# Patient Record
Sex: FEMALE | Race: WHITE | NOT HISPANIC OR LATINO | Employment: OTHER | URBAN - METROPOLITAN AREA
[De-identification: names, ages, dates, MRNs, and addresses within clinical notes are randomized per-mention and may not be internally consistent; named-entity substitution may affect disease eponyms.]

---

## 2017-01-01 DIAGNOSIS — E78.5 DYSLIPIDEMIA: ICD-10-CM

## 2017-01-02 RX ORDER — SIMVASTATIN 40 MG/1
TABLET, FILM COATED ORAL
Qty: 30 TABLET | Refills: 5 | Status: SHIPPED | OUTPATIENT
Start: 2017-01-02 | End: 2017-07-13 | Stop reason: SDUPTHER

## 2017-01-11 ENCOUNTER — OFFICE VISIT (OUTPATIENT)
Dept: FAMILY MEDICINE | Facility: CLINIC | Age: 77
End: 2017-01-11
Payer: MEDICARE

## 2017-01-11 VITALS
HEIGHT: 69 IN | WEIGHT: 145 LBS | DIASTOLIC BLOOD PRESSURE: 80 MMHG | BODY MASS INDEX: 21.48 KG/M2 | SYSTOLIC BLOOD PRESSURE: 148 MMHG | HEART RATE: 84 BPM

## 2017-01-11 DIAGNOSIS — Z12.39 BREAST CANCER SCREENING: ICD-10-CM

## 2017-01-11 DIAGNOSIS — E04.1 THYROID NODULE: ICD-10-CM

## 2017-01-11 DIAGNOSIS — G81.91 RIGHT HEMIPLEGIA: ICD-10-CM

## 2017-01-11 DIAGNOSIS — Z12.31 ENCOUNTER FOR SCREENING MAMMOGRAM FOR MALIGNANT NEOPLASM OF BREAST: ICD-10-CM

## 2017-01-11 DIAGNOSIS — K21.9 GASTROESOPHAGEAL REFLUX DISEASE, ESOPHAGITIS PRESENCE NOT SPECIFIED: ICD-10-CM

## 2017-01-11 DIAGNOSIS — R47.01 APHASIA: ICD-10-CM

## 2017-01-11 DIAGNOSIS — E11.49 DIABETES MELLITUS TYPE 2 WITH NEUROLOGICAL MANIFESTATIONS: ICD-10-CM

## 2017-01-11 DIAGNOSIS — I10 ESSENTIAL HYPERTENSION: Primary | ICD-10-CM

## 2017-01-11 PROCEDURE — 99999 PR PBB SHADOW E&M-EST. PATIENT-LVL II: CPT | Mod: PBBFAC,,, | Performed by: FAMILY MEDICINE

## 2017-01-11 PROCEDURE — 3077F SYST BP >= 140 MM HG: CPT | Mod: S$GLB,,, | Performed by: FAMILY MEDICINE

## 2017-01-11 PROCEDURE — 1159F MED LIST DOCD IN RCRD: CPT | Mod: S$GLB,,, | Performed by: FAMILY MEDICINE

## 2017-01-11 PROCEDURE — 3079F DIAST BP 80-89 MM HG: CPT | Mod: S$GLB,,, | Performed by: FAMILY MEDICINE

## 2017-01-11 PROCEDURE — 99214 OFFICE O/P EST MOD 30 MIN: CPT | Mod: S$GLB,,, | Performed by: FAMILY MEDICINE

## 2017-01-11 PROCEDURE — 4010F ACE/ARB THERAPY RXD/TAKEN: CPT | Mod: S$GLB,,, | Performed by: FAMILY MEDICINE

## 2017-01-11 PROCEDURE — 1157F ADVNC CARE PLAN IN RCRD: CPT | Mod: S$GLB,,, | Performed by: FAMILY MEDICINE

## 2017-01-11 PROCEDURE — 1160F RVW MEDS BY RX/DR IN RCRD: CPT | Mod: S$GLB,,, | Performed by: FAMILY MEDICINE

## 2017-01-11 PROCEDURE — 3044F HG A1C LEVEL LT 7.0%: CPT | Mod: S$GLB,,, | Performed by: FAMILY MEDICINE

## 2017-01-11 PROCEDURE — 99499 UNLISTED E&M SERVICE: CPT | Mod: S$GLB,,, | Performed by: FAMILY MEDICINE

## 2017-01-11 PROCEDURE — 2022F DILAT RTA XM EVC RTNOPTHY: CPT | Mod: S$GLB,,, | Performed by: FAMILY MEDICINE

## 2017-01-11 RX ORDER — LISINOPRIL AND HYDROCHLOROTHIAZIDE 12.5; 2 MG/1; MG/1
1 TABLET ORAL DAILY
Qty: 30 TABLET | Refills: 5 | Status: SHIPPED | OUTPATIENT
Start: 2017-01-11 | End: 2017-01-25 | Stop reason: SDUPTHER

## 2017-01-11 RX ORDER — LISINOPRIL 20 MG/1
20 TABLET ORAL DAILY
Status: CANCELLED | OUTPATIENT
Start: 2017-01-11

## 2017-01-11 RX ORDER — METFORMIN HYDROCHLORIDE 1000 MG/1
1000 TABLET ORAL 2 TIMES DAILY WITH MEALS
Qty: 60 TABLET | Refills: 5 | Status: SHIPPED | OUTPATIENT
Start: 2017-01-11 | End: 2017-07-08 | Stop reason: SDUPTHER

## 2017-01-11 RX ORDER — CARVEDILOL 12.5 MG/1
12.5 TABLET ORAL 2 TIMES DAILY
Qty: 60 TABLET | Refills: 11 | Status: SHIPPED | OUTPATIENT
Start: 2017-01-11 | End: 2018-01-13 | Stop reason: SDUPTHER

## 2017-01-11 RX ORDER — PANTOPRAZOLE SODIUM 40 MG/1
40 TABLET, DELAYED RELEASE ORAL DAILY
Qty: 30 TABLET | Refills: 11 | Status: SHIPPED | OUTPATIENT
Start: 2017-01-11 | End: 2018-01-12 | Stop reason: SDUPTHER

## 2017-01-11 NOTE — MR AVS SNAPSHOT
[facility]  [address]  [facility] [address]  Phone: [phone]  Fax: [phone]                  Michelle Rowell   2017 3:30 PM   Office Visit    Description:  Female : 1940   Provider:  Reno Hilario MD   Department:  [facility]           Reason for Visit     Follow-up           Diagnoses this Visit        Comments    Essential hypertension    -  Primary     Diabetes mellitus type 2 with neurological manifestations         Gastroesophageal reflux disease, esophagitis presence not specified         Right hemiplegia         Aphasia         Breast cancer screening         Encounter for screening mammogram for malignant neoplasm of breast         Thyroid nodule                To Do List           Future Appointments        Provider Department Dept Phone    2017 11:30 AM STAH MAMMO1 [facility] [phone]    2017 4:15 PM Reno Hilario MD [facility] [phone]    2017 11:30 AM Rodrick Faria MD [facility] - Neurology [phone]      Goals (5 Years of Data)     None      Follow-Up and Disposition     Return in about 2 weeks (around 2017).    Follow-up and Disposition History       These Medications        Disp Refills Start End    metformin (GLUCOPHAGE) 1000 MG tablet 60 tablet 5 2017     Take 1 tablet (1,000 mg total) by mouth 2 (two) times daily with meals. - Oral    Pharmacy: [facility] [address] [place] [address] Ph #: [phone]       carvedilol (COREG) 12.5 MG tablet 60 tablet 11 2017    Take 1 tablet (12.5 mg total) by mouth 2 (two) times daily. - Oral    Pharmacy: [facility] [address] [place] [address] Ph #: [phone]       pantoprazole (PROTONIX) 40 MG tablet 30 tablet 11 2017     Take 1 tablet (40 mg total) by mouth once daily. - Oral    Pharmacy: [facility] [address] [place] [address]  Ph #: [phone]       lisinopril-hydrochlorothiazide (PRINZIDE,ZESTORETIC) 20-12.5 mg per tablet 30 tablet 5 1/11/2017 1/11/2018    Take 1 tablet by mouth once daily. - Oral    Pharmacy: [facility] [place] [address] Ph #: [phone]         [facility]     [facility] On Call Nurse Care Line - 24/7 Assistance  Registered nurses in the [facility] provide clinical advisement, health education, appointment booking, and other advisory services.  Call for this free service at [phone].             Medications           Message regarding Medications     Verify the changes and/or additions to your medication regime listed below are the same as discussed with your clinician today.  If any of these changes or additions are incorrect, please notify your healthcare provider.        START taking these NEW medications        Refills    lisinopril-hydrochlorothiazide (PRINZIDE,ZESTORETIC) 20-12.5 mg per tablet 5    Sig: Take 1 tablet by mouth once daily.    Class: Normal    Route: Oral      CHANGE how you are taking these medications     Start Taking Instead of    metformin (GLUCOPHAGE) 1000 MG tablet metformin (GLUCOPHAGE) 1000 MG tablet    Dosage:  Take 1 tablet (1,000 mg total) by mouth 2 (two) times daily with meals. Dosage:  TAKE ONE TABLET BY MOUTH TWICE DAILY WITH  MEALS    Reason for Change:  Reorder     pantoprazole (PROTONIX) 40 MG tablet pantoprazole (PROTONIX) 40 MG tablet    Dosage:  Take 1 tablet (40 mg total) by mouth once daily. Dosage:  TAKE ONE TABLET BY MOUTH ONCE DAILY    Reason for Change:  Reorder            Verify that the below list of medications is an accurate representation of the medications you are currently taking.  If none reported, the list may be blank. If incorrect, please contact your healthcare provider. Carry this list with you in case of emergency.           Current Medications     carvedilol (COREG) 12.5 MG tablet Take 1 tablet (12.5 mg total)  "by mouth 2 (two) times daily.    latanoprost 0.005 % ophthalmic solution Place 1 drop into both eyes every evening.    lisinopril (PRINIVIL,ZESTRIL) 20 MG tablet Take 1 tablet by mouth once daily.    metformin (GLUCOPHAGE) 1000 MG tablet Take 1 tablet (1,000 mg total) by mouth 2 (two) times daily with meals.    pantoprazole (PROTONIX) 40 MG tablet Take 1 tablet (40 mg total) by mouth once daily.    simvastatin (ZOCOR) 40 MG tablet TAKE ONE TABLET BY MOUTH ONCE DAILY IN THE EVENING    lisinopril-hydrochlorothiazide (PRINZIDE,ZESTORETIC) 20-12.5 mg per tablet Take 1 tablet by mouth once daily.           Clinical Reference Information           Vital Signs - Last Recorded  Most recent update: 1/11/2017  3:46 PM by Nasim River MA    BP Pulse Ht Wt BMI    (!) 148/80 (BP Location: Left arm, Patient Position: Sitting, BP Method: Manual) 84 5' 9" (1.753 m) 65.8 kg (145 lb) 21.41 kg/m2      Blood Pressure          Most Recent Value    BP  (!)  148/80      Allergies as of 1/11/2017     No Known Allergies      Immunizations Administered on Date of Encounter - 1/11/2017     None      Orders Placed During Today's Visit     Future Labs/Procedures Expected by Expires    Mammo Digital Screening Bilat with CAD  1/11/2017 3/14/2018      "

## 2017-01-11 NOTE — PROGRESS NOTES
Subjective:       Patient ID: Michelle Rowell is a 76 y.o. female.    Chief Complaint: Follow-up    Pt is a 76 y.o. female who presents for evaluation and management of   Encounter Diagnoses   Name Primary?    Essential hypertension Yes    Diabetes mellitus type 2 with neurological manifestations     Gastroesophageal reflux disease, esophagitis presence not specified     Right hemiplegia     Aphasia     Breast cancer screening     Encounter for screening mammogram for malignant neoplasm of breast     .  Doing well on current meds. Denies any side effects. Prevention is up to date.    Review of Systems   Constitutional: Negative for chills and fever.   Respiratory: Negative for shortness of breath.    Cardiovascular: Negative for chest pain and palpitations.   Gastrointestinal: Negative for abdominal pain, blood in stool, constipation and nausea.   Genitourinary: Negative for difficulty urinating.   Neurological: Positive for speech difficulty, weakness and numbness.   Psychiatric/Behavioral: Negative for dysphoric mood, sleep disturbance and suicidal ideas. The patient is not nervous/anxious.        Objective:      Physical Exam   Constitutional: She is oriented to person, place, and time. She appears well-developed and well-nourished.   HENT:   Head: Normocephalic and atraumatic.   Right Ear: External ear normal.   Left Ear: External ear normal.   Nose: Nose normal.   Mouth/Throat: Oropharynx is clear and moist.   Eyes: EOM are normal. Pupils are equal, round, and reactive to light.   Neck: Normal range of motion. Neck supple. No JVD present. No tracheal deviation present. No thyromegaly present.   Cardiovascular: Normal rate, normal heart sounds and intact distal pulses.    No murmur heard.  Pulmonary/Chest: Effort normal and breath sounds normal. No respiratory distress. She has no wheezes. She has no rales. She exhibits no tenderness.   Abdominal: Soft. Bowel sounds are normal. She exhibits no  distension and no mass. There is no tenderness. There is no rebound and no guarding.   Musculoskeletal: Normal range of motion. She exhibits no edema or tenderness.   Lymphadenopathy:     She has no cervical adenopathy.   Neurological: She is alert and oriented to person, place, and time. She has normal reflexes. She displays normal reflexes. No cranial nerve deficit. She exhibits normal muscle tone. Coordination normal.   Mild dysarthria   Skin: Skin is warm and dry. No rash noted. No erythema. No pallor.   Psychiatric: She has a normal mood and affect. Her behavior is normal. Judgment and thought content normal.       Assessment:       1. Essential hypertension    2. Diabetes mellitus type 2 with neurological manifestations    3. Gastroesophageal reflux disease, esophagitis presence not specified    4. Right hemiplegia    5. Aphasia    6. Breast cancer screening    7. Encounter for screening mammogram for malignant neoplasm of breast         Plan:   Michelle was seen today for follow-up.    Diagnoses and all orders for this visit:    Essential hypertension  -     carvedilol (COREG) 12.5 MG tablet; Take 1 tablet (12.5 mg total) by mouth 2 (two) times daily.  -     lisinopril-hydrochlorothiazide (PRINZIDE,ZESTORETIC) 20-12.5 mg per tablet; Take 1 tablet by mouth once daily.    Diabetes mellitus type 2 with neurological manifestations  -     metformin (GLUCOPHAGE) 1000 MG tablet; Take 1 tablet (1,000 mg total) by mouth 2 (two) times daily with meals.    Gastroesophageal reflux disease, esophagitis presence not specified  -     pantoprazole (PROTONIX) 40 MG tablet; Take 1 tablet (40 mg total) by mouth once daily.    Right hemiplegia    Aphasia    Breast cancer screening  -     Mammo Digital Screening Bilat with CAD; Future    Encounter for screening mammogram for malignant neoplasm of breast   -     Mammo Digital Screening Bilat with CAD; Future    Thyroid nodule    Other orders  -     Cancel: lisinopril  (PRINIVIL,ZESTRIL) 20 MG tablet; Take 1 tablet (20 mg total) by mouth once daily.    RTC 2 weeks fo BP---added 12.5 HCT to ACE   See [facility] for FNA     No Follow-up on file.

## 2017-01-17 ENCOUNTER — HOSPITAL ENCOUNTER (OUTPATIENT)
Dept: RADIOLOGY | Facility: HOSPITAL | Age: 77
Discharge: HOME | End: 2017-01-17
Attending: FAMILY MEDICINE
Payer: MEDICARE

## 2017-01-17 DIAGNOSIS — Z12.39 BREAST CANCER SCREENING: ICD-10-CM

## 2017-01-17 DIAGNOSIS — Z12.31 ENCOUNTER FOR SCREENING MAMMOGRAM FOR MALIGNANT NEOPLASM OF BREAST: ICD-10-CM

## 2017-01-17 PROCEDURE — 77067 SCR MAMMO BI INCL CAD: CPT | Mod: 26,,, | Performed by: RADIOLOGY

## 2017-01-17 PROCEDURE — 77063 BREAST TOMOSYNTHESIS BI: CPT | Mod: 26,,, | Performed by: RADIOLOGY

## 2017-01-17 PROCEDURE — 77067 SCR MAMMO BI INCL CAD: CPT | Mod: TC

## 2017-01-25 ENCOUNTER — OFFICE VISIT (OUTPATIENT)
Dept: FAMILY MEDICINE | Facility: CLINIC | Age: 77
End: 2017-01-25
Payer: MEDICARE

## 2017-01-25 VITALS
HEART RATE: 88 BPM | WEIGHT: 141.38 LBS | HEIGHT: 69 IN | BODY MASS INDEX: 20.94 KG/M2 | SYSTOLIC BLOOD PRESSURE: 126 MMHG | DIASTOLIC BLOOD PRESSURE: 76 MMHG

## 2017-01-25 DIAGNOSIS — I10 ESSENTIAL HYPERTENSION: Primary | ICD-10-CM

## 2017-01-25 DIAGNOSIS — E11.40 TYPE 2 DIABETES MELLITUS WITH DIABETIC NEUROPATHY, WITHOUT LONG-TERM CURRENT USE OF INSULIN: ICD-10-CM

## 2017-01-25 DIAGNOSIS — E78.5 DYSLIPIDEMIA: ICD-10-CM

## 2017-01-25 PROCEDURE — 1157F ADVNC CARE PLAN IN RCRD: CPT | Mod: S$GLB,,, | Performed by: FAMILY MEDICINE

## 2017-01-25 PROCEDURE — 99499 UNLISTED E&M SERVICE: CPT | Mod: S$GLB,,, | Performed by: FAMILY MEDICINE

## 2017-01-25 PROCEDURE — 1159F MED LIST DOCD IN RCRD: CPT | Mod: S$GLB,,, | Performed by: FAMILY MEDICINE

## 2017-01-25 PROCEDURE — 3074F SYST BP LT 130 MM HG: CPT | Mod: S$GLB,,, | Performed by: FAMILY MEDICINE

## 2017-01-25 PROCEDURE — 4010F ACE/ARB THERAPY RXD/TAKEN: CPT | Mod: S$GLB,,, | Performed by: FAMILY MEDICINE

## 2017-01-25 PROCEDURE — 3044F HG A1C LEVEL LT 7.0%: CPT | Mod: S$GLB,,, | Performed by: FAMILY MEDICINE

## 2017-01-25 PROCEDURE — 99999 PR PBB SHADOW E&M-EST. PATIENT-LVL II: CPT | Mod: PBBFAC,,, | Performed by: FAMILY MEDICINE

## 2017-01-25 PROCEDURE — 1160F RVW MEDS BY RX/DR IN RCRD: CPT | Mod: S$GLB,,, | Performed by: FAMILY MEDICINE

## 2017-01-25 PROCEDURE — 2022F DILAT RTA XM EVC RTNOPTHY: CPT | Mod: S$GLB,,, | Performed by: FAMILY MEDICINE

## 2017-01-25 PROCEDURE — 3078F DIAST BP <80 MM HG: CPT | Mod: S$GLB,,, | Performed by: FAMILY MEDICINE

## 2017-01-25 PROCEDURE — 99213 OFFICE O/P EST LOW 20 MIN: CPT | Mod: S$GLB,,, | Performed by: FAMILY MEDICINE

## 2017-01-25 RX ORDER — LISINOPRIL AND HYDROCHLOROTHIAZIDE 12.5; 2 MG/1; MG/1
1 TABLET ORAL DAILY
Qty: 30 TABLET | Refills: 5 | Status: SHIPPED | OUTPATIENT
Start: 2017-01-25 | End: 2018-01-25

## 2017-01-25 NOTE — PROGRESS NOTES
Subjective:       Patient ID: Michelle Rowell is a 76 y.o. female.    Chief Complaint: Follow-up (1 week)    Pt is a 76 y.o. female who presents for evaluation and management of   Encounter Diagnoses   Name Primary?    Essential hypertension Yes    Dyslipidemia     Type 2 diabetes mellitus with diabetic neuropathy, without long-term current use of insulin    .  Doing well on current meds. Denies any side effects. Prevention is up to date.    Review of Systems   Respiratory: Negative for shortness of breath.    Cardiovascular: Negative for chest pain.       Objective:      Physical Exam   Constitutional: She is oriented to person, place, and time. She appears well-developed and well-nourished.   HENT:   Head: Normocephalic and atraumatic.   Right Ear: External ear normal.   Left Ear: External ear normal.   Nose: Nose normal.   Mouth/Throat: Oropharynx is clear and moist.   Eyes: EOM are normal. Pupils are equal, round, and reactive to light.   Neck: Normal range of motion. Neck supple. No JVD present. No tracheal deviation present. No thyromegaly present.   Cardiovascular: Normal rate, normal heart sounds and intact distal pulses.    No murmur heard.  Pulmonary/Chest: Effort normal and breath sounds normal. No respiratory distress. She has no wheezes. She has no rales. She exhibits no tenderness.   Abdominal: Soft. Bowel sounds are normal. She exhibits no distension and no mass. There is no tenderness. There is no rebound and no guarding.   Musculoskeletal: Normal range of motion. She exhibits no edema or tenderness.   Lymphadenopathy:     She has no cervical adenopathy.   Neurological: She is alert and oriented to person, place, and time. She has normal reflexes. She displays normal reflexes. No cranial nerve deficit. She exhibits normal muscle tone. Coordination normal.   Skin: Skin is warm and dry. No rash noted. No erythema. No pallor.   Psychiatric: She has a normal mood and affect. Her behavior is  normal. Judgment and thought content normal.       Assessment:       1. Essential hypertension    2. Dyslipidemia    3. Type 2 diabetes mellitus with diabetic neuropathy, without long-term current use of insulin        Plan:   Michelle was seen today for follow-up.    Diagnoses and all orders for this visit:    Essential hypertension  -     lisinopril-hydrochlorothiazide (PRINZIDE,ZESTORETIC) 20-12.5 mg per tablet; Take 1 tablet by mouth once daily.  -     Comprehensive metabolic panel; Future    Dyslipidemia  -     Lipid panel; Future  -     Comprehensive metabolic panel; Future    Type 2 diabetes mellitus with diabetic neuropathy, without long-term current use of insulin  -     Hemoglobin A1c; Future    RTC 3 months with labs   No Follow-up on file.

## 2017-04-21 ENCOUNTER — CLINICAL SUPPORT (OUTPATIENT)
Dept: FAMILY MEDICINE | Facility: CLINIC | Age: 77
End: 2017-04-21
Payer: MEDICARE

## 2017-04-21 DIAGNOSIS — I10 ESSENTIAL HYPERTENSION: ICD-10-CM

## 2017-04-21 DIAGNOSIS — E11.40 TYPE 2 DIABETES MELLITUS WITH DIABETIC NEUROPATHY, WITHOUT LONG-TERM CURRENT USE OF INSULIN: ICD-10-CM

## 2017-04-21 DIAGNOSIS — E78.5 DYSLIPIDEMIA: ICD-10-CM

## 2017-04-21 LAB
ALBUMIN SERPL BCP-MCNC: 3.5 G/DL
ALP SERPL-CCNC: 52 U/L
ALT SERPL W/O P-5'-P-CCNC: 25 U/L
ANION GAP SERPL CALC-SCNC: 12 MMOL/L
AST SERPL-CCNC: 24 U/L
BILIRUB SERPL-MCNC: 0.4 MG/DL
BUN SERPL-MCNC: 22 MG/DL
CALCIUM SERPL-MCNC: 9.3 MG/DL
CHLORIDE SERPL-SCNC: 102 MMOL/L
CHOLEST/HDLC SERPL: 2.4 {RATIO}
CO2 SERPL-SCNC: 25 MMOL/L
CREAT SERPL-MCNC: 0.8 MG/DL
EST. GFR  (AFRICAN AMERICAN): >60 ML/MIN/1.73 M^2
EST. GFR  (NON AFRICAN AMERICAN): >60 ML/MIN/1.73 M^2
GLUCOSE SERPL-MCNC: 130 MG/DL
HDL/CHOLESTEROL RATIO: 41.6 %
HDLC SERPL-MCNC: 137 MG/DL
HDLC SERPL-MCNC: 57 MG/DL
LDLC SERPL CALC-MCNC: 46.4 MG/DL
NONHDLC SERPL-MCNC: 80 MG/DL
POTASSIUM SERPL-SCNC: 4 MMOL/L
PROT SERPL-MCNC: 6.6 G/DL
SODIUM SERPL-SCNC: 139 MMOL/L
TRIGL SERPL-MCNC: 168 MG/DL

## 2017-04-21 PROCEDURE — 83036 HEMOGLOBIN GLYCOSYLATED A1C: CPT

## 2017-04-21 PROCEDURE — 36415 COLL VENOUS BLD VENIPUNCTURE: CPT | Mod: S$GLB,,, | Performed by: FAMILY MEDICINE

## 2017-04-21 PROCEDURE — 80053 COMPREHEN METABOLIC PANEL: CPT

## 2017-04-21 PROCEDURE — 80061 LIPID PANEL: CPT

## 2017-04-22 LAB
ESTIMATED AVG GLUCOSE: 134 MG/DL
HBA1C MFR BLD HPLC: 6.3 %

## 2017-04-27 ENCOUNTER — OFFICE VISIT (OUTPATIENT)
Dept: FAMILY MEDICINE | Facility: CLINIC | Age: 77
End: 2017-04-27
Payer: MEDICARE

## 2017-04-27 VITALS
DIASTOLIC BLOOD PRESSURE: 80 MMHG | SYSTOLIC BLOOD PRESSURE: 116 MMHG | HEART RATE: 80 BPM | HEIGHT: 69 IN | BODY MASS INDEX: 20.01 KG/M2 | WEIGHT: 135.13 LBS

## 2017-04-27 DIAGNOSIS — E11.59 TYPE 2 DIABETES MELLITUS WITH OTHER CIRCULATORY COMPLICATION, WITHOUT LONG-TERM CURRENT USE OF INSULIN: ICD-10-CM

## 2017-04-27 DIAGNOSIS — G47.00 INSOMNIA, UNSPECIFIED TYPE: ICD-10-CM

## 2017-04-27 DIAGNOSIS — R47.01 APHASIA: ICD-10-CM

## 2017-04-27 DIAGNOSIS — I10 ESSENTIAL HYPERTENSION: ICD-10-CM

## 2017-04-27 DIAGNOSIS — F32.A DEPRESSION, UNSPECIFIED DEPRESSION TYPE: ICD-10-CM

## 2017-04-27 DIAGNOSIS — Z86.73 HISTORY OF EMBOLIC STROKE: Primary | ICD-10-CM

## 2017-04-27 DIAGNOSIS — R63.0 POOR APPETITE: ICD-10-CM

## 2017-04-27 PROCEDURE — 1160F RVW MEDS BY RX/DR IN RCRD: CPT | Mod: S$GLB,,, | Performed by: FAMILY MEDICINE

## 2017-04-27 PROCEDURE — 3074F SYST BP LT 130 MM HG: CPT | Mod: S$GLB,,, | Performed by: FAMILY MEDICINE

## 2017-04-27 PROCEDURE — 99214 OFFICE O/P EST MOD 30 MIN: CPT | Mod: S$GLB,,, | Performed by: FAMILY MEDICINE

## 2017-04-27 PROCEDURE — 4010F ACE/ARB THERAPY RXD/TAKEN: CPT | Mod: S$GLB,,, | Performed by: FAMILY MEDICINE

## 2017-04-27 PROCEDURE — 99499 UNLISTED E&M SERVICE: CPT | Mod: S$GLB,,, | Performed by: FAMILY MEDICINE

## 2017-04-27 PROCEDURE — 1159F MED LIST DOCD IN RCRD: CPT | Mod: S$GLB,,, | Performed by: FAMILY MEDICINE

## 2017-04-27 PROCEDURE — 3044F HG A1C LEVEL LT 7.0%: CPT | Mod: S$GLB,,, | Performed by: FAMILY MEDICINE

## 2017-04-27 PROCEDURE — 3079F DIAST BP 80-89 MM HG: CPT | Mod: S$GLB,,, | Performed by: FAMILY MEDICINE

## 2017-04-27 PROCEDURE — 99999 PR PBB SHADOW E&M-EST. PATIENT-LVL II: CPT | Mod: PBBFAC,,, | Performed by: FAMILY MEDICINE

## 2017-04-27 RX ORDER — MIRTAZAPINE 15 MG/1
15 TABLET, FILM COATED ORAL NIGHTLY
Qty: 30 TABLET | Refills: 1 | Status: SHIPPED | OUTPATIENT
Start: 2017-04-27 | End: 2017-06-06 | Stop reason: SDUPTHER

## 2017-04-27 NOTE — PROGRESS NOTES
Subjective:       Patient ID: Michelle Rowell is a 76 y.o. female.    Chief Complaint: Follow-up    Pt is a 76 y.o. female who presents for evaluation and management of   Encounter Diagnoses   Name Primary?    History of embolic stroke Yes    Essential hypertension     Aphasia     Type 2 diabetes mellitus with other circulatory complication, without long-term current use of insulin     Insomnia, unspecified type     Depression, unspecified depression type     Poor appetite    .More withdrawn and weak per family. Seems like she is getting weaker. Stays home. Doesn't want to go out. Losing weight. She insists that her appetite is good. Eats mostly sandwiches     Doing well on current meds. Denies any side effects. Prevention is up to date.  Review of Systems   Constitutional: Positive for fatigue. Negative for chills and fever.   Respiratory: Negative for shortness of breath.    Cardiovascular: Negative for chest pain and palpitations.   Gastrointestinal: Negative for abdominal pain, blood in stool, constipation and nausea.   Genitourinary: Negative for difficulty urinating.   Neurological: Positive for speech difficulty, weakness and numbness.   Psychiatric/Behavioral: Positive for sleep disturbance. Negative for dysphoric mood and suicidal ideas. The patient is not nervous/anxious.         Sleeps 5 hours a night on a good night        Objective:      Physical Exam   Constitutional: She is oriented to person, place, and time. She appears well-developed and well-nourished.   HENT:   Head: Normocephalic and atraumatic.   Right Ear: External ear normal.   Left Ear: External ear normal.   Nose: Nose normal.   Mouth/Throat: Oropharynx is clear and moist.   Eyes: EOM are normal. Pupils are equal, round, and reactive to light.   Neck: Normal range of motion. Neck supple. No JVD present. No tracheal deviation present. No thyromegaly present.   Cardiovascular: Normal rate, normal heart sounds and intact distal  pulses.    No murmur heard.  Pulmonary/Chest: Effort normal and breath sounds normal. No respiratory distress. She has no wheezes. She has no rales. She exhibits no tenderness.   Abdominal: Soft. Bowel sounds are normal. She exhibits no distension and no mass. There is no tenderness. There is no rebound and no guarding.   Musculoskeletal: Normal range of motion. She exhibits no edema or tenderness.   Lymphadenopathy:     She has no cervical adenopathy.   Neurological: She is alert and oriented to person, place, and time. She has normal reflexes. She displays normal reflexes. No cranial nerve deficit. She exhibits normal muscle tone. Coordination normal.   Mild dysarthria   Skin: Skin is warm and dry. No rash noted. No erythema. No pallor.   Psychiatric: She has a normal mood and affect. Her behavior is normal. Judgment and thought content normal.       Assessment:       1. History of embolic stroke    2. Essential hypertension    3. Aphasia    4. Type 2 diabetes mellitus with other circulatory complication, without long-term current use of insulin    5. Insomnia, unspecified type    6. Depression, unspecified depression type    7. Poor appetite        Plan:   Michelle was seen today for follow-up.    Diagnoses and all orders for this visit:    History of embolic stroke    Essential hypertension    Aphasia    Type 2 diabetes mellitus with other circulatory complication, without long-term current use of insulin    Insomnia, unspecified type  -     mirtazapine (REMERON) 15 MG tablet; Take 1 tablet (15 mg total) by mouth every evening.    Depression, unspecified depression type  -     mirtazapine (REMERON) 15 MG tablet; Take 1 tablet (15 mg total) by mouth every evening.    Poor appetite  -     mirtazapine (REMERON) 15 MG tablet; Take 1 tablet (15 mg total) by mouth every evening.    refer to PT for debility   Add glucerna once a day   Adding above and RTC 1 month     No Follow-up on file.

## 2017-04-27 NOTE — MR AVS SNAPSHOT
[facility]  [address]  [facility] [address]  Phone: [phone]  Fax: [phone]                  Michelle Rowell   2017 4:15 PM   Office Visit    Description:  Female : 1940   Provider:  Reno Hilario MD   Department:  [facility]           Reason for Visit     Follow-up           Diagnoses this Visit        Comments    History of embolic stroke    -  Primary     Essential hypertension         Aphasia         Type 2 diabetes mellitus with other circulatory complication, without long-term current use of insulin         Insomnia, unspecified type         Depression, unspecified depression type         Poor appetite                To Do List           Future Appointments        Provider Department Dept Phone    2017 4:15 PM Reno Hilario MD [facility] [phone]    2017 11:30 AM Rodrick Faria MD [facility] - Neurology [phone]      Goals (5 Years of Data)     None      Follow-Up and Disposition     Return in about 1 month (around 2017).       These Medications        Disp Refills Start End    mirtazapine (REMERON) 15 MG tablet 30 tablet 1 2017    Take 1 tablet (15 mg total) by mouth every evening. - Oral    Pharmacy: [facility] [address] [place] [address] #: [phone]         [facility]     [facility] Care Line - 24/ Assistance  Unless otherwise directed by your provider, please contact [facility], our nurse care line that is available for / assistance.     Registered nurses in the [facility] provide: appointment scheduling, clinical advisement, health education, and other advisory services.  Call: [phone] (toll free)               Medications           Message regarding Medications     Verify the changes and/or additions to your medication regime listed below are the same as discussed with your clinician  "today.  If any of these changes or additions are incorrect, please notify your healthcare provider.        START taking these NEW medications        Refills    mirtazapine (REMERON) 15 MG tablet 1    Sig: Take 1 tablet (15 mg total) by mouth every evening.    Class: Normal    Route: Oral           Verify that the below list of medications is an accurate representation of the medications you are currently taking.  If none reported, the list may be blank. If incorrect, please contact your healthcare provider. Carry this list with you in case of emergency.           Current Medications     carvedilol (COREG) 12.5 MG tablet Take 1 tablet (12.5 mg total) by mouth 2 (two) times daily.    latanoprost 0.005 % ophthalmic solution Place 1 drop into both eyes every evening.    lisinopril-hydrochlorothiazide (PRINZIDE,ZESTORETIC) 20-12.5 mg per tablet Take 1 tablet by mouth once daily.    metformin (GLUCOPHAGE) 1000 MG tablet Take 1 tablet (1,000 mg total) by mouth 2 (two) times daily with meals.    pantoprazole (PROTONIX) 40 MG tablet Take 1 tablet (40 mg total) by mouth once daily.    simvastatin (ZOCOR) 40 MG tablet TAKE ONE TABLET BY MOUTH ONCE DAILY IN THE EVENING    mirtazapine (REMERON) 15 MG tablet Take 1 tablet (15 mg total) by mouth every evening.           Clinical Reference Information           Your Vitals Were     BP Pulse Height Weight BMI    116/80 (BP Location: Left arm, Patient Position: Sitting, BP Method: Manual) 80 5' 9" (1.753 m) 61.3 kg (135 lb 2.3 oz) 19.96 kg/m2      Blood Pressure          Most Recent Value    BP  116/80      Allergies as of 4/27/2017     No Known Allergies      Immunizations Administered on Date of Encounter - 4/27/2017     None      Language Assistance Services     ATTENTION: Language assistance services are available, free of charge. Please call [phone].      ATENCIÓN: Si sidrala arvind, tiene a kumar disposición servicios gratuitos de asistencia lingüística. Llame al " [phone].     APOLINAR Ý: N?u b?n nói Ti?ng Vi?t, có các d?ch v? h? tr? ngôn ng? mi?n phí dành cho b?n. G?i s? [phone].         [facility] complies with applicable Federal civil rights laws and does not discriminate on the basis of race, color, national origin, age, disability, or sex.

## 2017-06-06 ENCOUNTER — OFFICE VISIT (OUTPATIENT)
Dept: FAMILY MEDICINE | Facility: CLINIC | Age: 77
End: 2017-06-06
Payer: MEDICARE

## 2017-06-06 VITALS
DIASTOLIC BLOOD PRESSURE: 62 MMHG | RESPIRATION RATE: 18 BRPM | HEIGHT: 69 IN | SYSTOLIC BLOOD PRESSURE: 130 MMHG | BODY MASS INDEX: 20.27 KG/M2 | WEIGHT: 136.88 LBS | HEART RATE: 88 BPM

## 2017-06-06 DIAGNOSIS — S31.000A SACRAL WOUND, INITIAL ENCOUNTER: ICD-10-CM

## 2017-06-06 DIAGNOSIS — R63.0 POOR APPETITE: ICD-10-CM

## 2017-06-06 DIAGNOSIS — E11.9 TYPE 2 DIABETES MELLITUS WITHOUT COMPLICATION, WITHOUT LONG-TERM CURRENT USE OF INSULIN: Primary | ICD-10-CM

## 2017-06-06 DIAGNOSIS — F32.A DEPRESSION, UNSPECIFIED DEPRESSION TYPE: ICD-10-CM

## 2017-06-06 DIAGNOSIS — G47.00 INSOMNIA, UNSPECIFIED TYPE: ICD-10-CM

## 2017-06-06 PROCEDURE — 99499 UNLISTED E&M SERVICE: CPT | Mod: S$GLB,,, | Performed by: FAMILY MEDICINE

## 2017-06-06 PROCEDURE — 3044F HG A1C LEVEL LT 7.0%: CPT | Mod: S$GLB,,, | Performed by: FAMILY MEDICINE

## 2017-06-06 PROCEDURE — 1125F AMNT PAIN NOTED PAIN PRSNT: CPT | Mod: S$GLB,,, | Performed by: FAMILY MEDICINE

## 2017-06-06 PROCEDURE — 1159F MED LIST DOCD IN RCRD: CPT | Mod: S$GLB,,, | Performed by: FAMILY MEDICINE

## 2017-06-06 PROCEDURE — 4010F ACE/ARB THERAPY RXD/TAKEN: CPT | Mod: S$GLB,,, | Performed by: FAMILY MEDICINE

## 2017-06-06 PROCEDURE — 99999 PR PBB SHADOW E&M-EST. PATIENT-LVL II: CPT | Mod: PBBFAC,,, | Performed by: FAMILY MEDICINE

## 2017-06-06 PROCEDURE — 99214 OFFICE O/P EST MOD 30 MIN: CPT | Mod: S$GLB,,, | Performed by: FAMILY MEDICINE

## 2017-06-06 RX ORDER — MIRTAZAPINE 15 MG/1
15 TABLET, FILM COATED ORAL NIGHTLY
Qty: 30 TABLET | Refills: 5 | Status: SHIPPED | OUTPATIENT
Start: 2017-06-06 | End: 2017-06-22 | Stop reason: SDUPTHER

## 2017-06-06 NOTE — PROGRESS NOTES
Subjective:       Patient ID: Michelle Rowell is a 76 y.o. female.    Chief Complaint: Follow-up and Pain (tailbone )    Pt is a 76 y.o. female who presents for evaluation and management of   Encounter Diagnoses   Name Primary?    Insomnia, unspecified type     Depression, unspecified depression type     Poor appetite     Type 2 diabetes mellitus without complication, without long-term current use of insulin Yes   .sleep mood and appetite all improved     Doing well on current meds. Denies any side effects. Prevention is up to date.    Review of Systems   Constitutional: Positive for activity change and appetite change.        Appetite seems better    Psychiatric/Behavioral: Negative for dysphoric mood, sleep disturbance and suicidal ideas. The patient is not nervous/anxious.        Objective:      Physical Exam   Constitutional: She is oriented to person, place, and time. She appears well-developed and well-nourished.   HENT:   Head: Normocephalic and atraumatic.   Right Ear: External ear normal.   Left Ear: External ear normal.   Nose: Nose normal.   Mouth/Throat: Oropharynx is clear and moist.   Eyes: EOM are normal. Pupils are equal, round, and reactive to light.   Neck: Normal range of motion. Neck supple. No JVD present. No tracheal deviation present. No thyromegaly present.   Cardiovascular: Normal rate, normal heart sounds and intact distal pulses.    No murmur heard.  Pulmonary/Chest: Effort normal and breath sounds normal. No respiratory distress. She has no wheezes. She has no rales. She exhibits no tenderness.   Abdominal: Soft. Bowel sounds are normal. She exhibits no distension and no mass. There is no tenderness. There is no rebound and no guarding.   Musculoskeletal: Normal range of motion. She exhibits no edema or tenderness.   Lymphadenopathy:     She has no cervical adenopathy.   Neurological: She is alert and oriented to person, place, and time. She has normal reflexes. She displays  "normal reflexes. No cranial nerve deficit. She exhibits normal muscle tone. Coordination normal.   Mild dysarthria   Skin: Skin is warm and dry. No rash noted. No erythema. No pallor.   5mm stage 2 wound of superior glut cleft   Psychiatric: She has a normal mood and affect. Her behavior is normal. Judgment and thought content normal.       Protective Sensation (w/ 10 gram monofilament):  Right: Intact  Left: Intact    Visual Inspection:  Normal -  Bilateral    Pedal Pulses:   Right: Present  Left: Present    Posterior tibialis:   Right:Present  Left: Present      Assessment:       1. Type 2 diabetes mellitus without complication, without long-term current use of insulin    2. Insomnia, unspecified type    3. Depression, unspecified depression type    4. Poor appetite    5. Sacral wound, initial encounter        Plan:   Michelle was seen today for follow-up and pain.    Diagnoses and all orders for this visit:    Type 2 diabetes mellitus without complication, without long-term current use of insulin    Insomnia, unspecified type  -     mirtazapine (REMERON) 15 MG tablet; Take 1 tablet (15 mg total) by mouth every evening.    Depression, unspecified depression type  -     mirtazapine (REMERON) 15 MG tablet; Take 1 tablet (15 mg total) by mouth every evening.    Poor appetite  -     mirtazapine (REMERON) 15 MG tablet; Take 1 tablet (15 mg total) by mouth every evening.    Sacral wound, initial encounter  -     foam bandage (MEPILEX BORDER) 3 X 3 " Bndg; Apply 1 Device topically once daily at 6am.    RTC 2 weeks to recheck wound     No Follow-up on file.      "

## 2017-06-09 ENCOUNTER — PATIENT OUTREACH (OUTPATIENT)
Dept: ADMINISTRATIVE | Facility: HOSPITAL | Age: 77
End: 2017-06-09

## 2017-06-09 NOTE — LETTER
June 20, 2017    Michelle NORMA Sorin  [address] [facility] [address]             [facility]  [address]  [facility] [address]  Phone: [phone] Dear Ms. Rowell:    We have tried to reach you by mychart unsuccessfully.    [facility] is committed to your overall health.  To help you get the most out of each of your visits, we will review your information to make sure you are up to date on all of your recommended tests and/or procedures.       Dr. Hilario has found that you may be due for a tetanus vaccine, a pneumonia vaccine, a shingles vaccine, and a colonoscopy.     If you have had any of the above done at another facility, please bring the records or information with you so that your record at [facility] will be complete.  If you would like to schedule any of these, please contact me.     If you are currently taking medication, please bring it with you to your appointment for review.     Also, if you have any type of Advanced Directives, please bring them with you to your office visit so we may scan them into your chart.     Sincerely,       Rosanne Daniels LPN Clinical Care Coordinator   [facility]   [phone]

## 2017-06-22 ENCOUNTER — OFFICE VISIT (OUTPATIENT)
Dept: FAMILY MEDICINE | Facility: CLINIC | Age: 77
End: 2017-06-22
Payer: MEDICARE

## 2017-06-22 VITALS
BODY MASS INDEX: 21.25 KG/M2 | HEIGHT: 67 IN | SYSTOLIC BLOOD PRESSURE: 110 MMHG | RESPIRATION RATE: 18 BRPM | WEIGHT: 135.38 LBS | DIASTOLIC BLOOD PRESSURE: 68 MMHG | HEART RATE: 82 BPM

## 2017-06-22 DIAGNOSIS — L89.90 PRESSURE ULCER, UNSPECIFIED LOCATION, UNSPECIFIED ULCER STAGE: Primary | ICD-10-CM

## 2017-06-22 DIAGNOSIS — F32.A DEPRESSION, UNSPECIFIED DEPRESSION TYPE: ICD-10-CM

## 2017-06-22 DIAGNOSIS — R63.0 POOR APPETITE: ICD-10-CM

## 2017-06-22 DIAGNOSIS — G47.00 INSOMNIA, UNSPECIFIED TYPE: ICD-10-CM

## 2017-06-22 PROCEDURE — 1159F MED LIST DOCD IN RCRD: CPT | Mod: S$GLB,,, | Performed by: FAMILY MEDICINE

## 2017-06-22 PROCEDURE — 99214 OFFICE O/P EST MOD 30 MIN: CPT | Mod: S$GLB,,, | Performed by: FAMILY MEDICINE

## 2017-06-22 PROCEDURE — 99499 UNLISTED E&M SERVICE: CPT | Mod: S$GLB,,, | Performed by: FAMILY MEDICINE

## 2017-06-22 PROCEDURE — 1126F AMNT PAIN NOTED NONE PRSNT: CPT | Mod: S$GLB,,, | Performed by: FAMILY MEDICINE

## 2017-06-22 PROCEDURE — 99999 PR PBB SHADOW E&M-EST. PATIENT-LVL III: CPT | Mod: PBBFAC,,, | Performed by: FAMILY MEDICINE

## 2017-06-22 RX ORDER — LISINOPRIL 20 MG/1
TABLET ORAL
COMMUNITY
Start: 2017-06-18 | End: 2017-06-22

## 2017-06-22 RX ORDER — MIRTAZAPINE 30 MG/1
30 TABLET, FILM COATED ORAL NIGHTLY
Qty: 30 TABLET | Refills: 5 | Status: SHIPPED | OUTPATIENT
Start: 2017-06-22 | End: 2018-01-13 | Stop reason: SDUPTHER

## 2017-06-22 NOTE — PROGRESS NOTES
Subjective:       Patient ID: Michelle Rowell is a 76 y.o. female.    Chief Complaint: Follow-up (pt had ulcer on her rectum )    Pt is a 76 y.o. female who presents for evaluation and management of   Encounter Diagnoses   Name Primary?    Insomnia, unspecified type     Depression, unspecified depression type     Poor appetite     Pressure ulcer, unspecified location, unspecified ulcer stage Yes   .  Doing well on current meds. Denies any side effects. Prevention is up to date.  Review of Systems   Respiratory: Negative for shortness of breath.    Cardiovascular: Negative for chest pain.   Skin: Negative for wound.   Psychiatric/Behavioral: Positive for agitation and dysphoric mood. Negative for sleep disturbance and suicidal ideas.       Objective:      Physical Exam   Constitutional: She is oriented to person, place, and time. She appears well-developed and well-nourished.   HENT:   Head: Normocephalic and atraumatic.   Right Ear: External ear normal.   Left Ear: External ear normal.   Nose: Nose normal.   Mouth/Throat: Oropharynx is clear and moist.   Eyes: EOM are normal. Pupils are equal, round, and reactive to light.   Neck: Normal range of motion. Neck supple. No JVD present. No tracheal deviation present. No thyromegaly present.   Cardiovascular: Normal rate, normal heart sounds and intact distal pulses.    No murmur heard.  Pulmonary/Chest: Effort normal and breath sounds normal. No respiratory distress. She has no wheezes. She has no rales. She exhibits no tenderness.   Abdominal: Soft. Bowel sounds are normal. She exhibits no distension and no mass. There is no tenderness. There is no rebound and no guarding.   Musculoskeletal: Normal range of motion. She exhibits no edema or tenderness.   Lymphadenopathy:     She has no cervical adenopathy.   Neurological: She is alert and oriented to person, place, and time. She has normal reflexes. She displays normal reflexes. No cranial nerve deficit. She  exhibits normal muscle tone. Coordination normal.   Mild dysarthria   Skin: Skin is warm and dry. No rash noted. No erythema. No pallor.   5mm stage 2 wound of superior glut cleft has resolved    Psychiatric: She has a normal mood and affect. Her behavior is normal. Judgment and thought content normal.       Assessment:       1. Pressure ulcer, unspecified location, unspecified ulcer stage    2. Insomnia, unspecified type    3. Depression, unspecified depression type    4. Poor appetite        Plan:   Michelle was seen today for follow-up.    Diagnoses and all orders for this visit:    Pressure ulcer, unspecified location, unspecified ulcer stage    Insomnia, unspecified type  -     mirtazapine (REMERON) 30 MG tablet; Take 1 tablet (30 mg total) by mouth every evening.    Depression, unspecified depression type  -     mirtazapine (REMERON) 30 MG tablet; Take 1 tablet (30 mg total) by mouth every evening.    Poor appetite  -     mirtazapine (REMERON) 30 MG tablet; Take 1 tablet (30 mg total) by mouth every evening.      No Follow-up on file.

## 2017-06-27 ENCOUNTER — LAB VISIT (OUTPATIENT)
Dept: LAB | Facility: HOSPITAL | Age: 77
End: 2017-06-27
Attending: PSYCHIATRY & NEUROLOGY
Payer: MEDICARE

## 2017-06-27 ENCOUNTER — OFFICE VISIT (OUTPATIENT)
Dept: NEUROLOGY | Facility: CLINIC | Age: 77
End: 2017-06-27
Payer: MEDICARE

## 2017-06-27 VITALS
SYSTOLIC BLOOD PRESSURE: 126 MMHG | HEART RATE: 88 BPM | DIASTOLIC BLOOD PRESSURE: 72 MMHG | WEIGHT: 135.81 LBS | BODY MASS INDEX: 21.31 KG/M2 | HEIGHT: 67 IN | RESPIRATION RATE: 16 BRPM

## 2017-06-27 DIAGNOSIS — E11.9 TYPE 2 DIABETES MELLITUS WITHOUT COMPLICATION, WITHOUT LONG-TERM CURRENT USE OF INSULIN: ICD-10-CM

## 2017-06-27 DIAGNOSIS — I10 ESSENTIAL HYPERTENSION: ICD-10-CM

## 2017-06-27 DIAGNOSIS — E78.5 DYSLIPIDEMIA: ICD-10-CM

## 2017-06-27 DIAGNOSIS — R63.4 WEIGHT LOSS: ICD-10-CM

## 2017-06-27 DIAGNOSIS — E04.1 THYROID NODULE: ICD-10-CM

## 2017-06-27 DIAGNOSIS — M48.02 CERVICAL SPINAL STENOSIS: ICD-10-CM

## 2017-06-27 DIAGNOSIS — I69.90 LATE EFFECTS OF CVA (CEREBROVASCULAR ACCIDENT): Primary | ICD-10-CM

## 2017-06-27 LAB — TSH SERPL DL<=0.005 MIU/L-ACNC: 1.63 UIU/ML

## 2017-06-27 PROCEDURE — 1126F AMNT PAIN NOTED NONE PRSNT: CPT | Mod: S$GLB,,, | Performed by: PSYCHIATRY & NEUROLOGY

## 2017-06-27 PROCEDURE — 84443 ASSAY THYROID STIM HORMONE: CPT

## 2017-06-27 PROCEDURE — 99499 UNLISTED E&M SERVICE: CPT | Mod: S$GLB,,, | Performed by: PSYCHIATRY & NEUROLOGY

## 2017-06-27 PROCEDURE — 1159F MED LIST DOCD IN RCRD: CPT | Mod: S$GLB,,, | Performed by: PSYCHIATRY & NEUROLOGY

## 2017-06-27 PROCEDURE — 99214 OFFICE O/P EST MOD 30 MIN: CPT | Mod: S$GLB,,, | Performed by: PSYCHIATRY & NEUROLOGY

## 2017-06-27 PROCEDURE — 4010F ACE/ARB THERAPY RXD/TAKEN: CPT | Mod: S$GLB,,, | Performed by: PSYCHIATRY & NEUROLOGY

## 2017-06-27 PROCEDURE — 36415 COLL VENOUS BLD VENIPUNCTURE: CPT

## 2017-06-27 PROCEDURE — 99999 PR PBB SHADOW E&M-EST. PATIENT-LVL III: CPT | Mod: PBBFAC,,, | Performed by: PSYCHIATRY & NEUROLOGY

## 2017-06-27 PROCEDURE — 3044F HG A1C LEVEL LT 7.0%: CPT | Mod: S$GLB,,, | Performed by: PSYCHIATRY & NEUROLOGY

## 2017-06-27 RX ORDER — ASPIRIN 81 MG/1
162 TABLET ORAL DAILY
Status: ON HOLD | COMMUNITY
End: 2019-06-12 | Stop reason: HOSPADM

## 2017-06-27 NOTE — PROGRESS NOTES
"HPI: Michelle Rowell is a 76 y.o. female with years of neck pain and tingling in the right neck   CT spine, showed "subluxation" at C3/4 and likely at least moderate spinal stenosis". Follow up MRI 3/2015 shows moderate spinal stenosis and "very minimal anterolisthesis of C3 in respect to C4 by " which is not concerning for subluxation.   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 Daughters seems to think this is better    The patient has lost 10 pounds since the last visit (has been loosing weight since CVA) but eats well. Weight is stable since the past 2 months.   She has been more careful with her diet. PCP placed her on Remeron which helped her appetite (used for mood which is now improved).  She is back PT to help with walking which was declined with mood  Still no neck pain    Review of Systems   Constitutional: Positive for weight loss. Negative for fever.   HENT: Negative for nosebleeds.    Eyes: Negative for double vision.   Respiratory: Negative for hemoptysis.    Cardiovascular: Negative for leg swelling.   Gastrointestinal: Negative for blood in stool.   Genitourinary: Negative for hematuria.   Musculoskeletal: Negative for falls.   Skin: Negative for rash.   Neurological: Negative for seizures.   Endo/Heme/Allergies: Does not bruise/bleed easily.   Psychiatric/Behavioral: Negative for memory loss.             Exam:  Gen Appearance, well developed/nourished in no apparent distress  CV: 2+ distal pulses with no edema or swelling  Neuro:  MS: Awake, alert,  Sustains attention. Recent/remote memory intact, Language is reduced to mildly to spontaneous speech with some slurred speech mildly (improved from prior). She has some hesitancy at times but comprehension is intact.Fund of Knowledge is full  CN: Optic discs are flat with normal vasculature, PERRL, " "Extraoccular movements and visual fields are full. Normal facial sensation and strength,   Tongue and Palate are midline and strong. Shoulder Shrug symmetric and strong.  Motor: Normal bulk, tone increased on the right, no abnormal movements. 5/5 strength bilateral upper/lower extremities with 1+ reflexes    Sensory: symmetric to temp and vibration  Cerebellar: Finger-nose, Rapid alternating movements intact  Gait: Normal stance, no ataxia but circumducts right leg- encouraged walker use or cane use.     Imagin MRI C spine: Degenerative changes of the cervical spine contributing to multilevel central canal stenosis and neural foraminal narrowing as detailed in the above report.  NOTE: "very minimal anterolisthesis of C3 in respect to C4 by "  Left thyroid nodule measuring 1.3 x 1.6 cm. Consider further evaluation with a thyroid ultrasound.  ______________________________________     RA factor negative      MRI brain 2016:   MRI following thrombectomy for left MCA occlusion demonstrates small zones of infarction in the left frontal cortex which are mostly perisylvian, left insula, left putamen and caudate. There is no hemorrhage.      Electronically signed by: NITHYA DELANEY MD  Date: 16  Time: 07:33        Encounter     View Encounter           CTA: CT demonstrates no evidence of acute intracranial hemorrhage. There are minimal early CT changes in the left subinsular region although the majority of the gray-white differentiation is preserved.    CTA demonstrates a left M1 segment occlusion.    CT perfusion demonstrates a large area of ischemic penumbra with only a small area of core infarction in the left basal ganglia region.    The patient was taken emergently to the neuro- interventional radiology catheter lab for emergent mechanical thrombectomy.    Angio:   No carotid stenosis  1. Successful resolution thrombectomy of the left middle cerebral artery  2. TICI 3 of the left middle cerebral artery " "artery    Echo:1 - Technically difficult portable study.   2 - Concentric remodeling.   3 - Normal left ventricular systolic function (EF 60-65%).   4 - Normal right ventricular systolic function .   5 - Grade I left ventricular diastolic dysfunction.     4/2017: A1C less than 7 and LDL less than 70  Reviewed hypercoagulable panel from PCP- normal  3/2016 Thyroid US: There is a 1.6 cm solid nodule seen within the left inferior pole of the thyroid.  If not already perform FNA for further evaluation is recommended      Cardiac event monitor since CVA:  There were a total of 26 patient-triggered events.  The majority of these revealed normal sinus rhythm.  Two transmissions revealed normal sinus rhythm with rare PVCs.  One transmission revealed normal sinus rhythm with a PAC.  There were no significant   arrhythmias noted.    Assessment/Plan: Michelle Rowell is a 76 y.o. female with years of neck pain and tingling in the right neck   CT spine, showed "subluxation" at C3/4 and likely at least moderate spinal stenosis". Follow up MRI 3/2015 shows moderate spinal stenosis and "very minimal anterolisthesis of C3 in respect to C4 by " which is not concerning for subluxation.   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 Event monitor was negative. She had no other reason for anticoagulation once PE/DVT treatment was completed. Continue ASA instead for CVA prevention  2. Stroke risk factors: DM, HTN, HLD. Continue treatment for stroke prevention. Goal A1C is less than 7 (at goal) and goal LDL is less than 70 for stroke prevention (at goal).   3. Remain off driving for now again reviewed today/ she agrees as she declines driving safety evaluation .  4. Weight loss noted- now stabilizing on Remeron via PCP who is monitoring  5. Her neck pain is resolved/ no further treatment needed for this at this time for her C " spine stenosis/ no subluxation.   6. The patient saw Dr Christine re: Thyroid nodule-- who recommended US guided biopsy now that coumadin therapy is complete but patient refused. Check TSH today and requested she PCP for further management.   RTC in 12 months.

## 2017-07-05 ENCOUNTER — TELEPHONE (OUTPATIENT)
Dept: FAMILY MEDICINE | Facility: CLINIC | Age: 77
End: 2017-07-05

## 2017-07-05 NOTE — TELEPHONE ENCOUNTER
----- Message from Dejan Cagle sent at 2017  9:20 AM CDT -----  Contact: MIKHAIL / FLEX  Michelle Rowell  MRN: [id number]  : 1940  PCP: Reno Hilario  Home Phone      [phone]  Work Phone      Not on file.  Mobile          [phone]      MESSAGE: NEEDS PLAN OF CARE SIGNED AND SENT BACK.     PHONE: [phone]    FAX: [phone]

## 2017-07-05 NOTE — TELEPHONE ENCOUNTER
Spoke to Luna with [employer], states she will refax plan of care. Will get Dr. Hilario to sign and fax back.

## 2017-07-08 DIAGNOSIS — E11.49 DIABETES MELLITUS TYPE 2 WITH NEUROLOGICAL MANIFESTATIONS: ICD-10-CM

## 2017-07-09 RX ORDER — METFORMIN HYDROCHLORIDE 1000 MG/1
TABLET ORAL
Qty: 60 TABLET | Refills: 0 | Status: SHIPPED | OUTPATIENT
Start: 2017-07-09 | End: 2018-05-15 | Stop reason: SDUPTHER

## 2017-07-13 DIAGNOSIS — E78.5 DYSLIPIDEMIA: ICD-10-CM

## 2017-07-13 RX ORDER — SIMVASTATIN 40 MG/1
TABLET, FILM COATED ORAL
Qty: 30 TABLET | Refills: 0 | Status: SHIPPED | OUTPATIENT
Start: 2017-07-13 | End: 2017-08-20 | Stop reason: SDUPTHER

## 2017-07-31 ENCOUNTER — OFFICE VISIT (OUTPATIENT)
Dept: FAMILY MEDICINE | Facility: CLINIC | Age: 77
End: 2017-07-31
Payer: MEDICARE

## 2017-07-31 VITALS
SYSTOLIC BLOOD PRESSURE: 110 MMHG | WEIGHT: 136.25 LBS | HEIGHT: 67 IN | RESPIRATION RATE: 18 BRPM | BODY MASS INDEX: 21.38 KG/M2 | HEART RATE: 82 BPM | DIASTOLIC BLOOD PRESSURE: 70 MMHG

## 2017-07-31 DIAGNOSIS — F32.A DEPRESSION, UNSPECIFIED DEPRESSION TYPE: ICD-10-CM

## 2017-07-31 DIAGNOSIS — E11.49 TYPE 2 DIABETES MELLITUS WITH OTHER NEUROLOGIC COMPLICATION: Primary | ICD-10-CM

## 2017-07-31 DIAGNOSIS — R63.0 POOR APPETITE: ICD-10-CM

## 2017-07-31 DIAGNOSIS — G47.00 INSOMNIA, UNSPECIFIED TYPE: ICD-10-CM

## 2017-07-31 DIAGNOSIS — E04.1 THYROID NODULE: ICD-10-CM

## 2017-07-31 PROCEDURE — 99999 PR PBB SHADOW E&M-EST. PATIENT-LVL III: CPT | Mod: PBBFAC,,, | Performed by: FAMILY MEDICINE

## 2017-07-31 PROCEDURE — 3044F HG A1C LEVEL LT 7.0%: CPT | Mod: S$GLB,,, | Performed by: FAMILY MEDICINE

## 2017-07-31 PROCEDURE — 1159F MED LIST DOCD IN RCRD: CPT | Mod: S$GLB,,, | Performed by: FAMILY MEDICINE

## 2017-07-31 PROCEDURE — 4010F ACE/ARB THERAPY RXD/TAKEN: CPT | Mod: S$GLB,,, | Performed by: FAMILY MEDICINE

## 2017-07-31 PROCEDURE — 1126F AMNT PAIN NOTED NONE PRSNT: CPT | Mod: S$GLB,,, | Performed by: FAMILY MEDICINE

## 2017-07-31 PROCEDURE — 99499 UNLISTED E&M SERVICE: CPT | Mod: S$GLB,,, | Performed by: FAMILY MEDICINE

## 2017-07-31 PROCEDURE — 99213 OFFICE O/P EST LOW 20 MIN: CPT | Mod: S$GLB,,, | Performed by: FAMILY MEDICINE

## 2017-07-31 RX ORDER — BLOOD SUGAR DIAGNOSTIC
STRIP MISCELLANEOUS
COMMUNITY
Start: 2017-07-24 | End: 2018-12-11

## 2017-07-31 RX ORDER — BLOOD-GLUCOSE CONTROL, NORMAL
EACH MISCELLANEOUS
COMMUNITY
Start: 2017-07-24 | End: 2018-12-11

## 2017-07-31 NOTE — PROGRESS NOTES
Subjective:       Patient ID: Michelle Rowell is a 76 y.o. female.    Chief Complaint: Follow-up    Pt is a 76 y.o. female who presents for evaluation and management of   Encounter Diagnoses   Name Primary?    Insomnia, unspecified type     Depression, unspecified depression type     Poor appetite     Type 2 diabetes mellitus with other neurologic complication Yes    Thyroid nodule    .  Doing well on current meds. Denies any side effects. Prevention is up to date.  Review of Systems   Psychiatric/Behavioral: Negative for dysphoric mood and sleep disturbance.       Objective:      Physical Exam   Constitutional: She is oriented to person, place, and time. She appears well-developed and well-nourished.   HENT:   Head: Normocephalic and atraumatic.   Right Ear: External ear normal.   Left Ear: External ear normal.   Nose: Nose normal.   Mouth/Throat: Oropharynx is clear and moist.   Eyes: EOM are normal. Pupils are equal, round, and reactive to light.   Neck: Normal range of motion. Neck supple. No JVD present. No tracheal deviation present. No thyromegaly present.   Cardiovascular: Normal rate, normal heart sounds and intact distal pulses.    No murmur heard.  Pulmonary/Chest: Effort normal and breath sounds normal. No respiratory distress. She has no wheezes. She has no rales. She exhibits no tenderness.   Abdominal: Soft. Bowel sounds are normal. She exhibits no distension and no mass. There is no tenderness. There is no rebound and no guarding.   Musculoskeletal: Normal range of motion. She exhibits no edema or tenderness.   Lymphadenopathy:     She has no cervical adenopathy.   Neurological: She is alert and oriented to person, place, and time. She has normal reflexes. She displays normal reflexes. No cranial nerve deficit. She exhibits normal muscle tone. Coordination normal.   Mild dysarthria   Skin: Skin is warm and dry. No rash noted. No erythema. No pallor.   Psychiatric: She has a normal mood and  affect. Her behavior is normal. Judgment and thought content normal.       Assessment:       1. Type 2 diabetes mellitus with other neurologic complication    2. Insomnia, unspecified type    3. Depression, unspecified depression type    4. Poor appetite    5. Thyroid nodule        Plan:   Michelle was seen today for follow-up.    Diagnoses and all orders for this visit:    Type 2 diabetes mellitus with other neurologic complication  -     Comprehensive metabolic panel; Future  -     Hemoglobin A1c; Future    Insomnia, unspecified type    Depression, unspecified depression type    Poor appetite    Thyroid nodule  -     US Soft Tissue Head Neck Thyroid; Future    continue remeron  30mg nightly which is working well     Pt has refused FNA with Dr. Christine. But if her nodule has grown she may need to be convinced to do this         No Follow-up on file.

## 2017-08-14 ENCOUNTER — HOSPITAL ENCOUNTER (OUTPATIENT)
Dept: RADIOLOGY | Facility: HOSPITAL | Age: 77
Discharge: HOME | End: 2017-08-14
Attending: FAMILY MEDICINE
Payer: MEDICARE

## 2017-08-14 DIAGNOSIS — E04.1 THYROID NODULE: ICD-10-CM

## 2017-08-14 PROCEDURE — 76536 US EXAM OF HEAD AND NECK: CPT | Mod: TC

## 2017-08-14 PROCEDURE — 76536 US EXAM OF HEAD AND NECK: CPT | Mod: 26,,, | Performed by: RADIOLOGY

## 2017-08-16 NOTE — PROGRESS NOTES
Test results normal in that her thryoid nodule is stable, no need to bx. I rec repeating in a couple of years thanks

## 2017-08-20 DIAGNOSIS — E78.5 DYSLIPIDEMIA: ICD-10-CM

## 2017-08-21 RX ORDER — SIMVASTATIN 40 MG/1
TABLET, FILM COATED ORAL
Qty: 30 TABLET | Refills: 0 | Status: SHIPPED | OUTPATIENT
Start: 2017-08-21 | End: 2017-09-17 | Stop reason: SDUPTHER

## 2017-09-15 DIAGNOSIS — E11.9 TYPE 2 DIABETES MELLITUS WITHOUT COMPLICATION: ICD-10-CM

## 2017-09-15 RX ORDER — METFORMIN HYDROCHLORIDE 1000 MG/1
TABLET ORAL
Qty: 60 TABLET | Refills: 5 | Status: SHIPPED | OUTPATIENT
Start: 2017-09-15 | End: 2018-02-27 | Stop reason: SDUPTHER

## 2017-09-17 DIAGNOSIS — E78.5 DYSLIPIDEMIA: ICD-10-CM

## 2017-09-17 RX ORDER — SIMVASTATIN 40 MG/1
TABLET, FILM COATED ORAL
Qty: 30 TABLET | Refills: 5 | Status: SHIPPED | OUTPATIENT
Start: 2017-09-17 | End: 2018-02-27 | Stop reason: SDUPTHER

## 2017-10-27 ENCOUNTER — CLINICAL SUPPORT (OUTPATIENT)
Dept: FAMILY MEDICINE | Facility: CLINIC | Age: 77
End: 2017-10-27
Payer: MEDICARE

## 2017-10-27 DIAGNOSIS — E11.9 TYPE 2 DIABETES MELLITUS WITHOUT COMPLICATION, WITH LONG-TERM CURRENT USE OF INSULIN: ICD-10-CM

## 2017-10-27 DIAGNOSIS — Z79.4 TYPE 2 DIABETES MELLITUS WITHOUT COMPLICATION, WITH LONG-TERM CURRENT USE OF INSULIN: ICD-10-CM

## 2017-10-27 LAB
ALBUMIN SERPL BCP-MCNC: 3.5 G/DL
ALP SERPL-CCNC: 60 U/L
ALT SERPL W/O P-5'-P-CCNC: 24 U/L
ANION GAP SERPL CALC-SCNC: 10 MMOL/L
AST SERPL-CCNC: 26 U/L
BILIRUB SERPL-MCNC: 0.4 MG/DL
BUN SERPL-MCNC: 16 MG/DL
CALCIUM SERPL-MCNC: 9.3 MG/DL
CHLORIDE SERPL-SCNC: 102 MMOL/L
CO2 SERPL-SCNC: 28 MMOL/L
CREAT SERPL-MCNC: 0.7 MG/DL
EST. GFR  (AFRICAN AMERICAN): >60 ML/MIN/1.73 M^2
EST. GFR  (NON AFRICAN AMERICAN): >60 ML/MIN/1.73 M^2
ESTIMATED AVG GLUCOSE: 120 MG/DL
GLUCOSE SERPL-MCNC: 113 MG/DL
HBA1C MFR BLD HPLC: 5.8 %
POTASSIUM SERPL-SCNC: 4 MMOL/L
PROT SERPL-MCNC: 6.8 G/DL
SODIUM SERPL-SCNC: 140 MMOL/L

## 2017-10-27 PROCEDURE — 36415 COLL VENOUS BLD VENIPUNCTURE: CPT | Mod: S$GLB,,, | Performed by: FAMILY MEDICINE

## 2017-10-27 PROCEDURE — 83036 HEMOGLOBIN GLYCOSYLATED A1C: CPT

## 2017-10-27 PROCEDURE — 80053 COMPREHEN METABOLIC PANEL: CPT

## 2017-11-14 ENCOUNTER — OFFICE VISIT (OUTPATIENT)
Dept: FAMILY MEDICINE | Facility: CLINIC | Age: 77
End: 2017-11-14
Payer: MEDICARE

## 2017-11-14 VITALS
HEIGHT: 67 IN | RESPIRATION RATE: 20 BRPM | HEART RATE: 72 BPM | DIASTOLIC BLOOD PRESSURE: 76 MMHG | BODY MASS INDEX: 21.25 KG/M2 | SYSTOLIC BLOOD PRESSURE: 102 MMHG | WEIGHT: 135.38 LBS

## 2017-11-14 DIAGNOSIS — I10 ESSENTIAL HYPERTENSION: Primary | ICD-10-CM

## 2017-11-14 DIAGNOSIS — Z95.1 S/P CABG X 4: ICD-10-CM

## 2017-11-14 DIAGNOSIS — E04.1 THYROID NODULE: ICD-10-CM

## 2017-11-14 DIAGNOSIS — F32.A DEPRESSION, UNSPECIFIED DEPRESSION TYPE: ICD-10-CM

## 2017-11-14 DIAGNOSIS — E11.40 TYPE 2 DIABETES MELLITUS WITH DIABETIC NEUROPATHY, WITHOUT LONG-TERM CURRENT USE OF INSULIN: ICD-10-CM

## 2017-11-14 DIAGNOSIS — Z86.73 HISTORY OF EMBOLIC STROKE: ICD-10-CM

## 2017-11-14 DIAGNOSIS — G47.00 INSOMNIA, UNSPECIFIED TYPE: ICD-10-CM

## 2017-11-14 DIAGNOSIS — E55.9 VITAMIN D DEFICIENCY DISEASE: ICD-10-CM

## 2017-11-14 PROCEDURE — 99214 OFFICE O/P EST MOD 30 MIN: CPT | Mod: S$GLB,,, | Performed by: FAMILY MEDICINE

## 2017-11-14 PROCEDURE — 99499 UNLISTED E&M SERVICE: CPT | Mod: S$GLB,,, | Performed by: FAMILY MEDICINE

## 2017-11-14 PROCEDURE — G0008 ADMIN INFLUENZA VIRUS VAC: HCPCS | Mod: S$GLB,,, | Performed by: FAMILY MEDICINE

## 2017-11-14 PROCEDURE — 90662 IIV NO PRSV INCREASED AG IM: CPT | Mod: S$GLB,,, | Performed by: FAMILY MEDICINE

## 2017-11-14 PROCEDURE — 99999 PR PBB SHADOW E&M-EST. PATIENT-LVL III: CPT | Mod: PBBFAC,,, | Performed by: FAMILY MEDICINE

## 2017-11-14 RX ORDER — BLOOD GLUCOSE CONTROL HIGH,LOW
EACH MISCELLANEOUS
COMMUNITY
Start: 2017-10-10 | End: 2018-12-11

## 2017-11-14 RX ORDER — LANCING DEVICE
EACH MISCELLANEOUS
COMMUNITY
Start: 2017-10-10 | End: 2018-12-11

## 2017-11-14 NOTE — PROGRESS NOTES
Subjective:       Patient ID: Michelle Rowell is a 76 y.o. female.    Chief Complaint: Follow-up    Pt is a 76 y.o. female who presents for evaluation and management of   Encounter Diagnoses   Name Primary?    Essential hypertension Yes    History of embolic stroke     Depression, unspecified depression type     S/P CABG x 4     Type 2 diabetes mellitus with diabetic neuropathy, without long-term current use of insulin     Thyroid nodule     Insomnia, unspecified type     Vitamin D deficiency disease    .  Doing well on current meds. Denies any side effects. Prevention is up to date.  Review of Systems   Constitutional: Negative for chills, fatigue and fever.        Eating better      Respiratory: Negative for shortness of breath.    Cardiovascular: Negative for chest pain and palpitations.   Gastrointestinal: Negative for abdominal pain, blood in stool, constipation and nausea.   Genitourinary: Negative for difficulty urinating.   Neurological: Positive for speech difficulty, weakness and numbness.   Psychiatric/Behavioral: Negative for dysphoric mood, sleep disturbance and suicidal ideas. The patient is not nervous/anxious.         Sleeps 5 hours a night on a good night        Objective:      Physical Exam   Constitutional: She is oriented to person, place, and time. She appears well-developed and well-nourished.   HENT:   Head: Normocephalic and atraumatic.   Right Ear: External ear normal.   Left Ear: External ear normal.   Nose: Nose normal.   Mouth/Throat: Oropharynx is clear and moist.   Eyes: EOM are normal. Pupils are equal, round, and reactive to light.   Neck: Normal range of motion. Neck supple. No JVD present. No tracheal deviation present. No thyromegaly present.   Cardiovascular: Normal rate, normal heart sounds and intact distal pulses.    No murmur heard.  Pulmonary/Chest: Effort normal and breath sounds normal. No respiratory distress. She has no wheezes. She has no rales. She  exhibits no tenderness.   Abdominal: Soft. Bowel sounds are normal. She exhibits no distension and no mass. There is no tenderness. There is no rebound and no guarding.   Musculoskeletal: Normal range of motion. She exhibits no edema or tenderness.   Lymphadenopathy:     She has no cervical adenopathy.   Neurological: She is alert and oriented to person, place, and time. She has normal reflexes. She displays normal reflexes. No cranial nerve deficit. She exhibits normal muscle tone. Coordination normal.   Mild dysarthria   Skin: Skin is warm and dry. No rash noted. No erythema. No pallor.   Psychiatric: She has a normal mood and affect. Her behavior is normal. Judgment and thought content normal.       Assessment:       1. Essential hypertension    2. History of embolic stroke    3. Depression, unspecified depression type    4. S/P CABG x 4    5. Type 2 diabetes mellitus with diabetic neuropathy, without long-term current use of insulin    6. Thyroid nodule    7. Insomnia, unspecified type    8. Vitamin D deficiency disease        Plan:   Michelle was seen today for follow-up.    Diagnoses and all orders for this visit:    Essential hypertension  -     Comprehensive metabolic panel; Future  -     TSH; Future    History of embolic stroke    Depression, unspecified depression type    S/P CABG x 4    Type 2 diabetes mellitus with diabetic neuropathy, without long-term current use of insulin  -     Lipid panel; Future  -     Hemoglobin A1c; Future    Thyroid nodule    Insomnia, unspecified type    Vitamin D deficiency disease  -     Vitamin D; Future    Other orders  -     Influenza - High Dose (65+) (PF) (IM)    continue same     See orders     No Follow-up on file.

## 2018-01-12 DIAGNOSIS — K21.9 GASTROESOPHAGEAL REFLUX DISEASE, ESOPHAGITIS PRESENCE NOT SPECIFIED: ICD-10-CM

## 2018-01-13 DIAGNOSIS — I10 ESSENTIAL HYPERTENSION: ICD-10-CM

## 2018-01-13 DIAGNOSIS — G47.00 INSOMNIA, UNSPECIFIED TYPE: ICD-10-CM

## 2018-01-13 DIAGNOSIS — R63.0 POOR APPETITE: ICD-10-CM

## 2018-01-13 DIAGNOSIS — F32.A DEPRESSION, UNSPECIFIED DEPRESSION TYPE: ICD-10-CM

## 2018-01-14 RX ORDER — MIRTAZAPINE 30 MG/1
TABLET, FILM COATED ORAL
Qty: 30 TABLET | Refills: 5 | Status: SHIPPED | OUTPATIENT
Start: 2018-01-14 | End: 2018-05-15 | Stop reason: SDUPTHER

## 2018-01-14 RX ORDER — LISINOPRIL AND HYDROCHLOROTHIAZIDE 12.5; 2 MG/1; MG/1
TABLET ORAL
Qty: 30 TABLET | Refills: 5 | Status: SHIPPED | OUTPATIENT
Start: 2018-01-14 | End: 2018-05-15 | Stop reason: SDUPTHER

## 2018-01-14 RX ORDER — CARVEDILOL 12.5 MG/1
TABLET ORAL
Qty: 60 TABLET | Refills: 11 | Status: SHIPPED | OUTPATIENT
Start: 2018-01-14 | End: 2018-05-15 | Stop reason: SDUPTHER

## 2018-01-14 RX ORDER — PANTOPRAZOLE SODIUM 40 MG/1
TABLET, DELAYED RELEASE ORAL
Qty: 30 TABLET | Refills: 11 | Status: SHIPPED | OUTPATIENT
Start: 2018-01-14 | End: 2018-05-15 | Stop reason: SDUPTHER

## 2018-02-09 DIAGNOSIS — E11.9 TYPE 2 DIABETES MELLITUS WITHOUT COMPLICATION: ICD-10-CM

## 2018-02-27 DIAGNOSIS — E78.5 DYSLIPIDEMIA: ICD-10-CM

## 2018-02-27 DIAGNOSIS — E11.9 TYPE 2 DIABETES MELLITUS WITHOUT COMPLICATION: ICD-10-CM

## 2018-02-27 RX ORDER — SIMVASTATIN 40 MG/1
TABLET, FILM COATED ORAL
Qty: 30 TABLET | Refills: 5 | Status: SHIPPED | OUTPATIENT
Start: 2018-02-27 | End: 2018-05-15 | Stop reason: SDUPTHER

## 2018-02-27 RX ORDER — METFORMIN HYDROCHLORIDE 1000 MG/1
TABLET ORAL
Qty: 60 TABLET | Refills: 5 | Status: SHIPPED | OUTPATIENT
Start: 2018-02-27 | End: 2018-05-15 | Stop reason: SDUPTHER

## 2018-05-08 ENCOUNTER — PES CALL (OUTPATIENT)
Dept: ADMINISTRATIVE | Facility: CLINIC | Age: 78
End: 2018-05-08

## 2018-05-08 ENCOUNTER — CLINICAL SUPPORT (OUTPATIENT)
Dept: FAMILY MEDICINE | Facility: CLINIC | Age: 78
End: 2018-05-08
Payer: MEDICARE

## 2018-05-08 DIAGNOSIS — E04.1 THYROID NODULE: ICD-10-CM

## 2018-05-08 DIAGNOSIS — E11.40 TYPE 2 DIABETES MELLITUS WITH DIABETIC NEUROPATHY, WITHOUT LONG-TERM CURRENT USE OF INSULIN: ICD-10-CM

## 2018-05-08 DIAGNOSIS — E55.9 VITAMIN D DEFICIENCY DISEASE: ICD-10-CM

## 2018-05-08 DIAGNOSIS — I10 ESSENTIAL HYPERTENSION: ICD-10-CM

## 2018-05-08 LAB
25(OH)D3+25(OH)D2 SERPL-MCNC: 9 NG/ML
ALBUMIN SERPL BCP-MCNC: 3.4 G/DL
ALP SERPL-CCNC: 66 U/L
ALT SERPL W/O P-5'-P-CCNC: 23 U/L
ANION GAP SERPL CALC-SCNC: 11 MMOL/L
AST SERPL-CCNC: 24 U/L
BILIRUB SERPL-MCNC: 0.5 MG/DL
BUN SERPL-MCNC: 13 MG/DL
CALCIUM SERPL-MCNC: 9.5 MG/DL
CHLORIDE SERPL-SCNC: 99 MMOL/L
CHOLEST SERPL-MCNC: 144 MG/DL
CHOLEST/HDLC SERPL: 2.5 {RATIO}
CO2 SERPL-SCNC: 28 MMOL/L
CREAT SERPL-MCNC: 0.7 MG/DL
EST. GFR  (AFRICAN AMERICAN): >60 ML/MIN/1.73 M^2
EST. GFR  (NON AFRICAN AMERICAN): >60 ML/MIN/1.73 M^2
ESTIMATED AVG GLUCOSE: 126 MG/DL
GLUCOSE SERPL-MCNC: 113 MG/DL
HBA1C MFR BLD HPLC: 6 %
HDLC SERPL-MCNC: 57 MG/DL
HDLC SERPL: 39.6 %
LDLC SERPL CALC-MCNC: 68.6 MG/DL
NONHDLC SERPL-MCNC: 87 MG/DL
POTASSIUM SERPL-SCNC: 4 MMOL/L
PROT SERPL-MCNC: 6.8 G/DL
SODIUM SERPL-SCNC: 138 MMOL/L
TRIGL SERPL-MCNC: 92 MG/DL
TSH SERPL DL<=0.005 MIU/L-ACNC: 2.18 UIU/ML

## 2018-05-08 PROCEDURE — 83036 HEMOGLOBIN GLYCOSYLATED A1C: CPT

## 2018-05-08 PROCEDURE — 82306 VITAMIN D 25 HYDROXY: CPT

## 2018-05-08 PROCEDURE — 36415 COLL VENOUS BLD VENIPUNCTURE: CPT | Mod: S$GLB,,, | Performed by: FAMILY MEDICINE

## 2018-05-08 PROCEDURE — 80053 COMPREHEN METABOLIC PANEL: CPT

## 2018-05-08 PROCEDURE — 84443 ASSAY THYROID STIM HORMONE: CPT

## 2018-05-08 PROCEDURE — 80061 LIPID PANEL: CPT

## 2018-05-08 PROCEDURE — 99999 PR PBB SHADOW E&M-EST. PATIENT-LVL I: CPT | Mod: PBBFAC,,,

## 2018-05-11 DIAGNOSIS — Z13.5 DIABETIC RETINOPATHY SCREENING: ICD-10-CM

## 2018-05-15 ENCOUNTER — OFFICE VISIT (OUTPATIENT)
Dept: FAMILY MEDICINE | Facility: CLINIC | Age: 78
End: 2018-05-15
Payer: MEDICARE

## 2018-05-15 VITALS
BODY MASS INDEX: 21.21 KG/M2 | RESPIRATION RATE: 18 BRPM | SYSTOLIC BLOOD PRESSURE: 122 MMHG | DIASTOLIC BLOOD PRESSURE: 82 MMHG | HEIGHT: 67 IN | WEIGHT: 135.13 LBS | HEART RATE: 82 BPM

## 2018-05-15 DIAGNOSIS — E11.49 DIABETES MELLITUS TYPE 2 WITH NEUROLOGICAL MANIFESTATIONS: ICD-10-CM

## 2018-05-15 DIAGNOSIS — M89.9 BONE DISORDER: ICD-10-CM

## 2018-05-15 DIAGNOSIS — F32.A DEPRESSION, UNSPECIFIED DEPRESSION TYPE: ICD-10-CM

## 2018-05-15 DIAGNOSIS — R63.0 POOR APPETITE: ICD-10-CM

## 2018-05-15 DIAGNOSIS — I10 ESSENTIAL HYPERTENSION: ICD-10-CM

## 2018-05-15 DIAGNOSIS — G47.00 INSOMNIA, UNSPECIFIED TYPE: ICD-10-CM

## 2018-05-15 DIAGNOSIS — E78.5 DYSLIPIDEMIA: ICD-10-CM

## 2018-05-15 DIAGNOSIS — E55.9 VITAMIN D DEFICIENCY DISEASE: Primary | ICD-10-CM

## 2018-05-15 DIAGNOSIS — K21.9 GASTROESOPHAGEAL REFLUX DISEASE, ESOPHAGITIS PRESENCE NOT SPECIFIED: ICD-10-CM

## 2018-05-15 PROCEDURE — 3074F SYST BP LT 130 MM HG: CPT | Mod: CPTII,S$GLB,, | Performed by: FAMILY MEDICINE

## 2018-05-15 PROCEDURE — 3044F HG A1C LEVEL LT 7.0%: CPT | Mod: CPTII,S$GLB,, | Performed by: FAMILY MEDICINE

## 2018-05-15 PROCEDURE — 99214 OFFICE O/P EST MOD 30 MIN: CPT | Mod: S$GLB,,, | Performed by: FAMILY MEDICINE

## 2018-05-15 PROCEDURE — 99499 UNLISTED E&M SERVICE: CPT | Mod: S$GLB,,, | Performed by: FAMILY MEDICINE

## 2018-05-15 PROCEDURE — 99999 PR PBB SHADOW E&M-EST. PATIENT-LVL III: CPT | Mod: PBBFAC,,, | Performed by: FAMILY MEDICINE

## 2018-05-15 PROCEDURE — 3079F DIAST BP 80-89 MM HG: CPT | Mod: CPTII,S$GLB,, | Performed by: FAMILY MEDICINE

## 2018-05-15 RX ORDER — LISINOPRIL AND HYDROCHLOROTHIAZIDE 12.5; 2 MG/1; MG/1
1 TABLET ORAL DAILY
Qty: 30 TABLET | Refills: 5 | Status: SHIPPED | OUTPATIENT
Start: 2018-05-15 | End: 2019-02-05 | Stop reason: SDUPTHER

## 2018-05-15 RX ORDER — METFORMIN HYDROCHLORIDE 1000 MG/1
1000 TABLET ORAL 2 TIMES DAILY WITH MEALS
Qty: 60 TABLET | Refills: 0 | Status: SHIPPED | OUTPATIENT
Start: 2018-05-15 | End: 2019-05-08 | Stop reason: SDUPTHER

## 2018-05-15 RX ORDER — SIMVASTATIN 40 MG/1
TABLET, FILM COATED ORAL
Qty: 30 TABLET | Refills: 5 | Status: ON HOLD | OUTPATIENT
Start: 2018-05-15 | End: 2019-06-12 | Stop reason: HOSPADM

## 2018-05-15 RX ORDER — ERGOCALCIFEROL 1.25 MG/1
50000 CAPSULE ORAL
Qty: 4 CAPSULE | Refills: 5 | Status: SHIPPED | OUTPATIENT
Start: 2018-05-15 | End: 2018-06-29

## 2018-05-15 RX ORDER — PANTOPRAZOLE SODIUM 40 MG/1
40 TABLET, DELAYED RELEASE ORAL DAILY
Qty: 30 TABLET | Refills: 11 | Status: SHIPPED | OUTPATIENT
Start: 2018-05-15 | End: 2018-12-11

## 2018-05-15 RX ORDER — CARVEDILOL 12.5 MG/1
12.5 TABLET ORAL 2 TIMES DAILY
Qty: 60 TABLET | Refills: 11 | Status: ON HOLD | OUTPATIENT
Start: 2018-05-15 | End: 2023-01-01 | Stop reason: HOSPADM

## 2018-05-15 RX ORDER — MIRTAZAPINE 30 MG/1
TABLET, FILM COATED ORAL
Qty: 30 TABLET | Refills: 5 | Status: ON HOLD | OUTPATIENT
Start: 2018-05-15 | End: 2019-06-12

## 2018-05-15 NOTE — PROGRESS NOTES
Subjective:       Patient ID: Michelle Rowell is a 77 y.o. female.    Chief Complaint: Follow-up (6 month follow up)    Pt is a 77 y.o. female who presents for evaluation and management of   Encounter Diagnoses   Name Primary?    Vitamin D deficiency disease Yes    Essential hypertension     Diabetes mellitus type 2 with neurological manifestations     Insomnia, unspecified type     Depression, unspecified depression type     Poor appetite     Gastroesophageal reflux disease, esophagitis presence not specified     Dyslipidemia    .  Doing well on current meds. Denies any side effects. Prevention is up to date.    Review of Systems   Constitutional: Negative for chills, fatigue and fever.        Eating better      Respiratory: Negative for shortness of breath.    Cardiovascular: Negative for chest pain and palpitations.   Gastrointestinal: Negative for abdominal pain, blood in stool, constipation and nausea.   Genitourinary: Negative for difficulty urinating.   Neurological: Positive for speech difficulty, weakness and numbness.   Psychiatric/Behavioral: Negative for dysphoric mood, sleep disturbance and suicidal ideas. The patient is not nervous/anxious.         Sleeps 5 hours a night on a good night        Objective:      Physical Exam   Constitutional: She is oriented to person, place, and time. She appears well-developed and well-nourished.   HENT:   Head: Normocephalic and atraumatic.   Right Ear: External ear normal.   Left Ear: External ear normal.   Nose: Nose normal.   Mouth/Throat: Oropharynx is clear and moist.   Eyes: EOM are normal. Pupils are equal, round, and reactive to light.   Neck: Normal range of motion. Neck supple. No JVD present. No tracheal deviation present. No thyromegaly present.   Cardiovascular: Normal rate, normal heart sounds and intact distal pulses.    No murmur heard.  Pulmonary/Chest: Effort normal and breath sounds normal. No respiratory distress. She has no wheezes.  She has no rales. She exhibits no tenderness.   Abdominal: Soft. Bowel sounds are normal. She exhibits no distension and no mass. There is no tenderness. There is no rebound and no guarding.   Musculoskeletal: Normal range of motion. She exhibits no edema or tenderness.   Lymphadenopathy:     She has no cervical adenopathy.   Neurological: She is alert and oriented to person, place, and time. She has normal reflexes. She displays normal reflexes. No cranial nerve deficit. She exhibits normal muscle tone. Coordination normal.   Skin: Skin is warm and dry. No rash noted. No erythema. No pallor.   Psychiatric: She has a normal mood and affect. Her behavior is normal. Judgment and thought content normal.       Assessment:       1. Vitamin D deficiency disease    2. Essential hypertension    3. Diabetes mellitus type 2 with neurological manifestations    4. Insomnia, unspecified type    5. Depression, unspecified depression type    6. Poor appetite    7. Gastroesophageal reflux disease, esophagitis presence not specified    8. Dyslipidemia    9. Bone disorder        Plan:   Michelle was seen today for follow-up.    Diagnoses and all orders for this visit:    Vitamin D deficiency disease  -     ergocalciferol (ERGOCALCIFEROL) 50,000 unit Cap; Take 1 capsule (50,000 Units total) by mouth every 7 days.  -     Vitamin D; Future    Essential hypertension  -     carvedilol (COREG) 12.5 MG tablet; Take 1 tablet (12.5 mg total) by mouth 2 (two) times daily.  -     lisinopril-hydrochlorothiazide (PRINZIDE,ZESTORETIC) 20-12.5 mg per tablet; Take 1 tablet by mouth once daily.  -     Comprehensive metabolic panel; Future    Diabetes mellitus type 2 with neurological manifestations  -     metFORMIN (GLUCOPHAGE) 1000 MG tablet; Take 1 tablet (1,000 mg total) by mouth 2 (two) times daily with meals.  -     Hemoglobin A1c; Future    Insomnia, unspecified type  -     mirtazapine (REMERON) 30 MG tablet; TAKE ONE TABLET BY MOUTH ONCE  DAILY IN THE EVENING    Depression, unspecified depression type  -     mirtazapine (REMERON) 30 MG tablet; TAKE ONE TABLET BY MOUTH ONCE DAILY IN THE EVENING    Poor appetite  -     mirtazapine (REMERON) 30 MG tablet; TAKE ONE TABLET BY MOUTH ONCE DAILY IN THE EVENING    Gastroesophageal reflux disease, esophagitis presence not specified  -     pantoprazole (PROTONIX) 40 MG tablet; Take 1 tablet (40 mg total) by mouth once daily.    Dyslipidemia  -     simvastatin (ZOCOR) 40 MG tablet; TAKE ONE TABLET BY MOUTH ONCE DAILY IN THE EVENING    Bone disorder  -     DXA Bone Density Spine And Hip; Future    RTC 6 months       No Follow-up on file.

## 2018-06-07 ENCOUNTER — HOSPITAL ENCOUNTER (OUTPATIENT)
Dept: RADIOLOGY | Facility: HOSPITAL | Age: 78
Discharge: HOME | End: 2018-06-07
Attending: FAMILY MEDICINE
Payer: MEDICARE

## 2018-06-07 DIAGNOSIS — M89.9 BONE DISORDER: ICD-10-CM

## 2018-06-07 PROCEDURE — 77080 DXA BONE DENSITY AXIAL: CPT | Mod: TC

## 2018-06-07 PROCEDURE — 77080 DXA BONE DENSITY AXIAL: CPT | Mod: 26,,, | Performed by: RADIOLOGY

## 2018-06-09 NOTE — PROGRESS NOTES
Osteopenia of right hip but no Osteoporosis and her lumbar spine is normal On DXA. Continue vit D to prevent osteoporosis

## 2018-06-10 ENCOUNTER — PATIENT MESSAGE (OUTPATIENT)
Dept: FAMILY MEDICINE | Facility: CLINIC | Age: 78
End: 2018-06-10

## 2018-06-27 ENCOUNTER — PATIENT MESSAGE (OUTPATIENT)
Dept: FAMILY MEDICINE | Facility: CLINIC | Age: 78
End: 2018-06-27

## 2018-06-27 DIAGNOSIS — Z86.73 HISTORY OF CVA IN ADULTHOOD: Primary | ICD-10-CM

## 2018-06-27 DIAGNOSIS — M21.372 BILATERAL FOOT-DROP: ICD-10-CM

## 2018-06-27 DIAGNOSIS — M21.371 BILATERAL FOOT-DROP: ICD-10-CM

## 2018-06-27 NOTE — TELEPHONE ENCOUNTER
Spoke to patients daughter, states patient is refusing PT at this time. Instructed to call if patient decides to go.

## 2018-06-29 ENCOUNTER — OFFICE VISIT (OUTPATIENT)
Dept: NEUROLOGY | Facility: CLINIC | Age: 78
End: 2018-06-29
Payer: MEDICARE

## 2018-06-29 VITALS
HEIGHT: 67 IN | RESPIRATION RATE: 14 BRPM | WEIGHT: 134.94 LBS | BODY MASS INDEX: 21.18 KG/M2 | SYSTOLIC BLOOD PRESSURE: 108 MMHG | HEART RATE: 102 BPM | DIASTOLIC BLOOD PRESSURE: 68 MMHG

## 2018-06-29 DIAGNOSIS — I69.90 LATE EFFECTS OF CVA (CEREBROVASCULAR ACCIDENT): Primary | ICD-10-CM

## 2018-06-29 DIAGNOSIS — E11.9 TYPE 2 DIABETES MELLITUS WITHOUT COMPLICATION, WITHOUT LONG-TERM CURRENT USE OF INSULIN: ICD-10-CM

## 2018-06-29 DIAGNOSIS — E78.5 DYSLIPIDEMIA: ICD-10-CM

## 2018-06-29 DIAGNOSIS — I10 ESSENTIAL HYPERTENSION: ICD-10-CM

## 2018-06-29 PROCEDURE — 99214 OFFICE O/P EST MOD 30 MIN: CPT | Mod: S$GLB,,, | Performed by: PSYCHIATRY & NEUROLOGY

## 2018-06-29 PROCEDURE — 3078F DIAST BP <80 MM HG: CPT | Mod: CPTII,S$GLB,, | Performed by: PSYCHIATRY & NEUROLOGY

## 2018-06-29 PROCEDURE — 99499 UNLISTED E&M SERVICE: CPT | Mod: S$GLB,,, | Performed by: PSYCHIATRY & NEUROLOGY

## 2018-06-29 PROCEDURE — 3044F HG A1C LEVEL LT 7.0%: CPT | Mod: CPTII,S$GLB,, | Performed by: PSYCHIATRY & NEUROLOGY

## 2018-06-29 PROCEDURE — 3074F SYST BP LT 130 MM HG: CPT | Mod: CPTII,S$GLB,, | Performed by: PSYCHIATRY & NEUROLOGY

## 2018-06-29 PROCEDURE — 99999 PR PBB SHADOW E&M-EST. PATIENT-LVL III: CPT | Mod: PBBFAC,,, | Performed by: PSYCHIATRY & NEUROLOGY

## 2018-06-29 RX ORDER — VIT C/E/ZN/COPPR/LUTEIN/ZEAXAN 250MG-90MG
1000 CAPSULE ORAL DAILY
COMMUNITY

## 2018-06-29 NOTE — PROGRESS NOTES
"HPI: Michelle Rowell is a 77 y.o. female with years of neck pain and tingling in the right neck   CT spine, showed "subluxation" at C3/4 and likely at least moderate spinal stenosis". Follow up MRI 3/2015 shows moderate spinal stenosis and "very minimal anterolisthesis of C3 in respect to C4 by " which is not concerning for subluxation.   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  Patient did see PCP since the last visit regarding her thyroid nodule. Repeat US stable and PCP recommended another study in a few years  No weight loss  Speech si the same. Right sided weakness is the same and annoying.   She continues to have right HP with gait.  She is not falling.  No neck pain    Review of Systems   Constitutional: Negative for fever and weight loss.   HENT: Negative for nosebleeds.    Eyes: Negative for double vision.   Respiratory: Negative for hemoptysis.    Cardiovascular: Negative for leg swelling.   Gastrointestinal: Negative for blood in stool.   Genitourinary: Negative for hematuria.   Musculoskeletal: Negative for falls.   Skin: Negative for rash.   Neurological: Negative for tremors.   Endo/Heme/Allergies: Does not bruise/bleed easily.   Psychiatric/Behavioral: Negative for memory loss.       Exam:  Gen Appearance, well developed/nourished in no apparent distress  CV: 2+ distal pulses with no edema or swelling  Neuro:  MS: Awake, alert,  Sustains attention. Recent/remote memory intact, Language is reduced to mildly to spontaneous speech with some slurred speech mildly as prior. She has some hesitancy at times but comprehension is intact. Fund of Knowledge is full  CN: Optic discs are flat with normal vasculature, PERRL, Extraoccular movements and visual fields are full. Normal facial sensation and strength,   Tongue and Palate are midline and " "strong. Shoulder Shrug symmetric and strong.  Motor: Normal bulk, tone increased on the right, no abnormal movements. 5/5 strength bilateral upper with some difficulty with right sided movements from late effect CVA/lower extremities with 1+ reflexes    Sensory: symmetric to temp and vibration  Cerebellar: Finger-nose, Rapid alternating movements intact  Gait: Normal stance, no ataxia but circumducts right leg-using walker today and clearly has some right foot drop from HP    Imagin MRI C spine: Degenerative changes of the cervical spine contributing to multilevel central canal stenosis and neural foraminal narrowing as detailed in the above report.  NOTE: "very minimal anterolisthesis of C3 in respect to C4 by "  Left thyroid nodule measuring 1.3 x 1.6 cm. Consider further evaluation with a thyroid ultrasound.  ______________________________________     RA factor negative      MRI brain 2016:   MRI following thrombectomy for left MCA occlusion demonstrates small zones of infarction in the left frontal cortex which are mostly perisylvian, left insula, left putamen and caudate. There is no hemorrhage.      Electronically signed by: NITHYA DELANEY MD  Date: 16  Time: 07:33        Encounter     View Encounter           CTA: CT demonstrates no evidence of acute intracranial hemorrhage. There are minimal early CT changes in the left subinsular region although the majority of the gray-white differentiation is preserved.    CTA demonstrates a left M1 segment occlusion.    CT perfusion demonstrates a large area of ischemic penumbra with only a small area of core infarction in the left basal ganglia region.    The patient was taken emergently to the neuro- interventional radiology catheter lab for emergent mechanical thrombectomy.    Angio:   No carotid stenosis  1. Successful resolution thrombectomy of the left middle cerebral artery  2. TICI 3 of the left middle cerebral artery artery    Echo:1 - Technically " "difficult portable study.   2 - Concentric remodeling.   3 - Normal left ventricular systolic function (EF 60-65%).   4 - Normal right ventricular systolic function .   5 - Grade I left ventricular diastolic dysfunction.     2018 A1C is 6 and LDL is 86  TSH normal 2017/2018    Cardiac event monitor since CVA:  There were a total of 26 patient-triggered events.  The majority of these revealed normal sinus rhythm.  Two transmissions revealed normal sinus rhythm with rare PVCs.  One transmission revealed normal sinus rhythm with a PAC.  There were no significant   arrhythmias noted.    Assessment/Plan: Michelle Rowell is a 77 y.o. female with years of neck pain and tingling in the right neck   CT spine, showed "subluxation" at C3/4 and likely at least moderate spinal stenosis". Follow up MRI 3/2015 shows moderate spinal stenosis and "very minimal anterolisthesis of C3 in respect to C4 by " which is not concerning for subluxation.   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 Event monitor was negative. She had no other reason for anticoagulation once PE/DVT treatment was completed. Continue ASA instead for CVA prevention  2. Stroke risk factors: DM, HTN, HLD. Continue treatment for stroke prevention. Goal A1C is less than 7 (at goal) and goal LDL is less than 70 for stroke prevention (at goal). BP at goal  3. Remain off driving for now again reviewed today/ she agrees as she declines driving safety evaluation .  4. Weight loss noted- now  Resolved   5. Her neck pain is resolved/ no further treatment needed for this at this time for her C spine stenosis/ no subluxation.   6. Can refer back to PT PRN/if desired for her chronic right hemiparesis- she declines at this time but Rx written for PT in case she decides on this.   RTC in 2 years                  "

## 2018-08-06 ENCOUNTER — PES CALL (OUTPATIENT)
Dept: ADMINISTRATIVE | Facility: CLINIC | Age: 78
End: 2018-08-06

## 2018-09-16 DIAGNOSIS — E11.9 TYPE 2 DIABETES MELLITUS WITHOUT COMPLICATION: ICD-10-CM

## 2018-09-16 RX ORDER — METFORMIN HYDROCHLORIDE 1000 MG/1
TABLET ORAL
Qty: 180 TABLET | Refills: 1 | Status: SHIPPED | OUTPATIENT
Start: 2018-09-16 | End: 2018-11-20 | Stop reason: SDUPTHER

## 2018-11-09 ENCOUNTER — CLINICAL SUPPORT (OUTPATIENT)
Dept: FAMILY MEDICINE | Facility: CLINIC | Age: 78
End: 2018-11-09
Payer: MEDICARE

## 2018-11-09 DIAGNOSIS — E55.9 VITAMIN D DEFICIENCY DISEASE: ICD-10-CM

## 2018-11-09 DIAGNOSIS — E11.49 DIABETES MELLITUS TYPE 2 WITH NEUROLOGICAL MANIFESTATIONS: ICD-10-CM

## 2018-11-09 DIAGNOSIS — I10 ESSENTIAL HYPERTENSION: ICD-10-CM

## 2018-11-09 LAB
25(OH)D3+25(OH)D2 SERPL-MCNC: 44 NG/ML
ALBUMIN SERPL BCP-MCNC: 3.6 G/DL
ALP SERPL-CCNC: 66 U/L
ALT SERPL W/O P-5'-P-CCNC: 21 U/L
ANION GAP SERPL CALC-SCNC: 11 MMOL/L
AST SERPL-CCNC: 25 U/L
BILIRUB SERPL-MCNC: 0.5 MG/DL
BUN SERPL-MCNC: 18 MG/DL
CALCIUM SERPL-MCNC: 9.6 MG/DL
CHLORIDE SERPL-SCNC: 96 MMOL/L
CO2 SERPL-SCNC: 29 MMOL/L
CREAT SERPL-MCNC: 0.8 MG/DL
EST. GFR  (AFRICAN AMERICAN): >60 ML/MIN/1.73 M^2
EST. GFR  (NON AFRICAN AMERICAN): >60 ML/MIN/1.73 M^2
ESTIMATED AVG GLUCOSE: 126 MG/DL
GLUCOSE SERPL-MCNC: 112 MG/DL
HBA1C MFR BLD HPLC: 6 %
POTASSIUM SERPL-SCNC: 4.4 MMOL/L
PROT SERPL-MCNC: 7 G/DL
SODIUM SERPL-SCNC: 136 MMOL/L

## 2018-11-09 PROCEDURE — 36415 COLL VENOUS BLD VENIPUNCTURE: CPT | Mod: HCWC,S$GLB,, | Performed by: FAMILY MEDICINE

## 2018-11-09 PROCEDURE — 83036 HEMOGLOBIN GLYCOSYLATED A1C: CPT | Mod: HCWC

## 2018-11-09 PROCEDURE — 80053 COMPREHEN METABOLIC PANEL: CPT | Mod: HCWC

## 2018-11-09 PROCEDURE — 99999 PR PBB SHADOW E&M-EST. PATIENT-LVL I: CPT | Mod: PBBFAC,HCWC,,

## 2018-11-09 PROCEDURE — 82306 VITAMIN D 25 HYDROXY: CPT | Mod: HCWC

## 2018-11-12 ENCOUNTER — TELEPHONE (OUTPATIENT)
Dept: FAMILY MEDICINE | Facility: CLINIC | Age: 78
End: 2018-11-12

## 2018-11-20 ENCOUNTER — OFFICE VISIT (OUTPATIENT)
Dept: FAMILY MEDICINE | Facility: CLINIC | Age: 78
End: 2018-11-20
Payer: MEDICARE

## 2018-11-20 VITALS
HEART RATE: 86 BPM | RESPIRATION RATE: 18 BRPM | HEIGHT: 67 IN | SYSTOLIC BLOOD PRESSURE: 108 MMHG | BODY MASS INDEX: 21.21 KG/M2 | DIASTOLIC BLOOD PRESSURE: 64 MMHG | WEIGHT: 135.13 LBS

## 2018-11-20 DIAGNOSIS — F32.A DEPRESSION, UNSPECIFIED DEPRESSION TYPE: ICD-10-CM

## 2018-11-20 DIAGNOSIS — E11.40 TYPE 2 DIABETES MELLITUS WITH DIABETIC NEUROPATHY, WITHOUT LONG-TERM CURRENT USE OF INSULIN: ICD-10-CM

## 2018-11-20 DIAGNOSIS — Z86.73 HISTORY OF EMBOLIC STROKE: ICD-10-CM

## 2018-11-20 DIAGNOSIS — E55.9 VITAMIN D DEFICIENCY DISEASE: ICD-10-CM

## 2018-11-20 DIAGNOSIS — G47.00 INSOMNIA, UNSPECIFIED TYPE: ICD-10-CM

## 2018-11-20 DIAGNOSIS — I10 ESSENTIAL HYPERTENSION: Primary | ICD-10-CM

## 2018-11-20 DIAGNOSIS — G81.91 RIGHT HEMIPLEGIA: ICD-10-CM

## 2018-11-20 PROCEDURE — 90670 PCV13 VACCINE IM: CPT | Mod: HCWC,S$GLB,, | Performed by: FAMILY MEDICINE

## 2018-11-20 PROCEDURE — 99999 PR PBB SHADOW E&M-EST. PATIENT-LVL III: CPT | Mod: PBBFAC,HCWC,, | Performed by: FAMILY MEDICINE

## 2018-11-20 PROCEDURE — 3074F SYST BP LT 130 MM HG: CPT | Mod: CPTII,HCWC,S$GLB, | Performed by: FAMILY MEDICINE

## 2018-11-20 PROCEDURE — 3078F DIAST BP <80 MM HG: CPT | Mod: CPTII,HCWC,S$GLB, | Performed by: FAMILY MEDICINE

## 2018-11-20 PROCEDURE — 3044F HG A1C LEVEL LT 7.0%: CPT | Mod: CPTII,HCWC,S$GLB, | Performed by: FAMILY MEDICINE

## 2018-11-20 PROCEDURE — 99214 OFFICE O/P EST MOD 30 MIN: CPT | Mod: HCWC,25,S$GLB, | Performed by: FAMILY MEDICINE

## 2018-11-20 PROCEDURE — G0009 ADMIN PNEUMOCOCCAL VACCINE: HCPCS | Mod: HCWC,S$GLB,, | Performed by: FAMILY MEDICINE

## 2018-11-20 PROCEDURE — 90662 IIV NO PRSV INCREASED AG IM: CPT | Mod: HCWC,S$GLB,, | Performed by: FAMILY MEDICINE

## 2018-11-20 PROCEDURE — 1101F PT FALLS ASSESS-DOCD LE1/YR: CPT | Mod: CPTII,HCWC,S$GLB, | Performed by: FAMILY MEDICINE

## 2018-11-20 PROCEDURE — G0008 ADMIN INFLUENZA VIRUS VAC: HCPCS | Mod: HCWC,S$GLB,, | Performed by: FAMILY MEDICINE

## 2018-11-20 NOTE — PROGRESS NOTES
Subjective:       Patient ID: Michelle Rowell is a 77 y.o. female.    Chief Complaint: Annual Exam    Pt is a 77 y.o. female who presents for evaluation and management of   Encounter Diagnoses   Name Primary?    Essential hypertension Yes    Depression, unspecified depression type     History of embolic stroke     Insomnia, unspecified type     Type 2 diabetes mellitus with diabetic neuropathy, without long-term current use of insulin     Right hemiplegia    .  Doing well on current meds. Denies any side effects. Prevention is up to date.    Review of Systems   Constitutional: Negative for chills and fever.   Respiratory: Negative for shortness of breath.    Cardiovascular: Negative for chest pain and palpitations.   Gastrointestinal: Negative for abdominal pain, blood in stool, constipation and nausea.   Endocrine: Negative for polydipsia and polyuria.   Genitourinary: Negative for difficulty urinating, frequency and hematuria.   Musculoskeletal: Positive for back pain.   Neurological: Positive for speech difficulty, weakness and numbness.   Psychiatric/Behavioral: Negative for dysphoric mood, sleep disturbance and suicidal ideas. The patient is not nervous/anxious.        Objective:      Physical Exam   Constitutional: She is oriented to person, place, and time. She appears well-developed and well-nourished.   HENT:   Head: Normocephalic and atraumatic.   Right Ear: External ear normal.   Left Ear: External ear normal.   Nose: Nose normal.   Mouth/Throat: Oropharynx is clear and moist.   Eyes: EOM are normal. Pupils are equal, round, and reactive to light.   Neck: Normal range of motion. Neck supple. No JVD present. No tracheal deviation present. No thyromegaly present.   Cardiovascular: Normal rate, normal heart sounds and intact distal pulses.   No murmur heard.  Pulmonary/Chest: Effort normal and breath sounds normal. No respiratory distress. She has no wheezes. She has no rales. She exhibits no  tenderness.   Abdominal: Soft. Bowel sounds are normal. She exhibits no distension and no mass. There is no tenderness. There is no rebound and no guarding.   Musculoskeletal: Normal range of motion. She exhibits no edema or tenderness.   Protective Sensation (w/ 10 gram monofilament):  Right: Intact  Left: Intact    Visual Inspection:  Normal -  Bilateral    Pedal Pulses:   Right: Present  Left: Present    Posterior tibialis:   Right:Present  Left: Present     Lymphadenopathy:     She has no cervical adenopathy.   Neurological: She is alert and oriented to person, place, and time. She has normal reflexes. She displays normal reflexes. No cranial nerve deficit. She exhibits normal muscle tone. Coordination normal.   Mild dysarthria   Skin: Skin is warm and dry. No rash noted. No erythema. No pallor.   Psychiatric: She has a normal mood and affect. Her behavior is normal. Judgment and thought content normal.       Assessment:       1. Essential hypertension    2. Depression, unspecified depression type    3. History of embolic stroke    4. Insomnia, unspecified type    5. Type 2 diabetes mellitus with diabetic neuropathy, without long-term current use of insulin    6. Right hemiplegia    7. Vitamin D deficiency disease        Plan:   Michelle was seen today for annual exam.    Diagnoses and all orders for this visit:    Essential hypertension  -     Lipid panel; Future  -     Comprehensive metabolic panel; Future    Depression, unspecified depression type    History of embolic stroke    Insomnia, unspecified type    Type 2 diabetes mellitus with diabetic neuropathy, without long-term current use of insulin  -     Hemoglobin A1c; Future  -     (In Office Administered) Pneumococcal Conjugate Vaccine (13 Valent) (IM)    Right hemiplegia    Vitamin D deficiency disease  -     Vitamin D; Future    Other orders  -     Influenza - High Dose (65+) (PF) (IM)          Problem List Items Addressed This Visit     Depression     Essential hypertension - Primary    History of embolic stroke    Insomnia    Right hemiplegia    Type 2 diabetes mellitus with neurologic complication          Continue same see orders   Flu vaccine today     RTC 6 months     No Follow-up on file.

## 2018-11-21 LAB
LEFT EYE DM RETINOPATHY: NEGATIVE
RIGHT EYE DM RETINOPATHY: POSITIVE

## 2018-12-10 ENCOUNTER — PATIENT MESSAGE (OUTPATIENT)
Dept: FAMILY MEDICINE | Facility: CLINIC | Age: 78
End: 2018-12-10

## 2018-12-11 ENCOUNTER — OFFICE VISIT (OUTPATIENT)
Dept: FAMILY MEDICINE | Facility: CLINIC | Age: 78
End: 2018-12-11
Payer: MEDICARE

## 2018-12-11 VITALS
BODY MASS INDEX: 20.88 KG/M2 | HEIGHT: 67 IN | SYSTOLIC BLOOD PRESSURE: 110 MMHG | DIASTOLIC BLOOD PRESSURE: 68 MMHG | HEART RATE: 56 BPM | WEIGHT: 133 LBS | RESPIRATION RATE: 18 BRPM

## 2018-12-11 DIAGNOSIS — M54.50 ACUTE LEFT-SIDED LOW BACK PAIN WITHOUT SCIATICA: Primary | ICD-10-CM

## 2018-12-11 PROCEDURE — 99213 OFFICE O/P EST LOW 20 MIN: CPT | Mod: 25,HCWC,S$GLB, | Performed by: FAMILY MEDICINE

## 2018-12-11 PROCEDURE — 96372 THER/PROPH/DIAG INJ SC/IM: CPT | Mod: HCWC,S$GLB,, | Performed by: FAMILY MEDICINE

## 2018-12-11 PROCEDURE — 99999 PR PBB SHADOW E&M-EST. PATIENT-LVL III: CPT | Mod: PBBFAC,HCWC,, | Performed by: FAMILY MEDICINE

## 2018-12-11 PROCEDURE — 3074F SYST BP LT 130 MM HG: CPT | Mod: CPTII,HCWC,S$GLB, | Performed by: FAMILY MEDICINE

## 2018-12-11 PROCEDURE — 1101F PT FALLS ASSESS-DOCD LE1/YR: CPT | Mod: CPTII,HCWC,S$GLB, | Performed by: FAMILY MEDICINE

## 2018-12-11 PROCEDURE — 3078F DIAST BP <80 MM HG: CPT | Mod: CPTII,HCWC,S$GLB, | Performed by: FAMILY MEDICINE

## 2018-12-11 RX ORDER — KETOROLAC TROMETHAMINE 30 MG/ML
30 INJECTION, SOLUTION INTRAMUSCULAR; INTRAVENOUS
Status: COMPLETED | OUTPATIENT
Start: 2018-12-11 | End: 2018-12-11

## 2018-12-11 RX ORDER — METHYLPREDNISOLONE ACETATE 40 MG/ML
60 INJECTION, SUSPENSION INTRA-ARTICULAR; INTRALESIONAL; INTRAMUSCULAR; SOFT TISSUE
Status: COMPLETED | OUTPATIENT
Start: 2018-12-11 | End: 2018-12-11

## 2018-12-11 RX ADMIN — KETOROLAC TROMETHAMINE 30 MG: 30 INJECTION, SOLUTION INTRAMUSCULAR; INTRAVENOUS at 05:12

## 2018-12-11 RX ADMIN — METHYLPREDNISOLONE ACETATE 60 MG: 40 INJECTION, SUSPENSION INTRA-ARTICULAR; INTRALESIONAL; INTRAMUSCULAR; SOFT TISSUE at 05:12

## 2018-12-11 NOTE — PROGRESS NOTES
Subjective:       Patient ID: Michelle Rowell is a 78 y.o. female.    Chief Complaint: Back Pain    Pt is a 78 y.o. female who presents for evaluation and management of   Encounter Diagnosis   Name Primary?    Acute left-sided low back pain without sciatica Yes   .  Aleve is helping some   Radiates to her left thigh     Doing well on current meds. Denies any side effects. Prevention is up to date.  Review of Systems   Musculoskeletal: Positive for back pain.   Neurological: Positive for weakness and numbness.       Objective:      Physical Exam   Constitutional: She is oriented to person, place, and time. She appears well-developed and well-nourished.   HENT:   Head: Normocephalic and atraumatic.   Right Ear: External ear normal.   Left Ear: External ear normal.   Nose: Nose normal.   Mouth/Throat: Oropharynx is clear and moist.   Eyes: EOM are normal. Pupils are equal, round, and reactive to light.   Neck: Normal range of motion. Neck supple. No JVD present. No tracheal deviation present. No thyromegaly present.   Cardiovascular: Normal rate, normal heart sounds and intact distal pulses.   No murmur heard.  Pulmonary/Chest: Effort normal and breath sounds normal. No respiratory distress. She has no wheezes. She has no rales. She exhibits no tenderness.   Abdominal: Soft. Bowel sounds are normal. She exhibits no distension and no mass. There is no tenderness. There is no rebound and no guarding.   Musculoskeletal: Normal range of motion. She exhibits no edema or tenderness.   Neg SLR  TTP left inferior facets and buttock    Lymphadenopathy:     She has no cervical adenopathy.   Neurological: She is alert and oriented to person, place, and time. She has normal reflexes. She displays normal reflexes. No cranial nerve deficit. She exhibits normal muscle tone. Coordination normal.   Dysarthria      Skin: Skin is warm and dry. No rash noted. No erythema. No pallor.   Psychiatric: She has a normal mood and affect.  Her behavior is normal. Judgment and thought content normal.       Assessment:       1. Acute left-sided low back pain without sciatica        Plan:   Michelle was seen today for back pain.    Diagnoses and all orders for this visit:    Acute left-sided low back pain without sciatica  -     ketorolac injection 30 mg  -     methylPREDNISolone acetate injection 60 mg      Problem List Items Addressed This Visit     None      Visit Diagnoses     Acute left-sided low back pain without sciatica    -  Primary    Relevant Medications    ketorolac injection 30 mg (Start on 12/11/2018  5:00 PM)    methylPREDNISolone acetate injection 60 mg (Start on 12/11/2018  5:00 PM)        Continue aleve   If she is not getting better, we will do HH with PT   No Follow-up on file.

## 2018-12-12 ENCOUNTER — TELEPHONE (OUTPATIENT)
Dept: FAMILY MEDICINE | Facility: CLINIC | Age: 78
End: 2018-12-12

## 2018-12-12 DIAGNOSIS — M54.50 ACUTE LEFT-SIDED LOW BACK PAIN WITHOUT SCIATICA: ICD-10-CM

## 2018-12-12 DIAGNOSIS — M25.552 PAIN OF LEFT HIP JOINT: Primary | ICD-10-CM

## 2018-12-12 NOTE — TELEPHONE ENCOUNTER
Patient was seen in office yesterday for back pain. Patients daughter calling, states patient was barely able to get up this morning due to pain. States injections helped yesterday but concerned about the severe pain. States once she takes alleve, it helps. Advise.      Kimi: [phone]

## 2018-12-13 ENCOUNTER — HOSPITAL ENCOUNTER (OUTPATIENT)
Dept: RADIOLOGY | Facility: HOSPITAL | Age: 78
Discharge: HOME | End: 2018-12-13
Attending: FAMILY MEDICINE
Payer: MEDICARE

## 2018-12-13 DIAGNOSIS — M54.50 ACUTE LEFT-SIDED LOW BACK PAIN WITHOUT SCIATICA: ICD-10-CM

## 2018-12-13 DIAGNOSIS — M25.552 PAIN OF LEFT HIP JOINT: ICD-10-CM

## 2018-12-13 PROCEDURE — 72100 X-RAY EXAM L-S SPINE 2/3 VWS: CPT | Mod: TC,HCWC

## 2018-12-13 PROCEDURE — 73502 X-RAY EXAM HIP UNI 2-3 VIEWS: CPT | Mod: 26,HCWC,LT, | Performed by: RADIOLOGY

## 2018-12-13 PROCEDURE — 72100 X-RAY EXAM L-S SPINE 2/3 VWS: CPT | Mod: 26,HCWC,, | Performed by: RADIOLOGY

## 2018-12-13 PROCEDURE — 73502 X-RAY EXAM HIP UNI 2-3 VIEWS: CPT | Mod: TC,HCWC,LT

## 2018-12-14 NOTE — TELEPHONE ENCOUNTER
Patient seen in clinic on 12/11/2018, please review Hip and L-spine results. Advise      (Dr. Hilario out of the office today)

## 2018-12-15 NOTE — TELEPHONE ENCOUNTER
I spoke with the family about osteopenia & DJD and may need Rx for this; so they will contact Dr Bojorquez about Prolia, fosamax, etc

## 2018-12-17 ENCOUNTER — TELEPHONE (OUTPATIENT)
Dept: FAMILY MEDICINE | Facility: CLINIC | Age: 78
End: 2018-12-17

## 2018-12-17 ENCOUNTER — PATIENT MESSAGE (OUTPATIENT)
Dept: FAMILY MEDICINE | Facility: CLINIC | Age: 78
End: 2018-12-17

## 2018-12-17 NOTE — PROGRESS NOTES
Please inform Michelle that her xrays show some arthritis and also curvature of her lumbar spine that could certainly give her some pain radiating into the left thigh. No broken bones. If she is still hurting, I would rec she see Dr. Tim iniguez [facility]. Let me know if interested thanks

## 2018-12-17 NOTE — TELEPHONE ENCOUNTER
Spoke to pts daughter and she stated that pt still cannot walk in the morning due to extremely bad hip pain. Pain is rated above a 10 on the pain scale. The pain is rapidly getting worse each day.  Daughter states that as the day goes on pt is able to walk by herself a little bit but the pain is still rated between a 8-10.    Xray was completed 12/12/2018.  SHe would like to know what is the next step for them to take to figure out why pt is in so much hip pain in the morning and through out the day.  Daughter is worried that she will not be able to walk at all pretty soon.

## 2018-12-17 NOTE — TELEPHONE ENCOUNTER
----- Message from Reno Hilario MD sent at 12/16/2018  8:06 PM CST -----  Please inform Michelle that her xrays show some arthritis and also curvature of her lumbar spine that could certainly give her some pain radiating into the left thigh. No broken bones. If she is still hurting, I would rec she see Dr. Tim iniguez [facility]. Let me know if interested thanks

## 2018-12-17 NOTE — TELEPHONE ENCOUNTER
----- Message from Dedrick Holley sent at 2018 10:28 AM CST -----  Contact: Daughter - Kimi Rowell  MRN: [id number]  : 1940  PCP: Reno Hilario  Home Phone      [phone]  Work Phone      Not on file.  Mobile          [phone]      MESSAGE: still having severe hip pain X 2 wks -- requesting to speak with nurse    Call [phone]    PCP: Yanelis

## 2018-12-17 NOTE — TELEPHONE ENCOUNTER
Pts daughter was notitifed pts dr espinoza on Friday.  [facility] she verbally understood and I also informed her that Elisabet will contact them with an appt date and time with dr Dumont

## 2018-12-18 RX ORDER — HYDROCODONE BITARTRATE AND ACETAMINOPHEN 7.5; 325 MG/1; MG/1
1 TABLET ORAL EVERY 12 HOURS PRN
Qty: 30 TABLET | Refills: 0 | Status: SHIPPED | OUTPATIENT
Start: 2018-12-18 | End: 2019-06-06 | Stop reason: ALTCHOICE

## 2018-12-21 ENCOUNTER — OFFICE VISIT (OUTPATIENT)
Dept: PAIN MEDICINE | Facility: CLINIC | Age: 78
End: 2018-12-21
Attending: PAIN MEDICINE
Payer: MEDICARE

## 2018-12-21 VITALS
WEIGHT: 132.06 LBS | HEART RATE: 81 BPM | HEIGHT: 67 IN | DIASTOLIC BLOOD PRESSURE: 74 MMHG | SYSTOLIC BLOOD PRESSURE: 123 MMHG | BODY MASS INDEX: 20.73 KG/M2

## 2018-12-21 DIAGNOSIS — G81.91 RIGHT HEMIPLEGIA: Primary | ICD-10-CM

## 2018-12-21 DIAGNOSIS — M53.3 SACROILIAC JOINT PAIN: ICD-10-CM

## 2018-12-21 DIAGNOSIS — M47.897 OTHER OSTEOARTHRITIS OF SPINE, LUMBOSACRAL REGION: ICD-10-CM

## 2018-12-21 DIAGNOSIS — M47.816 LUMBAR SPONDYLOSIS: ICD-10-CM

## 2018-12-21 DIAGNOSIS — M51.36 DDD (DEGENERATIVE DISC DISEASE), LUMBAR: ICD-10-CM

## 2018-12-21 PROCEDURE — 3078F DIAST BP <80 MM HG: CPT | Mod: CPTII,S$GLB,, | Performed by: PAIN MEDICINE

## 2018-12-21 PROCEDURE — 3078F PR MOST RECENT DIASTOLIC BLOOD PRESSURE < 80 MM HG: ICD-10-PCS | Mod: CPTII,S$GLB,, | Performed by: PAIN MEDICINE

## 2018-12-21 PROCEDURE — 99204 OFFICE O/P NEW MOD 45 MIN: CPT | Mod: S$GLB,,, | Performed by: PAIN MEDICINE

## 2018-12-21 PROCEDURE — 99999 PR PBB SHADOW E&M-EST. PATIENT-LVL III: CPT | Mod: PBBFAC,,, | Performed by: PAIN MEDICINE

## 2018-12-21 PROCEDURE — 99999 PR PBB SHADOW E&M-EST. PATIENT-LVL III: ICD-10-PCS | Mod: PBBFAC,,, | Performed by: PAIN MEDICINE

## 2018-12-21 PROCEDURE — 99499 UNLISTED E&M SERVICE: CPT | Mod: HCNC,S$GLB,, | Performed by: PAIN MEDICINE

## 2018-12-21 PROCEDURE — 1101F PR PT FALLS ASSESS DOC 0-1 FALLS W/OUT INJ PAST YR: ICD-10-PCS | Mod: CPTII,S$GLB,, | Performed by: PAIN MEDICINE

## 2018-12-21 PROCEDURE — 3074F SYST BP LT 130 MM HG: CPT | Mod: CPTII,S$GLB,, | Performed by: PAIN MEDICINE

## 2018-12-21 PROCEDURE — 99204 PR OFFICE/OUTPT VISIT, NEW, LEVL IV, 45-59 MIN: ICD-10-PCS | Mod: S$GLB,,, | Performed by: PAIN MEDICINE

## 2018-12-21 PROCEDURE — 99499 RISK ADDL DX/OHS AUDIT: ICD-10-PCS | Mod: HCNC,S$GLB,, | Performed by: PAIN MEDICINE

## 2018-12-21 PROCEDURE — 1101F PT FALLS ASSESS-DOCD LE1/YR: CPT | Mod: CPTII,S$GLB,, | Performed by: PAIN MEDICINE

## 2018-12-21 PROCEDURE — 3074F PR MOST RECENT SYSTOLIC BLOOD PRESSURE < 130 MM HG: ICD-10-PCS | Mod: CPTII,S$GLB,, | Performed by: PAIN MEDICINE

## 2018-12-21 NOTE — LETTER
January 1, 2019      Reno Hilario MD  [address]  [facility] [id number] [place] [address] - Pain Management  [address] [facility] [address]  Phone: [phone]  Fax: [phone]          Patient: Michelle Rowell   MR Number: [id number]   YOB: 1940   Date of Visit: 12/21/2018       Dear Dr. Reno Hilario:    Thank you for referring Michelle Rowell to me for evaluation. Attached you will find relevant portions of my assessment and plan of care.    If you have questions, please do not hesitate to call me. I look forward to following Michelle Rowlel along with you.    Sincerely,    Tim Dumont Jr., MD    Enclosure  CC:  No Recipients    If you would like to receive this communication electronically, please contact [email or web address] or [phone] to request more information on [employer] access.    For providers and/or their staff who would like to refer a patient to [facility], please contact us through our one-stop-shop provider referral line, [facility], at [phone].    If you feel you have received this communication in error or would no longer like to receive these types of communications, please e-mail [email or web address]

## 2018-12-21 NOTE — PROGRESS NOTES
Subjective:     Patient ID: Michelle Rowell is a 78 y.o. female    Chief Complaint: Hip Pain (Left)      Referred by: Reno Hilario MD      HPI:    Initial Encounter (12/21/18):  Michelle Rowell is a 78 y.o. female with hx of CVA with residual right hemiparesis who presents today with left hip pain. This pain started about 2 weeks ago. No injury noted. At first the pain was not severe, but after one week the pain started to limit her ability to ambulate. The pain has significantly improved over the past few days. She is able to ambulate with a walker, but the pain is still limiting her mobility and ADLs. The pain is located in the left lower lumbar region and does not radiate. She denies any associated numbness tingling, weakness or b/b dysfunction.      This pain is described in detail below.    Physical Therapy: No    Non-pharmacologic Treatment: Rest helps         · TENS? No    Pain Medications:         · Currently taking: Norco, Aleve    · Has tried in the past:      · Has not tried: Muscle relaxants, TCAs, SNRIs, anticonvulsants, topical creams    Blood thinners: ASA 81    Interventional Therapies: None    Relevant Surgeries: None    Affecting sleep? Yes    Affecting daily activities? yes    Depressive symptoms? no          · SI/HI? No    Work status: Retired    Pain Scores:    Best:       4/10  Worst:     10/10  Usually:   8/10  Today:    4/10    Review of Systems   Constitutional: Negative for activity change, appetite change, chills, fatigue, fever and unexpected weight change.   HENT: Negative for hearing loss.    Eyes: Negative for visual disturbance.   Respiratory: Negative for chest tightness and shortness of breath.    Cardiovascular: Negative for chest pain.   Gastrointestinal: Negative for abdominal pain, constipation, diarrhea, nausea and vomiting.   Genitourinary: Negative for difficulty urinating.   Musculoskeletal: Positive for arthralgias, back pain, gait problem and myalgias.  Negative for neck pain.   Skin: Negative for rash.   Neurological: Negative for dizziness, weakness, light-headedness, numbness and headaches.   Psychiatric/Behavioral: Positive for sleep disturbance. Negative for hallucinations and suicidal ideas. The patient is not nervous/anxious.        Past Medical History:   Diagnosis Date    Diabetes mellitus type II     Heart disease     Hyperlipidemia     Hypertension     Limb ischemia     Pseudoaneurysm following procedure 1/11/2016    Stroke        Past Surgical History:   Procedure Laterality Date    CORONARY ARTERY BYPASS GRAFT      JARED FILTER PLACEMENT      REMOVAL-IVC FILTER N/A 6/6/2016    Performed by [facility] at [facility] OR [address] [place] [address]    THROMBECTOMY      TONSILLECTOMY      TUBAL LIGATION      US PSEUDOANEURYSM REPAIR INC IMAGING  1/11/2016            Social History     Socioeconomic History    Marital status:      Spouse name: Not on file    Number of children: Not on file    Years of education: Not on file    Highest education level: Not on file   Social Needs    Financial resource strain: Not on file    Food insecurity - worry: Not on file    Food insecurity - inability: Not on file    Transportation needs - medical: Not on file    Transportation needs - non-medical: Not on file   Occupational History    Not on file   Tobacco Use    Smoking status: Former Smoker    Smokeless tobacco: Never Used    Tobacco comment: quit in 1990s   Substance and Sexual Activity    Alcohol use: No    Drug use: No    Sexual activity: Not on file   Other Topics Concern    Not on file   Social History Narrative    Not on file       Review of patient's allergies indicates:  No Known Allergies    Current Outpatient Medications on File Prior to Visit   Medication Sig Dispense Refill    aspirin (ECOTRIN) 81 MG EC tablet Take 162 mg by mouth once daily.       carvedilol (COREG) 12.5 MG tablet Take 1 tablet (12.5 mg total) by mouth 2  "(two) times daily. 60 tablet 11    cholecalciferol, vitamin D3, (VITAMIN D3) 1,000 unit capsule Take 1,000 Units by mouth once daily.      HYDROcodone-acetaminophen (NORCO) 7.5-325 mg per tablet Take 1 tablet by mouth every 12 (twelve) hours as needed for Pain. 30 tablet 0    latanoprost 0.005 % ophthalmic solution Place 1 drop into both eyes every evening.      lisinopril-hydrochlorothiazide (PRINZIDE,ZESTORETIC) 20-12.5 mg per tablet Take 1 tablet by mouth once daily. 30 tablet 5    metFORMIN (GLUCOPHAGE) 1000 MG tablet Take 1 tablet (1,000 mg total) by mouth 2 (two) times daily with meals. 60 tablet 0    mirtazapine (REMERON) 30 MG tablet TAKE ONE TABLET BY MOUTH ONCE DAILY IN THE EVENING 30 tablet 5    simvastatin (ZOCOR) 40 MG tablet TAKE ONE TABLET BY MOUTH ONCE DAILY IN THE EVENING 30 tablet 5     No current facility-administered medications on file prior to visit.        Objective:      /74   Pulse 81   Ht 5' 7" (1.702 m)   Wt 59.9 kg (132 lb 0.9 oz)   BMI 20.68 kg/m²     Exam:  GEN:  Well developed, well nourished.  No acute distress. Normal pain behavior.  HEENT:  No trauma.  Mucous membranes moist.  Nares patent bilaterally.  PSYCH: Normal affect. Thought content appropriate.  CHEST:  Breathing symmetric.  No audible wheezing.  ABD: Soft, non-distended.  SKIN:  Warm, pink, dry.  No rash on exposed areas.    EXT:  No cyanosis, clubbing, or edema.  No color change or changes in nail or hair growth.  NEURO/MUSCULOSKELETAL:  Fully alert, oriented, and appropriate. Speech normal jareth. No cranial nerve deficits.   Gait: Antalgic. Uses RW for ambulation.  No trendelenburg sign bilaterally.   Motor Strength: 5/5 motor strength throughout lower extremities.   Sensory: No sensory deficit in the lower extremities.   Reflexes:  2+ and symmetric throughout.  Downgoing Babinski's bilaterally.  No clonus or spasticity.  L-Spine:  Very limited ROM with pain on all movements. Unable to perform facet " loading bilaterally.  Negative SLR bilaterally.    SI Joint/Hip: Unabe to perform HARDEEP bilaterally.  Unable to perform FADIR bilaterally.  TTP over left lumbar paraspinals, Left SIJ  No TTP over hips, piriformis muscles, or GTB.      No pain with IR/ER bilateral hips    Imaging:  Narrative     EXAMINATION:  XR LUMBAR SPINE AP AND LATERAL    CLINICAL HISTORY:  Low back pain, <6wks, no red flags, no prior management;Low back pain    TECHNIQUE:  AP, lateral and spot images were performed of the lumbar spine.    COMPARISON:  None    FINDINGS:  There is a dextroscoliotic curvature of the lumbar spine.  Vertebral body heights are maintained.  There are multilevel degenerative changes consisting of disc space narrowing, endplate sclerosis, marginal osteophytosis and facet arthropathy.  No fracture or subluxation is seen.   Impression       As above.      Electronically signed by: Xochitl Chawla MD  Date: 12/13/2018  Time: 12:18     Narrative     EXAMINATION:  XR HIP 2 VIEW LEFT    CLINICAL HISTORY:  Pain in left hip    TECHNIQUE:  AP view of the pelvis and frog leg lateral view of the left hip were performed.    COMPARISON:  None    FINDINGS:  The bones are osteopenic.  The bilateral sacroiliac joints, hip joints and pubic symphysis are intact.  Mild-to-moderate degenerative changes are noted.  There also moderate degenerative changes of the lumbar spine.  No fracture or dislocation is identified.  Vascular calcifications.   Impression       As above.      Electronically signed by: Xochitl Chawla MD  Date: 12/13/2018  Time: 11:56    Encounter     View Encounter                Assessment:       Encounter Diagnoses   Name Primary?    Right hemiplegia Yes    DDD (degenerative disc disease), lumbar     Lumbar spondylosis     Other osteoarthritis of spine, lumbosacral region     Sacroiliac joint pain          Plan:       Michelle was seen today for hip pain.    Diagnoses and all orders for this visit:    Right  hemiplegia  -     Ambulatory Referral to Physical/Occupational Therapy    DDD (degenerative disc disease), lumbar  -     Ambulatory Referral to Physical/Occupational Therapy    Lumbar spondylosis  -     Ambulatory Referral to Physical/Occupational Therapy    Other osteoarthritis of spine, lumbosacral region  -     Ambulatory Referral to Physical/Occupational Therapy    Sacroiliac joint pain  -     Ambulatory Referral to Physical/Occupational Therapy        Michelle Rowell is a 78 y.o. female with acute left sided low back pain. Exact etiology unclear, though pain has significantly improved since onset. No signs of neurologic involvement. Good relief with NSAIDs    1. Pertinent imaging studies reviewed by me. Imaging results were discussed with patient.  2. Refer to PT for ROM, strengthening, stretching and HEP.  3. Ok to continue Aleve as this is helpful and no contraindications. Advised her to take with food and as little as needed.  4. RTC PRN. May consider lumbar MBBs vs. Left SIJ injection if needed.

## 2019-01-06 DIAGNOSIS — F32.A DEPRESSION, UNSPECIFIED DEPRESSION TYPE: ICD-10-CM

## 2019-01-06 DIAGNOSIS — G47.00 INSOMNIA, UNSPECIFIED TYPE: ICD-10-CM

## 2019-01-06 DIAGNOSIS — I10 ESSENTIAL HYPERTENSION: ICD-10-CM

## 2019-01-06 DIAGNOSIS — R63.0 POOR APPETITE: ICD-10-CM

## 2019-01-06 RX ORDER — MIRTAZAPINE 30 MG/1
TABLET, FILM COATED ORAL
Qty: 30 TABLET | Refills: 5 | Status: SHIPPED | OUTPATIENT
Start: 2019-01-06 | End: 2019-02-05 | Stop reason: SDUPTHER

## 2019-01-06 RX ORDER — LISINOPRIL AND HYDROCHLOROTHIAZIDE 12.5; 2 MG/1; MG/1
TABLET ORAL
Qty: 30 TABLET | Refills: 5 | Status: ON HOLD | OUTPATIENT
Start: 2019-01-06 | End: 2019-06-12 | Stop reason: HOSPADM

## 2019-02-05 ENCOUNTER — OFFICE VISIT (OUTPATIENT)
Dept: FAMILY MEDICINE | Facility: CLINIC | Age: 79
End: 2019-02-05
Payer: MEDICARE

## 2019-02-05 VITALS
WEIGHT: 129.81 LBS | SYSTOLIC BLOOD PRESSURE: 146 MMHG | BODY MASS INDEX: 20.33 KG/M2 | RESPIRATION RATE: 16 BRPM | TEMPERATURE: 97 F | DIASTOLIC BLOOD PRESSURE: 80 MMHG | HEART RATE: 76 BPM

## 2019-02-05 DIAGNOSIS — L98.429 STAGE 2 SKIN ULCER OF SACRAL REGION: Primary | ICD-10-CM

## 2019-02-05 PROCEDURE — 3079F PR MOST RECENT DIASTOLIC BLOOD PRESSURE 80-89 MM HG: ICD-10-PCS | Mod: HCNC,CPTII,S$GLB, | Performed by: FAMILY MEDICINE

## 2019-02-05 PROCEDURE — 99499 UNLISTED E&M SERVICE: CPT | Mod: HCNC,S$GLB,, | Performed by: FAMILY MEDICINE

## 2019-02-05 PROCEDURE — 1101F PT FALLS ASSESS-DOCD LE1/YR: CPT | Mod: HCNC,CPTII,S$GLB, | Performed by: FAMILY MEDICINE

## 2019-02-05 PROCEDURE — 3077F SYST BP >= 140 MM HG: CPT | Mod: HCNC,CPTII,S$GLB, | Performed by: FAMILY MEDICINE

## 2019-02-05 PROCEDURE — 99999 PR PBB SHADOW E&M-EST. PATIENT-LVL III: CPT | Mod: PBBFAC,HCNC,, | Performed by: FAMILY MEDICINE

## 2019-02-05 PROCEDURE — 99213 OFFICE O/P EST LOW 20 MIN: CPT | Mod: HCNC,S$GLB,, | Performed by: FAMILY MEDICINE

## 2019-02-05 PROCEDURE — 99999 PR PBB SHADOW E&M-EST. PATIENT-LVL III: ICD-10-PCS | Mod: PBBFAC,HCNC,, | Performed by: FAMILY MEDICINE

## 2019-02-05 PROCEDURE — 99499 RISK ADDL DX/OHS AUDIT: ICD-10-PCS | Mod: HCNC,S$GLB,, | Performed by: FAMILY MEDICINE

## 2019-02-05 PROCEDURE — 99213 PR OFFICE/OUTPT VISIT, EST, LEVL III, 20-29 MIN: ICD-10-PCS | Mod: HCNC,S$GLB,, | Performed by: FAMILY MEDICINE

## 2019-02-05 PROCEDURE — 1101F PR PT FALLS ASSESS DOC 0-1 FALLS W/OUT INJ PAST YR: ICD-10-PCS | Mod: HCNC,CPTII,S$GLB, | Performed by: FAMILY MEDICINE

## 2019-02-05 PROCEDURE — 3077F PR MOST RECENT SYSTOLIC BLOOD PRESSURE >= 140 MM HG: ICD-10-PCS | Mod: HCNC,CPTII,S$GLB, | Performed by: FAMILY MEDICINE

## 2019-02-05 PROCEDURE — 3079F DIAST BP 80-89 MM HG: CPT | Mod: HCNC,CPTII,S$GLB, | Performed by: FAMILY MEDICINE

## 2019-02-05 RX ORDER — PANTOPRAZOLE SODIUM 40 MG/1
TABLET, DELAYED RELEASE ORAL
Status: ON HOLD | COMMUNITY
Start: 2019-01-08 | End: 2019-06-12 | Stop reason: HOSPADM

## 2019-02-05 NOTE — PROGRESS NOTES
Subjective:       Patient ID: Michelle Rowell is a 78 y.o. female.    Chief Complaint: Sore (Coccyx)    Pt is a 78 y.o. female who presents for evaluation and management of   Encounter Diagnosis   Name Primary?    Stage 2 skin ulcer of sacral region Yes     Noticed 2 days ago  She had ulcer here about a year ago that got better with mepilex     Doing well on current meds. Denies any side effects. Prevention is up to date.    Review of Systems   Constitutional: Negative for fever.   Skin: Positive for wound.       Objective:      Physical Exam   Constitutional: She is oriented to person, place, and time. She appears well-developed and well-nourished.   HENT:   Head: Normocephalic and atraumatic.   Right Ear: External ear normal.   Left Ear: External ear normal.   Nose: Nose normal.   Mouth/Throat: Oropharynx is clear and moist.   Eyes: EOM are normal. Pupils are equal, round, and reactive to light.   Neck: Normal range of motion. Neck supple. No JVD present. No tracheal deviation present. No thyromegaly present.   Cardiovascular: Normal rate, normal heart sounds and intact distal pulses.   No murmur heard.  Pulmonary/Chest: Effort normal and breath sounds normal. No respiratory distress. She has no wheezes. She has no rales. She exhibits no tenderness.   Abdominal: Soft. Bowel sounds are normal. She exhibits no distension and no mass. There is no tenderness. There is no rebound and no guarding.   Musculoskeletal: Normal range of motion. She exhibits no edema or tenderness.   Lymphadenopathy:     She has no cervical adenopathy.   Neurological: She is alert and oriented to person, place, and time. She has normal reflexes. She displays normal reflexes. No cranial nerve deficit. She exhibits normal muscle tone. Coordination normal.   Skin: Skin is warm and dry. No rash noted. No erythema. No pallor.   Superior gluteal cleft with 2-3mm ulcer, stage 2    Psychiatric: She has a normal mood and affect. Her behavior is  "normal. Judgment and thought content normal.       Assessment:       1. Stage 2 skin ulcer of sacral region        Plan:   Michelle was seen today for sore.    Diagnoses and all orders for this visit:    Stage 2 skin ulcer of sacral region  -     foam bandage (MEPILEX BORDER) 3 X 3 " Bndg; Apply 1 Units topically once daily.      Problem List Items Addressed This Visit     None      Visit Diagnoses     Stage 2 skin ulcer of sacral region    -  Primary    Relevant Medications    foam bandage (MEPILEX BORDER) 3 X 3 " Bndg        Has foam pillow with center cut out.   Will resume mepilex daily. Clean with betadine daily     RTC 2 weeks     No Follow-up on file.      "

## 2019-02-25 DIAGNOSIS — E78.5 DYSLIPIDEMIA: ICD-10-CM

## 2019-02-26 RX ORDER — SIMVASTATIN 40 MG/1
TABLET, FILM COATED ORAL
Qty: 30 TABLET | Refills: 5 | Status: SHIPPED | OUTPATIENT
Start: 2019-02-26 | End: 2019-05-08 | Stop reason: SDUPTHER

## 2019-04-19 DIAGNOSIS — E11.9 TYPE 2 DIABETES MELLITUS WITHOUT COMPLICATION: ICD-10-CM

## 2019-04-21 RX ORDER — METFORMIN HYDROCHLORIDE 1000 MG/1
TABLET ORAL
Qty: 180 TABLET | Refills: 1 | Status: ON HOLD | OUTPATIENT
Start: 2019-04-21 | End: 2019-06-12 | Stop reason: HOSPADM

## 2019-05-07 ENCOUNTER — PATIENT MESSAGE (OUTPATIENT)
Dept: FAMILY MEDICINE | Facility: CLINIC | Age: 79
End: 2019-05-07

## 2019-05-08 ENCOUNTER — CLINICAL SUPPORT (OUTPATIENT)
Dept: FAMILY MEDICINE | Facility: CLINIC | Age: 79
End: 2019-05-08
Payer: MEDICARE

## 2019-05-08 ENCOUNTER — OFFICE VISIT (OUTPATIENT)
Dept: FAMILY MEDICINE | Facility: CLINIC | Age: 79
End: 2019-05-08
Payer: MEDICARE

## 2019-05-08 VITALS
HEIGHT: 67 IN | SYSTOLIC BLOOD PRESSURE: 110 MMHG | RESPIRATION RATE: 18 BRPM | DIASTOLIC BLOOD PRESSURE: 68 MMHG | WEIGHT: 129.19 LBS | BODY MASS INDEX: 20.28 KG/M2 | HEART RATE: 84 BPM

## 2019-05-08 DIAGNOSIS — G81.91 RIGHT HEMIPLEGIA: ICD-10-CM

## 2019-05-08 DIAGNOSIS — Z95.1 S/P CABG X 4: ICD-10-CM

## 2019-05-08 DIAGNOSIS — E04.1 THYROID NODULE: ICD-10-CM

## 2019-05-08 DIAGNOSIS — M25.473 ANKLE SWELLING, UNSPECIFIED LATERALITY: ICD-10-CM

## 2019-05-08 DIAGNOSIS — E11.40 TYPE 2 DIABETES MELLITUS WITH DIABETIC NEUROPATHY, WITHOUT LONG-TERM CURRENT USE OF INSULIN: ICD-10-CM

## 2019-05-08 DIAGNOSIS — E55.9 VITAMIN D DEFICIENCY DISEASE: ICD-10-CM

## 2019-05-08 DIAGNOSIS — I10 ESSENTIAL HYPERTENSION: ICD-10-CM

## 2019-05-08 DIAGNOSIS — F32.A DEPRESSION, UNSPECIFIED DEPRESSION TYPE: ICD-10-CM

## 2019-05-08 DIAGNOSIS — E88.09 HYPOALBUMINEMIA DUE TO PROTEIN-CALORIE MALNUTRITION: ICD-10-CM

## 2019-05-08 DIAGNOSIS — E46 HYPOALBUMINEMIA DUE TO PROTEIN-CALORIE MALNUTRITION: ICD-10-CM

## 2019-05-08 DIAGNOSIS — E11.40 TYPE 2 DIABETES MELLITUS WITH DIABETIC NEUROPATHY, WITHOUT LONG-TERM CURRENT USE OF INSULIN: Primary | ICD-10-CM

## 2019-05-08 LAB
25(OH)D3+25(OH)D2 SERPL-MCNC: 42 NG/ML (ref 30–96)
ALBUMIN SERPL BCP-MCNC: 2.9 G/DL (ref 3.5–5.2)
ALP SERPL-CCNC: 65 U/L (ref 55–135)
ALT SERPL W/O P-5'-P-CCNC: 31 U/L (ref 10–44)
ANION GAP SERPL CALC-SCNC: 10 MMOL/L (ref 8–16)
AST SERPL-CCNC: 29 U/L (ref 10–40)
BILIRUB SERPL-MCNC: 0.4 MG/DL (ref 0.1–1)
BUN SERPL-MCNC: 11 MG/DL (ref 8–23)
CALCIUM SERPL-MCNC: 9.1 MG/DL (ref 8.7–10.5)
CHLORIDE SERPL-SCNC: 96 MMOL/L (ref 95–110)
CHOLEST SERPL-MCNC: 156 MG/DL (ref 120–199)
CHOLEST/HDLC SERPL: 2.4 {RATIO} (ref 2–5)
CO2 SERPL-SCNC: 29 MMOL/L (ref 23–29)
CREAT SERPL-MCNC: 0.7 MG/DL (ref 0.5–1.4)
EST. GFR  (AFRICAN AMERICAN): >60 ML/MIN/1.73 M^2
EST. GFR  (NON AFRICAN AMERICAN): >60 ML/MIN/1.73 M^2
GLUCOSE SERPL-MCNC: 106 MG/DL (ref 70–110)
HDLC SERPL-MCNC: 66 MG/DL (ref 40–75)
HDLC SERPL: 42.3 % (ref 20–50)
LDLC SERPL CALC-MCNC: 69.6 MG/DL (ref 63–159)
NONHDLC SERPL-MCNC: 90 MG/DL
POTASSIUM SERPL-SCNC: 4.3 MMOL/L (ref 3.5–5.1)
PROT SERPL-MCNC: 6.3 G/DL (ref 6–8.4)
SODIUM SERPL-SCNC: 135 MMOL/L (ref 136–145)
TRIGL SERPL-MCNC: 102 MG/DL (ref 30–150)

## 2019-05-08 PROCEDURE — 3078F DIAST BP <80 MM HG: CPT | Mod: HCNC,CPTII,S$GLB, | Performed by: FAMILY MEDICINE

## 2019-05-08 PROCEDURE — 80061 LIPID PANEL: CPT | Mod: HCNC

## 2019-05-08 PROCEDURE — 36415 COLL VENOUS BLD VENIPUNCTURE: CPT | Mod: HCNC,S$GLB,, | Performed by: FAMILY MEDICINE

## 2019-05-08 PROCEDURE — 99999 PR PBB SHADOW E&M-EST. PATIENT-LVL III: CPT | Mod: PBBFAC,HCNC,, | Performed by: FAMILY MEDICINE

## 2019-05-08 PROCEDURE — 99214 OFFICE O/P EST MOD 30 MIN: CPT | Mod: HCNC,S$GLB,, | Performed by: FAMILY MEDICINE

## 2019-05-08 PROCEDURE — 3074F SYST BP LT 130 MM HG: CPT | Mod: HCNC,CPTII,S$GLB, | Performed by: FAMILY MEDICINE

## 2019-05-08 PROCEDURE — 99499 UNLISTED E&M SERVICE: CPT | Mod: HCNC,S$GLB,, | Performed by: FAMILY MEDICINE

## 2019-05-08 PROCEDURE — 36415 PR COLLECTION VENOUS BLOOD,VENIPUNCTURE: ICD-10-PCS | Mod: HCNC,S$GLB,, | Performed by: FAMILY MEDICINE

## 2019-05-08 PROCEDURE — 3078F PR MOST RECENT DIASTOLIC BLOOD PRESSURE < 80 MM HG: ICD-10-PCS | Mod: HCNC,CPTII,S$GLB, | Performed by: FAMILY MEDICINE

## 2019-05-08 PROCEDURE — 99999 PR PBB SHADOW E&M-EST. PATIENT-LVL III: ICD-10-PCS | Mod: PBBFAC,HCNC,, | Performed by: FAMILY MEDICINE

## 2019-05-08 PROCEDURE — 99499 RISK ADDL DX/OHS AUDIT: ICD-10-PCS | Mod: HCNC,S$GLB,, | Performed by: FAMILY MEDICINE

## 2019-05-08 PROCEDURE — 99214 PR OFFICE/OUTPT VISIT, EST, LEVL IV, 30-39 MIN: ICD-10-PCS | Mod: HCNC,S$GLB,, | Performed by: FAMILY MEDICINE

## 2019-05-08 PROCEDURE — 83036 HEMOGLOBIN GLYCOSYLATED A1C: CPT | Mod: HCNC

## 2019-05-08 PROCEDURE — 1101F PT FALLS ASSESS-DOCD LE1/YR: CPT | Mod: HCNC,CPTII,S$GLB, | Performed by: FAMILY MEDICINE

## 2019-05-08 PROCEDURE — 82306 VITAMIN D 25 HYDROXY: CPT | Mod: HCNC

## 2019-05-08 PROCEDURE — 80053 COMPREHEN METABOLIC PANEL: CPT | Mod: HCNC

## 2019-05-08 PROCEDURE — 1101F PR PT FALLS ASSESS DOC 0-1 FALLS W/OUT INJ PAST YR: ICD-10-PCS | Mod: HCNC,CPTII,S$GLB, | Performed by: FAMILY MEDICINE

## 2019-05-08 PROCEDURE — 3074F PR MOST RECENT SYSTOLIC BLOOD PRESSURE < 130 MM HG: ICD-10-PCS | Mod: HCNC,CPTII,S$GLB, | Performed by: FAMILY MEDICINE

## 2019-05-08 NOTE — PROGRESS NOTES
Subjective:       Patient ID: Michelle Rowell is a 78 y.o. female.    Chief Complaint: Edema (Right Ankle swelling x 3 months)    Pt is a 78 y.o. female who presents for evaluation and management of   Encounter Diagnoses   Name Primary?    Type 2 diabetes mellitus with diabetic neuropathy, without long-term current use of insulin Yes    S/P CABG x 4     Right hemiplegia     Essential hypertension     Depression, unspecified depression type     Thyroid nodule     Ankle swelling, unspecified laterality     Hypoalbuminemia due to protein-calorie malnutrition    .  Doing well on current meds. Denies any side effects. Prevention is up to date.  Review of Systems   Constitutional: Negative for chills and fever.   Respiratory: Negative for shortness of breath.    Cardiovascular: Positive for leg swelling. Negative for chest pain and palpitations.   Gastrointestinal: Negative for abdominal pain, blood in stool, constipation and nausea.   Endocrine: Negative for polydipsia and polyuria.   Genitourinary: Negative for difficulty urinating, frequency and hematuria.   Musculoskeletal: Positive for back pain.   Neurological: Positive for speech difficulty, weakness and numbness.   Psychiatric/Behavioral: Negative for dysphoric mood, sleep disturbance and suicidal ideas. The patient is not nervous/anxious.        Objective:      Physical Exam   Constitutional: She is oriented to person, place, and time. She appears well-developed and well-nourished.   HENT:   Head: Normocephalic and atraumatic.   Right Ear: External ear normal.   Left Ear: External ear normal.   Nose: Nose normal.   Mouth/Throat: Oropharynx is clear and moist.   Eyes: Pupils are equal, round, and reactive to light. EOM are normal.   Neck: Normal range of motion. Neck supple. No JVD present. No tracheal deviation present. No thyromegaly present.   Cardiovascular: Normal rate, normal heart sounds and intact distal pulses.   No murmur  heard.  Pulmonary/Chest: Effort normal and breath sounds normal. No respiratory distress. She has no wheezes. She has no rales. She exhibits no tenderness.   Abdominal: Soft. Bowel sounds are normal. She exhibits no distension and no mass. There is no tenderness. There is no rebound and no guarding.   Musculoskeletal: Normal range of motion. She exhibits edema. She exhibits no tenderness.   2 plus edema   Neg yashira  No palp cord    Lymphadenopathy:     She has no cervical adenopathy.   Neurological: She is alert and oriented to person, place, and time. She has normal reflexes. She displays normal reflexes. No cranial nerve deficit. She exhibits normal muscle tone. Coordination normal.   Partial right hemiparesis    Skin: Skin is warm and dry. No rash noted. No erythema. No pallor.   Superior gluteal cleft with 2-3mm ulcer, stage 2    Psychiatric: She has a normal mood and affect. Her behavior is normal. Judgment and thought content normal.       Assessment:       1. Type 2 diabetes mellitus with diabetic neuropathy, without long-term current use of insulin    2. S/P CABG x 4    3. Right hemiplegia    4. Essential hypertension    5. Depression, unspecified depression type    6. Thyroid nodule    7. Ankle swelling, unspecified laterality    8. Hypoalbuminemia due to protein-calorie malnutrition        Plan:   Michelle was seen today for edema.    Diagnoses and all orders for this visit:    Type 2 diabetes mellitus with diabetic neuropathy, without long-term current use of insulin    S/P CABG x 4    Right hemiplegia    Essential hypertension    Depression, unspecified depression type    Thyroid nodule  -     US Soft Tissue Head Neck Thyroid; Future    Ankle swelling, unspecified laterality    Hypoalbuminemia due to protein-calorie malnutrition      Problem List Items Addressed This Visit     Depression    Essential hypertension    Right hemiplegia    S/P CABG x 4    Thyroid nodule    Relevant Orders    US Soft Tissue  Head Neck Thyroid    Type 2 diabetes mellitus with neurologic complication - Primary      Other Visit Diagnoses     Ankle swelling, unspecified laterality        Hypoalbuminemia due to protein-calorie malnutrition            RLE has always b een a little swollen, bu both legs are worse recently. BAsed on her labs, and no signs of CHF, this is from low albumin.  She needs to add glucerna 2 po BID to her meals and get LE compression stockings     RTC 6 months       No follow-ups on file.

## 2019-05-09 LAB
ESTIMATED AVG GLUCOSE: 123 MG/DL (ref 68–131)
HBA1C MFR BLD HPLC: 5.9 % (ref 4–5.6)

## 2019-05-19 DIAGNOSIS — K21.9 GASTROESOPHAGEAL REFLUX DISEASE, ESOPHAGITIS PRESENCE NOT SPECIFIED: ICD-10-CM

## 2019-05-19 RX ORDER — PANTOPRAZOLE SODIUM 40 MG/1
TABLET, DELAYED RELEASE ORAL
Qty: 90 TABLET | Refills: 3 | Status: ON HOLD | OUTPATIENT
Start: 2019-05-19 | End: 2019-06-12 | Stop reason: HOSPADM

## 2019-05-31 ENCOUNTER — PES CALL (OUTPATIENT)
Dept: ADMINISTRATIVE | Facility: CLINIC | Age: 79
End: 2019-05-31

## 2019-06-06 ENCOUNTER — ANESTHESIA EVENT (OUTPATIENT)
Dept: EMERGENCY MEDICINE | Facility: HOSPITAL | Age: 79
End: 2019-06-06
Payer: MEDICARE

## 2019-06-06 ENCOUNTER — ANESTHESIA (OUTPATIENT)
Dept: EMERGENCY MEDICINE | Facility: HOSPITAL | Age: 79
End: 2019-06-06
Payer: MEDICARE

## 2019-06-06 ENCOUNTER — HOSPITAL ENCOUNTER (EMERGENCY)
Facility: HOSPITAL | Age: 79
Discharge: ANOTHER HOSPITAL | End: 2019-06-06
Attending: SURGERY
Payer: MEDICARE

## 2019-06-06 ENCOUNTER — HOSPITAL ENCOUNTER (INPATIENT)
Facility: HOSPITAL | Age: 79
LOS: 6 days | Discharge: NURSING, REHAB OR LONG-TERM CARE | DRG: 064 | End: 2019-06-12
Attending: EMERGENCY MEDICINE | Admitting: PSYCHIATRY & NEUROLOGY
Payer: MEDICARE

## 2019-06-06 VITALS
DIASTOLIC BLOOD PRESSURE: 56 MMHG | TEMPERATURE: 96 F | RESPIRATION RATE: 16 BRPM | SYSTOLIC BLOOD PRESSURE: 119 MMHG | WEIGHT: 136.81 LBS | HEART RATE: 75 BPM | OXYGEN SATURATION: 100 % | BODY MASS INDEX: 21.43 KG/M2

## 2019-06-06 DIAGNOSIS — R53.1 WEAKNESS: ICD-10-CM

## 2019-06-06 DIAGNOSIS — I10 ESSENTIAL HYPERTENSION: ICD-10-CM

## 2019-06-06 DIAGNOSIS — Z95.1 S/P CABG X 4: ICD-10-CM

## 2019-06-06 DIAGNOSIS — I63.9 CVA (CEREBRAL VASCULAR ACCIDENT): ICD-10-CM

## 2019-06-06 DIAGNOSIS — N39.0 UTI (URINARY TRACT INFECTION), UNCOMPLICATED: ICD-10-CM

## 2019-06-06 DIAGNOSIS — I25.3 ANEURYSM OF HEART: ICD-10-CM

## 2019-06-06 DIAGNOSIS — Z92.82 S/P ADMN TPA IN DIFF FAC W/N LAST 24 HR BEF ADM TO CRNT FAC: ICD-10-CM

## 2019-06-06 DIAGNOSIS — I67.848 OTHER CEREBROVASCULAR VASOSPASM AND VASOCONSTRICTION: ICD-10-CM

## 2019-06-06 DIAGNOSIS — I63.9 STROKE: ICD-10-CM

## 2019-06-06 DIAGNOSIS — G81.91 RIGHT HEMIPLEGIA: ICD-10-CM

## 2019-06-06 DIAGNOSIS — I63.9 CEREBROVASCULAR ACCIDENT (CVA), UNSPECIFIED MECHANISM: Primary | ICD-10-CM

## 2019-06-06 DIAGNOSIS — Z96.89 CHEST TUBE IN PLACE: ICD-10-CM

## 2019-06-06 DIAGNOSIS — Z86.73 HISTORY OF ISCHEMIC LEFT MCA STROKE: ICD-10-CM

## 2019-06-06 DIAGNOSIS — R13.12 OROPHARYNGEAL DYSPHAGIA: ICD-10-CM

## 2019-06-06 DIAGNOSIS — I63.411 EMBOLIC STROKE INVOLVING RIGHT MIDDLE CEREBRAL ARTERY: Primary | ICD-10-CM

## 2019-06-06 PROBLEM — I63.511 STROKE DUE TO OCCLUSION OF RIGHT MIDDLE CEREBRAL ARTERY: Status: ACTIVE | Noted: 2019-06-06

## 2019-06-06 PROBLEM — Z86.72 HISTORY OF DEEP VEIN THROMBOPHLEBITIS OF LOWER EXTREMITY: Status: ACTIVE | Noted: 2019-06-06

## 2019-06-06 PROBLEM — J96.00 ACUTE RESPIRATORY FAILURE: Status: ACTIVE | Noted: 2019-06-06

## 2019-06-06 PROBLEM — Z86.718 HISTORY OF DEEP VEIN THROMBOSIS: Status: ACTIVE | Noted: 2019-06-06

## 2019-06-06 LAB
ABO + RH BLD: NORMAL
ALBUMIN SERPL BCP-MCNC: 3.1 G/DL (ref 3.5–5.2)
ALP SERPL-CCNC: 61 U/L (ref 55–135)
ALT SERPL W/O P-5'-P-CCNC: 23 U/L (ref 10–44)
ANION GAP SERPL CALC-SCNC: 15 MMOL/L (ref 8–16)
APTT BLDCRRT: 24.1 SEC (ref 21–32)
APTT BLDCRRT: 27.4 SEC (ref 21–32)
AST SERPL-CCNC: 24 U/L (ref 10–40)
BASOPHILS # BLD AUTO: 0.05 K/UL (ref 0–0.2)
BASOPHILS # BLD AUTO: 0.05 K/UL (ref 0–0.2)
BASOPHILS # BLD AUTO: 0.08 K/UL (ref 0–0.2)
BASOPHILS NFR BLD: 0.3 % (ref 0–1.9)
BASOPHILS NFR BLD: 0.6 % (ref 0–1.9)
BASOPHILS NFR BLD: 0.7 % (ref 0–1.9)
BILIRUB SERPL-MCNC: 0.5 MG/DL (ref 0.1–1)
BLD GP AB SCN CELLS X3 SERPL QL: NORMAL
BUN SERPL-MCNC: 13 MG/DL (ref 8–23)
CALCIUM SERPL-MCNC: 9.1 MG/DL (ref 8.7–10.5)
CHLORIDE SERPL-SCNC: 96 MMOL/L (ref 95–110)
CHOLEST SERPL-MCNC: 141 MG/DL (ref 120–199)
CHOLEST/HDLC SERPL: 2 {RATIO} (ref 2–5)
CK MB SERPL-MCNC: 0.8 NG/ML (ref 0.1–6.5)
CK MB SERPL-RTO: 1.3 % (ref 0–5)
CK SERPL-CCNC: 62 U/L (ref 20–180)
CO2 SERPL-SCNC: 23 MMOL/L (ref 23–29)
CREAT SERPL-MCNC: 0.7 MG/DL (ref 0.5–1.4)
DIFFERENTIAL METHOD: ABNORMAL
EOSINOPHIL # BLD AUTO: 0 K/UL (ref 0–0.5)
EOSINOPHIL # BLD AUTO: 0.1 K/UL (ref 0–0.5)
EOSINOPHIL # BLD AUTO: 0.2 K/UL (ref 0–0.5)
EOSINOPHIL NFR BLD: 0.1 % (ref 0–8)
EOSINOPHIL NFR BLD: 0.7 % (ref 0–8)
EOSINOPHIL NFR BLD: 1.3 % (ref 0–8)
ERYTHROCYTE [DISTWIDTH] IN BLOOD BY AUTOMATED COUNT: 15.1 % (ref 11.5–14.5)
ERYTHROCYTE [DISTWIDTH] IN BLOOD BY AUTOMATED COUNT: 15.6 % (ref 11.5–14.5)
ERYTHROCYTE [DISTWIDTH] IN BLOOD BY AUTOMATED COUNT: 15.9 % (ref 11.5–14.5)
EST. GFR  (AFRICAN AMERICAN): >60 ML/MIN/1.73 M^2
EST. GFR  (NON AFRICAN AMERICAN): >60 ML/MIN/1.73 M^2
ESTIMATED AVG GLUCOSE: 114 MG/DL (ref 68–131)
GLUCOSE SERPL-MCNC: 119 MG/DL (ref 70–110)
HBA1C MFR BLD HPLC: 5.6 % (ref 4–5.6)
HCT VFR BLD AUTO: 36.4 % (ref 37–48.5)
HCT VFR BLD AUTO: 36.8 % (ref 37–48.5)
HCT VFR BLD AUTO: 39.4 % (ref 37–48.5)
HDLC SERPL-MCNC: 69 MG/DL (ref 40–75)
HDLC SERPL: 48.9 % (ref 20–50)
HGB BLD-MCNC: 12 G/DL (ref 12–16)
HGB BLD-MCNC: 12.3 G/DL (ref 12–16)
HGB BLD-MCNC: 13.2 G/DL (ref 12–16)
IMM GRANULOCYTES # BLD AUTO: 0.05 K/UL (ref 0–0.04)
IMM GRANULOCYTES # BLD AUTO: 0.07 K/UL (ref 0–0.04)
IMM GRANULOCYTES NFR BLD AUTO: 0.4 % (ref 0–0.5)
IMM GRANULOCYTES NFR BLD AUTO: 0.6 % (ref 0–0.5)
INR PPP: 1 (ref 0.8–1.2)
INR PPP: 1.2 (ref 0.8–1.2)
INR PPP: 1.9 (ref 0.8–1.2)
LDLC SERPL CALC-MCNC: 57.6 MG/DL (ref 63–159)
LYMPHOCYTES # BLD AUTO: 2.2 K/UL (ref 1–4.8)
LYMPHOCYTES # BLD AUTO: 2.5 K/UL (ref 1–4.8)
LYMPHOCYTES # BLD AUTO: 4.8 K/UL (ref 1–4.8)
LYMPHOCYTES NFR BLD: 15.1 % (ref 18–48)
LYMPHOCYTES NFR BLD: 24.2 % (ref 18–48)
LYMPHOCYTES NFR BLD: 39.3 % (ref 18–48)
MCH RBC QN AUTO: 31.1 PG (ref 27–31)
MCH RBC QN AUTO: 31.4 PG (ref 27–31)
MCH RBC QN AUTO: 31.9 PG (ref 27–31)
MCHC RBC AUTO-ENTMCNC: 33 G/DL (ref 32–36)
MCHC RBC AUTO-ENTMCNC: 33.4 G/DL (ref 32–36)
MCHC RBC AUTO-ENTMCNC: 33.5 G/DL (ref 32–36)
MCV RBC AUTO: 93 FL (ref 82–98)
MCV RBC AUTO: 94 FL (ref 82–98)
MCV RBC AUTO: 97 FL (ref 82–98)
MONOCYTES # BLD AUTO: 0.6 K/UL (ref 0.3–1)
MONOCYTES # BLD AUTO: 1.2 K/UL (ref 0.3–1)
MONOCYTES # BLD AUTO: 1.5 K/UL (ref 0.3–1)
MONOCYTES NFR BLD: 10.2 % (ref 4–15)
MONOCYTES NFR BLD: 6.8 % (ref 4–15)
MONOCYTES NFR BLD: 9.1 % (ref 4–15)
NEUTROPHILS # BLD AUTO: 12.4 K/UL (ref 1.8–7.7)
NEUTROPHILS # BLD AUTO: 5.9 K/UL (ref 1.8–7.7)
NEUTROPHILS # BLD AUTO: 6.1 K/UL (ref 1.8–7.7)
NEUTROPHILS NFR BLD: 48.5 % (ref 38–73)
NEUTROPHILS NFR BLD: 67.1 % (ref 38–73)
NEUTROPHILS NFR BLD: 75 % (ref 38–73)
NONHDLC SERPL-MCNC: 72 MG/DL
NRBC BLD-RTO: 0 /100 WBC
NRBC BLD-RTO: 0 /100 WBC
PLATELET # BLD AUTO: 217 K/UL (ref 150–350)
PLATELET # BLD AUTO: 290 K/UL (ref 150–350)
PLATELET # BLD AUTO: 314 K/UL (ref 150–350)
PMV BLD AUTO: 11.2 FL (ref 9.2–12.9)
PMV BLD AUTO: 11.2 FL (ref 9.2–12.9)
PMV BLD AUTO: 12.5 FL (ref 9.2–12.9)
POCT GLUCOSE: 115 MG/DL (ref 70–110)
POCT GLUCOSE: 141 MG/DL (ref 70–110)
POCT GLUCOSE: 179 MG/DL (ref 70–110)
POCT GLUCOSE: 206 MG/DL (ref 70–110)
POTASSIUM SERPL-SCNC: 4.4 MMOL/L (ref 3.5–5.1)
PROT SERPL-MCNC: 6.5 G/DL (ref 6–8.4)
PROTHROMBIN TIME: 10.1 SEC (ref 9–12.5)
PROTHROMBIN TIME: 11.7 SEC (ref 9–12.5)
PROTHROMBIN TIME: 18.2 SEC (ref 9–12.5)
RBC # BLD AUTO: 3.76 M/UL (ref 4–5.4)
RBC # BLD AUTO: 3.95 M/UL (ref 4–5.4)
RBC # BLD AUTO: 4.21 M/UL (ref 4–5.4)
SODIUM SERPL-SCNC: 134 MMOL/L (ref 136–145)
TRIGL SERPL-MCNC: 72 MG/DL (ref 30–150)
TROPONIN I SERPL DL<=0.01 NG/ML-MCNC: <0.006 NG/ML (ref 0–0.03)
TSH SERPL DL<=0.005 MIU/L-ACNC: 3.27 UIU/ML (ref 0.4–4)
WBC # BLD AUTO: 12.15 K/UL (ref 3.9–12.7)
WBC # BLD AUTO: 16.51 K/UL (ref 3.9–12.7)
WBC # BLD AUTO: 9.09 K/UL (ref 3.9–12.7)

## 2019-06-06 PROCEDURE — 85610 PROTHROMBIN TIME: CPT | Mod: HCNC

## 2019-06-06 PROCEDURE — 96375 TX/PRO/DX INJ NEW DRUG ADDON: CPT | Mod: HCNC

## 2019-06-06 PROCEDURE — 85610 PROTHROMBIN TIME: CPT | Mod: 91,HCNC

## 2019-06-06 PROCEDURE — 84484 ASSAY OF TROPONIN QUANT: CPT | Mod: HCNC

## 2019-06-06 PROCEDURE — 85025 COMPLETE CBC W/AUTO DIFF WBC: CPT | Mod: 91,HCNC

## 2019-06-06 PROCEDURE — 27200966 HC CLOSED SUCTION SYSTEM: Mod: HCNC

## 2019-06-06 PROCEDURE — 82550 ASSAY OF CK (CPK): CPT | Mod: HCNC

## 2019-06-06 PROCEDURE — 85730 THROMBOPLASTIN TIME PARTIAL: CPT | Mod: HCNC

## 2019-06-06 PROCEDURE — 86901 BLOOD TYPING SEROLOGIC RH(D): CPT | Mod: HCNC

## 2019-06-06 PROCEDURE — 99291 CRITICAL CARE FIRST HOUR: CPT | Mod: HCNC,,, | Performed by: PHYSICIAN ASSISTANT

## 2019-06-06 PROCEDURE — 25000003 PHARM REV CODE 250: Mod: HCNC | Performed by: SURGERY

## 2019-06-06 PROCEDURE — 82962 GLUCOSE BLOOD TEST: CPT | Mod: HCNC,91

## 2019-06-06 PROCEDURE — 63600175 PHARM REV CODE 636 W HCPCS: Mod: HCNC

## 2019-06-06 PROCEDURE — G0427 INPT/ED TELECONSULT70: HCPCS | Mod: GT,G0,, | Performed by: PSYCHIATRY & NEUROLOGY

## 2019-06-06 PROCEDURE — 82962 GLUCOSE BLOOD TEST: CPT | Mod: HCNC

## 2019-06-06 PROCEDURE — 99223 1ST HOSP IP/OBS HIGH 75: CPT | Mod: HCNC,,, | Performed by: PSYCHIATRY & NEUROLOGY

## 2019-06-06 PROCEDURE — 20000000 HC ICU ROOM: Mod: HCNC

## 2019-06-06 PROCEDURE — 31500 INSERT EMERGENCY AIRWAY: CPT | Mod: HCNC | Performed by: NURSE ANESTHETIST, CERTIFIED REGISTERED

## 2019-06-06 PROCEDURE — 94002 VENT MGMT INPAT INIT DAY: CPT | Mod: HCNC

## 2019-06-06 PROCEDURE — 93010 ELECTROCARDIOGRAM REPORT: CPT | Mod: HCNC,,, | Performed by: INTERNAL MEDICINE

## 2019-06-06 PROCEDURE — 25000003 PHARM REV CODE 250: Mod: HCNC | Performed by: PHYSICIAN ASSISTANT

## 2019-06-06 PROCEDURE — 94761 N-INVAS EAR/PLS OXIMETRY MLT: CPT | Mod: HCNC

## 2019-06-06 PROCEDURE — 93010 EKG 12-LEAD: ICD-10-PCS | Mod: HCNC,,, | Performed by: INTERNAL MEDICINE

## 2019-06-06 PROCEDURE — 99291 PR CRITICAL CARE, E/M 30-74 MINUTES: ICD-10-PCS | Mod: HCNC,,, | Performed by: PHYSICIAN ASSISTANT

## 2019-06-06 PROCEDURE — 96365 THER/PROPH/DIAG IV INF INIT: CPT | Mod: HCNC

## 2019-06-06 PROCEDURE — 85730 THROMBOPLASTIN TIME PARTIAL: CPT | Mod: 91,HCNC

## 2019-06-06 PROCEDURE — 93005 ELECTROCARDIOGRAM TRACING: CPT | Mod: HCNC

## 2019-06-06 PROCEDURE — 27000221 HC OXYGEN, UP TO 24 HOURS: Mod: HCNC

## 2019-06-06 PROCEDURE — 85025 COMPLETE CBC W/AUTO DIFF WBC: CPT | Mod: HCNC

## 2019-06-06 PROCEDURE — 82553 CREATINE MB FRACTION: CPT | Mod: HCNC

## 2019-06-06 PROCEDURE — 80061 LIPID PANEL: CPT | Mod: HCNC

## 2019-06-06 PROCEDURE — 99900026 HC AIRWAY MAINTENANCE (STAT): Mod: HCNC

## 2019-06-06 PROCEDURE — 83036 HEMOGLOBIN GLYCOSYLATED A1C: CPT | Mod: HCNC

## 2019-06-06 PROCEDURE — 80053 COMPREHEN METABOLIC PANEL: CPT | Mod: HCNC

## 2019-06-06 PROCEDURE — 99291 CRITICAL CARE FIRST HOUR: CPT | Mod: 25,HCNC

## 2019-06-06 PROCEDURE — 99900035 HC TECH TIME PER 15 MIN (STAT): Mod: HCNC

## 2019-06-06 PROCEDURE — 99223 PR INITIAL HOSPITAL CARE,LEVL III: ICD-10-PCS | Mod: HCNC,,, | Performed by: PSYCHIATRY & NEUROLOGY

## 2019-06-06 PROCEDURE — 96360 HYDRATION IV INFUSION INIT: CPT | Mod: HCNC

## 2019-06-06 PROCEDURE — 84443 ASSAY THYROID STIM HORMONE: CPT | Mod: HCNC

## 2019-06-06 PROCEDURE — 63600175 PHARM REV CODE 636 W HCPCS: Mod: HCNC | Performed by: SURGERY

## 2019-06-06 PROCEDURE — 25500020 PHARM REV CODE 255: Mod: HCNC | Performed by: EMERGENCY MEDICINE

## 2019-06-06 PROCEDURE — G0427 PR INPT TELEHEALTH CON 70/>M: ICD-10-PCS | Mod: GT,G0,, | Performed by: PSYCHIATRY & NEUROLOGY

## 2019-06-06 RX ORDER — ETOMIDATE 2 MG/ML
INJECTION INTRAVENOUS
Status: COMPLETED
Start: 2019-06-06 | End: 2019-06-06

## 2019-06-06 RX ORDER — INSULIN ASPART 100 [IU]/ML
0-5 INJECTION, SOLUTION INTRAVENOUS; SUBCUTANEOUS EVERY 6 HOURS PRN
Status: DISCONTINUED | OUTPATIENT
Start: 2019-06-06 | End: 2019-06-12 | Stop reason: HOSPADM

## 2019-06-06 RX ORDER — ACETAMINOPHEN 325 MG/1
650 TABLET ORAL EVERY 6 HOURS PRN
Status: DISCONTINUED | OUTPATIENT
Start: 2019-06-06 | End: 2019-06-07

## 2019-06-06 RX ORDER — PROPOFOL 10 MG/ML
15 INJECTION, EMULSION INTRAVENOUS
Status: COMPLETED | OUTPATIENT
Start: 2019-06-06 | End: 2019-06-06

## 2019-06-06 RX ORDER — CHLORHEXIDINE GLUCONATE ORAL RINSE 1.2 MG/ML
15 SOLUTION DENTAL 2 TIMES DAILY
Status: DISCONTINUED | OUTPATIENT
Start: 2019-06-06 | End: 2019-06-08

## 2019-06-06 RX ORDER — PROPOFOL 10 MG/ML
INJECTION, EMULSION INTRAVENOUS
Status: COMPLETED
Start: 2019-06-06 | End: 2019-06-06

## 2019-06-06 RX ORDER — SODIUM CHLORIDE 9 MG/ML
INJECTION, SOLUTION INTRAVENOUS CONTINUOUS
Status: DISCONTINUED | OUTPATIENT
Start: 2019-06-06 | End: 2019-06-08

## 2019-06-06 RX ORDER — FAMOTIDINE 20 MG/1
20 TABLET, FILM COATED ORAL 2 TIMES DAILY
Status: DISCONTINUED | OUTPATIENT
Start: 2019-06-06 | End: 2019-06-06

## 2019-06-06 RX ORDER — PANTOPRAZOLE SODIUM 40 MG/1
40 FOR SUSPENSION ORAL 2 TIMES DAILY
Status: DISCONTINUED | OUTPATIENT
Start: 2019-06-06 | End: 2019-06-10

## 2019-06-06 RX ORDER — ATORVASTATIN CALCIUM 20 MG/1
40 TABLET, FILM COATED ORAL DAILY
Status: DISCONTINUED | OUTPATIENT
Start: 2019-06-07 | End: 2019-06-07

## 2019-06-06 RX ORDER — GLUCAGON 1 MG
1 KIT INJECTION
Status: DISCONTINUED | OUTPATIENT
Start: 2019-06-06 | End: 2019-06-12 | Stop reason: HOSPADM

## 2019-06-06 RX ORDER — ETOMIDATE 2 MG/ML
20 INJECTION INTRAVENOUS
Status: COMPLETED | OUTPATIENT
Start: 2019-06-06 | End: 2019-06-06

## 2019-06-06 RX ORDER — SODIUM CHLORIDE 0.9 % (FLUSH) 0.9 %
10 SYRINGE (ML) INJECTION
Status: DISCONTINUED | OUTPATIENT
Start: 2019-06-06 | End: 2019-06-12 | Stop reason: HOSPADM

## 2019-06-06 RX ORDER — FENTANYL CITRATE-0.9 % NACL/PF 10 MCG/ML
PLASTIC BAG, INJECTION (ML) INTRAVENOUS CONTINUOUS
Status: DISCONTINUED | OUTPATIENT
Start: 2019-06-06 | End: 2019-06-06

## 2019-06-06 RX ORDER — SUCCINYLCHOLINE CHLORIDE 20 MG/ML
INJECTION INTRAMUSCULAR; INTRAVENOUS
Status: DISCONTINUED
Start: 2019-06-06 | End: 2019-06-06 | Stop reason: HOSPADM

## 2019-06-06 RX ADMIN — ALTEPLASE 50 MG: KIT at 02:06

## 2019-06-06 RX ADMIN — PROPOFOL 15 MCG/KG/MIN: 10 INJECTION, EMULSION INTRAVENOUS at 02:06

## 2019-06-06 RX ADMIN — IOHEXOL 100 ML: 350 INJECTION, SOLUTION INTRAVENOUS at 04:06

## 2019-06-06 RX ADMIN — SODIUM CHLORIDE: 0.9 INJECTION, SOLUTION INTRAVENOUS at 07:06

## 2019-06-06 RX ADMIN — Medication 5 MCG/HR: at 04:06

## 2019-06-06 RX ADMIN — CHLORHEXIDINE GLUCONATE 0.12% ORAL RINSE 15 ML: 1.2 LIQUID ORAL at 09:06

## 2019-06-06 RX ADMIN — SODIUM CHLORIDE 1000 ML: 0.9 SOLUTION INTRAVENOUS at 03:06

## 2019-06-06 RX ADMIN — ALTEPLASE 6 MG: KIT at 02:06

## 2019-06-06 NOTE — HPI
Ms. Rowell is a 79 y/o female with PMH significant for previous LMCA stroke with residual RSW, DVTs s/p IVC filter placement, DM, HLD, HTN who [place] [facility] [place]/p TPA for RMCA syndrome. Patient was found on the ground with LSW and right gaze at home by her nieces at approximately 1325, who had last spoken with her over the phone an hour previously, seeming to be at her baseline. She was brought to [facility] ED where telestroke consult was done and patient was given TPA. Infusion completed at 1541. While in ED, she was intubated for airway protection prior to transport. Not a candidate for thrombectomy as no LVO on CTAMP on arrival to [facility].

## 2019-06-06 NOTE — ED PROVIDER NOTES
Encounter Date: 6/6/2019       History     Chief Complaint   Patient presents with    Extremity Weakness     Patient is 78-year-old white female who was basically found on the floor at home by family members.  Patient was not able to express words to the family, they noted she had left upper extremity weakness and mouth and lower lip drooping on the right side.  Patient has had a previous stroke but this is definitely a change in mental status.        Review of patient's allergies indicates:  No Known Allergies  Past Medical History:   Diagnosis Date    Diabetes mellitus type II     Heart disease     Hyperlipidemia     Hypertension     Limb ischemia     Pseudoaneurysm following procedure 1/11/2016    Stroke      Past Surgical History:   Procedure Laterality Date    CORONARY ARTERY BYPASS GRAFT      [place] FILTER PLACEMENT      REMOVAL-IVC FILTER N/A 6/6/2016    Performed by [facility] at [facility] OR [address] [place] [address]    THROMBECTOMY      TONSILLECTOMY      TUBAL LIGATION      US PSEUDOANEURYSM REPAIR INC IMAGING  1/11/2016          Family History   Problem Relation Age of Onset    Heart disease Father     Cancer Father     Thyroid disease Daughter      Social History     Tobacco Use    Smoking status: Former Smoker    Smokeless tobacco: Never Used    Tobacco comment: quit in 1990s   Substance Use Topics    Alcohol use: No    Drug use: No     Review of Systems   Unable to perform ROS: Mental status change       Physical Exam     Initial Vitals   BP Pulse Resp Temp SpO2   06/06/19 1359 06/06/19 1359 -- 06/06/19 1405 06/06/19 1359   (!) 142/89 83  96.1 °F (35.6 °C) 99 %      MAP       --                Physical Exam    Constitutional: No distress.   HENT:   Head: Normocephalic and atraumatic.   Nose: Nose normal.   Mouth/Throat: Oropharynx is clear and moist.   Eyes: Conjunctivae and EOM are normal. Pupils are equal, round, and reactive to light.   Neck: Normal range of motion. Neck supple.    Cardiovascular: Normal rate, normal heart sounds and intact distal pulses.   Pulmonary/Chest: Breath sounds normal. No respiratory distress. She has no wheezes. She has no rales.   Abdominal: Soft. Bowel sounds are normal. There is no tenderness. There is no rebound.   Neurological: She is alert.   Skin: Skin is warm and dry.         ED Course   Procedures  Labs Reviewed   CBC W/ AUTO DIFFERENTIAL - Abnormal; Notable for the following components:       Result Value    Mean Corpuscular Hemoglobin 31.4 (*)     RDW 15.6 (*)     Mono # 1.2 (*)     All other components within normal limits   POCT GLUCOSE - Abnormal; Notable for the following components:    POCT Glucose 115 (*)     All other components within normal limits   COMPREHENSIVE METABOLIC PANEL   PROTIME-INR   APTT   TROPONIN I   CK-MB   POCT GLUCOSE MONITORING CONTINUOUS          Imaging Results          CT Head Without Contrast (Final result)  Result time 06/06/19 13:59:08    Final result by Bharath Shi MD (06/06/19 13:59:08)                 Impression:      1.  No CT evidence of an acute intracranial abnormality.    2.  Mild atrophy and minimal small vessel ischemic changes of the periventricular white matter.  Old infarcts.      Electronically signed by: Bharath Shi MD  Date:    06/06/2019  Time:    13:59             Narrative:    EXAMINATION:  CT HEAD WITHOUT CONTRAST    CLINICAL HISTORY:  Neuro deficit(s), subacute;    TECHNIQUE:  Axial images were obtained through the head.  Coronal and sagittal reformations were also performed.  Contrast was not administered.    COMPARISON:  February 4, 2016.    FINDINGS:  CT head without contrast dated  June 6, 2019.    There is no CT evidence of an acute intracranial abnormality.  No acute focal infarct, hemorrhage, mass or midline shift.    There are findings related to an old left cerebral and basal ganglia infarcts with some compensatory enlargement of the left lateral ventricular system.  There is  mild atrophy and minimal small vessel ischemic changes of the periventricular white matter.    No displaced skull fracture.  No visualized significant paranasal sinus disease.                               Acute CVA, non-hemorrhagic.  TPA initiated .  Patient accepted in Transfer to [facility].                       Clinical Impression:       ICD-10-CM ICD-9-CM   1. Cerebrovascular accident (CVA), unspecified mechanism I63.9 434.91   2. Weakness R53.1 780.79         Disposition:   Disposition: Transferred  Condition: Critical                        John Swain Jr., MD  06/06/19 1431

## 2019-06-06 NOTE — ED PROVIDER NOTES
"Encounter Date: 6/6/2019       History     Chief Complaint   Patient presents with    Transfer     arrived via [facility] transfer from [facility], stroke work up, intubated, received TPA     3:57 PM  Patient is a 78-year-old female with a history of HTN, DM2, HLD, previous stroke s/p R hemiparesis presents to the ED after being transferred from [facility] ED. Patient at seen at outside, was "basically found on the floor at home by family members.  Patient was not able to express words to the family, they noted she had left upper extremity weakness and mouth and lower lip drooping on the right side." Vasculary neurology was consulted and tPA was given at 1441 and patient transferred to [facility] for further evaluation.  Patient intubated and was given propofol and then ketamine.  Exam is limited at this time given sedation.     Hx of R hemiparesis from PCP physical exams from previous strokes. At baseline, patient was able to walk with a walker. She was independent and could feed and dress herself.     Family states that they called patient around 1200, and patient wanted something to eat. A family emember came around 1330 PM to bring [employer] and saw the patient on the ground.           Review of patient's allergies indicates:  No Known Allergies  Past Medical History:   Diagnosis Date    Diabetes mellitus type II     Heart disease     Hyperlipidemia     Hypertension     Limb ischemia     Pseudoaneurysm following procedure 1/11/2016    Stroke      Past Surgical History:   Procedure Laterality Date    CORONARY ARTERY BYPASS GRAFT      JARED FILTER PLACEMENT      REMOVAL-IVC FILTER N/A 6/6/2016    Performed by [facility] Diagnostic Provider at [facility] OR [facility]    THROMBECTOMY      TONSILLECTOMY      TUBAL LIGATION      US PSEUDOANEURYSM REPAIR INC IMAGING  1/11/2016          Family History   Problem Relation Age of Onset    Heart disease Father     Cancer Father     Thyroid disease Daughter  "     Social History     Tobacco Use    Smoking status: Former Smoker    Smokeless tobacco: Never Used    Tobacco comment: quit in 1990s   Substance Use Topics    Alcohol use: No    Drug use: No     Review of Systems   Unable to perform ROS: Acuity of condition       Physical Exam     Initial Vitals [06/06/19 1550]   BP Pulse Resp Temp SpO2   115/83 73 20 96.5 °F (35.8 °C) 99 %      MAP       --         Physical Exam    Vitals reviewed.  Constitutional: She is not diaphoretic. She is sedated and intubated.   HENT:   Head: Atraumatic.   Nose: Nose normal.   Eyes: Lids are normal. Pupils are equal, round, and reactive to light. Right eye exhibits no chemosis. Left eye exhibits no chemosis.   Cardiovascular: Normal rate.   Pulmonary/Chest: No stridor. She is intubated. She has no wheezes.   Abdominal: She exhibits no mass. There is no rigidity and no guarding.   Neurological:   Previously documented R hemiparesis.  Patient withdraws from painful stimuli of LUE/LLE extremities.          ED Course   Procedures  Labs Reviewed   CBC W/ AUTO DIFFERENTIAL - Abnormal; Notable for the following components:       Result Value    RBC 3.76 (*)     Hematocrit 36.4 (*)     Mean Corpuscular Hemoglobin 31.9 (*)     RDW 15.9 (*)     Immature Granulocytes 0.6 (*)     Immature Grans (Abs) 0.05 (*)     All other components within normal limits   PROTIME-INR - Abnormal; Notable for the following components:    Prothrombin Time 18.2 (*)     INR 1.9 (*)     All other components within normal limits   LIPID PANEL - Abnormal; Notable for the following components:    LDL Cholesterol 57.6 (*)     All other components within normal limits   POCT GLUCOSE - Abnormal; Notable for the following components:    POCT Glucose 141 (*)     All other components within normal limits   TSH   HEMOGLOBIN A1C   HEMOGLOBIN A1C    Narrative:     ADD ON [facility] [phone] PER ZEE ALMONTE RN  06/06/2019  18:31    URINALYSIS, REFLEX TO URINE CULTURE   POCT  GLUCOSE, HAND-HELD DEVICE   TYPE & SCREEN   POCT GLUCOSE MONITORING CONTINUOUS   POCT GLUCOSE MONITORING CONTINUOUS          Imaging Results           CTA STROKE MULTI-PHASE (Final result)  Result time 06/06/19 16:38:04    Final result by Dhiraj Quiroga DO (06/06/19 16:38:04)                 Impression:      CTA head: No evidence for significant proximal stenosis or occlusion.    CTA neck: Atherosclerotic disease carotid bifurcations and proximal ICAs with less than 50% proximal ICA stenosis by NASCET criteria.    CT head: Compared to study from 02/04/2016 there is been encephalomalacia of the left cerebral hemisphere left MCA territory compatible with interval remote infarction.  Compensatory enlargement ventricles sulci and cisterns without hydrocephalus.    There is no evidence for acute intracranial hemorrhage or sulcal effacement to suggest large territory recent infarction allowing for motion.  Clinical correlation and further evaluation with MRI as warranted if patient compatible..    Innumerable micro nodularity within the lungs which may be related to infectious/granulomatous process though indeterminate.  Clinical correlation and further evaluation with CT thorax recommended.    Partially visualized aerated material within the right bronchus intermedius and bilateral lower lobe bronchi concerning for possible aspiration debris    Indeterminate left thyroid nodule clinical correlation and correlation with nonemergent thyroid ultrasound advised.      Electronically signed by: Dhiraj Quiroga DO  Date:    06/06/2019  Time:    16:38             Narrative:    EXAMINATION:  CTA STROKE MULTI-PHASE    CLINICAL HISTORY:  s/p tpa;    TECHNIQUE:  5 mm axial images of the head pre contrast with 0.625 mm axial CTA images of the head neck postcontrast.  Coronal and sagittal MPR and MIP imaging was performed 100 ml of Omnipaque 350 contrast was injected intravenously please note study was performed in multiphase  technique with 3 arterial passes through the head.    COMPARISON:  CT head 02/04/2016    FINDINGS:  CT head without contrast: There is large region of encephalomalacia left cerebral hemisphere with compensatory enlargement ventricles sulci and cisterns new from prior CT compatible with interval remote infarction.  There is no evidence for acute intracranial hemorrhage or sulcal effacement to suggest large territory recent infarction allowing for motion limitation.  Mild moderate patchy ethmoid air cell opacities.  Incidental small bilateral parietal foramen identified.  Endotracheal and orogastric tubes partially visualized.    CTA head:    Anterior circulation: The bilateral distal cervical, petrous, cavernous, and supraclinoid segments of the ICAs are patent without significant focal stenosis or aneurysm.    The anterior middle cerebral arteries are patent without focal stenosis or aneurysm.    Posterior circulation: The distal vertebral arteries, basilar artery and posterior cerebral arteries are patent without focal stenosis or aneurysm.    CTA neck: Atherosclerotic plaquing of the arch and origin the great vessels without significant stenosis.    The origin of the vertebral arteries from the respective subclavian arteries are within normal limits.  The vertebral arteries are patent throughout their course without focal stenosis or occlusion.    Right carotid: The right common carotid artery, carotid bifurcation and extracranial portions of the internal carotid artery are patent without significant focal stenosis.    Left carotid: The left common carotid artery, carotid bifurcation and extracranial portions of the internal carotid arteries are patent without significant focal stenosis.    Atherosclerotic plaquing carotid bifurcations and proximal ICAs.  There is less than 50% stenosis of the proximal ICAs by NASCET criteria.    .    Pharynx/larynx: Evaluation limited by indwelling endotracheal and presumed  nasogastric tubes.  Scattered fluid in the nasopharynx and oropharynx.    Please note there is abnormal material with aerated secretions within the visualized right mainstem bronchus and bronchus intermedius with additional opacification right lower lobe bronchi which may represent aspiration debris though indeterminate.    Innumerable micro nodularity within the lungs bilaterally with slight tree-in-bud configuration which may represent granulomatous process though indeterminate.  Further evaluation with dedicated CT thorax recommended.  Largest visualized nodule right upper lobe measuring 4 mm image 213 series 5.    Multilevel degenerative change of the cervical spine without evidence for acute fracture with grade 1 anterolisthesis of C3 on C4 and C4 on C5..    The bilateral Parotid and submandibular glands are within normal limits. There is a unusual central hypodense lesion within the left thyroid measuring 1.2 cm clinical correlation correlation with dedicated thyroid ultrasound advised.    Degenerative change of the spine without evidence for acute fracture.    Case discussed with Dr. Putnam 06/06/2019 at 1634 This report was flagged in Epic as abnormal.                                 Medical Decision Making:   History:   Old Medical Records: I decided to obtain old medical records.  Old Records Summarized: records from previous admission(s), records from clinic visits and records from another hospital.  Initial Assessment:   Patient is a 78-year-old female that was transferred to [facility] ED for vascular Neurology evaluation after exhibiting left extremity weakness, left facial droop.  She was given tPA at the previous ED.  Clinical Tests:   Lab Tests: Ordered and Reviewed  Radiological Study: Ordered and Reviewed  Medical Tests: Ordered and Reviewed  ED Management:  Exam limited due to sedation.  Vascular Neurology at bedside.  Will order labs and imaging and reassess.    POCT glucose of  141.  ECG with NSR at 74 bpm. No STEMI.  CBC with no leukocytosis or anemia.   CTA multiphase with no evidence for stenosis or occlusions in head. Neck with <50% ICA stenosis.    Patient will be admitted to Neuro ICU for further evaluation and management.  See their consult notes.  Other:   I have discussed this case with another health care provider.    I have reviewed patient's chart and discussed this case with my supervising MD.     Maria Alejandra Patten PA-C  Emergent Department  [facility] #[id number] or #[id number]                Attending Attestation:     Physician Attestation Statement for NP/PA:   I have conducted a face to face encounter with this patient in addition to the NP/PA, due to Medical Complexity      Attending Critical Care:   Critical Care Times:   Direct Patient Care (initial evaluation, reassessments, and time considering the case)................................................................15 minutes.   Additional History from reviewing old medical records or taking additional history from the family, EMS, PCP, etc.......................10 minutes.   Ordering, Reviewing, and Interpreting Diagnostic Studies...............................................................................................................5 minutes.   Documentation..................................................................................................................................................................................5 minutes.   Consultation with other Physicians. .................................................................................................................................................10 minutes.   ==============================================================  · Total Critical Care Time - exclusive of procedural time: 45 minutes.  ==============================================================  Critical care was necessary to treat or prevent imminent or  life-threatening deterioration of the following conditions: CNS failure, stroke and respiratory failure.       Attending ED Notes:   78-year-old female transferred from outside facility after being found to have acute neurologic deficit.  Patient was intubated prior to transfer.  She was on propofol infusion which was turned off in route to hypotension.  On arrival she is intubated and fairly sedated.  Minimal neurologic exam obtained. Neurovascular service notified on the patient's arrival.  Neuro ICU notified of the patient's arrival.  Patient sent emergently to CT a for re-evaluation of her stroke process.  The patient will be admitted to the neuro ICU for further management of this illness.             Clinical Impression:       ICD-10-CM ICD-9-CM   1. S/P admn tPA in diff fac w/n last 24 hr bef adm to crnt fac Z92.82 V45.88   2. Stroke I63.9 434.91         Disposition:   Disposition: Admitted  Condition: Critical                        Maria Alejandra Patten PA-C  06/06/19 0627       Luis Alfredo Grullon MD  06/06/19 1855

## 2019-06-06 NOTE — ED NOTES
Per EMS - propofol stopped at 1515 for low BP, ketamine 120 mg and vecuronium 6 mg given in flight at 1520

## 2019-06-06 NOTE — ASSESSMENT & PLAN NOTE
Stroke due to occlusion of right middle cerebral artery  Antithrombotics for secondary stroke prevention: Antiplatelets: None: Hold all Antithrombotics x 24 hours after IV t-PA administration    Statins for secondary stroke prevention and hyperlipidemia, if present:   Statins: Atorvastatin- 40 mg daily    Aggressive risk factor modification: HTN, DM, HLD, CAD     Rehab efforts: The patient has been evaluated by a stroke team provider and the therapy needs have been fully considered based off the presenting complaints and exam findings. The following therapy evaluations are needed: PT evaluate and treat, OT evaluate and treat, SLP evaluate and treat, PM&R evaluate for appropriate placement    Diagnostics ordered/pending: CTA Head to assess vasculature , CTA Neck/Arch to assess vasculature, HgbA1C to assess blood glucose levels, Lipid Profile to assess cholesterol levels, MRI head without contrast to assess brain parenchyma, TTE to assess cardiac function/status     VTE prophylaxis: None: Reason for No Pharmacological VTE Prophylaxis: Mechanical prophylaxis: Place SCDs    BP parameters: Infarct: Post tPA, SBP <180

## 2019-06-06 NOTE — ANESTHESIA PROCEDURE NOTES
Intubation    Diagnosis: respiratory distress  Doctor requesting consult: Lee  Patient location during procedure: ED  Procedure start time: 6/6/2019 2:46 PM  Timeout: 6/6/2019 2:45 PM  Procedure end time: 6/6/2019 2:48 PM  Staffing  Resident/CRNA: Johnathon Molina CRNA  Preanesthetic Checklist  Completed: patient identified, site marked, surgical consent, pre-op evaluation, timeout performed, IV checked, risks and benefits discussed, monitors and equipment checked and anesthesia consent given  Intubation  Indication: respiratory distress  Pre-oxygenation. Induction: intravenous rapid sequence, mask ventilation: n/a.  Intubation: postinduction, laryngoscopy direct, Erik 3.  Endotracheal Tube: oral, 8.0 mm ID, cuffed (inflated to minimal occlusive pressure)  Attempts: 1, Grade I - full view of cords  Complicating Factors: none  Tube secured at 22 cm at the lips.  Findings post-intubation: bilateral breath sounds, positive ETCO2, atraumatic / condition of teeth unchanged  Position Confirmation: auscultation

## 2019-06-06 NOTE — ED TRIAGE NOTES
Patient found on floor by daughter at 1:25, unable to speak, only able to move right, eyes open, drooping to right side. Last known normal was 12:30 today. Patient currently unable to move extremeties. Unable to speak, opens eyes spontaneously. Blinks to answer questions. GCS 7.

## 2019-06-06 NOTE — ED NOTES
Pt accepted to [facility] by Dr. Banerjee. ED to ED. Number for report is [phone]. Airmed approx. 10 min out.

## 2019-06-06 NOTE — ED TRIAGE NOTES
Pt. Presents to ED today with james cct as transfer from [place] for neuro stroke consult. Pt. LKN 1230, found down, GCS 7, aphasic. Pt. Intubated pta and given tPA bolus 6mg at 1441, infusion of 50mg at 1442, stopped at 1541.

## 2019-06-06 NOTE — SUBJECTIVE & OBJECTIVE
Woke up with symptoms?: no    Recent bleeding noted: no  Does the patient take any Blood Thinners? no  Medications: Antiplatelets:  aspirin      Past Medical History: hypertension, diabetes, hyperlipidemia, MI/CAD and stroke    Past Surgical History: no major surgeries within the last 2 weeks    Family History: no relevant history    Social History: no smoking, no drinking, no drugs and former smoker    Allergies: No Known Allergies     Review of Systems   Neurological: Positive for facial asymmetry, speech difficulty, weakness and numbness.   All other systems reviewed and are negative.    Objective:   Vitals: Blood pressure (!) 119/56, pulse 75, temperature 96.1 °F (35.6 °C), temperature source Tympanic, resp. rate 16, weight 62.1 kg (136 lb 12.8 oz), SpO2 100 %.     CT READ: Yes  Abnormal CT hyperdense right MCA.     Physical Exam   Constitutional: She appears well-developed and well-nourished.   HENT:   Head: Normocephalic and atraumatic.   Eyes: Pupils are equal, round, and reactive to light. EOM are normal.   Cardiovascular: Normal rate and regular rhythm.   Pulmonary/Chest: Effort normal.   Neurological: She is alert. A cranial nerve deficit and sensory deficit is present.   Vitals reviewed.

## 2019-06-06 NOTE — HPI
79 y/o female LKN at 12 noon. Found down at home at 1325 with left hemiparesis and right gaze preference. In ED poor control of secretions.

## 2019-06-06 NOTE — CONSULTS
[facility]  Vascular Neurology  Comprehensive Stroke Center  Tele-Consultation Note      Inpatient consult to Telemedicine-Stroke  Consult performed by: Bibiana Vergara MD  Consult ordered by: John Cai Jr., MD  Reason for consult: stroke          Consulting Provider: JOHN CAI JR  Current Providers  No providers found    Patient Location:  [facility] EMERGENCY DEPARTMENT Emergency Department  Spoke hospital nurse at bedside with patient assisting consultant.     Patient information was obtained from relative(s), EMS personnel and past medical records.         Assessment/Plan:     STROKE DOCUMENTATION     Acute Stroke Times:   Acute Stroke Times   Last Known Normal Date: 06/06/19  Last Known Normal Time: 1200  Symptom Onset Date: 06/06/19  Symptom Onset Time: 1200  Stroke Team Called Date: 06/06/19  Stroke Team Called Time: 1303  Stroke Team Arrival Date: 06/06/19  Stroke Team Arrival Time: 1309  CT Interpretation Time: 1309  Decision to Treat Time for Alteplase: 1318    NIH Scale:  Interval: baseline  1a. Level of Consciousness: 0-->Alert, keenly responsive  1b. LOC Questions: 2-->Answers neither question correctly  1c. LOC Commands: 2-->Performs neither task correctly  2. Best Gaze: 1-->Partial gaze palsy, gaze is abnormal in one or both eyes, but forced deviation or total gaze paresis is not present  3. Visual: 2-->Complete hemianopia  4. Facial Palsy: 2-->Partial paralysis (total or near-total paralysis of lower face)  5a. Motor Arm, Left: 3-->No effort against gravity, limb falls  5b. Motor Arm, Right: 0-->No drift, limb holds 90 (or 45) degrees for full 10 secs  6a. Motor Leg, Left: 2-->Some effort against gravity, leg falls to bed by 5 secs, but has some effort against gravity  6b. Motor Leg, Right: 1-->Drift, leg falls by the end of the 5-sec period but does not hit bed  7. Limb Ataxia: 0-->Absent  8. Sensory: 1-->Mild-to-moderate sensory loss, patient feels  pinprick is less sharp or is dull on the affected side, or there is a loss of superficial pain with pinprick, but patient is aware of being touched  9. Best Language: 3-->Mute, global aphasia, no usable speech or auditory comprehension  10. Dysarthria: 2-->Severe dysarthria, patients speech is so slurred as to be unintelligible in the absence of or out of proportion to any dysphasia, or is mute/anarthric  11. Extinction and Inattention (formerly Neglect): 1-->Visual, tactile, auditory, spatial, or personal inattention or extinction to bilateral simultaneous stimulation in one of the sensory modalities  Total (NIH Stroke Scale): 22     Modified Walker Score: 1  [place] Coma Scale:10   ABCD2 Score:    [id number] Score:   HAS -BLED Score:   ICH Score:   Hunt & Vizcaino Classification:       Diagnoses:   Stroke due to occlusion of right middle cerebral artery  Stroke due to occlusion of right middle cerebral artery  Antithrombotics for secondary stroke prevention: Antiplatelets: None: Hold all Antithrombotics x 24 hours after IV t-PA administration    Statins for secondary stroke prevention and hyperlipidemia, if present:   Statins: Atorvastatin- 40 mg daily    Aggressive risk factor modification: HTN, DM, HLD, CAD     Rehab efforts: The patient has been evaluated by a stroke team provider and the therapy needs have been fully considered based off the presenting complaints and exam findings. The following therapy evaluations are needed: PT evaluate and treat, OT evaluate and treat, SLP evaluate and treat, PM&R evaluate for appropriate placement    Diagnostics ordered/pending: CTA Head to assess vasculature , CTA Neck/Arch to assess vasculature, HgbA1C to assess blood glucose levels, Lipid Profile to assess cholesterol levels, MRI head without contrast to assess brain parenchyma, TTE to assess cardiac function/status     VTE prophylaxis: None: Reason for No Pharmacological VTE Prophylaxis: Mechanical prophylaxis: Place  SCDs    BP parameters: Infarct: Post tPA, SBP <180            Blood pressure (!) 119/56, pulse 75, temperature 96.1 °F (35.6 °C), temperature source Tympanic, resp. rate 16, weight 62.1 kg (136 lb 12.8 oz), SpO2 100 %.  Alteplase Eligible?: Yes  Alteplase Recommendation:   Alteplase Total Dose:   Total dose: Alteplase 0.9mg/kg (max dose:90mg)                      ** based on acquired weight from facility   Bolus Dose:   10% of total Alteplase dose given intravenously over 1 minute   Continuous Infusion Dose:   Remaining 90% of total Alteplase dose infused intravenously over 60 minutes    **infusion must start at the same time as the bolus dose     Additional Recommendations:   1. Neurological assessment and vital signs (except temperature) every 15 minutes during Altaplase infusion.  2. Frequency of BP assessments may need to be increased if systolic BP stays >= 180 mm Hg or diastolic BP stays >= 105 mm Hg. Administer antihypertensive meds as ordered  3. Continue to monitor and control blood pressure and monitor for neurological deterioration every 15 minutes for the first hour after the infusion is stopped. Then every 30 minutes for the next 6 hours. Perform hourly monitoring from the 8th post-infusion hour until 24 hours post-infusion.  4. Temperature every 4 hours or as required.  5. Follow hospital protocol for further orders re: post tPA infusion patient management.  6. No antithrombotics or anticoagulants (including but not limited to: heparin, warfarin, aspirin, clopidigrel, or dipyridamole) for 24 hours, then start antithrombotics as ordered by treating physician    Adapted from the American Heart Association/American Stroke Association (AHA/ASA) and American Association of Neuroscience Nurses (AANN) Guidelines.   Possible Interventional Revascularization Candidate? Yes    Disposition Recommendation: transfer to [facility] by  air  stat    Subjective:     History of Present Illness:  79 y/o female  DE at 12 noon. Found down at home at 1325 with left hemiparesis and right gaze preference. In ED poor control of secretions.       Woke up with symptoms?: no    Recent bleeding noted: no  Does the patient take any Blood Thinners? no  Medications: Antiplatelets:  aspirin      Past Medical History: hypertension, diabetes, hyperlipidemia, MI/CAD and stroke    Past Surgical History: no major surgeries within the last 2 weeks    Family History: no relevant history    Social History: no smoking, no drinking, no drugs and former smoker    Allergies: No Known Allergies     Review of Systems   Neurological: Positive for facial asymmetry, speech difficulty, weakness and numbness.   All other systems reviewed and are negative.    Objective:   Vitals: Blood pressure (!) 119/56, pulse 75, temperature 96.1 °F (35.6 °C), temperature source Tympanic, resp. rate 16, weight 62.1 kg (136 lb 12.8 oz), SpO2 100 %.     CT READ: Yes  Abnormal CT hyperdense right MCA.     Physical Exam   Constitutional: She appears well-developed and well-nourished.   HENT:   Head: Normocephalic and atraumatic.   Eyes: Pupils are equal, round, and reactive to light. EOM are normal.   Cardiovascular: Normal rate and regular rhythm.   Pulmonary/Chest: Effort normal.   Neurological: She is alert. A cranial nerve deficit and sensory deficit is present.   Vitals reviewed.            Recommended the emergency room physician to have a brief discussion with the patient and/or family if available regarding the risks and benefits of treatment, and to briefly document the occurrence of that discussion in his clinical encounter note.     The attending portion of this evaluation, treatment, and documentation was performed per Bibiana Vergara MD via audiovisual.    Billing code:  (moderate to severe stroke, large areas of edema, some mimics)    · This patient has a critical neurological condition/illness, with high morbidity and mortality.  · There is a high  probability for acute neurological change leading to clinical and possibly life-threatening deterioration requiring highest level of physician preparedness for urgent intervention.  · Care was coordinated with other physicians involved in the patient's care.  · Radiologic studies and laboratory data were reviewed and interpreted, and plan of care was re-assessed based on the results.  · Diagnosis, treatment options and prognosis may have been discussed with the patient and/or family members or caregiver.  · Further advanced medical management and further evaluation is warranted for his care.      In your opinion, this was a: Tier 1 Van Positive    Consult End Time: 6302     Bibiana Vergara MD  Comprehensive Stroke Center  Vascular Neurology   [facility]

## 2019-06-07 PROBLEM — K62.5 RECTAL BLEEDING: Status: ACTIVE | Noted: 2019-06-07

## 2019-06-07 PROBLEM — I25.3 ATRIAL SEPTAL ANEURYSM: Status: ACTIVE | Noted: 2019-06-07

## 2019-06-07 PROBLEM — D72.829 LEUKOCYTOSIS: Status: ACTIVE | Noted: 2019-06-07

## 2019-06-07 LAB
ALBUMIN SERPL BCP-MCNC: 2.7 G/DL (ref 3.5–5.2)
ALLENS TEST: ABNORMAL
ALLENS TEST: ABNORMAL
ALP SERPL-CCNC: 59 U/L (ref 55–135)
ALT SERPL W/O P-5'-P-CCNC: 23 U/L (ref 10–44)
ANION GAP SERPL CALC-SCNC: 14 MMOL/L (ref 8–16)
ASCENDING AORTA: 2.25 CM
AST SERPL-CCNC: 28 U/L (ref 10–40)
AV INDEX (PROSTH): 0.69
AV MEAN GRADIENT: 3.12 MMHG
AV PEAK GRADIENT: 4.16 MMHG
AV VALVE AREA: 1.92 CM2
AV VELOCITY RATIO: 0.75
BASOPHILS # BLD AUTO: 0.04 K/UL (ref 0–0.2)
BASOPHILS # BLD AUTO: 0.08 K/UL (ref 0–0.2)
BASOPHILS NFR BLD: 0.2 % (ref 0–1.9)
BASOPHILS NFR BLD: 0.5 % (ref 0–1.9)
BILIRUB SERPL-MCNC: 0.5 MG/DL (ref 0.1–1)
BSA FOR ECHO PROCEDURE: 1.78 M2
BUN SERPL-MCNC: 16 MG/DL (ref 8–23)
CALCIUM SERPL-MCNC: 8.4 MG/DL (ref 8.7–10.5)
CHLORIDE SERPL-SCNC: 98 MMOL/L (ref 95–110)
CO2 SERPL-SCNC: 21 MMOL/L (ref 23–29)
CREAT SERPL-MCNC: 0.7 MG/DL (ref 0.5–1.4)
CV ECHO LV RWT: 0.58 CM
DELSYS: ABNORMAL
DELSYS: ABNORMAL
DIFFERENTIAL METHOD: ABNORMAL
DIFFERENTIAL METHOD: ABNORMAL
DOP CALC AO PEAK VEL: 1.02 M/S
DOP CALC AO VTI: 20.63 CM
DOP CALC LVOT AREA: 2.77 CM2
DOP CALC LVOT DIAMETER: 1.88 CM
DOP CALC LVOT PEAK VEL: 0.77 M/S
DOP CALC LVOT STROKE VOLUME: 39.62 CM3
DOP CALCLVOT PEAK VEL VTI: 14.28 CM
ECHO LV POSTERIOR WALL: 0.93 CM (ref 0.6–1.1)
EOSINOPHIL # BLD AUTO: 0 K/UL (ref 0–0.5)
EOSINOPHIL # BLD AUTO: 0 K/UL (ref 0–0.5)
EOSINOPHIL NFR BLD: 0 % (ref 0–8)
EOSINOPHIL NFR BLD: 0.1 % (ref 0–8)
ERYTHROCYTE [DISTWIDTH] IN BLOOD BY AUTOMATED COUNT: 15.1 % (ref 11.5–14.5)
ERYTHROCYTE [DISTWIDTH] IN BLOOD BY AUTOMATED COUNT: 15.5 % (ref 11.5–14.5)
EST. GFR  (AFRICAN AMERICAN): >60 ML/MIN/1.73 M^2
EST. GFR  (NON AFRICAN AMERICAN): >60 ML/MIN/1.73 M^2
FIO2: 40
FRACTIONAL SHORTENING: 30 % (ref 28–44)
GLUCOSE SERPL-MCNC: 156 MG/DL (ref 70–110)
HCO3 UR-SCNC: 18.9 MMOL/L (ref 24–28)
HCO3 UR-SCNC: 19.9 MMOL/L (ref 24–28)
HCT VFR BLD AUTO: 35.3 % (ref 37–48.5)
HCT VFR BLD AUTO: 38 % (ref 37–48.5)
HGB BLD-MCNC: 11.7 G/DL (ref 12–16)
HGB BLD-MCNC: 12.7 G/DL (ref 12–16)
IMM GRANULOCYTES # BLD AUTO: 0.06 K/UL (ref 0–0.04)
IMM GRANULOCYTES # BLD AUTO: 0.09 K/UL (ref 0–0.04)
IMM GRANULOCYTES NFR BLD AUTO: 0.3 % (ref 0–0.5)
IMM GRANULOCYTES NFR BLD AUTO: 0.5 % (ref 0–0.5)
INTERVENTRICULAR SEPTUM: 0.9 CM (ref 0.6–1.1)
LA MAJOR: 4.11 CM
LA MINOR: 3.6 CM
LA WIDTH: 2.98 CM
LEFT ATRIUM SIZE: 2.81 CM
LEFT ATRIUM VOLUME INDEX: 15.3 ML/M2
LEFT ATRIUM VOLUME: 27.32 CM3
LEFT INTERNAL DIMENSION IN SYSTOLE: 2.24 CM (ref 2.1–4)
LEFT VENTRICLE DIASTOLIC VOLUME INDEX: 23.02 ML/M2
LEFT VENTRICLE DIASTOLIC VOLUME: 41.04 ML
LEFT VENTRICLE MASS INDEX: 44.4 G/M2
LEFT VENTRICLE SYSTOLIC VOLUME INDEX: 9.5 ML/M2
LEFT VENTRICLE SYSTOLIC VOLUME: 17 ML
LEFT VENTRICULAR INTERNAL DIMENSION IN DIASTOLE: 3.2 CM (ref 3.5–6)
LEFT VENTRICULAR MASS: 79.22 G
LYMPHOCYTES # BLD AUTO: 2.4 K/UL (ref 1–4.8)
LYMPHOCYTES # BLD AUTO: 2.7 K/UL (ref 1–4.8)
LYMPHOCYTES NFR BLD: 13.7 % (ref 18–48)
LYMPHOCYTES NFR BLD: 15.4 % (ref 18–48)
MAGNESIUM SERPL-MCNC: 1.1 MG/DL (ref 1.6–2.6)
MCH RBC QN AUTO: 31.5 PG (ref 27–31)
MCH RBC QN AUTO: 31.8 PG (ref 27–31)
MCHC RBC AUTO-ENTMCNC: 33.1 G/DL (ref 32–36)
MCHC RBC AUTO-ENTMCNC: 33.4 G/DL (ref 32–36)
MCV RBC AUTO: 95 FL (ref 82–98)
MCV RBC AUTO: 95 FL (ref 82–98)
MODE: ABNORMAL
MODE: ABNORMAL
MONOCYTES # BLD AUTO: 1.4 K/UL (ref 0.3–1)
MONOCYTES # BLD AUTO: 1.6 K/UL (ref 0.3–1)
MONOCYTES NFR BLD: 8 % (ref 4–15)
MONOCYTES NFR BLD: 8.8 % (ref 4–15)
NEUTROPHILS # BLD AUTO: 13.2 K/UL (ref 1.8–7.7)
NEUTROPHILS # BLD AUTO: 13.6 K/UL (ref 1.8–7.7)
NEUTROPHILS NFR BLD: 75.1 % (ref 38–73)
NEUTROPHILS NFR BLD: 77.4 % (ref 38–73)
NRBC BLD-RTO: 0 /100 WBC
NRBC BLD-RTO: 0 /100 WBC
PCO2 BLDA: 32 MMHG (ref 35–45)
PCO2 BLDA: 35.3 MMHG (ref 35–45)
PEEP: 5
PH SMN: 7.36 [PH] (ref 7.35–7.45)
PH SMN: 7.38 [PH] (ref 7.35–7.45)
PHOSPHATE SERPL-MCNC: 4.1 MG/DL (ref 2.7–4.5)
PISA TR MAX VEL: 2.38 M/S
PLATELET # BLD AUTO: 258 K/UL (ref 150–350)
PLATELET # BLD AUTO: 318 K/UL (ref 150–350)
PMV BLD AUTO: 11.5 FL (ref 9.2–12.9)
PMV BLD AUTO: 11.7 FL (ref 9.2–12.9)
PO2 BLDA: 69 MMHG (ref 80–100)
PO2 BLDA: 78 MMHG (ref 80–100)
POC BE: -6 MMOL/L
POC BE: -6 MMOL/L
POC SATURATED O2: 93 % (ref 95–100)
POC SATURATED O2: 95 % (ref 95–100)
POC TCO2: 20 MMOL/L (ref 23–27)
POC TCO2: 21 MMOL/L (ref 23–27)
POCT GLUCOSE: 169 MG/DL (ref 70–110)
POCT GLUCOSE: 183 MG/DL (ref 70–110)
POCT GLUCOSE: 184 MG/DL (ref 70–110)
POTASSIUM SERPL-SCNC: 4.2 MMOL/L (ref 3.5–5.1)
PROT SERPL-MCNC: 5.5 G/DL (ref 6–8.4)
PS: 5
RA MAJOR: 3.2 CM
RA WIDTH: 2.49 CM
RBC # BLD AUTO: 3.72 M/UL (ref 4–5.4)
RBC # BLD AUTO: 4 M/UL (ref 4–5.4)
RIGHT VENTRICULAR END-DIASTOLIC DIMENSION: 2.58 CM
SAMPLE: ABNORMAL
SAMPLE: ABNORMAL
SINUS: 2.77 CM
SITE: ABNORMAL
SITE: ABNORMAL
SODIUM SERPL-SCNC: 133 MMOL/L (ref 136–145)
SODIUM SERPL-SCNC: 136 MMOL/L (ref 136–145)
SODIUM UR-SCNC: 58 MMOL/L (ref 20–250)
SPONT RATE: 27
STJ: 2.54 CM
TDI LATERAL: 0.04
TDI SEPTAL: 0.06
TDI: 0.05
TR MAX PG: 22.66 MMHG
TRICUSPID ANNULAR PLANE SYSTOLIC EXCURSION: 1.31 CM
WBC # BLD AUTO: 17.51 K/UL (ref 3.9–12.7)
WBC # BLD AUTO: 17.63 K/UL (ref 3.9–12.7)

## 2019-06-07 PROCEDURE — 20000000 HC ICU ROOM: Mod: HCNC

## 2019-06-07 PROCEDURE — 99233 SBSQ HOSP IP/OBS HIGH 50: CPT | Mod: HCNC,GC,, | Performed by: PSYCHIATRY & NEUROLOGY

## 2019-06-07 PROCEDURE — 27000221 HC OXYGEN, UP TO 24 HOURS: Mod: HCNC

## 2019-06-07 PROCEDURE — 85025 COMPLETE CBC W/AUTO DIFF WBC: CPT | Mod: 91,HCNC

## 2019-06-07 PROCEDURE — 36600 WITHDRAWAL OF ARTERIAL BLOOD: CPT | Mod: HCNC

## 2019-06-07 PROCEDURE — 99291 PR CRITICAL CARE, E/M 30-74 MINUTES: ICD-10-PCS | Mod: HCNC,GC,, | Performed by: PHYSICIAN ASSISTANT

## 2019-06-07 PROCEDURE — 99900017 HC EXTUBATION W/PARAMETERS (STAT): Mod: HCNC

## 2019-06-07 PROCEDURE — 94003 VENT MGMT INPAT SUBQ DAY: CPT | Mod: HCNC

## 2019-06-07 PROCEDURE — 84295 ASSAY OF SERUM SODIUM: CPT | Mod: HCNC

## 2019-06-07 PROCEDURE — 84100 ASSAY OF PHOSPHORUS: CPT | Mod: HCNC

## 2019-06-07 PROCEDURE — 99900026 HC AIRWAY MAINTENANCE (STAT): Mod: HCNC

## 2019-06-07 PROCEDURE — 99233 PR SUBSEQUENT HOSPITAL CARE,LEVL III: ICD-10-PCS | Mod: HCNC,GC,, | Performed by: PSYCHIATRY & NEUROLOGY

## 2019-06-07 PROCEDURE — 94150 VITAL CAPACITY TEST: CPT | Mod: HCNC

## 2019-06-07 PROCEDURE — 94761 N-INVAS EAR/PLS OXIMETRY MLT: CPT | Mod: HCNC

## 2019-06-07 PROCEDURE — 25000003 PHARM REV CODE 250: Mod: HCNC | Performed by: STUDENT IN AN ORGANIZED HEALTH CARE EDUCATION/TRAINING PROGRAM

## 2019-06-07 PROCEDURE — 25000003 PHARM REV CODE 250: Mod: HCNC | Performed by: PHYSICIAN ASSISTANT

## 2019-06-07 PROCEDURE — 25000003 PHARM REV CODE 250: Mod: HCNC | Performed by: NURSE PRACTITIONER

## 2019-06-07 PROCEDURE — 27200966 HC CLOSED SUCTION SYSTEM: Mod: HCNC

## 2019-06-07 PROCEDURE — 99291 CRITICAL CARE FIRST HOUR: CPT | Mod: HCNC,GC,, | Performed by: PHYSICIAN ASSISTANT

## 2019-06-07 PROCEDURE — 99900035 HC TECH TIME PER 15 MIN (STAT): Mod: HCNC

## 2019-06-07 PROCEDURE — 94010 BREATHING CAPACITY TEST: CPT | Mod: HCNC

## 2019-06-07 PROCEDURE — 80053 COMPREHEN METABOLIC PANEL: CPT | Mod: HCNC

## 2019-06-07 PROCEDURE — 82803 BLOOD GASES ANY COMBINATION: CPT | Mod: HCNC

## 2019-06-07 PROCEDURE — 83735 ASSAY OF MAGNESIUM: CPT | Mod: HCNC

## 2019-06-07 PROCEDURE — 84300 ASSAY OF URINE SODIUM: CPT | Mod: HCNC

## 2019-06-07 RX ORDER — PHENYLEPHRINE HCL IN 0.9% NACL 1 MG/10 ML
SYRINGE (ML) INTRAVENOUS
Status: DISPENSED
Start: 2019-06-07 | End: 2019-06-07

## 2019-06-07 RX ORDER — ATORVASTATIN CALCIUM 20 MG/1
40 TABLET, FILM COATED ORAL DAILY
Status: DISCONTINUED | OUTPATIENT
Start: 2019-06-07 | End: 2019-06-10

## 2019-06-07 RX ORDER — CLOPIDOGREL BISULFATE 75 MG/1
75 TABLET ORAL DAILY
Status: DISCONTINUED | OUTPATIENT
Start: 2019-06-07 | End: 2019-06-12 | Stop reason: HOSPADM

## 2019-06-07 RX ORDER — AMOXICILLIN 250 MG
1 CAPSULE ORAL DAILY
Status: DISCONTINUED | OUTPATIENT
Start: 2019-06-07 | End: 2019-06-10

## 2019-06-07 RX ORDER — ACETAMINOPHEN 325 MG/1
650 TABLET ORAL EVERY 6 HOURS PRN
Status: DISCONTINUED | OUTPATIENT
Start: 2019-06-07 | End: 2019-06-12 | Stop reason: HOSPADM

## 2019-06-07 RX ADMIN — ATORVASTATIN CALCIUM 40 MG: 20 TABLET, FILM COATED ORAL at 10:06

## 2019-06-07 RX ADMIN — SODIUM CHLORIDE 1000 ML: 0.9 INJECTION, SOLUTION INTRAVENOUS at 11:06

## 2019-06-07 RX ADMIN — SENNOSIDES,DOCUSATE SODIUM 1 TABLET: 8.6; 5 TABLET, FILM COATED ORAL at 10:06

## 2019-06-07 RX ADMIN — SODIUM CHLORIDE 500 ML: 0.9 INJECTION, SOLUTION INTRAVENOUS at 10:06

## 2019-06-07 RX ADMIN — PANTOPRAZOLE SODIUM 40 MG: 40 GRANULE, DELAYED RELEASE ORAL at 10:06

## 2019-06-07 RX ADMIN — CHLORHEXIDINE GLUCONATE 0.12% ORAL RINSE 15 ML: 1.2 LIQUID ORAL at 09:06

## 2019-06-07 RX ADMIN — CLOPIDOGREL 75 MG: 75 TABLET, FILM COATED ORAL at 04:06

## 2019-06-07 RX ADMIN — CHLORHEXIDINE GLUCONATE 0.12% ORAL RINSE 15 ML: 1.2 LIQUID ORAL at 10:06

## 2019-06-07 NOTE — ASSESSMENT & PLAN NOTE
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

## 2019-06-07 NOTE — PROGRESS NOTES
[facility]  Vascular Neurology  Comprehensive Stroke Center  Progress Note    Assessment/Plan:     * Embolic stroke involving right middle cerebral artery  Ms Rowell is a 79 y/o CF with PMHx of CAD, HTN, HLD, DM type II, prior L MCA stroke who presented to the ED with Right MCA syndrome. Pt symptoms improved post tPA.  No LVO on CTA head and neck but mild bilateral carotid artery atherosclerosis.  MRI brain shows right BG infarct and left punctate infarct in the parietal lobe.  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  Etiology ESUS.  Suspect cardioembolic due to bilateral hemisphere involvement (prior L MCA and now acute R MCA stroke).  Last admission patient was discharged with 30 day cardiac event monitor but atrial fibrillation not detected.  Patient needs implantable loop recorder before discharge.        Antithrombotics for secondary stroke prevention: continue plavix 75mg qd      Statins for secondary stroke prevention and hyperlipidemia, if present:   Statins: can continue home simvastatin LDL less than 70    Aggressive risk factor modification: HTN, DM, HLD, Diet, Exercise, CAD     Rehab efforts: The patient has been evaluated by a stroke team provider and the therapy needs have been fully considered based off the presenting complaints and exam findings. The following therapy evaluations are needed: PT evaluate and treat, OT evaluate and treat, SLP evaluate and treat, PM&R evaluate for appropriate placement TBD     Diagnostics ordered/pending: HgbA1C to assess blood glucose levels, Lipid Profile to assess cholesterol levels, MRI head without contrast to assess brain parenchyma, TTE to assess cardiac function/status , TSH to assess thyroid function    VTE prophylaxis: Heparin 5000 units SQ every 8 hours holding due to rectal bleeding     BP parameters: Infarct: Post tPA, SBP <180        Cytotoxic cerebral edema  Area of cytotoxic cerebral edema identified when  reviewing brain imaging in the territory of the right middle cerebral artery. There is no mass effect associated with it. We will continue to monitor the patients clinical exam for any worsening of symptoms which may indicate expansion of the stroke or the area of the edema resulting in the clinical change. The pattern is suggestive of  ESUS etiology.        Atrial septal aneurysm  Seen on echocardiogram   Increases stroke risk     Leukocytosis  + leukocytosis  U/A pending  Lethargic today     Rectal bleeding  Reported by nursing staff  H/h stable    History of ischemic left MCA stroke  Residual right sided weakness  On asa and simvastatin at home     S/P admn tPA in diff fac w/n last 24 hr bef adm to crnt fac  Close monitoring in NCC 24 hours post administration         Type 2 diabetes mellitus with neurologic complication  - risk factor for stroke   - HgA1C 5.6  -s/s insulin      Essential hypertension  - Risk factor for stroke   -SBP less than 180         Patient admitted to [facility] for new acute infarcts.  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  Extubated today.      STROKE DOCUMENTATION   Acute Stroke Times   Last Known Normal Date: 06/06/19  Last Known Normal Time: 1200  Symptom Onset Date: 06/06/19  Symptom Onset Time: (unk[place])  Stroke Team Called Date: 06/06/19  Stroke Team Called Time: 1550  Stroke Team Arrival Date: 06/06/19  Stroke Team Arrival Time: 1600  CT Interpretation Time: 1608  Decision to Treat Time for IR: 1615    NIH Scale:  1a. Level of Consciousness: 1-->Not alert, but arousable by minor stimulation to obey, answer, or respond  1b. LOC Questions: 1-->Answers one question correctly  1c. LOC Commands: 0-->Performs both tasks correctly  2. Best Gaze: 0-->Normal  3. Visual: 0-->No visual loss  4. Facial Palsy: 1-->Minor paralysis (flattened nasolabial fold, asymmetry on smiling)  5a. Motor Arm, Left: 1-->Drift, limb holds 90 (or 45) degrees, but drifts down before full 10 seconds, does not hit bed or other  support  5b. Motor Arm, Right: 2-->Some effort against gravity, limb cannot get to or maintain (if cued) 90 (or 45) degrees, drifts down to bed, but has some effort against gravity  6a. Motor Leg, Left: 1-->Drift, leg falls by the end of the 5-sec period but does not hit bed  6b. Motor Leg, Right: 2-->Some effort against gravity, leg falls to bed by 5 secs, but has some effort against gravity  7. Limb Ataxia: 0-->Absent  8. Sensory: 0-->Normal, no sensory loss  9. Best Language: 0-->No aphasia, normal  10. Dysarthria: 0-->Normal  11. Extinction and Inattention (formerly Neglect): 0-->No abnormality  Total (NIH Stroke Scale): 9       Modified [place] Score: 1  [place] Coma Scale:    ABCD2 Score:    [id number] Score:   HAS -BLED Score:   ICH Score:   Hunt & Vizcaino Classification:      Hemorrhagic change of an Ischemic Stroke: Does this patient have an ischemic stroke with hemorrhagic changes? No     Neurologic Chief Complaint: left sided weakness     Subjective:     Interval History: Patient is seen for follow-up neurological assessment and treatment recommendations:Patient admitted to [facility] for new acute infarcts.  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  Extubated today.      HPI, Past Medical, Family, and Social History remains the same as documented in the initial encounter.     Review of Systems   Constitutional: Negative for chills and fever.   Respiratory: Negative for cough and shortness of breath.      Scheduled Meds:   atorvastatin  40 mg Per OG tube Daily    chlorhexidine  15 mL Mouth/Throat BID    clopidogrel  75 mg Oral Daily    pantoprazole  40 mg Per OG tube BID    phenylephrine HCl in 0.9% NaCl        senna-docusate 8.6-50 mg  1 tablet Per NG tube Daily     Continuous Infusions:   sodium chloride 0.9% 75 mL/hr at 06/07/19 1605     PRN Meds:acetaminophen, Dextrose 10% Bolus, glucagon (human recombinant), insulin aspart U-100, sodium chloride 0.9%    Objective:     Vital Signs (Most Recent):  Temp: 97.9 °F  (36.6 °C) (06/07/19 1505)  Pulse: 96 (06/07/19 1607)  Resp: (!) 27 (06/07/19 1607)  BP: (!) 98/59 (06/07/19 1605)  SpO2: (!) 94 % (06/07/19 1607)  BP Location: Left arm    Vital Signs Range (Last 24H):  Temp:  [97.9 °F (36.6 °C)-98.8 °F (37.1 °C)]   Pulse:  []   Resp:  [10-27]   BP: ()/(50-85)   SpO2:  [93 %-100 %]   BP Location: Left arm    Physical Exam   Constitutional: She appears well-developed and well-nourished.   HENT:   Head: Normocephalic and atraumatic.   Eyes: Pupils are equal, round, and reactive to light. EOM are normal.   Pulmonary/Chest: Effort normal. No respiratory distress.       Neurological Exam:   LOC: drowsy  Attention Span: Good   Language: No aphasia  Articulation: No dysarthria  Orientation: not oriented to age but aware of month   Visual Fields: Full  EOM (CN III, IV, VI): Full/intact  Pupils (CN II, III): PERRL  Facial Sensation (CN V): Normal  Facial Movement (CN VII): Lower facial weakness on the Right  Motor: Arm left  Paresis: 4/5  Leg left  Paresis: 3/5  Arm right  Paresis: 4/5  Leg right Paresis: 2/5  Cebellar: No evidence of appendicular or axial ataxia  Sensation: Intact to light touch, temperature and vibration  Tone: Normal tone throughout    Laboratory:  CMP:   Recent Labs   Lab 06/07/19  0252 06/07/19  0956   CALCIUM 8.4*  --    ALBUMIN 2.7*  --    PROT 5.5*  --    * 136   K 4.2  --    CO2 21*  --    CL 98  --    BUN 16  --    CREATININE 0.7  --    ALKPHOS 59  --    ALT 23  --    AST 28  --    BILITOT 0.5  --      CBC:   Recent Labs   Lab 06/07/19  0956   WBC 17.63*   RBC 4.00   HGB 12.7   HCT 38.0      MCV 95   MCH 31.8*   MCHC 33.4     Lipid Panel:   Recent Labs   Lab 06/06/19  1814   CHOL 141   LDLCALC 57.6*   HDL 69   TRIG 72     Hgb A1C:   Recent Labs   Lab 06/06/19  1831   HGBA1C 5.6     TSH:   Recent Labs   Lab 06/06/19  1611   TSH 3.270       Diagnostic Results     Brain Imaging   MRI brain 06/07/19    Restricted diffusion in right basal  ganglia and punctate infract in left parietal lobe  Cytotoxic cerebral edema  Remote left MCA infarct     Vessel Imaging   CTA head/neck 06/07/19  CTA head: No evidence for significant proximal stenosis or occlusion.    CTA neck: Atherosclerotic disease carotid bifurcations and proximal ICAs with less than 50% proximal ICA stenosis by NASCET criteria.    Cardiac Imaging   06/07/19  · Mildly decreased left ventricular systolic function. The estimated ejection fraction is 40%  · Apico septal and apico inferior part of the apex is aneursymal.  · Concentric left ventricular remodeling.  · Indeterminate left ventricular diastolic function.  · Normal right ventricular systolic function.  · IVC not visualized.        Veronica Frank PA-C  Comprehensive Stroke Center  Department of Vascular Neurology   [facility]

## 2019-06-07 NOTE — ASSESSMENT & PLAN NOTE
Intubated PTA for airway protection  - Remain off sedation   - Wean vent as able  - Did not extubate in ED as patient with episodes of apnea, continue to evaluate throughout tonight for possible extubation    - CXR OK   - PUD and VAP ppx

## 2019-06-07 NOTE — PROGRESS NOTES
0815 pt to MRI with RN and RRT on portable vent and monitor with ambu bag at bedside. No issues with transport. Pt back to room 9075 at 0850. Reconnected to monitor, no issues with transport. Will continue to monitor pt.

## 2019-06-07 NOTE — PLAN OF CARE
06/07/19 1519   Discharge Assessment   Assessment Type Discharge Planning Assessment   Confirmed/corrected address and phone number on facesheet? Yes   Assessment information obtained from? Caregiver  (daughter)   Expected Length of Stay (days) 5   Communicated expected length of stay with patient/caregiver yes   Prior to hospitilization cognitive status: Alert/Oriented   Prior to hospitalization functional status: Assistive Equipment;Needs Assistance   Current cognitive status: Unable to Assess   Current Functional Status: Completely Dependent   Facility Arrived From: [facility]    Lives With child(jewell), adult  (daughter, Vicki)   Able to Return to Prior Arrangements yes   Is patient able to care for self after discharge? Unable to determine at this time (comments)   Who are your caregiver(s) and their phone number(s)? Petra Rowell (daughter) [phone]   Patient's perception of discharge disposition other (comments)  (glenda)   Readmission Within the Last 30 Days no previous admission in last 30 days   Patient currently being followed by outpatient case management? No   Patient currently receives any other outside agency services? No   Equipment Currently Used at Home rollator;bedside commode;raised toilet   Do you have any problems affording any of your prescribed medications? No   Is the patient taking medications as prescribed? yes   Does the patient have transportation home? Yes   Transportation Anticipated family or friend will provide   Does the patient receive services at the [facility]mad[facility] Clinic? No   Discharge Plan A Skilled Nursing Facility   Discharge Plan B Rehab   DME Needed Upon Discharge  other (see comments)  (tbd)   Patient/Family in Agreement with Plan yes       Discharge/ My Health Packet Folder Given to patient/family:      yes       PCP:  Reno Hilario MD        Pharmacy:    [facility] [address] [place], [place] [address] [address] [address]  Phone: [phone] Fax:  [phone]        Emergency Contacts:    Extended Emergency Contact Information  Primary Emergency Contact: RowellPetra  Address: [address] [place], [place] [id number] [place] [address] of [place]  Home Phone: [phone]  Mobile Phone: [phone]  Relation: Daughter    Insurance:  Payor: [employer] MANAGED MEDICARE / Plan: [email or web address] MEDICARE HMO / Product Type: Capitation /             Cher Quinn RN, CCRN-K, [facility]  Neuro-Critical Care   X [id number]

## 2019-06-07 NOTE — ASSESSMENT & PLAN NOTE
Area of cytotoxic cerebral edema identified when reviewing brain imaging in the territory of the right middle cerebral artery. There is no mass effect associated with it. We will continue to monitor the patients clinical exam for any worsening of symptoms which may indicate expansion of the stroke or the area of the edema resulting in the clinical change. The pattern is suggestive of  ESUS etiology.

## 2019-06-07 NOTE — ASSESSMENT & PLAN NOTE
Ms Rowell is a 77 y/o CF with PMHx of CAD, HTN, HLD, DM type II, presented to the ED with Right MCA syndrome. Pt symptoms improved post tPA     NeuroImaging - CTH  - Left MCA territory encephalomalacia                              CTA head and neck - no LVO       Etiology - ESUS     Plan - Close Neuro Checks per protocol post tPA               ASA 81 mg daily 24 hour post tPA and Lipitor 40 mg daily              PT / OT / SLP follow up              Echo, MRI brain results pending     Antithrombotics for secondary stroke prevention: Antiplatelets: Aspirin: 81 mg daily    Statins for secondary stroke prevention and hyperlipidemia, if present:   Statins: Atorvastatin- 40 mg daily    Aggressive risk factor modification: HTN, DM, HLD, Diet, Exercise, CAD     Rehab efforts: The patient has been evaluated by a stroke team provider and the therapy needs have been fully considered based off the presenting complaints and exam findings. The following therapy evaluations are needed: PT evaluate and treat, OT evaluate and treat, SLP evaluate and treat, PM&R evaluate for appropriate placement    Diagnostics ordered/pending: HgbA1C to assess blood glucose levels, Lipid Profile to assess cholesterol levels, MRI head without contrast to assess brain parenchyma, TTE to assess cardiac function/status , TSH to assess thyroid function    VTE prophylaxis: Heparin 5000 units SQ every 8 hours 24 hour post tPA    BP parameters: Infarct: Post tPA, SBP <180

## 2019-06-07 NOTE — NURSING
Mila NP notified of pts stool containing bright red blood and blood clot.  Protime- INR, CBC, and APTT ordered.      Mila NP notified of .  Orders for sliding scale to be placed.

## 2019-06-07 NOTE — CONSULTS
Inpatient consult to Physical Medicine Rehab  Consult performed by: PARESH Vega  Consult ordered by: Mel Haskins PA-C  Reason for consult: assess rehab needs      Consult received.  Reviewed patient history and current admission.    Patient is intubated and too acute at this time; rehab potential cannot be appropriately assessed.  Recommend early mobility, OOB in chair, and PT/OT/SLP when appropriate.  Will follow for additional rehab needs and post-acute recommendation when patient is medically stable and able to participate with therapy.    Thank you for consult.    KELY Rodriguez, ZOHRAP-C  Physical Medicine & Rehabilitation   06/07/2019  Spectralink: [id number]

## 2019-06-07 NOTE — PLAN OF CARE
Problem: Adult Inpatient Plan of Care  Goal: Plan of Care Review  Outcome: Ongoing (interventions implemented as appropriate)  POC reviewed with pt at 0500. Pt unable to verbalize understanding. Questions and concerns addressed with pts family. No acute events overnight. Pt progressing toward goals. Will continue to monitor. See flowsheets for full assessment and VS info

## 2019-06-07 NOTE — CONSULTS
[facility]  Vascular Neurology  Comprehensive Stroke Center  Consult Note    Inpatient consult to Vascular (Stroke) Neurology  Consult performed by: Fidel Garcia MD  Consult ordered by: Maria Alejandra Patten PA-C        Assessment/Plan:     Patient is a 78 y.o. year old female with:    * Cerebrovascular accident (CVA) due to embolism of right middle cerebral artery  Ms Rowell is a 77 y/o CF with PMHx of CAD, HTN, HLD, DM type II, presented to the ED with Right MCA syndrome. Pt symptoms improved post tPA     NeuroImaging - CTH  - Left MCA territory encephalomalacia                              CTA head and neck - no LVO       Etiology - ESUS     Plan - Close Neuro Checks per protocol post tPA               ASA 81 mg daily 24 hour post tPA and Lipitor 40 mg daily              PT / OT / SLP follow up              Echo, MRI brain results pending     Antithrombotics for secondary stroke prevention: Antiplatelets: Aspirin: 81 mg daily    Statins for secondary stroke prevention and hyperlipidemia, if present:   Statins: Atorvastatin- 40 mg daily    Aggressive risk factor modification: HTN, DM, HLD, Diet, Exercise, CAD     Rehab efforts: The patient has been evaluated by a stroke team provider and the therapy needs have been fully considered based off the presenting complaints and exam findings. The following therapy evaluations are needed: PT evaluate and treat, OT evaluate and treat, SLP evaluate and treat, PM&R evaluate for appropriate placement    Diagnostics ordered/pending: HgbA1C to assess blood glucose levels, Lipid Profile to assess cholesterol levels, MRI head without contrast to assess brain parenchyma, TTE to assess cardiac function/status , TSH to assess thyroid function    VTE prophylaxis: Heparin 5000 units SQ every 8 hours 24 hour post tPA    BP parameters: Infarct: Post tPA, SBP <180        Cytotoxic cerebral edema  Area of cytotoxic cerebral edema identified when reviewing  brain imaging in the territory of the right middle cerebral artery. There is no mass effect associated with it. We will continue to monitor the patients clinical exam for any worsening of symptoms which may indicate expansion of the stroke or the area of the edema resulting in the clinical change. The pattern is suggestive of  ESUS etiology.        Type 2 diabetes mellitus with neurologic complication  - risk factor for stroke   - rec Long term Aic control of < 7      Essential hypertension  - Risk factor for stroke   - rec permissive HTN for 48 hours followed by Long term BP goals of < 130 mmHg          STROKE DOCUMENTATION     Acute Stroke Times   Last Known Normal Date: 06/06/19  Last Known Normal Time: 1200  Symptom Onset Date: 06/06/19  Symptom Onset Time: (unkown)  Stroke Team Called Date: 06/06/19  Stroke Team Called Time: 1550  Stroke Team Arrival Date: 06/06/19  Stroke Team Arrival Time: 1600  CT Interpretation Time: 1608  Decision to Treat Time for IR: 1615    NIH Scale:  Interval: 1hour post tPA or Endovascular procedure completion  1a. Level of Consciousness: 2-->Not alert, requires repeated stimulation to attend, or is obtunded and requires strong or painful stimulation to make movements (not stereotyped)  1b. LOC Questions: 2-->Answers neither question correctly  1c. LOC Commands: 1-->Performs one task correctly  2. Best Gaze: 0-->Normal  3. Visual: 0-->No visual loss  4. Facial Palsy: 0-->Normal symmetrical movements  5a. Motor Arm, Left: 3-->No effort against gravity, limb falls  5b. Motor Arm, Right: 3-->No effort against gravity, limb falls  6a. Motor Leg, Left: 3-->No effort against gravity, leg falls to bed immediately  6b. Motor Leg, Right: 3-->No effort against gravity, leg falls to bed immediately  7. Limb Ataxia: 0-->Absent  8. Sensory: 0-->Normal, no sensory loss  9. Best Language: 3-->Mute, global aphasia, no usable speech or auditory comprehension  10. Dysarthria: (UN) Intubated or other  physical barrier  11. Extinction and Inattention (formerly Neglect): 0-->No abnormality  Total (NIH Stroke Scale): 20    Modified Tito Score: 1  Ryan Coma Scale:14   ABCD2 Score:    [id number] Score:   HAS -BLED Score:   ICH Score:   Hunt & Vizcaino Classification:       Thrombolysis Candidate? Yes. The risks and benefits of tPA were discussed with the patient and/or family. The patient and/or family verbalized understanding of the risks arid benefits and has given verbal consent for tPA, If patient was not competent or no family was available, treatment will be administered as an emergency procedure and in what we believe to be the patients best interest    Delays to Thrombolysis?  No    Interventional Revascularization Candidate?   Is the patient eligible for mechanical endovascular reperfusion (ADIA)?  No; No large vessel occlusion      Hemorrhagic change of an Ischemic Stroke: Does this patient have an ischemic stroke with hemorrhagic changes? No     Subjective:     History of Present Illness:  Ms Rowell is a 79 y/o CF with PMHx of HTN, CABG, HLD, DM type II, Left MCA ischemic stroke presented to the ED with right MCA stroke syndrome.     Per chart review, patient was LKN at 12 noon and she was found on the floor at 1:30 pm with not able to move her left side and drooling from the left. She was taken to ED at [facility] where telestroke was consulted and tPA was administered at 14:41. Pt had to be intubated to protect her airway. Propofol was switched to Ketamine due to low BP. Vecuronium was also administered. CTH was done at [facility] ED which was normal and CTA was neg for any high grade stenosis or Occlusions. Pt was able to follow commands and moving all her 4 extremities.         Past Medical History:   Diagnosis Date    Diabetes mellitus type II     Heart disease     Hyperlipidemia     Hypertension     Limb ischemia     Pseudoaneurysm following procedure 1/11/2016    Stroke      Past Surgical History:  "  Procedure Laterality Date    CORONARY ARTERY BYPASS GRAFT      JARED FILTER PLACEMENT      REMOVAL-IVC FILTER N/A 6/6/2016    Performed by [facility] at [facility] OR [facility]    THROMBECTOMY      TONSILLECTOMY      TUBAL LIGATION      US PSEUDOANEURYSM REPAIR INC IMAGING  1/11/2016          Family History   Problem Relation Age of Onset    Heart disease Father     Cancer Father     Thyroid disease Daughter      Social History     Tobacco Use    Smoking status: Former Smoker    Smokeless tobacco: Never Used    Tobacco comment: quit in 1990s   Substance Use Topics    Alcohol use: No    Drug use: No     Review of patient's allergies indicates:  No Known Allergies    Medications: I have reviewed the current medication administration record.    Medications Prior to Admission   Medication Sig Dispense Refill Last Dose    carvedilol (COREG) 12.5 MG tablet Take 1 tablet (12.5 mg total) by mouth 2 (two) times daily. 60 tablet 11     latanoprost 0.005 % ophthalmic solution Place 1 drop into both eyes every evening.       lisinopril-hydrochlorothiazide (PRINZIDE,ZESTORETIC) 20-12.5 mg per tablet TAKE ONE TABLET BY MOUTH ONCE DAILY 30 tablet 5     metFORMIN (GLUCOPHAGE) 1000 MG tablet TAKE 1 TABLET BY MOUTH TWICE DAILY WITH MEALS 180 tablet 1     mirtazapine (REMERON) 30 MG tablet TAKE ONE TABLET BY MOUTH ONCE DAILY IN THE EVENING 30 tablet 5     pantoprazole (PROTONIX) 40 MG tablet TAKE 1 TABLET BY MOUTH ONCE DAILY 90 tablet 3     simvastatin (ZOCOR) 40 MG tablet TAKE ONE TABLET BY MOUTH ONCE DAILY IN THE EVENING 30 tablet 5     aspirin (ECOTRIN) 81 MG EC tablet Take 162 mg by mouth once daily.    Unknown    cholecalciferol, vitamin D3, (VITAMIN D3) 1,000 unit capsule Take 1,000 Units by mouth once daily.   Unknown    foam bandage (MEPILEX BORDER) 3 X 3 " Bndg Apply 1 Units topically once daily. 30 each 5 Unknown at Unknown time    pantoprazole (PROTONIX) 40 MG tablet    Taking "       Review of Systems   Constitutional: Positive for activity change and diaphoresis. Negative for fatigue.   HENT: Negative for ear discharge.    Eyes: Negative for discharge.   Cardiovascular: Negative for chest pain.   Gastrointestinal: Negative for abdominal pain.   Genitourinary: Negative for flank pain.   Neurological: Positive for speech difficulty and weakness.     Objective:     Vital Signs (Most Recent):  Temp: 97.9 °F (36.6 °C) (06/06/19 2009)  Pulse: 84 (06/06/19 2031)  Resp: 13 (06/06/19 2031)  BP: 132/82 (06/06/19 2031)  SpO2: 100 % (06/06/19 2031)    Vital Signs Range (Last 24H):  Temp:  [96.1 °F (35.6 °C)-97.9 °F (36.6 °C)]   Pulse:  [71-86]   Resp:  [11-36]   BP: (115-150)/()   SpO2:  [95 %-100 %]     Physical Exam    Neurological Exam:     MSE - Pt able to follow simple commands   CN - Pupils reactive to light, EOMI, no facial droop   Strength - able to withdraw to pain, no antigravity strength in any of the limbs   Sensory - withdraws to pain        Laboratory:  CMP:   Recent Labs   Lab 06/06/19  1405   CALCIUM 9.1   ALBUMIN 3.1*   PROT 6.5   *   K 4.4   CO2 23   CL 96   BUN 13   CREATININE 0.7   ALKPHOS 61   ALT 23   AST 24   BILITOT 0.5     CBC:   Recent Labs   Lab 06/06/19  1611   WBC 9.09   RBC 3.76*   HGB 12.0   HCT 36.4*      MCV 97   MCH 31.9*   MCHC 33.0     Lipid Panel:   Recent Labs   Lab 06/06/19  1814   CHOL 141   LDLCALC 57.6*   HDL 69   TRIG 72     Coagulation:   Recent Labs   Lab 06/06/19  1405 06/06/19  1611   INR 1.0 1.9*   APTT 27.4  --      Hgb A1C:   Recent Labs   Lab 06/06/19  1831   HGBA1C 5.6     TSH:   Recent Labs   Lab 06/06/19  1611   TSH 3.270       Diagnostic Results:      Brain imaging:    CTH - Left MCA territory encephalomalacia         Vessel Imaging:        CTA - No LVO     Cardiac Evaluation:     EKG - Possible Acute MI      Fidel Garcia MD  Comprehensive Stroke Center  Department of Vascular Neurology   [facility]  [place]

## 2019-06-07 NOTE — PROGRESS NOTES
Pt extubated by AURORA Mcadams per MD order with RN at bedside. Pt on 3L NC, tolerating well. Pt AAOx4. Will continue to monitor pt.

## 2019-06-07 NOTE — ASSESSMENT & PLAN NOTE
79 y/o female with PMH of LMCA stroke in 2016 presents s/p TPA for RMCA syndrome. No LVO on CTA  - Admit to NCC  - Post-TPA protocol  - Vascular Neurology following  - SBP goal 100-180  - Echo, TSH, lipid panel, hemoglobin A1c for stroke work-up  - PT/OT/SLP  - May be able to be extubated, exam greatly improved since initial evaluation. Wean sedation to off and re-evaluate

## 2019-06-07 NOTE — ED NOTES
Notified by Neuro that extubation for pt will be considered after reassessing tidal volume. No orders for sedation at this time. Will continue to monitor.

## 2019-06-07 NOTE — PROGRESS NOTES
Received pt. From ED already intubated with ETT #8, 23 cm @ the lip, secured with commercial tube gregory. Placed pt on a mechanical ventilation on SPONT/PS 7/5 T 40% , pt tolerating it well, will continue to monitor.

## 2019-06-07 NOTE — CONSULTS
"  [facility]  Adult Nutrition  Consult Note    SUMMARY     Recommendations    1. If able to extubate & advance diet, recommend Regular diet (texture per SLP).   2. If unable to extubate, initiate enteral nutrition. Recommend Impact Peptide @ 40 mL/hr to provide 1440 kcals, 90 g of protein, 739 mL fluid.    - Hold for residuals >500 mL; additional fluid per MD.  3. RD to monitor & follow-up.    Goals: Meet % EEN, EPN  Nutrition Goal Status: new  Communication of RD Recs: reviewed with RN    Reason for Assessment    Reason For Assessment: consult  Diagnosis: other (see comments)(CVA)  Relevant Medical History: HTN, HLD, DM  Interdisciplinary Rounds: did not attend    General Information Comments: Pt intubated w/ no family at bedside. Per chart review, pt w/ stable wt x 2 years (135-145#). Appears thin, however unable to assess for malnutrition at this time 2/2 unsure of PO intake PTA/UBW.  Nutrition Discharge Planning: Unable to determine    Nutrition/Diet History    Patient Reported Diet/Restrictions/Preferences: other (see comments)(KEEGAN)  Factors Affecting Nutritional Intake: NPO, on mechanical ventilation    Anthropometrics    Temp: 98.5 °F (36.9 °C)  Height: 5' 7" (170.2 cm)(Per chart review (5/2019))  Height (inches): 67 in  Weight: 66 kg (145 lb 8.1 oz)  Weight (lb): 145.51 lb  Ideal Body Weight (IBW), Female: 135 lb  % Ideal Body Weight, Female (lb): 107.79 lb  BMI (Calculated): 22.8  BMI Grade: 18.5-24.9 - normal    Lab/Procedures/Meds    Pertinent Labs Reviewed: reviewed  Pertinent Labs Comments: Na 133, Gluc 156, A1C 5.6  Pertinent Medications Reviewed: reviewed  Pertinent Medications Comments: IVF    Estimated/Assessed Needs    Weight Used For Calorie Calculations: 66 kg (145 lb 8.1 oz)     Energy Calorie Requirements (kcal): 1451 kcal/d  Energy Need Method: Stu State     Protein Requirements: 79-99 g/d (1.2-1.5 g/kg)  Weight Used For Protein Calculations: 66 kg (145 lb 8.1 oz) "     Estimated Fluid Requirement Method: other (see comments)(Per MD or 1 mL/kcal)     CHO Requirement: 50% total kcals    Nutrition Prescription Ordered    Current Diet Order: NPO    Evaluation of Received Nutrient/Fluid Intake    Comments: LBM: 6/7    Nutrition Risk    Level of Risk/Frequency of Follow-up: (2x/week)     Assessment and Plan    Nutrition Problem  Inadequate energy intake    Related to (etiology):   Inability to consume sufficient energy    Signs and Symptoms (as evidenced by):   NPO w/ no alternate means of nutrition     Interventions/Recommendations (treatment strategy):  Collaboration of care     Nutrition Diagnosis Status:   New     Monitor and Evaluation    Food and Nutrient Intake: energy intake, food and beverage intake, enteral nutrition intake  Food and Nutrient Adminstration: diet order, enteral and parenteral nutrition administration  Physical Activity and Function: nutrition-related ADLs and IADLs  Anthropometric Measurements: weight, weight change  Biochemical Data, Medical Tests and Procedures: inflammatory profile, lipid profile, glucose/endocrine profile, gastrointestinal profile, electrolyte and renal panel  Nutrition-Focused Physical Findings: overall appearance     Nutrition Follow-Up    RD Follow-up?: Yes

## 2019-06-07 NOTE — PROGRESS NOTES
2031-2045 patient transported from ED 1 to bed 9075 via transport ventilator with oxygen attached. Ambu bag and mask at \[facility]\"". Patient tolerated well no complications noted. Report and care of patient turned over to RT Imani.

## 2019-06-07 NOTE — PROGRESS NOTES
Pt extubated per MD order. Pt is on 3 lpm NC tolerating well. No respiratory distress noted. RN at bedside.  Will continue to monitor.

## 2019-06-07 NOTE — HOSPITAL COURSE
6/6: RMCA syndrome, TPA, intubation, transfer to [facility], admit to [facility]CC  6/7: Extubation. MRI.   6/8: TTF under Vascular Neurology

## 2019-06-07 NOTE — PT/OT/SLP PROGRESS
Physical Therapy      Patient Name:  Michelle Rowell   MRN:  [id number]    PT consult received and acknowledged.  The patient is currently intubated and only appropriate for in bed activities.  PT will follow up when patient is appropriate for EOB or OOB activities.     Brandy Laureano, PT  6/7/2019  [phone] (pager)

## 2019-06-07 NOTE — PT/OT/SLP PROGRESS
Speech Language Pathology  Discharge      Michelle Rowell  MRN: [id number]    Patient not seen today secondary to Other (Pt currently intubated). Please re-consult once pt extubated and appropriate for SLP services.       DAGMAR Ragsdale, CCC-SLP  6/7/2019

## 2019-06-07 NOTE — ASSESSMENT & PLAN NOTE
BRBPR reported by RN overnight  - Trended H&H, stable  - Likely related to TPA administration  - Continue to monitor stools and H&H   - Holding SQH  - PPI BID

## 2019-06-07 NOTE — HOSPITAL COURSE
Hospital Course:  Patient admitted to [facility] for new acute infarcts.  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  Extubated today.    06/08/19 Patient has been hypotensive overnight received IV bolus 500cc.  Normal saline gtt. Held due to EF 40%. Patient with leukocytosis and urinary retention.  U/A pending.    06/09/19 Patient transferred out of the NCC unit to stroke progressive unit yesterday.  Patient blood pressure improved. U/A positive for urinary tract infection.  Started 3 day course of bactrim.  Patient using mouth to breath and saturating at 100% on 2 liters of nasal cannula.  Patient states she usually breathes out of her mouth and does not feel short of breath.  Will plan to wean off oxygen. No areas of decreased breath sounds on physical exam.    6/10: passed MBSS this morning for mechanical soft/nectar thick. WBC improved on abx (day #2 of Bactrim today). Electrolyte abnormalities (Mg, K) replaced.   6/11: NAEON. Electrolytes replaced. continued on plavix. Pending insurance auth for SNF placement   6/12: no major events overnight. Plans to transfer to SNF today

## 2019-06-07 NOTE — SUBJECTIVE & OBJECTIVE
Past Medical History:   Diagnosis Date    Diabetes mellitus type II     Heart disease     Hyperlipidemia     Hypertension     Limb ischemia     Pseudoaneurysm following procedure 1/11/2016    Stroke      Past Surgical History:   Procedure Laterality Date    CORONARY ARTERY BYPASS GRAFT      JARED FILTER PLACEMENT      REMOVAL-IVC FILTER N/A 6/6/2016    Performed by [facility] Diagnostic Provider at [facility] OR [facility]    THROMBECTOMY      TONSILLECTOMY      TUBAL LIGATION      US PSEUDOANEURYSM REPAIR INC IMAGING  1/11/2016          Family History   Problem Relation Age of Onset    Heart disease Father     Cancer Father     Thyroid disease Daughter      Social History     Tobacco Use    Smoking status: Former Smoker    Smokeless tobacco: Never Used    Tobacco comment: quit in 1990s   Substance Use Topics    Alcohol use: No    Drug use: No     Review of patient's allergies indicates:  No Known Allergies    Medications: I have reviewed the current medication administration record.    Medications Prior to Admission   Medication Sig Dispense Refill Last Dose    carvedilol (COREG) 12.5 MG tablet Take 1 tablet (12.5 mg total) by mouth 2 (two) times daily. 60 tablet 11     latanoprost 0.005 % ophthalmic solution Place 1 drop into both eyes every evening.       lisinopril-hydrochlorothiazide (PRINZIDE,ZESTORETIC) 20-12.5 mg per tablet TAKE ONE TABLET BY MOUTH ONCE DAILY 30 tablet 5     metFORMIN (GLUCOPHAGE) 1000 MG tablet TAKE 1 TABLET BY MOUTH TWICE DAILY WITH MEALS 180 tablet 1     mirtazapine (REMERON) 30 MG tablet TAKE ONE TABLET BY MOUTH ONCE DAILY IN THE EVENING 30 tablet 5     pantoprazole (PROTONIX) 40 MG tablet TAKE 1 TABLET BY MOUTH ONCE DAILY 90 tablet 3     simvastatin (ZOCOR) 40 MG tablet TAKE ONE TABLET BY MOUTH ONCE DAILY IN THE EVENING 30 tablet 5     aspirin (ECOTRIN) 81 MG EC tablet Take 162 mg by mouth once daily.    Unknown    cholecalciferol, vitamin D3, (VITAMIN D3) 1,000  "unit capsule Take 1,000 Units by mouth once daily.   Unknown    foam bandage (MEPILEX BORDER) 3 X 3 " Bndg Apply 1 Units topically once daily. 30 each 5 Unknown at Unknown time    pantoprazole (PROTONIX) 40 MG tablet    Taking       Review of Systems   Constitutional: Positive for activity change and diaphoresis. Negative for fatigue.   HENT: Negative for ear discharge.    Eyes: Negative for discharge.   Cardiovascular: Negative for chest pain.   Gastrointestinal: Negative for abdominal pain.   Genitourinary: Negative for flank pain.   Neurological: Positive for speech difficulty and weakness.     Objective:     Vital Signs (Most Recent):  Temp: 97.9 °F (36.6 °C) (06/06/19 2009)  Pulse: 84 (06/06/19 2031)  Resp: 13 (06/06/19 2031)  BP: 132/82 (06/06/19 2031)  SpO2: 100 % (06/06/19 2031)    Vital Signs Range (Last 24H):  Temp:  [96.1 °F (35.6 °C)-97.9 °F (36.6 °C)]   Pulse:  [71-86]   Resp:  [11-36]   BP: (115-150)/()   SpO2:  [95 %-100 %]     Physical Exam    Neurological Exam:     MSE - Pt able to follow simple commands   CN - Pupils reactive to light, EOMI, no facial droop   Strength - able to withdraw to pain, no antigravity strength in any of the limbs   Sensory - withdraws to pain        Laboratory:  CMP:   Recent Labs   Lab 06/06/19  1405   CALCIUM 9.1   ALBUMIN 3.1*   PROT 6.5   *   K 4.4   CO2 23   CL 96   BUN 13   CREATININE 0.7   ALKPHOS 61   ALT 23   AST 24   BILITOT 0.5     CBC:   Recent Labs   Lab 06/06/19  1611   WBC 9.09   RBC 3.76*   HGB 12.0   HCT 36.4*      MCV 97   MCH 31.9*   MCHC 33.0     Lipid Panel:   Recent Labs   Lab 06/06/19  1814   CHOL 141   LDLCALC 57.6*   HDL 69   TRIG 72     Coagulation:   Recent Labs   Lab 06/06/19  1405 06/06/19  1611   INR 1.0 1.9*   APTT 27.4  --      Hgb A1C:   Recent Labs   Lab 06/06/19  1831   HGBA1C 5.6     TSH:   Recent Labs   Lab 06/06/19  1611   TSH 3.270       Diagnostic Results:      Brain imaging:    CTH - Left MCA territory " encephalomalacia         Vessel Imaging:        CTA - No LVO     Cardiac Evaluation:     EKG - Possible Acute MI

## 2019-06-07 NOTE — ASSESSMENT & PLAN NOTE
Ms Rowell is a 77 y/o CF with PMHx of CAD, HTN, HLD, DM type II, prior L MCA stroke who presented to the ED with Right MCA syndrome. Pt symptoms improved post tPA.  No LVO on CTA head and neck but mild bilateral carotid artery atherosclerosis.  MRI brain shows right BG infarct and left punctate infarct in the parietal lobe.  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  Etiology ESUS.  Suspect cardioembolic due to bilateral hemisphere involvement (prior L MCA and now acute R MCA stroke).  Last admission patient was discharged with 30 day cardiac event monitor but atrial fibrillation not detected.  Patient needs implantable loop recorder before discharge.        Antithrombotics for secondary stroke prevention: continue plavix 75mg qd      Statins for secondary stroke prevention and hyperlipidemia, if present:   Statins: Atorvastatin- 40 mg daily    Aggressive risk factor modification: HTN, DM, HLD, Diet, Exercise, CAD     Rehab efforts: The patient has been evaluated by a stroke team provider and the therapy needs have been fully considered based off the presenting complaints and exam findings. The following therapy evaluations are needed: PT evaluate and treat, OT evaluate and treat, SLP evaluate and treat, PM&R evaluate for appropriate placement TBD     Diagnostics ordered/pending: HgbA1C to assess blood glucose levels, Lipid Profile to assess cholesterol levels, MRI head without contrast to assess brain parenchyma, TTE to assess cardiac function/status , TSH to assess thyroid function    VTE prophylaxis: Heparin 5000 units SQ every 8 hours holding due to rectal bleeding     BP parameters: Infarct: Post tPA, SBP <180

## 2019-06-07 NOTE — PLAN OF CARE
Problem: Adult Inpatient Plan of Care  Goal: Plan of Care Review  Outcome: Ongoing (interventions implemented as appropriate)  Recommendations     1. If able to extubate & advance diet, recommend Regular diet (texture per SLP).   2. If unable to extubate, initiate enteral nutrition. Recommend Impact Peptide @ 40 mL/hr to provide 1440 kcals, 90 g of protein, 739 mL fluid.               - Hold for residuals >500 mL; additional fluid per MD.  3. RD to monitor & follow-up.

## 2019-06-07 NOTE — PLAN OF CARE
Problem: Fall Injury Risk  Goal: Absence of Fall and Fall-Related Injury    Intervention: Identify and Manage Contributors to Fall Injury Risk  POC reviewed with pt and family at 1400. Pt verbalized understanding. Questions and concerns addressed. Pt to MRI this am. Pt extubated, now on RA, tolerating well. PT AAOx4. MAIK done, pt NPO except meds crushed in pudding, SLP eval ordered. Echo done at bedside. Singh removed. No BM this shift. NS gtt continued while NPO. Restraints removed after extubation. No acute events today. Pt progressing toward goals. Will continue to monitor. See flowsheets for full assessment and VS info.

## 2019-06-07 NOTE — ASSESSMENT & PLAN NOTE
79 y/o female with PMH of LMCA stroke in 2016 presents s/p TPA for RMCA syndrome. No LVO on CTA  - TPA window now complete  - MRI this morning shows acute R basal ganglia infarct   - Begin clopidogrel daily (patient compliant on daily ASA since 2016)    - Vascular Neurology following  - SBP goal 100-180  - Echo, TSH, lipid panel, hemoglobin A1c for stroke work-up  - PT/OT/SLP  - Extubated this afternoon

## 2019-06-07 NOTE — ASSESSMENT & PLAN NOTE
Previous LE DVT, s/p IVC filter and on short term anticoagulation in 2016  - RLE edema noted on PE  - Repeat LE venous ultrasounds negative for DVTs  - OK to place SCDs  - IVC filter from previous admission removed, no longer present on KUB   - High risk for DVTs

## 2019-06-07 NOTE — ASSESSMENT & PLAN NOTE
- Risk factor for stroke   - rec permissive HTN for 48 hours followed by Long term BP goals of < 130 mmHg

## 2019-06-07 NOTE — SUBJECTIVE & OBJECTIVE
Neurologic Chief Complaint: left sided weakness     Subjective:     Interval History: Patient is seen for follow-up neurological assessment and treatment recommendations:Patient admitted to [facility] for new acute infarcts.  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  Extubated today.      HPI, Past Medical, Family, and Social History remains the same as documented in the initial encounter.     Review of Systems   Constitutional: Negative for chills and fever.   Respiratory: Negative for cough and shortness of breath.      Scheduled Meds:   atorvastatin  40 mg Per OG tube Daily    chlorhexidine  15 mL Mouth/Throat BID    clopidogrel  75 mg Oral Daily    pantoprazole  40 mg Per OG tube BID    phenylephrine HCl in 0.9% NaCl        senna-docusate 8.6-50 mg  1 tablet Per NG tube Daily     Continuous Infusions:   sodium chloride 0.9% 75 mL/hr at 06/07/19 1605     PRN Meds:acetaminophen, Dextrose 10% Bolus, glucagon (human recombinant), insulin aspart U-100, sodium chloride 0.9%    Objective:     Vital Signs (Most Recent):  Temp: 97.9 °F (36.6 °C) (06/07/19 1505)  Pulse: 96 (06/07/19 1607)  Resp: (!) 27 (06/07/19 1607)  BP: (!) 98/59 (06/07/19 1605)  SpO2: (!) 94 % (06/07/19 1607)  BP Location: Left arm    Vital Signs Range (Last 24H):  Temp:  [97.9 °F (36.6 °C)-98.8 °F (37.1 °C)]   Pulse:  []   Resp:  [10-27]   BP: ()/(50-85)   SpO2:  [93 %-100 %]   BP Location: Left arm    Physical Exam   Constitutional: She appears well-developed and well-nourished.   HENT:   Head: Normocephalic and atraumatic.   Eyes: Pupils are equal, round, and reactive to light. EOM are normal.   Pulmonary/Chest: Effort normal. No respiratory distress.       Neurological Exam:   LOC: drowsy  Attention Span: Good   Language: No aphasia  Articulation: No dysarthria  Orientation: not oriented to age but aware of month   Visual Fields: Full  EOM (CN III, IV, VI): Full/intact  Pupils (CN II, III): PERRL  Facial Sensation (CN V): Normal  Facial Movement  (CN VII): Lower facial weakness on the Right  Motor: Arm left  Paresis: 4/5  Leg left  Paresis: 3/5  Arm right  Paresis: 4/5  Leg right Paresis: 2/5  Cebellar: No evidence of appendicular or axial ataxia  Sensation: Intact to light touch, temperature and vibration  Tone: Normal tone throughout    Laboratory:  CMP:   Recent Labs   Lab 06/07/19  0252 06/07/19  0956   CALCIUM 8.4*  --    ALBUMIN 2.7*  --    PROT 5.5*  --    * 136   K 4.2  --    CO2 21*  --    CL 98  --    BUN 16  --    CREATININE 0.7  --    ALKPHOS 59  --    ALT 23  --    AST 28  --    BILITOT 0.5  --      CBC:   Recent Labs   Lab 06/07/19  0956   WBC 17.63*   RBC 4.00   HGB 12.7   HCT 38.0      MCV 95   MCH 31.8*   MCHC 33.4     Lipid Panel:   Recent Labs   Lab 06/06/19  1814   CHOL 141   LDLCALC 57.6*   HDL 69   TRIG 72     Hgb A1C:   Recent Labs   Lab 06/06/19  1831   HGBA1C 5.6     TSH:   Recent Labs   Lab 06/06/19  1611   TSH 3.270       Diagnostic Results     Brain Imaging   MRI brain 06/07/19    Restricted diffusion in right basal ganglia and punctate infract in left parietal lobe  Cytotoxic cerebral edema  Remote left MCA infarct     Vessel Imaging   CTA head/neck 06/07/19  CTA head: No evidence for significant proximal stenosis or occlusion.    CTA neck: Atherosclerotic disease carotid bifurcations and proximal ICAs with less than 50% proximal ICA stenosis by NASCET criteria.    Cardiac Imaging   06/07/19  · Mildly decreased left ventricular systolic function. The estimated ejection fraction is 40%  · Apico septal and apico inferior part of the apex is aneursymal.  · Concentric left ventricular remodeling.  · Indeterminate left ventricular diastolic function.  · Normal right ventricular systolic function.  · IVC not visualized.

## 2019-06-07 NOTE — H&P
[facility]  Neurocritical Care  History & Physical    Admit Date: 6/6/2019  Service Date: 06/06/2019  Length of Stay: 0    Subjective:     Chief Complaint: Stroke due to occlusion of right middle cerebral artery    History of Present Illness: Ms. Rowell is a 79 y/o female with PMH significant for previous LMCA stroke with residual RSW, DVTs s/p IVC filter placement, DM, HLD, HTN who presents to [facility] s/p TPA for RMCA syndrome. Patient was found on the ground with LSW and right gaze at home by her nieces at approximately 1325, who had last spoken with her over the phone an hour previously, seeming to be at her baseline. She was brought to [facility] ED where telestroke consult was done and patient was given TPA. Infusion completed at 1541. While in ED, she was intubated for airway protection prior to transport. Not a candidate for thrombectomy as no LVO on CTAMP on arrival to [facility].     Past Medical History:   Diagnosis Date    Diabetes mellitus type II     Heart disease     Hyperlipidemia     Hypertension     Limb ischemia     Pseudoaneurysm following procedure 1/11/2016    Stroke      Past Surgical History:   Procedure Laterality Date    CORONARY ARTERY BYPASS GRAFT      JARED FILTER PLACEMENT      REMOVAL-IVC FILTER N/A 6/6/2016    Performed by [facility] Provider at [facility] OR [address] [place] [address]    THROMBECTOMY      TONSILLECTOMY      TUBAL LIGATION      US PSEUDOANEURYSM REPAIR INC IMAGING  1/11/2016            Current Facility-Administered Medications on File Prior to Encounter   Medication Dose Route Frequency Provider Last Rate Last Dose    [COMPLETED] alteplase (ACTIVASE) 100 mg injection 6 mg  0.09 mg/kg Intravenous ED 1 Time John Swain Jr., MD   6 mg at 06/06/19 1441    [COMPLETED] etomidate injection 20 mg  20 mg Intravenous ED 1 Time John Swain Jr., MD        [COMPLETED] propofol (DIPRIVAN) 10 mg/mL infusion  15 mcg/kg/min Intravenous ED 1 Time John Swain Jr., MD   " Stopped at 06/06/19 1505    [COMPLETED] sodium chloride 0.9% bolus 1,000 mL  1,000 mL Intravenous ED 1 Time John Swain Jr., MD   1,000 mL at 06/06/19 1505    [COMPLETED] alteplase (ACTIVASE) 100 mg injection 50 mg  0.81 mg/kg Intravenous ED 1 Time John Swain Jr., MD 50 mL/hr at 06/06/19 1453 50 mg at 06/06/19 1453    [DISCONTINUED] succinylcholine (ANECTINE) 20 mg/mL injection              Current Outpatient Medications on File Prior to Encounter   Medication Sig Dispense Refill    carvedilol (COREG) 12.5 MG tablet Take 1 tablet (12.5 mg total) by mouth 2 (two) times daily. 60 tablet 11    latanoprost 0.005 % ophthalmic solution Place 1 drop into both eyes every evening.      lisinopril-hydrochlorothiazide (PRINZIDE,ZESTORETIC) 20-12.5 mg per tablet TAKE ONE TABLET BY MOUTH ONCE DAILY 30 tablet 5    metFORMIN (GLUCOPHAGE) 1000 MG tablet TAKE 1 TABLET BY MOUTH TWICE DAILY WITH MEALS 180 tablet 1    mirtazapine (REMERON) 30 MG tablet TAKE ONE TABLET BY MOUTH ONCE DAILY IN THE EVENING 30 tablet 5    pantoprazole (PROTONIX) 40 MG tablet TAKE 1 TABLET BY MOUTH ONCE DAILY 90 tablet 3    simvastatin (ZOCOR) 40 MG tablet TAKE ONE TABLET BY MOUTH ONCE DAILY IN THE EVENING 30 tablet 5    aspirin (ECOTRIN) 81 MG EC tablet Take 162 mg by mouth once daily.       cholecalciferol, vitamin D3, (VITAMIN D3) 1,000 unit capsule Take 1,000 Units by mouth once daily.      foam bandage (MEPILEX BORDER) 3 X 3 " Bndg Apply 1 Units topically once daily. 30 each 5    pantoprazole (PROTONIX) 40 MG tablet       [DISCONTINUED] HYDROcodone-acetaminophen (NORCO) 7.5-325 mg per tablet Take 1 tablet by mouth every 12 (twelve) hours as needed for Pain. 30 tablet 0     Allergies: Patient has no known allergies.    Family History   Problem Relation Age of Onset    Heart disease Father     Cancer Father     Thyroid disease Daughter        Social History     Tobacco Use    Smoking status: Former Smoker    Smokeless tobacco: " Never Used    Tobacco comment: quit in 1990s   Substance Use Topics    Alcohol use: No    Drug use: No      Review of Systems: Unable to obtain a complete ROS due to intubation     Vitals:   Temp: 97.9 °F (36.6 °C)  Pulse: 83  BP: 123/78  MAP (mmHg): 92  Resp: 16  SpO2: 100 %  Oxygen Concentration (%): 40  O2 Device (Oxygen Therapy): ventilator  Vent Mode: Spont  Set Rate: 0 bmp  Vt Set: 400 mL  Pressure Support: 10 cmH20  PEEP/CPAP: 5 cmH20  Peak Airway Pressure: 15 cmH2O  Mean Airway Pressure: 7.5 cmH20  Plateau Pressure: 0 cmH20    Temp  Min: 96.1 °F (35.6 °C)  Max: 97.9 °F (36.6 °C)  Pulse  Min: 71  Max: 86  BP  Min: 115/83  Max: 150/63  MAP (mmHg)  Min: 78  Max: 120  Resp  Min: 11  Max: 36  SpO2  Min: 95 %  Max: 100 %  Oxygen Concentration (%)  Min: 40  Max: 50    No intake/output data recorded.         Examination:   Constitutional: Intubated. Well-nourished and -developed. No apparent distress.   Eyes: Conjunctiva clear, anicteric. Lids no lesions.  Head/Ears/Nose/Mouth/Throat/Neck: Moist mucous membranes. External ears, nose atraumatic.   Cardiovascular: Regular rhythm. No murmurs. No leg edema.  Respiratory: Comfortable respirations. Clear to auscultation.  Gastrointestinal: No hernia. Soft, nondistended, nontender. + bowel sounds.  Unilateral pitting edema present to RLE    Neurologic:   [id number]  -Alert. Intubated. Follows commands in all four extremities. Nods appropriately to simple questions.   -Cranial nerves: EOMI, PERRL, tongue midline, grimace symmetric although ETT gregory in place.   -Strength: RUE with greater drift than LUE, nether hit bed before 10 seconds. BLE cannot counter gravity.   -Sensation intact to touch in arms, legs.    Today I independently reviewed pertinent medications, lines/drains/airways, imaging, laboratory results, notably:     Assessment/Plan:     Neuro  * Stroke due to occlusion of right middle cerebral artery  77 y/o female with PMH of LMCA stroke in 2016 presents s/p  TPA for RMCA syndrome. No LVO on CTA  - Admit to [facility]  - Post-TPA protocol  - Vascular Neurology following  - SBP goal 100-180  - Echo, TSH, lipid panel, hemoglobin A1c for stroke work-up  - PT/OT/SLP  - May be able to be extubated, exam greatly improved since initial evaluation. Wean sedation to off and re-evaluate     History of ischemic left MCA stroke  January 2016, s/p thrombectomy  - Residual RSW  - Encephalomalacia noted on CTH   - PT/OT    Right hemiplegia  Residual from her previous CVA    Pulmonary  Acute respiratory failure  Intubated PTA for airway protection  - Remain off sedation   - Wean vent as able  - Did not extubate in ED as patient with episodes of apnea, continue to evaluate throughout tonight for possible extubation    - CXR OK   - PUD and VAP ppx    Cardiac/Vascular  Essential hypertension  SBP goal 100-180   - Hold enteral antihypertensives until out of acute stroke phase     Hematology  History of deep vein thrombosis  Previous LE DVT, s/p IVC filter and on short term anticoagulation in 2016  - RLE edema noted on PE  - Repeat LE venous ultrasounds  - Avoid SCDs until LE doppler completed   - KUB to check if IVC filter still in place   - High risk for DVTs      Endocrine  Type 2 diabetes mellitus with neurologic complication  Hemoglobin A1c pending  - SSI low dose   - q6h glucose checks           The patient is being Prophylaxed for:  Venous Thromboembolism with: None - No SQH 2/2 TPA. No SCDs 2/2 concern for DVT. IVCF possibly in place, verify with KUB.   Stress Ulcer with: H2B  Ventilator Pneumonia with: chlorhexidine oral care    Activity Orders          Diet NPO: NPO starting at 06/06 1802        Full Code     Critical care time: 45 minutes     Mel Haskins PA-C  Neurocritical Care  [facility]

## 2019-06-07 NOTE — NURSING
Patient transport from [facility] --> [facility] ED--> [facility] via stretcher       Type of stroke/diagnosis:  CVA due to embolism of R MCA     TPA start 1441 and end time 1541    Thrombectomy start and end time N/A    Current symptoms: Generalized weakness, R sided residual weakness from previous stroke    Skin assessment done: Y  Wounds noted: none     NCC notified: Lori LI

## 2019-06-07 NOTE — PLAN OF CARE
06/07/19 1527   Post-Acute Status   Post-Acute Authorization Placement   Post-Acute Placement Status Awaiting Internal Medical Clearance   Discharge Delays None known at this time       Cher Quinn RN, CCRN-K, [facility]  Neuro-Critical Care   X [phone]

## 2019-06-07 NOTE — HPI
Ms Rowell is a 77 y/o CF with PMHx of HTN, CABG, HLD, DM type II, Left MCA ischemic stroke presented to the ED with right MCA stroke syndrome.     Per chart review, patient was LKN at 12 noon and she was found on the floor at 1:30 pm with not able to move her left side and drooling from the left. She was taken to ED at [facility] where telestroke was consulted and tPA was administered at 14:41. Pt had to be intubated to protect her airway. Propofol was switched to Ketamine due to low BP. Vecuronium was also administered. CTH was done at [facility] ED which was normal and CTA was neg for any high grade stenosis or Occlusions. Pt was able to follow commands and moving all her 4 extremities.

## 2019-06-07 NOTE — PROGRESS NOTES
[facility]  Neurocritical Care  Progress Note    Admit Date: 6/6/2019  Service Date: 06/07/2019  Length of Stay: 1    Subjective:     Chief Complaint: Cerebrovascular accident (CVA) due to embolism of right middle cerebral artery    History of Present Illness: Ms. Rowell is a 77 y/o female with PMH significant for previous LMCA stroke with residual RSW, DVTs s/p IVC filter placement, DM, HLD, HTN who presents to [facility] s/p TPA for RMCA syndrome. Patient was found on the ground with LSW and right gaze at home by her nieces at approximately 1325, who had last spoken with her over the phone an hour previously, seeming to be at her baseline. She was brought to [facility] ED where telestroke consult was done and patient was given TPA. Infusion completed at 1541. While in ED, she was intubated for airway protection prior to transport. Not a candidate for thrombectomy as no LVO on CTAMP on arrival to [facility].     Hospital Course: 6/6: RMCA syndrome, TPA, intubation, transfer to [facility], admit to [facility]  6/7: Extubation. MRI.     Interval History: Patient remained intubated overnight as still pulling in low volumes on spontaneous setting and having episodes of apnea. Doing well this morning on minimal settings. Extubated without complications this afternoon. MRI obtained, R basal ganglia ischemic stroke present. Plavix initiated as patient was on aspirin and compliant when she suffered stroke. BRBPR noted overnight. H&H remains stable on serial checks. Continue to trend daily and monitor stools. Hold SQH.     Review of Systems: Unable to obtain a complete ROS due to intubation     Vitals:   Temp: 97.9 °F (36.6 °C)  Pulse: 96  Rhythm: normal sinus rhythm  BP: (!) 98/59  MAP (mmHg): 73  Resp: (!) 27  SpO2: (!) 94 %  Oxygen Concentration (%): 100  O2 Device (Oxygen Therapy): room air  Vent Mode: Spont  Pressure Support: 5 cmH20  PEEP/CPAP: 5 cmH20  Peak Airway Pressure: 11 cmH2O  Mean Airway Pressure: 7.7 cmH20  Plateau  Pressure: 0 cmH20    Temp  Min: 96.9 °F (36.1 °C)  Max: 98.8 °F (37.1 °C)  Pulse  Min: 71  Max: 102  BP  Min: 79/50  Max: 148/85  MAP (mmHg)  Min: 64  Max: 100  Resp  Min: 10  Max: 36  SpO2  Min: 93 %  Max: 100 %  Oxygen Concentration (%)  Min: 39  Max: 100    06/06 0701 - 06/07 0700  In: 815.9 [I.V.:815.9]  Out: 630 [Urine:630]   Unmeasured Output  Stool Occurrence: 0     Examination:   Constitutional: Intubated. Well-nourished and -developed. No apparent distress.   Eyes: Conjunctiva clear, anicteric. Lids no lesions.  Head/Ears/Nose/Mouth/Throat/Neck: Moist mucous membranes. External ears, nose atraumatic.   Cardiovascular: Regular rhythm. No murmurs. No leg edema.  Respiratory: Comfortable respirations. Clear to auscultation.  Gastrointestinal: No hernia. Soft, nondistended, nontender. + bowel sounds.    Neurologic:  -GCS [id number]  -Alert. Follows commands. Nods appropriately and mouths around ETT   -Cranial nerves ntact, +cough, +gag, EOMI, PERRL, face symmetric   -Strength: RUE with greater drift than LUE, nether hit bed before 10 seconds. BLE cannot counter gravity.   -Sensation intact     Medications:   Continuous  sodium chloride 0.9% Last Rate: 75 mL/hr at 06/07/19 1605   Scheduled  atorvastatin 40 mg Daily   chlorhexidine 15 mL BID   clopidogrel 75 mg Daily   pantoprazole 40 mg BID   phenylephrine HCl in 0.9% NaCl     senna-docusate 8.6-50 mg 1 tablet Daily   PRN  acetaminophen 650 mg Q6H PRN   Dextrose 10% Bolus 12.5 g PRN   glucagon (human recombinant) 1 mg PRN   insulin aspart U-100 0-5 Units Q6H PRN   sodium chloride 0.9% 10 mL PRN      Today I independently reviewed pertinent medications, lines/drains/airways, imaging, laboratory results, notably:     ISTAT: Recent Labs   Lab 06/07/19  1607   PH 7.379   PCO2 32.0*   PO2 69*   POCSATURATED 93*   HCO3 18.9*   BE -6   POCTCO2 20*   SAMPLE ARTERIAL      Chem: Recent Labs   Lab 06/07/19  0252 06/07/19  0956   * 136   K 4.2  --    CL 98  --    CO2 21*   --    *  --    BUN 16  --    CREATININE 0.7  --    ESTGFRAFRICA >60.0  --    EGFRNONAA >60.0  --    CALCIUM 8.4*  --    MG 1.1*  --    PHOS 4.1  --    ANIONGAP 14  --    PROT 5.5*  --    ALBUMIN 2.7*  --    BILITOT 0.5  --    ALKPHOS 59  --    AST 28  --    ALT 23  --      Heme: Recent Labs   Lab 06/06/19 2059 06/07/19  0956   WBC 16.51*   < > 17.63*   HGB 12.3   < > 12.7   HCT 36.8*   < > 38.0      < > 318   INR 1.2  --   --     < > = values in this interval not displayed.     Endo:   Recent Labs   Lab 06/06/19 2106 06/07/19  0543 06/07/19  1203   POCTGLUCOSE 206* 169* 184*        Assessment/Plan:     Neuro  * Cerebrovascular accident (CVA) due to embolism of right middle cerebral artery  79 y/o female with PMH of LMCA stroke in 2016 presents s/p TPA for RMCA syndrome. No LVO on CTA  - TPA window now complete  - MRI this morning shows acute R basal ganglia infarct   - Begin clopidogrel daily (patient compliant on daily ASA since 2016)    - Vascular Neurology following  - SBP goal 100-180  - Echo, TSH, lipid panel, hemoglobin A1c for stroke work-up  - PT/OT/SLP  - Extubated this afternoon     History of ischemic left MCA stroke  January 2016, s/p thrombectomy  - Residual RSW  - Encephalomalacia noted on CTH/MRI   - PT/OT    Right hemiplegia  Residual from her previous CVA    Cytotoxic cerebral edema  From ischemic stroke   - follow with imaging   - goal eunatremia     Cardiac/Vascular  Essential hypertension  SBP goal 100-180   - Hold enteral antihypertensives as pressure on lower side     Hematology  History of deep vein thrombosis  Previous LE DVT, s/p IVC filter and on short term anticoagulation in 2016  - RLE edema noted on PE  - Repeat LE venous ultrasounds negative for DVTs  - OK to place SCDs  - IVC filter from previous admission removed, no longer present on KUB   - High risk for DVTs      Endocrine  Type 2 diabetes mellitus with neurologic complication  Hemoglobin A1c 5.6  - SSI low dose    - q6h glucose checks     GI  Rectal bleeding  BRBPR reported by RN overnight  - Trended H&H, stable  - Likely related to TPA administration  - Continue to monitor stools and H&H   - Holding SQH  - PPI BID        The patient is being Prophylaxed for:  Venous Thromboembolism with: Mechanical- holding SQH in setting of rectal bleed.   Stress Ulcer with: PPI  Ventilator Pneumonia with: chlorhexidine oral care    Activity Orders          Diet NPO: NPO starting at 06/06 1802        Full Code     Critical care time: 35 minutes     Mel Haskins PA-C  Neurocritical Care  [facility]

## 2019-06-08 PROBLEM — R13.12 OROPHARYNGEAL DYSPHAGIA: Status: ACTIVE | Noted: 2019-06-08

## 2019-06-08 PROBLEM — J96.00 ACUTE RESPIRATORY FAILURE: Status: RESOLVED | Noted: 2019-06-06 | Resolved: 2019-06-08

## 2019-06-08 PROBLEM — I63.511 ACUTE ISCHEMIC RIGHT MCA STROKE: Status: ACTIVE | Noted: 2019-06-08

## 2019-06-08 PROBLEM — K62.5 RECTAL BLEEDING: Status: RESOLVED | Noted: 2019-06-07 | Resolved: 2019-06-08

## 2019-06-08 LAB
ALBUMIN SERPL BCP-MCNC: 2.4 G/DL (ref 3.5–5.2)
ALLENS TEST: ABNORMAL
ALP SERPL-CCNC: 55 U/L (ref 55–135)
ALT SERPL W/O P-5'-P-CCNC: 19 U/L (ref 10–44)
ANION GAP SERPL CALC-SCNC: 11 MMOL/L (ref 8–16)
AST SERPL-CCNC: 22 U/L (ref 10–40)
BACTERIA #/AREA URNS AUTO: ABNORMAL /HPF
BASOPHILS # BLD AUTO: 0.04 K/UL (ref 0–0.2)
BASOPHILS NFR BLD: 0.2 % (ref 0–1.9)
BILIRUB SERPL-MCNC: 0.6 MG/DL (ref 0.1–1)
BILIRUB UR QL STRIP: NEGATIVE
BUN SERPL-MCNC: 18 MG/DL (ref 8–23)
CALCIUM SERPL-MCNC: 8.4 MG/DL (ref 8.7–10.5)
CHLORIDE SERPL-SCNC: 103 MMOL/L (ref 95–110)
CLARITY UR REFRACT.AUTO: ABNORMAL
CO2 SERPL-SCNC: 21 MMOL/L (ref 23–29)
COLOR UR AUTO: ABNORMAL
CREAT SERPL-MCNC: 0.8 MG/DL (ref 0.5–1.4)
DELSYS: ABNORMAL
DIFFERENTIAL METHOD: ABNORMAL
EOSINOPHIL # BLD AUTO: 0 K/UL (ref 0–0.5)
EOSINOPHIL NFR BLD: 0 % (ref 0–8)
ERYTHROCYTE [DISTWIDTH] IN BLOOD BY AUTOMATED COUNT: 15.7 % (ref 11.5–14.5)
EST. GFR  (AFRICAN AMERICAN): >60 ML/MIN/1.73 M^2
EST. GFR  (NON AFRICAN AMERICAN): >60 ML/MIN/1.73 M^2
FLOW: 2
GLUCOSE SERPL-MCNC: 155 MG/DL (ref 70–110)
GLUCOSE UR QL STRIP: NEGATIVE
HCO3 UR-SCNC: 21.1 MMOL/L (ref 24–28)
HCT VFR BLD AUTO: 33.7 % (ref 37–48.5)
HGB BLD-MCNC: 11.2 G/DL (ref 12–16)
HGB UR QL STRIP: ABNORMAL
IMM GRANULOCYTES # BLD AUTO: 0.1 K/UL (ref 0–0.04)
IMM GRANULOCYTES NFR BLD AUTO: 0.6 % (ref 0–0.5)
KETONES UR QL STRIP: ABNORMAL
LEUKOCYTE ESTERASE UR QL STRIP: ABNORMAL
LYMPHOCYTES # BLD AUTO: 3.1 K/UL (ref 1–4.8)
LYMPHOCYTES NFR BLD: 18.9 % (ref 18–48)
MAGNESIUM SERPL-MCNC: 1.1 MG/DL (ref 1.6–2.6)
MCH RBC QN AUTO: 31.3 PG (ref 27–31)
MCHC RBC AUTO-ENTMCNC: 33.2 G/DL (ref 32–36)
MCV RBC AUTO: 94 FL (ref 82–98)
MICROSCOPIC COMMENT: ABNORMAL
MODE: ABNORMAL
MONOCYTES # BLD AUTO: 1.4 K/UL (ref 0.3–1)
MONOCYTES NFR BLD: 8.5 % (ref 4–15)
NEUTROPHILS # BLD AUTO: 11.7 K/UL (ref 1.8–7.7)
NEUTROPHILS NFR BLD: 71.8 % (ref 38–73)
NITRITE UR QL STRIP: POSITIVE
NRBC BLD-RTO: 0 /100 WBC
PCO2 BLDA: 33.4 MMHG (ref 35–45)
PH SMN: 7.41 [PH] (ref 7.35–7.45)
PH UR STRIP: 5 [PH] (ref 5–8)
PHOSPHATE SERPL-MCNC: 3.8 MG/DL (ref 2.7–4.5)
PLATELET # BLD AUTO: 268 K/UL (ref 150–350)
PMV BLD AUTO: 11.8 FL (ref 9.2–12.9)
PO2 BLDA: 97 MMHG (ref 80–100)
POC BE: -4 MMOL/L
POC SATURATED O2: 98 % (ref 95–100)
POC TCO2: 22 MMOL/L (ref 23–27)
POCT GLUCOSE: 147 MG/DL (ref 70–110)
POTASSIUM SERPL-SCNC: 3.9 MMOL/L (ref 3.5–5.1)
PROT SERPL-MCNC: 5.1 G/DL (ref 6–8.4)
PROT UR QL STRIP: NEGATIVE
RBC # BLD AUTO: 3.58 M/UL (ref 4–5.4)
RBC #/AREA URNS AUTO: 1 /HPF (ref 0–4)
SAMPLE: ABNORMAL
SITE: ABNORMAL
SODIUM SERPL-SCNC: 135 MMOL/L (ref 136–145)
SP GR UR STRIP: 1.03 (ref 1–1.03)
SP02: 98
SQUAMOUS #/AREA URNS AUTO: 1 /HPF
URN SPEC COLLECT METH UR: ABNORMAL
WBC # BLD AUTO: 16.28 K/UL (ref 3.9–12.7)
WBC #/AREA URNS AUTO: 36 /HPF (ref 0–5)

## 2019-06-08 PROCEDURE — 82803 BLOOD GASES ANY COMBINATION: CPT | Mod: HCNC

## 2019-06-08 PROCEDURE — 87086 URINE CULTURE/COLONY COUNT: CPT | Mod: HCNC

## 2019-06-08 PROCEDURE — 80053 COMPREHEN METABOLIC PANEL: CPT | Mod: HCNC

## 2019-06-08 PROCEDURE — 84100 ASSAY OF PHOSPHORUS: CPT | Mod: HCNC

## 2019-06-08 PROCEDURE — 99233 PR SUBSEQUENT HOSPITAL CARE,LEVL III: ICD-10-PCS | Mod: HCNC,GC,, | Performed by: PHYSICIAN ASSISTANT

## 2019-06-08 PROCEDURE — 25000003 PHARM REV CODE 250: Mod: HCNC | Performed by: PHYSICIAN ASSISTANT

## 2019-06-08 PROCEDURE — 36415 COLL VENOUS BLD VENIPUNCTURE: CPT | Mod: HCNC

## 2019-06-08 PROCEDURE — 97535 SELF CARE MNGMENT TRAINING: CPT | Mod: HCNC

## 2019-06-08 PROCEDURE — 36600 WITHDRAWAL OF ARTERIAL BLOOD: CPT | Mod: HCNC

## 2019-06-08 PROCEDURE — 87077 CULTURE AEROBIC IDENTIFY: CPT | Mod: HCNC

## 2019-06-08 PROCEDURE — 85025 COMPLETE CBC W/AUTO DIFF WBC: CPT | Mod: HCNC

## 2019-06-08 PROCEDURE — 99233 SBSQ HOSP IP/OBS HIGH 50: CPT | Mod: HCNC,GC,, | Performed by: PHYSICIAN ASSISTANT

## 2019-06-08 PROCEDURE — 87186 SC STD MICRODIL/AGAR DIL: CPT | Mod: HCNC

## 2019-06-08 PROCEDURE — 25000003 PHARM REV CODE 250: Mod: HCNC | Performed by: NURSE PRACTITIONER

## 2019-06-08 PROCEDURE — 92610 EVALUATE SWALLOWING FUNCTION: CPT | Mod: HCNC

## 2019-06-08 PROCEDURE — 99900035 HC TECH TIME PER 15 MIN (STAT): Mod: HCNC

## 2019-06-08 PROCEDURE — 99233 PR SUBSEQUENT HOSPITAL CARE,LEVL III: ICD-10-PCS | Mod: HCNC,GC,, | Performed by: PSYCHIATRY & NEUROLOGY

## 2019-06-08 PROCEDURE — 63600175 PHARM REV CODE 636 W HCPCS: Mod: HCNC | Performed by: PHYSICIAN ASSISTANT

## 2019-06-08 PROCEDURE — 63600175 PHARM REV CODE 636 W HCPCS: Mod: HCNC | Performed by: NURSE PRACTITIONER

## 2019-06-08 PROCEDURE — 27000221 HC OXYGEN, UP TO 24 HOURS: Mod: HCNC

## 2019-06-08 PROCEDURE — 83735 ASSAY OF MAGNESIUM: CPT | Mod: HCNC

## 2019-06-08 PROCEDURE — 81001 URINALYSIS AUTO W/SCOPE: CPT | Mod: HCNC

## 2019-06-08 PROCEDURE — 20600001 HC STEP DOWN PRIVATE ROOM: Mod: HCNC

## 2019-06-08 PROCEDURE — 99233 SBSQ HOSP IP/OBS HIGH 50: CPT | Mod: HCNC,GC,, | Performed by: PSYCHIATRY & NEUROLOGY

## 2019-06-08 PROCEDURE — 97161 PT EVAL LOW COMPLEX 20 MIN: CPT | Mod: HCNC

## 2019-06-08 PROCEDURE — 94761 N-INVAS EAR/PLS OXIMETRY MLT: CPT | Mod: HCNC

## 2019-06-08 PROCEDURE — 87088 URINE BACTERIA CULTURE: CPT | Mod: HCNC

## 2019-06-08 RX ORDER — HEPARIN SODIUM 5000 [USP'U]/ML
5000 INJECTION, SOLUTION INTRAVENOUS; SUBCUTANEOUS EVERY 8 HOURS
Status: DISCONTINUED | OUTPATIENT
Start: 2019-06-08 | End: 2019-06-12 | Stop reason: HOSPADM

## 2019-06-08 RX ADMIN — ATORVASTATIN CALCIUM 40 MG: 20 TABLET, FILM COATED ORAL at 09:06

## 2019-06-08 RX ADMIN — PANTOPRAZOLE SODIUM 40 MG: 40 GRANULE, DELAYED RELEASE ORAL at 09:06

## 2019-06-08 RX ADMIN — HEPARIN SODIUM 5000 UNITS: 5000 INJECTION, SOLUTION INTRAVENOUS; SUBCUTANEOUS at 03:06

## 2019-06-08 RX ADMIN — HEPARIN SODIUM 5000 UNITS: 5000 INJECTION, SOLUTION INTRAVENOUS; SUBCUTANEOUS at 09:06

## 2019-06-08 RX ADMIN — CLOPIDOGREL 75 MG: 75 TABLET, FILM COATED ORAL at 09:06

## 2019-06-08 RX ADMIN — SENNOSIDES,DOCUSATE SODIUM 1 TABLET: 8.6; 5 TABLET, FILM COATED ORAL at 09:06

## 2019-06-08 NOTE — PLAN OF CARE
Problem: Physical Therapy Goal  Goal: Physical Therapy Goal  Goals to be met by: 2019     Patient will increase functional independence with mobility by performin. Supine to sit with Stand-by Assistance  2. Sit to supine with Stand-by Assistance  3. Sit to stand transfer with Stand-by Assistance  4. Bed to chair transfer with Stand-by Assistance using Rolling Walker  5. Gait  x 100 feet with Stand-by Assistance using Rolling Walker.   6. Lower extremity exercise program x15 reps per handout, with supervision    Outcome: Ongoing (interventions implemented as appropriate)  Goals set

## 2019-06-08 NOTE — ASSESSMENT & PLAN NOTE
79 y/o female with PMH of LMCA stroke in 2016 presents s/p TPA for RMCA syndrome. No LVO on CTA  - TPA window now complete  - MRI shows acute R basal ganglia infarct   - Continue clopidogrel daily (patient compliant on daily ASA since 2016)    - Vascular Neurology following, will step down to their service today   - SBP goal 100-180  - PT/OT/SLP  - tolerating extubation well

## 2019-06-08 NOTE — PROGRESS NOTES
Nursing Transfer Note       Transfer [id number]    Transfer via bed    Transfer with chart and belongings with daughter    Transported by Eric LYNNE    Medicines sent: NO    Chart sent with patient: Yes    Notified: Receiving RN    Bedside Neuro assessment performed: Yes  Bedside Handoff given to: Receiving RN     Upon arrival to floor: pt tolerated transfer without any complications, neuro assessment intact, transfer of care complete

## 2019-06-08 NOTE — PROGRESS NOTES
[facility]  Neurocritical Care  Progress Note    Admit Date: 6/6/2019  Service Date: 06/08/2019  Length of Stay: 2    Subjective:     Chief Complaint: Embolic stroke involving right middle cerebral artery    History of Present Illness: Ms. Rowell is a 77 y/o female with PMH significant for previous LMCA stroke with residual RSW, DVTs s/p IVC filter placement, DM, HLD, HTN who [place] to [facility] s/p TPA for RMCA syndrome. Patient was found on the ground with LSW and right gaze at home by her nieces at approximately 1325, who had last spoken with her over the phone an hour previously, seeming to be at her baseline. She was brought to [facility] ED where telestroke consult was done and patient was given TPA. Infusion completed at 1541. While in ED, she was intubated for airway protection prior to transport. Not a candidate for thrombectomy as no LVO on CTAMP on arrival to [facility].     Hospital Course: 6/6: RMCA syndrome, TPA, intubation, transfer to [facility], admit to [facility]  6/7: Extubation. MRI.   6/8: TTF under Vascular Neurology     Interval History: No significant events overnight. Tolerating extubation well. SLP recommendations remain NPO, OK for meds crushed. Will hold off on NGT placement as she is progressing and will re-assess tomorrow if needs alternate enteral access. TTF under Vascular Neurology today.     Review of Systems: Review of Systems   Constitutional: Negative.    HENT: Negative.    Respiratory: Negative for cough and shortness of breath.    Cardiovascular: Positive for leg swelling. Negative for chest pain.   Gastrointestinal: Negative for abdominal pain, nausea and vomiting.   Neurological: Positive for speech change and focal weakness.         Vitals:   Temp: 97.8 °F (36.6 °C)  Pulse: 89  Rhythm: normal sinus rhythm  BP: 101/71  MAP (mmHg): 82  Resp: 16  SpO2: 98 %  O2 Device (Oxygen Therapy): room air    Temp  Min: 97.3 °F (36.3 °C)  Max: 97.8 °F (36.6 °C)  Pulse  Min: 79  Max: 104  BP   Min: 74/48  Max: 134/56  MAP (mmHg)  Min: 57  Max: 82  Resp  Min: 15  Max: 30  SpO2  Min: 94 %  Max: 100 %  Oxygen Concentration (%)  Min: 28  Max: 28    06/07 0701 - 06/08 0700  In: 3406.3 [I.V.:1786.3]  Out: 335 [Urine:335]   Unmeasured Output  Urine Occurrence: 1  Stool Occurrence: 1     Examination:   Constitutional: Well-nourished and -developed. No apparent distress.   Eyes: Conjunctiva clear, anicteric. Lids no lesions.  Head/Ears/Nose/Mouth/Throat/Neck: Moist mucous membranes. External ears, nose atraumatic.   Cardiovascular: Regular rhythm. No murmurs. No leg edema.  Respiratory: Comfortable respirations. Clear to auscultation.    Neurologic:  -GCS [id number]  -Alert. Oriented to person, place, and situation. Speech slowed and hoarse. Follows commands.  -Cranial nerves: slight R facial droop noted now that ETT no longer in place   -Motor R upper extremity weak but can oppose gravity. No drift LUE. BLE weak but can oppose gravity  -Sensation intact     Medications:   Continuous Scheduled  atorvastatin 40 mg Daily   chlorhexidine 15 mL BID   clopidogrel 75 mg Daily   heparin (porcine) 5,000 Units Q8H   pantoprazole 40 mg BID   senna-docusate 8.6-50 mg 1 tablet Daily   PRN  acetaminophen 650 mg Q6H PRN   Dextrose 10% Bolus 12.5 g PRN   glucagon (human recombinant) 1 mg PRN   insulin aspart U-100 0-5 Units Q6H PRN   sodium chloride 0.9% 10 mL PRN      Today I independently reviewed pertinent medications, lines/drains/airways, imaging, laboratory results, notably:     ISTAT: Recent Labs   Lab 06/08/19  0924   PH 7.407   PCO2 33.4*   PO2 97   POCSATURATED 98   HCO3 21.1*   BE -4   POCTCO2 22*   SAMPLE ARTERIAL      Chem: Recent Labs   Lab 06/08/19  0334   *   K 3.9      CO2 21*   *   BUN 18   CREATININE 0.8   ESTGFRAFRICA >60.0   EGFRNONAA >60.0   CALCIUM 8.4*   MG 1.1*   PHOS 3.8   ANIONGAP 11   PROT 5.1*   ALBUMIN 2.4*   BILITOT 0.6   ALKPHOS 55   AST 22   ALT 19     Heme: Recent Labs   Lab  06/08/19  0334   WBC 16.28*   HGB 11.2*   HCT 33.7*        Endo:   Recent Labs   Lab 06/07/19  1810 06/08/19  1150   POCTGLUCOSE 183* 147*      Assessment/Plan:     Neuro  * Embolic stroke involving right middle cerebral artery  77 y/o female with PMH of LMCA stroke in 2016 presents s/p TPA for RMCA syndrome. No LVO on CTA  - TPA window now complete  - MRI shows acute R basal ganglia infarct   - Continue clopidogrel daily (patient compliant on daily ASA since 2016)    - Vascular Neurology following, will step down to their service today   - SBP goal 100-180  - PT/OT/SLP  - tolerating extubation well     History of ischemic left MCA stroke  January 2016, s/p thrombectomy  - Residual RSW  - Encephalomalacia noted on CTH/MRI   - PT/OT    Right hemiplegia  Residual from her previous CVA  - Continue PT/OT    Cytotoxic cerebral edema  From ischemic stroke   - follow with imaging   - goal eunatremia     Cardiac/Vascular  Atrial septal aneurysm  Noted on 2D echo obtained for stroke work-up  - Defer to stroke service for OP medical management   - Continue daily clopidogrel     Hematology  History of deep vein thrombosis  Previous LE DVT, s/p IVC filter and on short term anticoagulation in 2016  - RLE edema noted on exam  - Repeat LE venous ultrasounds negative for DVTs  - OK to place SCDs  - IVC filter from previous admission removed, no longer present on KUB   - High risk for DVTs      Oncology  Leukocytosis  Persistent since admission  Afebrile  No clinical signs of sepsis   - Infectious w/u including UA and CXR  - Hemodynamically stable for transfer to stepdown unit.     Endocrine  Type 2 diabetes mellitus with neurologic complication  Hemoglobin A1c 5.6  - SSI low dose   - q6h glucose checks           The patient is being Prophylaxed for:  Venous Thromboembolism with: Mechanical or Chemical  Stress Ulcer with: PPI  Ventilator Pneumonia with: not applicable    Activity Orders          Diet NPO Except for:  Medication (crushed in pudding): NPO starting at 06/07 1638        Full Code    Mel Haskins PA-C  Neurocritical Care  [facility]

## 2019-06-08 NOTE — ASSESSMENT & PLAN NOTE
Ms Rowell is a 79 y/o CF with PMHx of CAD, HTN, HLD, DM type II, prior L MCA stroke who presented to the ED with Right MCA syndrome. Pt symptoms improved post tPA.  No LVO on CTA head and neck but mild bilateral carotid artery atherosclerosis.  MRI brain shows right BG infarct and left punctate infarct in the parietal lobe.  Echocardiogram shows apical aneurysm, concentric remodeling and EF 40%.  Etiology ESUS.  Suspect cardioembolic due to bilateral hemisphere involvement (prior L MCA and now acute R MCA stroke).  Last admission patient was discharged with 30 day cardiac event monitor but atrial fibrillation not detected.  Patient needs implantable loop recorder before discharge.        Antithrombotics for secondary stroke prevention: continue plavix 75mg qd      Statins for secondary stroke prevention and hyperlipidemia, if present:   Statins: Atorvastatin- 40 mg daily    Aggressive risk factor modification: HTN, DM, HLD, Diet, Exercise, CAD     Rehab efforts: The patient has been evaluated by a stroke team provider and the therapy needs have been fully considered based off the presenting complaints and exam findings. The following therapy evaluations are needed: PT evaluate and treat, OT evaluate and treat, SLP evaluate and treat, PM&R evaluate for appropriate placement TBD     Diagnostics ordered/pending: HgbA1C to assess blood glucose levels, Lipid Profile to assess cholesterol levels, MRI head without contrast to assess brain parenchyma, TTE to assess cardiac function/status , TSH to assess thyroid function    VTE prophylaxis: Heparin 5000 units SQ every 8 hours and place SCDs    BP parameters: Infarct: Post tPA, SBP <180

## 2019-06-08 NOTE — ASSESSMENT & PLAN NOTE
Noted on 2D echo obtained for stroke work-up  - Defer to stroke service for OP medical management   - Continue daily clopidogrel

## 2019-06-08 NOTE — PT/OT/SLP EVAL
Speech Language Pathology Evaluation  Bedside Swallow    Patient Name:  Michelle Rowell   MRN:  [id number]  Admitting Diagnosis: Embolic stroke involving right middle cerebral artery    Recommendations:                 General Recommendations:  Dysphagia therapy and Speech language evaluation  Diet recommendations:  NPO, NPO   Aspiration Precautions: Double swallow with each bite/sip, Frequent oral care, HOB to 90 degrees, Meds crushed in puree, Small bites/sips and Strict aspiration precautions   General Precautions: Standard, aspiration, aphasia, NPO, fall  Communication strategies:  provide increased time to answer    History:     Past Medical History:   Diagnosis Date    Diabetes mellitus type II     Heart disease     Hyperlipidemia     Hypertension     Limb ischemia     Pseudoaneurysm following procedure 1/11/2016    Stroke        Past Surgical History:   Procedure Laterality Date    CORONARY ARTERY BYPASS GRAFT      [employer] FILTER PLACEMENT      REMOVAL-IVC FILTER N/A 6/6/2016    Performed by [facility] Provider at [facility] OR [address] [place] [address]    THROMBECTOMY      TONSILLECTOMY      TUBAL LIGATION      [facility]  1/11/2016            Social History: Patient lives with her daughter.    Prior Intubation HX:  Pt was intubated and extubated 6/6/19    Modified Barium Swallow: none this admission.  MBSS in previous admission 1/14/16:  Mod oropharyngeal dysphagia, much improved from MBSS performed earlier this week.    Penetration to cords with immediate cough and clear with nectar thick liquids  Chin tuck improved safety with nectar thick liquids though cont penetration with thin liquids.  Pt was placed on puree diet w/ honey thick liquids and d/c'd w/ Dental soft diet w/ honey thick liquids.    Chest X-Rays: 6/7/19:  Postoperative changes identified in the thorax.  Endotracheal tube at T4/T5 level..  NG tube coiled up in the gastric fundus.  Heart size normal.  Mild patchy  airspace disease in the right upper lobe and slight accentuation interstitial markings.  The left lung is clear.  No pleural effusion    Prior diet: regular w/ thin liquids    Occupation/hobbies/homemaking: Pt worked at [employer].  She is retired.  She used to enjoy sewing.    Subjective     Pt was resting upon SLP presentation.  She was easily awakened.  She was agreeable to evaluation.  Patient goals: none expressed     Pain/Comfort:  · Pain Rating 1: 0/10  · Pain Rating Post-Intervention 1: 0/10    Objective:     Oral Musculature Evaluation  · Oral Musculature: general weakness  · Dentition: uses dentures to eat, upper dentures  · Secretion Management: adequate  · Mucosal Quality: dry  · Mandibular Strength and Mobility: WFL  · Oral Labial Strength and Mobility: impaired pursing, impaired seal, impaired retraction  · Lingual Strength and Mobility: impaired strength, impaired right lateral movement  · Buccal Strength and Mobility: WFL  · Volitional Cough: weak, non-productive  · Volitional Swallow: present  · Voice Prior to PO Intake: clear, decreased intensity    Bedside Swallow Eval:   Consistencies Assessed:  · Thin liquids single ice chips x2, tsp sips x3, cup sips x3  · Puree tsp bites x2  · Solids deferred d/t increased risk of aspiration     Oral Phase:   · Mild oral dysphagia  · Anterior loss  · Decreased closure around utensil  · Leakage, mouth breathing    Pharyngeal Phase:   · Coughing/choking w/ cup sip of thin liquids only following puree trial  · delayed swallow initation:  Mild across consistencies  · wet vocal quality after swallow only w/ cup sip of thin liquids following puree trial.  · Pt tolerated ice chips, tsp sips and cup sips in isolation.  · Pt tolerated tsp trials of puree in isolation    Compensatory Strategies  · Multiple swallows for puree    Treatment: Education was provided to pt and daughter re: SLP role, normal swallow mechanics, need for npo status, s/s of aspiration, risk of  aspiration, aspiration precautions, and SLP POC.  Pt and daughter indicated good understanding.  Daughter reported increased anxiety for mother to improve, be seen during the weekend, and concern regarding previous difficult hospital stay.  Support and encouragement offered.      Assessment:     Michelle Rowell is a 78 y.o. female with an SLP diagnosis of Dysphagia.  She presents with mild-moderate oropharyngeal dysphagia at this time.    Goals:   Multidisciplinary Problems     SLP Goals        Problem: SLP Goal    Goal Priority Disciplines Outcome   SLP Goal     SLP Ongoing (interventions implemented as appropriate)   Description:  Speech Language Pathology Goals  Goals expected to be met by 6/15/19    1.  Pt will participate in ongoing swallow assessment to determine readiness to resume po intake and the least restrictive diet.  2.  Pt will participate in Speech Language Cognitive evaluation to determine the need for additional goals.                        Plan:     · Patient to be seen:  5 x/week   · Plan of Care expires:  07/07/19  · Plan of Care reviewed with:  patient, daughter   · SLP Follow-Up:  Yes       Discharge recommendations:  other (see comments)(pending pt/ot recs and pt progress)   Barriers to Discharge:  Inaccessible Home Environment    Time Tracking:     SLP Treatment Date:   06/08/19  Speech Start Time:  0822  Speech Stop Time:  0852     Speech Total Time (min):  30 min    Billable Minutes: Eval Swallow and Oral Function 20 and Seld Care/Home Management Training 10    Radha Erickson CCC-SLP  06/08/2019

## 2019-06-08 NOTE — SUBJECTIVE & OBJECTIVE
Neurologic Chief Complaint: left sided weakness     Subjective:     Interval History: Patient is seen for follow-up neurological assessment and treatment recommendations:Patient has been hypotensive overnight received IV bolus 500cc.  Normal saline gtt. Held due to EF 40%. Patient with leukocytosis and urinary retention.  U/A pending.      HPI, Past Medical, Family, and Social History remains the same as documented in the initial encounter.     Review of Systems   Constitutional: Negative for chills and fever.   Respiratory: Negative for cough and shortness of breath.      Scheduled Meds:   atorvastatin  40 mg Per OG tube Daily    chlorhexidine  15 mL Mouth/Throat BID    clopidogrel  75 mg Oral Daily    heparin (porcine)  5,000 Units Subcutaneous Q8H    pantoprazole  40 mg Per OG tube BID    senna-docusate 8.6-50 mg  1 tablet Per NG tube Daily     Continuous Infusions:    PRN Meds:acetaminophen, Dextrose 10% Bolus, glucagon (human recombinant), insulin aspart U-100, sodium chloride 0.9%    Objective:     Vital Signs (Most Recent):  Temp: 97.8 °F (36.6 °C) (06/08/19 1253)  Pulse: 88 (06/08/19 1253)  Resp: 16 (06/08/19 1253)  BP: 101/71 (06/08/19 1253)  SpO2: 98 % (06/08/19 1253)  BP Location: Left arm    Vital Signs Range (Last 24H):  Temp:  [97.3 °F (36.3 °C)-97.9 °F (36.6 °C)]   Pulse:  []   Resp:  [15-30]   BP: ()/(47-71)   SpO2:  [93 %-100 %]   BP Location: Left arm    Physical Exam   Constitutional: She appears well-developed and well-nourished.   HENT:   Head: Normocephalic and atraumatic.   Eyes: Pupils are equal, round, and reactive to light. EOM are normal.   Pulmonary/Chest: Effort normal. No respiratory distress.       Neurological Exam:   LOC: alert  Attention Span: Good   Language: No aphasia  Articulation: No dysarthria  Orientation: oriented to month and age  Visual Fields: Full  EOM (CN III, IV, VI): Full/intact  Pupils (CN II, III): PERRL  Facial Sensation (CN V): Normal  Facial  Movement (CN VII): Lower facial weakness on the Right  Motor: Arm left  Paresis: 4/5  Leg left  Paresis: 3/5  Arm right  Paresis: 4/5  Leg right Paresis: 2/5  Cebellar: No evidence of appendicular or axial ataxia  Sensation: Intact to light touch, temperature and vibration  Tone: Normal tone throughout    Laboratory:  CMP:   Recent Labs   Lab 06/08/19  0334   CALCIUM 8.4*   ALBUMIN 2.4*   PROT 5.1*   *   K 3.9   CO2 21*      BUN 18   CREATININE 0.8   ALKPHOS 55   ALT 19   AST 22   BILITOT 0.6     CBC:   Recent Labs   Lab 06/08/19  0334   WBC 16.28*   RBC 3.58*   HGB 11.2*   HCT 33.7*      MCV 94   MCH 31.3*   MCHC 33.2     Lipid Panel:   Recent Labs   Lab 06/06/19  1814   CHOL 141   LDLCALC 57.6*   HDL 69   TRIG 72     Hgb A1C:   Recent Labs   Lab 06/06/19  1831   HGBA1C 5.6     TSH:   Recent Labs   Lab 06/06/19  1611   TSH 3.270       Diagnostic Results     Brain Imaging   MRI brain 06/07/19    Restricted diffusion in right basal ganglia and punctate infract in left parietal lobe  Cytotoxic cerebral edema  Remote left MCA infarct     Vessel Imaging   CTA head/neck 06/07/19  CTA head: No evidence for significant proximal stenosis or occlusion.    CTA neck: Atherosclerotic disease carotid bifurcations and proximal ICAs with less than 50% proximal ICA stenosis by NASCET criteria.    Cardiac Imaging   06/07/19  · Mildly decreased left ventricular systolic function. The estimated ejection fraction is 40%  · Apico septal and apico inferior part of the apex is aneursymal.  · Concentric left ventricular remodeling.  · Indeterminate left ventricular diastolic function.  · Normal right ventricular systolic function.  · IVC not visualized.

## 2019-06-08 NOTE — ASSESSMENT & PLAN NOTE
Persistent since admission  Afebrile  No clinical signs of sepsis   - Infectious w/u including UA and CXR  - Hemodynamically stable for transfer to stepdown unit.

## 2019-06-08 NOTE — PROGRESS NOTES
[facility]  Vascular Neurology  Comprehensive Stroke Center  Progress Note    Assessment/Plan:     * Embolic stroke involving right middle cerebral artery  Ms Rowell is a 77 y/o CF with PMHx of CAD, HTN, HLD, DM type II, prior L MCA stroke who presented to the ED with Right MCA syndrome. Pt symptoms improved post tPA.  No LVO on CTA head and neck but mild bilateral carotid artery atherosclerosis.  MRI brain shows right BG infarct and left punctate infarct in the parietal lobe.  Echocardiogram shows apical aneurysm, concentric remodeling and EF 40%.  Etiology ESUS.  Suspect cardioembolic due to bilateral hemisphere involvement (prior L MCA and now acute R MCA stroke).  Last admission patient was discharged with 30 day cardiac event monitor but atrial fibrillation not detected.  Patient needs implantable loop recorder before discharge.        Antithrombotics for secondary stroke prevention: continue plavix 75mg qd      Statins for secondary stroke prevention and hyperlipidemia, if present:   Statins: Atorvastatin- 40 mg daily    Aggressive risk factor modification: HTN, DM, HLD, Diet, Exercise, CAD     Rehab efforts: The patient has been evaluated by a stroke team provider and the therapy needs have been fully considered based off the presenting complaints and exam findings. The following therapy evaluations are needed: PT evaluate and treat, OT evaluate and treat, SLP evaluate and treat, PM&R evaluate for appropriate placement TBD     Diagnostics ordered/pending: HgbA1C to assess blood glucose levels, Lipid Profile to assess cholesterol levels, MRI head without contrast to assess brain parenchyma, TTE to assess cardiac function/status , TSH to assess thyroid function    VTE prophylaxis: Heparin 5000 units SQ every 8 hours and place SCDs    BP parameters: Infarct: Post tPA, SBP <180        Cytotoxic cerebral edema  Area of cytotoxic cerebral edema identified when reviewing brain imaging in the  territory of the right middle cerebral artery. There is no mass effect associated with it. We will continue to monitor the patients clinical exam for any worsening of symptoms which may indicate expansion of the stroke or the area of the edema resulting in the clinical change. The pattern is suggestive of  ESUS etiology.    Oropharyngeal dysphagia  Due to stroke  Will follow up with SLP  NPO    Leukocytosis  + leukocytosis  U/A pending      History of ischemic left MCA stroke  Residual right sided weakness  On asa and simvastatin at home     S/P admn tPA in diff fac w/n last 24 hr bef adm to crnt fac  Close monitoring in NCC 24 hours post administration       Type 2 diabetes mellitus with neurologic complication  - risk factor for stroke   - HgA1C 5.6  -s/s insulin      Essential hypertension  - Risk factor for stroke   -SBP less than 180  Has been hypotensive           Patient admitted to [facility] for new acute infarcts.  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  Extubated today.    06/08/19 Patient has been hypotensive overnight received IV bolus 500cc.  Normal saline gtt. Held due to EF 40%. Patient with leukocytosis and urinary retention.  U/A pending.      STROKE DOCUMENTATION   Acute Stroke Times   Last Known Normal Date: 06/06/19  Last Known Normal Time: 1200  Symptom Onset Date: 06/06/19  Symptom Onset Time: ([place])  Stroke Team Called Date: 06/06/19  Stroke Team Called Time: 1550  Stroke Team Arrival Date: 06/06/19  Stroke Team Arrival Time: 1600  CT Interpretation Time: 1608  Decision to Treat Time for IR: 1615    NIH Scale:  1a. Level of Consciousness: 0-->Alert, keenly responsive  1b. LOC Questions: 0-->Answers both questions correctly  1c. LOC Commands: 0-->Performs both tasks correctly  2. Best Gaze: 0-->Normal  3. Visual: 0-->No visual loss  4. Facial Palsy: 1-->Minor paralysis (flattened nasolabial fold, asymmetry on smiling)  5a. Motor Arm, Left: 1-->Drift, limb holds 90 (or 45) degrees, but drifts down before  full 10 seconds, does not hit bed or other support  5b. Motor Arm, Right: 2-->Some effort against gravity, limb cannot get to or maintain (if cued) 90 (or 45) degrees, drifts down to bed, but has some effort against gravity  6a. Motor Leg, Left: 2-->Some effort against gravity, leg falls to bed by 5 secs, but has some effort against gravity  6b. Motor Leg, Right: 2-->Some effort against gravity, leg falls to bed by 5 secs, but has some effort against gravity  7. Limb Ataxia: 0-->Absent  8. Sensory: 0-->Normal, no sensory loss  9. Best Language: 0-->No aphasia, normal  10. Dysarthria: 0-->Normal  11. Extinction and Inattention (formerly Neglect): 0-->No abnormality  Total (NIH Stroke Scale): 8       Modified Tito Score: 1  Ryan Coma Scale:    ABCD2 Score:    [id number]-ALB Score:   HAS -BLED Score:   ICH Score:   Hunt & Vizcaino Classification:      Hemorrhagic change of an Ischemic Stroke: Does this patient have an ischemic stroke with hemorrhagic changes? No     Neurologic Chief Complaint: left sided weakness     Subjective:     Interval History: Patient is seen for follow-up neurological assessment and treatment recommendations:Patient has been hypotensive overnight received IV bolus 500cc.  Normal saline gtt. Held due to EF 40%. Patient with leukocytosis and urinary retention.  U/A pending.      HPI, Past Medical, Family, and Social History remains the same as documented in the initial encounter.     Review of Systems   Constitutional: Negative for chills and fever.   Respiratory: Negative for cough and shortness of breath.      Scheduled Meds:   atorvastatin  40 mg Per OG tube Daily    chlorhexidine  15 mL Mouth/Throat BID    clopidogrel  75 mg Oral Daily    heparin (porcine)  5,000 Units Subcutaneous Q8H    pantoprazole  40 mg Per OG tube BID    senna-docusate 8.6-50 mg  1 tablet Per NG tube Daily     Continuous Infusions:    PRN Meds:acetaminophen, Dextrose 10% Bolus, glucagon (human recombinant), insulin  aspart U-100, sodium chloride 0.9%    Objective:     Vital Signs (Most Recent):  Temp: 97.8 °F (36.6 °C) (06/08/19 1253)  Pulse: 88 (06/08/19 1253)  Resp: 16 (06/08/19 1253)  BP: 101/71 (06/08/19 1253)  SpO2: 98 % (06/08/19 1253)  BP Location: Left arm    Vital Signs Range (Last 24H):  Temp:  [97.3 °F (36.3 °C)-97.9 °F (36.6 °C)]   Pulse:  []   Resp:  [15-30]   BP: ()/(47-71)   SpO2:  [93 %-100 %]   BP Location: Left arm    Physical Exam   Constitutional: She appears well-developed and well-nourished.   HENT:   Head: Normocephalic and atraumatic.   Eyes: Pupils are equal, round, and reactive to light. EOM are normal.   Pulmonary/Chest: Effort normal. No respiratory distress.       Neurological Exam:   LOC: alert  Attention Span: Good   Language: No aphasia  Articulation: No dysarthria  Orientation: oriented to month and age  Visual Fields: Full  EOM (CN III, IV, VI): Full/intact  Pupils (CN II, III): PERRL  Facial Sensation (CN V): Normal  Facial Movement (CN VII): Lower facial weakness on the Right  Motor: Arm left  Paresis: 4/5  Leg left  Paresis: 3/5  Arm right  Paresis: 4/5  Leg right Paresis: 2/5  Cebellar: No evidence of appendicular or axial ataxia  Sensation: Intact to light touch, temperature and vibration  Tone: Normal tone throughout    Laboratory:  CMP:   Recent Labs   Lab 06/08/19  0334   CALCIUM 8.4*   ALBUMIN 2.4*   PROT 5.1*   *   K 3.9   CO2 21*      BUN 18   CREATININE 0.8   ALKPHOS 55   ALT 19   AST 22   BILITOT 0.6     CBC:   Recent Labs   Lab 06/08/19  0334   WBC 16.28*   RBC 3.58*   HGB 11.2*   HCT 33.7*      MCV 94   MCH 31.3*   MCHC 33.2     Lipid Panel:   Recent Labs   Lab 06/06/19  1814   CHOL 141   LDLCALC 57.6*   HDL 69   TRIG 72     Hgb A1C:   Recent Labs   Lab 06/06/19  1831   HGBA1C 5.6     TSH:   Recent Labs   Lab 06/06/19  1611   TSH 3.270       Diagnostic Results     Brain Imaging   MRI brain 06/07/19    Restricted diffusion in right basal ganglia and  punctate infract in left parietal lobe  Cytotoxic cerebral edema  Remote left MCA infarct     Vessel Imaging   CTA head/neck 06/07/19  CTA head: No evidence for significant proximal stenosis or occlusion.    CTA neck: Atherosclerotic disease carotid bifurcations and proximal ICAs with less than 50% proximal ICA stenosis by NASCET criteria.    Cardiac Imaging   06/07/19  · Mildly decreased left ventricular systolic function. The estimated ejection fraction is 40%  · Apico septal and apico inferior part of the apex is aneursymal.  · Concentric left ventricular remodeling.  · Indeterminate left ventricular diastolic function.  · Normal right ventricular systolic function.  · IVC not visualized.        Veronica Frank PA-C  Comprehensive Stroke Center  Department of Vascular Neurology   [facility]

## 2019-06-08 NOTE — PLAN OF CARE
Problem: SLP Goal  Goal: SLP Goal  Speech Language Pathology Goals  Goals expected to be met by 6/15/19    1.  Pt will participate in ongoing swallow assessment to determine readiness to resume po intake and the least restrictive diet.  2.  Pt will participate in Speech Language Cognitive evaluation to determine the need for additional goals.      Outcome: Ongoing (interventions implemented as appropriate)  Clinical Swallow evaluation completed.  REC:  Pt remain npo at this time w/ meds crushed in puree if needed.  Recs reviewed w/ team.  Cont ST per POC.    Radha Erickson CCC-SLP  6/8/2019

## 2019-06-08 NOTE — PT/OT/SLP EVAL
Physical Therapy Evaluation    Patient Name:  Michelle Rowell   MRN:  [id number]    Recommendations:     Discharge Recommendations:  nursing facility, skilled   Discharge Equipment Recommendations: none   Barriers to discharge: None    Assessment:     Michelle Rowell is a 78 y.o. female admitted with a medical diagnosis of Embolic stroke involving right middle cerebral artery.  She presents with the following impairments/functional limitations:  weakness, impaired endurance, impaired self care skills, impaired functional mobilty, gait instability, impaired balance, decreased upper extremity function, decreased lower extremity function, decreased coordination Pt. cooperative and tolerated treatment fairly well, but fatigued quickly with activity.    Rehab Prognosis: Good; patient would benefit from acute skilled PT services to address these deficits and reach maximum level of function.    Recent Surgery: * No surgery found *      Plan:     During this hospitalization, patient to be seen 5 x/week to address the identified rehab impairments via gait training, therapeutic activities, therapeutic exercises, neuromuscular re-education and progress toward the following goals:    · Plan of Care Expires:  07/08/19    Subjective     Chief Complaint: weakness  Patient/Family Comments/goals: to be more indep.  Pain/Comfort:  · Pain Rating 1: 0/10  · Pain Rating Post-Intervention 1: 0/10    Patients cultural, spiritual, Episcopal conflicts given the current situation: no    Living Environment:  Pt. Lives with daughter in [facility] with 1 [address].  Prior to admission, patients level of function was mod. indep. with rollator.  Equipment used at home: rollator, bedside commode, shower chair.  Upon discharge, patient will have assistance from daughter and family.    Objective:     Communicated with nursing prior to session.  Patient found supine with oxygen, peripheral IV, telemetry  upon PT entry to room.    General Precautions:  Standard, aspiration, aphasia, fall   Orthopedic Precautions:N/A   Braces:       Exams:  · RUE ROM: WFL except decreased active shoulder flexion  · RUE Strength: 4-/5  · LUE ROM: WFL  · LUE Strength: WFL  · RLE ROM: WFL  · RLE Strength: 4-/5  · LLE ROM: WFL  · LLE Strength: WFL    Functional Mobility:  · Bed Mobility:     · Rolling Right: minimum assistance  · Scooting: minimum assistance  · Supine to Sit: minimum assistance and moderate assistance  · Sit to Supine: minimum assistance and moderate assistance  · Transfers:     · Sit to Stand:  minimum assistance and moderate assistance with hand-held assist and rolling walker  · Gait: 12 small steps(6 forward/backward) with RW and Min A and 2 side steps with HHA and Mod A   · Balance: fair sitting and fair- standing      Therapeutic Activities and Exercises:   Discussed therapy needs, goals, and POC.    AM-PAC 6 CLICK MOBILITY  Total Score:14     Patient left supine with all lines intact and call button in reach.    GOALS:   Multidisciplinary Problems     Physical Therapy Goals        Problem: Physical Therapy Goal    Goal Priority Disciplines Outcome Goal Variances Interventions   Physical Therapy Goal     PT, PT/OT Ongoing (interventions implemented as appropriate)     Description:  Goals to be met by: 2019     Patient will increase functional independence with mobility by performin. Supine to sit with Stand-by Assistance  2. Sit to supine with Stand-by Assistance  3. Sit to stand transfer with Stand-by Assistance  4. Bed to chair transfer with Stand-by Assistance using Rolling Walker  5. Gait  x 100 feet with Stand-by Assistance using Rolling Walker.   6. Lower extremity exercise program x15 reps per handout, with supervision                      History:     Past Medical History:   Diagnosis Date    Diabetes mellitus type II     Heart disease     Hyperlipidemia     Hypertension     Limb ischemia     Pseudoaneurysm following procedure 2016     Stroke        Past Surgical History:   Procedure Laterality Date    CORONARY ARTERY BYPASS GRAFT      JARED FILTER PLACEMENT      REMOVAL-IVC FILTER N/A 6/6/2016    Performed [facility]    THROMBECTOMY      TONSILLECTOMY      TUBAL LIGATION      US [facility]  1/11/2016            Time Tracking:     PT Received On: 06/08/19  PT Start Time: 1528     PT Stop Time: 1553  PT Total Time (min): 25 min     Billable Minutes: Evaluation 25      Bharath Szymanski, PT  06/08/2019

## 2019-06-09 PROBLEM — N39.0 UTI (URINARY TRACT INFECTION), UNCOMPLICATED: Status: ACTIVE | Noted: 2019-06-09

## 2019-06-09 LAB
ALBUMIN SERPL BCP-MCNC: 2.2 G/DL (ref 3.5–5.2)
ALP SERPL-CCNC: 53 U/L (ref 55–135)
ALT SERPL W/O P-5'-P-CCNC: 17 U/L (ref 10–44)
ANION GAP SERPL CALC-SCNC: 10 MMOL/L (ref 8–16)
AST SERPL-CCNC: 24 U/L (ref 10–40)
BASOPHILS # BLD AUTO: 0.07 K/UL (ref 0–0.2)
BASOPHILS NFR BLD: 0.6 % (ref 0–1.9)
BILIRUB SERPL-MCNC: 0.6 MG/DL (ref 0.1–1)
BUN SERPL-MCNC: 17 MG/DL (ref 8–23)
CALCIUM SERPL-MCNC: 8.4 MG/DL (ref 8.7–10.5)
CHLORIDE SERPL-SCNC: 105 MMOL/L (ref 95–110)
CO2 SERPL-SCNC: 22 MMOL/L (ref 23–29)
CREAT SERPL-MCNC: 0.7 MG/DL (ref 0.5–1.4)
DIFFERENTIAL METHOD: ABNORMAL
EOSINOPHIL # BLD AUTO: 0.1 K/UL (ref 0–0.5)
EOSINOPHIL NFR BLD: 0.6 % (ref 0–8)
ERYTHROCYTE [DISTWIDTH] IN BLOOD BY AUTOMATED COUNT: 15.6 % (ref 11.5–14.5)
EST. GFR  (AFRICAN AMERICAN): >60 ML/MIN/1.73 M^2
EST. GFR  (NON AFRICAN AMERICAN): >60 ML/MIN/1.73 M^2
GLUCOSE SERPL-MCNC: 102 MG/DL (ref 70–110)
HCT VFR BLD AUTO: 34.8 % (ref 37–48.5)
HGB BLD-MCNC: 11.2 G/DL (ref 12–16)
IMM GRANULOCYTES # BLD AUTO: 0.05 K/UL (ref 0–0.04)
IMM GRANULOCYTES NFR BLD AUTO: 0.4 % (ref 0–0.5)
LYMPHOCYTES # BLD AUTO: 2.8 K/UL (ref 1–4.8)
LYMPHOCYTES NFR BLD: 24.8 % (ref 18–48)
MAGNESIUM SERPL-MCNC: 1.1 MG/DL (ref 1.6–2.6)
MCH RBC QN AUTO: 31.5 PG (ref 27–31)
MCHC RBC AUTO-ENTMCNC: 32.2 G/DL (ref 32–36)
MCV RBC AUTO: 98 FL (ref 82–98)
MONOCYTES # BLD AUTO: 1.2 K/UL (ref 0.3–1)
MONOCYTES NFR BLD: 10.6 % (ref 4–15)
NEUTROPHILS # BLD AUTO: 7.2 K/UL (ref 1.8–7.7)
NEUTROPHILS NFR BLD: 63 % (ref 38–73)
NRBC BLD-RTO: 0 /100 WBC
PHOSPHATE SERPL-MCNC: 2.6 MG/DL (ref 2.7–4.5)
PLATELET # BLD AUTO: 240 K/UL (ref 150–350)
PMV BLD AUTO: 11.3 FL (ref 9.2–12.9)
POCT GLUCOSE: 83 MG/DL (ref 70–110)
POCT GLUCOSE: 99 MG/DL (ref 70–110)
POTASSIUM SERPL-SCNC: 3.8 MMOL/L (ref 3.5–5.1)
PROT SERPL-MCNC: 5 G/DL (ref 6–8.4)
RBC # BLD AUTO: 3.56 M/UL (ref 4–5.4)
SODIUM SERPL-SCNC: 137 MMOL/L (ref 136–145)
WBC # BLD AUTO: 11.41 K/UL (ref 3.9–12.7)

## 2019-06-09 PROCEDURE — 36415 COLL VENOUS BLD VENIPUNCTURE: CPT | Mod: HCNC

## 2019-06-09 PROCEDURE — 84100 ASSAY OF PHOSPHORUS: CPT | Mod: HCNC

## 2019-06-09 PROCEDURE — 80053 COMPREHEN METABOLIC PANEL: CPT | Mod: HCNC

## 2019-06-09 PROCEDURE — 99233 PR SUBSEQUENT HOSPITAL CARE,LEVL III: ICD-10-PCS | Mod: HCNC,GC,, | Performed by: PSYCHIATRY & NEUROLOGY

## 2019-06-09 PROCEDURE — 83735 ASSAY OF MAGNESIUM: CPT | Mod: HCNC

## 2019-06-09 PROCEDURE — 92526 ORAL FUNCTION THERAPY: CPT | Mod: HCNC

## 2019-06-09 PROCEDURE — 63600175 PHARM REV CODE 636 W HCPCS: Mod: HCNC | Performed by: PHYSICIAN ASSISTANT

## 2019-06-09 PROCEDURE — 97166 OT EVAL MOD COMPLEX 45 MIN: CPT | Mod: HCNC

## 2019-06-09 PROCEDURE — 25000003 PHARM REV CODE 250: Mod: HCNC | Performed by: PHYSICIAN ASSISTANT

## 2019-06-09 PROCEDURE — 20600001 HC STEP DOWN PRIVATE ROOM: Mod: HCNC

## 2019-06-09 PROCEDURE — 97535 SELF CARE MNGMENT TRAINING: CPT | Mod: HCNC

## 2019-06-09 PROCEDURE — 97530 THERAPEUTIC ACTIVITIES: CPT | Mod: HCNC

## 2019-06-09 PROCEDURE — 99233 SBSQ HOSP IP/OBS HIGH 50: CPT | Mod: HCNC,GC,, | Performed by: PSYCHIATRY & NEUROLOGY

## 2019-06-09 PROCEDURE — 85025 COMPLETE CBC W/AUTO DIFF WBC: CPT | Mod: HCNC

## 2019-06-09 RX ORDER — NITROFURANTOIN 25; 75 MG/1; MG/1
100 CAPSULE ORAL EVERY 12 HOURS
Status: DISCONTINUED | OUTPATIENT
Start: 2019-06-09 | End: 2019-06-09

## 2019-06-09 RX ORDER — SULFAMETHOXAZOLE AND TRIMETHOPRIM 800; 160 MG/1; MG/1
1 TABLET ORAL 2 TIMES DAILY
Status: COMPLETED | OUTPATIENT
Start: 2019-06-09 | End: 2019-06-11

## 2019-06-09 RX ORDER — SODIUM CHLORIDE 9 MG/ML
INJECTION, SOLUTION INTRAVENOUS CONTINUOUS
Status: ACTIVE | OUTPATIENT
Start: 2019-06-09 | End: 2019-06-09

## 2019-06-09 RX ADMIN — CLOPIDOGREL 75 MG: 75 TABLET, FILM COATED ORAL at 08:06

## 2019-06-09 RX ADMIN — HEPARIN SODIUM 5000 UNITS: 5000 INJECTION, SOLUTION INTRAVENOUS; SUBCUTANEOUS at 09:06

## 2019-06-09 RX ADMIN — SODIUM CHLORIDE: 0.9 INJECTION, SOLUTION INTRAVENOUS at 10:06

## 2019-06-09 RX ADMIN — PANTOPRAZOLE SODIUM 40 MG: 40 GRANULE, DELAYED RELEASE ORAL at 09:06

## 2019-06-09 RX ADMIN — SENNOSIDES,DOCUSATE SODIUM 1 TABLET: 8.6; 5 TABLET, FILM COATED ORAL at 08:06

## 2019-06-09 RX ADMIN — SULFAMETHOXAZOLE AND TRIMETHOPRIM 1 TABLET: 800; 160 TABLET ORAL at 10:06

## 2019-06-09 RX ADMIN — ATORVASTATIN CALCIUM 40 MG: 20 TABLET, FILM COATED ORAL at 08:06

## 2019-06-09 RX ADMIN — SULFAMETHOXAZOLE AND TRIMETHOPRIM 1 TABLET: 800; 160 TABLET ORAL at 09:06

## 2019-06-09 RX ADMIN — PANTOPRAZOLE SODIUM 40 MG: 40 GRANULE, DELAYED RELEASE ORAL at 08:06

## 2019-06-09 RX ADMIN — HEPARIN SODIUM 5000 UNITS: 5000 INJECTION, SOLUTION INTRAVENOUS; SUBCUTANEOUS at 05:06

## 2019-06-09 RX ADMIN — HEPARIN SODIUM 5000 UNITS: 5000 INJECTION, SOLUTION INTRAVENOUS; SUBCUTANEOUS at 02:06

## 2019-06-09 NOTE — PLAN OF CARE
Problem: SLP Goal  Goal: SLP Goal  Speech Language Pathology Goals  Goals expected to be met by 6/15/19    1.  Pt will participate in ongoing swallow assessment to determine readiness to resume po intake and the least restrictive diet.  2.  Pt will participate in Speech Language Cognitive evaluation to determine the need for additional goals.       Outcome: Ongoing (interventions implemented as appropriate)  Pt re-evaluated at the bedside on 6/9. Recommending MBSS for 6/10 to determine safest PO diet.   DAGMAR Quijano, CCC-SLP  Speech Language Pathologist  [phone]  6/9/2019

## 2019-06-09 NOTE — ASSESSMENT & PLAN NOTE
Due to stroke  Currently NPO except meds crushed in puree  Light fluids ordered  SLP following and will reevaluate patient today

## 2019-06-09 NOTE — PLAN OF CARE
Problem: Occupational Therapy Goal  Goal: Occupational Therapy Goal  OT evaluation completed.  DARREL Rebolledo  6/9/2019

## 2019-06-09 NOTE — ASSESSMENT & PLAN NOTE
- risk factor for stroke   - HgA1C 5.6  -s/s insulin     Pt reports pain under lt arm that started yesterday no trauma noted full active rom noted, pt reports sinus drainage and headache for past few days, family in room with pt, denies chest pain or sob, bilateral radial pulses +2, rt dp pulse +2

## 2019-06-09 NOTE — PROGRESS NOTES
[facility]  Vascular Neurology  Comprehensive Stroke Center  Progress Note    Assessment/Plan:     * Embolic stroke involving right middle cerebral artery  Ms Rowell is a 79 y/o CF with PMHx of CAD, HTN, HLD, DM type II, prior L MCA stroke who presented to the ED with Right MCA syndrome. Pt symptoms improved post tPA.  No LVO on CTA head and neck but mild bilateral carotid artery atherosclerosis.  MRI brain shows right BG infarct and left punctate infarct in the parietal lobe.  Echocardiogram shows apical aneurysm, concentric remodeling and EF 40%.  Etiology ESUS.  Suspect cardioembolic due to bilateral hemisphere involvement (prior L MCA and now acute R MCA stroke).  Last admission patient was discharged with 30 day cardiac event monitor but atrial fibrillation not detected.  Patient needs implantable loop recorder before discharge.        Antithrombotics for secondary stroke prevention: continue plavix 75mg qd      Statins for secondary stroke prevention and hyperlipidemia, if present:   Statins: Atorvastatin- 40 mg daily    Aggressive risk factor modification: HTN, DM, HLD, Diet, Exercise, CAD     Rehab efforts: The patient has been evaluated by a stroke team provider and the therapy needs have been fully considered based off the presenting complaints and exam findings. The following therapy evaluations are needed: PT evaluate and treat, OT evaluate and treat, SLP evaluate and treat, PM&R evaluate for appropriate placement SNF    Diagnostics ordered/pending: HgbA1C to assess blood glucose levels, Lipid Profile to assess cholesterol levels, MRI head without contrast to assess brain parenchyma, TTE to assess cardiac function/status , TSH to assess thyroid function    VTE prophylaxis: Heparin 5000 units SQ every 8 hours and place SCDs    BP parameters: Infarct: Post tPA, SBP <180        Oropharyngeal dysphagia  Due to stroke  Currently NPO except meds crushed in puree  Light fluids  ordered  MBSS 06/10  SLP following     Cytotoxic cerebral edema  Area of cytotoxic cerebral edema identified when reviewing brain imaging in the territory of the right middle cerebral artery. There is no mass effect associated with it. We will continue to monitor the patients clinical exam for any worsening of symptoms which may indicate expansion of the stroke or the area of the edema resulting in the clinical change. The pattern is suggestive of  ESUS etiology.        UTI (urinary tract infection), uncomplicated  Elevated white blood cells on microscopic, + urinary retention  Started bactrim x3 days     Aneurysm of heart  Apical septal aneurysm and inferior apical aneurysm   Seen on echocardiogram   Increases stroke risk         History of ischemic left MCA stroke  Residual right sided weakness  On asa and simvastatin at home       Type 2 diabetes mellitus with neurologic complication  - risk factor for stroke   - HgA1C 5.6  -s/s insulin      Essential hypertension  - Risk factor for stroke   -SBP less than 180  Has been hypotensive but now improving            Patient admitted to [facility] for new acute infarcts.  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  Extubated today.    06/08/19 Patient has been hypotensive overnight received IV bolus 500cc.  Normal saline gtt. Held due to EF 40%. Patient with leukocytosis and urinary retention.  U/A pending.    06/09/19 Patient transferred out of the NCC unit to stroke progressive unit yesterday.  Patient blood pressure improved. U/A positive for urinary tract infection.  Started 3 day course of bactrim.  Patient using mouth to breath and saturating at 100% on 2 liters of nasal cannula.  Patient states she usually breathes out of her mouth and does not feel short of breath.  Will plan to wean off oxygen. No areas of decreased breath sounds on physical exam.      STROKE DOCUMENTATION   Acute Stroke Times   Last Known Normal Date: 06/06/19  Last Known Normal Time: 1200  Symptom Onset Date:  06/06/19  Symptom Onset Time: (unkown)  Stroke Team Called Date: 06/06/19  Stroke Team Called Time: 1550  Stroke Team Arrival Date: 06/06/19  Stroke Team Arrival Time: 1600  CT Interpretation Time: 1608  Decision to Treat Time for IR: 1615    NIH Scale:  1a. Level of Consciousness: 0-->Alert, keenly responsive  1b. LOC Questions: 0-->Answers both questions correctly  1c. LOC Commands: 0-->Performs both tasks correctly  2. Best Gaze: 0-->Normal  3. Visual: 0-->No visual loss  4. Facial Palsy: 1-->Minor paralysis (flattened nasolabial fold, asymmetry on smiling)  5a. Motor Arm, Left: 1-->Drift, limb holds 90 (or 45) degrees, but drifts down before full 10 seconds, does not hit bed or other support  5b. Motor Arm, Right: 2-->Some effort against gravity, limb cannot get to or maintain (if cued) 90 (or 45) degrees, drifts down to bed, but has some effort against gravity  6a. Motor Leg, Left: 2-->Some effort against gravity, leg falls to bed by 5 secs, but has some effort against gravity  6b. Motor Leg, Right: 2-->Some effort against gravity, leg falls to bed by 5 secs, but has some effort against gravity  7. Limb Ataxia: 0-->Absent  8. Sensory: 0-->Normal, no sensory loss  9. Best Language: 0-->No aphasia, normal  10. Dysarthria: 0-->Normal  11. Extinction and Inattention (formerly Neglect): 0-->No abnormality  Total (NIH Stroke Scale): 8       Modified [place] Score: 1  [place] Coma Scale:    ABCD2 Score:    [id number] Score:   HAS -BLED Score:   ICH Score:   Hunt & Vizcaino Classification:      Hemorrhagic change of an Ischemic Stroke: Does this patient have an ischemic stroke with hemorrhagic changes? No     Neurologic Chief Complaint: left sided weakness     Subjective:     Interval History: Patient is seen for follow-up neurological assessment and treatment recommendations:Blood pressure improving.  Patient transferred out of the NCC unit to stroke progressive unit yesterday.  U/A positive for urinary tract infection.   Started 3 day course of bactrim.  Patient using mouth to breath and saturating at 100% on 2 liters of nasal cannula.  Patient states she usually breathes out of her mouth and does not feel short of breath.  Will plan to wean off oxygen. No areas of decreased breath sounds on physical exam.      HPI, Past Medical, Family, and Social History remains the same as documented in the initial encounter.     Review of Systems   Constitutional: Negative for chills and fever.   Respiratory: Negative for cough, shortness of breath and wheezing.    Gastrointestinal: Negative for nausea and vomiting.     Scheduled Meds:   atorvastatin  40 mg Per OG tube Daily    clopidogrel  75 mg Oral Daily    heparin (porcine)  5,000 Units Subcutaneous Q8H    pantoprazole  40 mg Per OG tube BID    senna-docusate 8.6-50 mg  1 tablet Per NG tube Daily    sulfamethoxazole-trimethoprim 800-160mg  1 tablet Oral BID     Continuous Infusions:   sodium chloride 0.9% 75 mL/hr at 06/09/19 1009     PRN Meds:acetaminophen, Dextrose 10% Bolus, glucagon (human recombinant), insulin aspart U-100, sodium chloride 0.9%    Objective:     Vital Signs (Most Recent):  Temp: 97.5 °F (36.4 °C) (06/09/19 0348)  Pulse: 81 (06/09/19 1116)  Resp: 19 (06/09/19 0348)  BP: 134/68 (06/09/19 0900)  SpO2: 97 % (06/09/19 0348)  BP Location: Right arm    Vital Signs Range (Last 24H):  Temp:  [96.7 °F (35.9 °C)-97.8 °F (36.6 °C)]   Pulse:  [81-91]   Resp:  [16-19]   BP: (101-134)/(58-71)   SpO2:  [96 %-100 %]   BP Location: Right arm    Physical Exam   Constitutional: She appears well-developed and well-nourished.   HENT:   Head: Normocephalic and atraumatic.   Eyes: Pupils are equal, round, and reactive to light. EOM are normal.   Pulmonary/Chest: Effort normal. No respiratory distress.       Neurological Exam:   LOC: alert  Attention Span: Good   Language: No aphasia  Articulation: No dysarthria  Orientation: oriented to month and age  Visual Fields: Full  EOM (CN III,  IV, VI): Full/intact  Pupils (CN II, III): PERRL  Facial Sensation (CN V): Normal  Facial Movement (CN VII): Lower facial weakness on the Right  Motor: Arm left  Paresis: 4/5  Leg left  Paresis: 3/5  Arm right  Paresis: 4/5  Leg right Paresis: 2/5  Cebellar: No evidence of appendicular or axial ataxia  Sensation: Intact to light touch, temperature and vibration  Tone: Normal tone throughout    Laboratory:  CMP:   Recent Labs   Lab 06/09/19  0403   CALCIUM 8.4*   ALBUMIN 2.2*   PROT 5.0*      K 3.8   CO2 22*      BUN 17   CREATININE 0.7   ALKPHOS 53*   ALT 17   AST 24   BILITOT 0.6     CBC:   Recent Labs   Lab 06/09/19  0403   WBC 11.41   RBC 3.56*   HGB 11.2*   HCT 34.8*      MCV 98   MCH 31.5*   MCHC 32.2     Lipid Panel:   Recent Labs   Lab 06/06/19  1814   CHOL 141   LDLCALC 57.6*   HDL 69   TRIG 72     Hgb A1C:   Recent Labs   Lab 06/06/19  1831   HGBA1C 5.6     TSH:   Recent Labs   Lab 06/06/19  1611   TSH 3.270       Diagnostic Results     Brain Imaging   MRI brain 06/07/19    Restricted diffusion in right basal ganglia and punctate infract in left parietal lobe  Cytotoxic cerebral edema  Remote left MCA infarct     Vessel Imaging   CTA head/neck 06/07/19  CTA head: No evidence for significant proximal stenosis or occlusion.    CTA neck: Atherosclerotic disease carotid bifurcations and proximal ICAs with less than 50% proximal ICA stenosis by NASCET criteria.    Cardiac Imaging   06/07/19  · Mildly decreased left ventricular systolic function. The estimated ejection fraction is 40%  · Apico septal and apico inferior part of the apex is aneursymal.  · Concentric left ventricular remodeling.  · Indeterminate left ventricular diastolic function.  · Normal right ventricular systolic function.  · IVC not visualized.        Veronica Frank PA-C  [facility]  Department of Vascular Neurology   [facility]

## 2019-06-09 NOTE — PT/OT/SLP EVAL
"Occupational Therapy   Evaluation    Name: Michelle Rowell  MRN: [id number]  Admitting Diagnosis:  Embolic stroke involving right middle cerebral artery      Recommendations:     Discharge Recommendations: nursing facility, skilled  Discharge Equipment Recommendations:  (TBD closer to d/c)  Barriers to discharge:  None    Assessment:     Michelle Rowell is a 78 y.o. female with a medical diagnosis of Embolic stroke involving right middle cerebral artery.  She presents with performance deficits affecting function: weakness, impaired sensation, impaired functional mobilty, impaired balance, impaired cognition, decreased upper extremity function, decreased safety awareness, abnormal tone, impaired endurance, impaired self care skills, gait instability, decreased coordination, decreased lower extremity function, decreased ROM, impaired fine motor.      Rehab Prognosis: Good; patient would benefit from acute skilled OT services to address these deficits and reach maximum level of function.       Plan:     Patient to be seen 3 x/week to address the above listed problems via self-care/home management, therapeutic activities, therapeutic exercises, neuromuscular re-education, cognitive retraining, sensory integration  · Plan of Care Expires: 07/06/19  · Plan of Care Reviewed with: patient, daughter    Subjective     Patient:  "I had a stroke.  I went to the bathroom and fell."  Dtg:  "Her right side was weak from her last stroke in 2016."    Occupational Profile:  Patient reported that she resides in [place] with dtg in one story home with no steps to enter.  PTA patient independent with ADLs, not driving.  (Dtg reports assistance provided with dressing and getting in and out of the shower.  Patient is right handed.  DME:  Rollator, 3 in 1 commode, shower chair.  Hobbies:  TV, playing games on her phone.       Pain/Comfort:  · Pain Rating 1: 0/10  · Pain Rating Post-Intervention 1: 0/10    Patients cultural, " spiritual, Evangelical conflicts given the current situation: no    Objective:     Communicated with: Nurse prior to session.  Patient found supine with oxygen, bed alarm, telemetry, PureWick, peripheral IV upon OT entry to room.  Dtg present.    General Precautions: Standard, aspiration, fall   Orthopedic Precautions:N/A   Braces: N/A     Occupational Performance:    Bed Mobility:    · Patient completed Rolling/Turning to Left with  moderate assistance  · Patient completed Rolling/Turning to Right with moderate assistance  · Patient completed Scooting/Bridging with moderate assistance  · Patient completed Supine to Sit with moderate assistance  · Patient completed Sit to Supine with moderate assistance    Functional Mobility/Transfers:  · Patient completed Sit <> Stand Transfer with moderate assistance  with  no assistive device   · Patient completed Bed <> Chair Transfer using Stand Pivot technique with moderate assistance with no assistive device    Activities of Daily Living:  · Grooming: minimum assistance while seated EOB  · Upper Body Dressing: maximal assistance while seated EOB  · Lower Body Dressing: maximal assistance while seated EOB    Cognitive/Visual Perceptual:  Cognitive/Psychosocial Skills:     -       Oriented to: Person, Place and Time   -       Follows Commands/attention:Follows one-step commands  -       Memory: Impaired STM  -       Safety awareness/insight to disability: impaired   -       Mood/Affect/Coping skills/emotional control: Appropriate to situation and Cooperative    Physical Exam:  Postural examination/scapula alignment:    -       Rounded shoulders  -       Posterior pelvic tilt  -       Kyphosis  Skin integrity: Thin  Edema:  None noted  Sensation:    -       Intact  Upper Extremity Range of Motion:     -       Right Upper Extremity: 0-30 shoulder flexion AROM  -       Left Upper Extremity: WNL  Upper Extremity Strength:    -       Right Upper Extremity: 2/5 proximally; 4/5  distally  -       Left Upper Extremity: 4/5 distally    AMPAC 6 Click ADL:  AMPAC Total Score: 12    Treatment & Education:  Patient/ Family education provided for stroke warning signs, prevention guidelines and personal risk factors.  Patient/ Family verbalizing understanding via teach back method.     Patient education provided on role of OT and need for SNF upon discharge.  Patient education provided on dressing technique, positioning, postural control, and transfers.  Continued education, patient/ family training recommended.  Patient alert and oriented x 3; able to follow 4/4 one step commands.  Patient attentive and interactive throughout the session.  Patient able to identify 4/4 body parts.  Able to name 3/3 objects.  Able to sequence 7/7 days of the week and 12/12 months of the year.  Patient's functional status and disposition recommendation discussed with stroke team in daily rounds.  White board updated in patient's room.  OT asked if there were any other questions; patient/ family had no further questions.   Education:    Patient left supine with all lines intact, call button in reach and bed alarm on    GOALS:   Multidisciplinary Problems     Occupational Therapy Goals        Problem: Occupational Therapy Goal    Goal Priority Disciplines Outcome Interventions   Occupational Therapy Goal     OT, PT/OT     Description:  Goals set 6/9 to be addressed for 14 days with expiration date, 6/23:  Patient will increase functional independence with ADLs by performing:    Patient will demonstrate rolling to the right with min assist.  Not met   Patient will demonstrate rolling to the left with min assist.   Not met  Patient will demonstrate supine -sit with min assist.   Not met  Patient will demonstrate stand pivot transfers with min assist.   Not met  Patient will demonstrate grooming while standing with min assist.   Not met  Patient will demonstrate upper body dressing with min assist while seated EOB.   Not  met  Patient will demonstrate lower body dressing with mod assist while seated EOB.   Not met  Patient will demonstrate toileting with min assist.   Not met  Patient will demonstrate bathing while seated EOB with min assist.   Not met  Patient's family / caregiver will demonstrate independence and safety with assisting patient with self-care skills and functional mobility.     Not met  Patient and/or patient's family will verbalize understanding of stroke prevention guidelines, personal risk factors and stroke warning signs via teachback method.  Not met                           History:     Past Medical History:   Diagnosis Date    Diabetes mellitus type II     Heart disease     Hyperlipidemia     Hypertension     Limb ischemia     Pseudoaneurysm following procedure 1/11/2016    Stroke        Past Surgical History:   Procedure Laterality Date    CORONARY ARTERY BYPASS GRAFT      JARED FILTER PLACEMENT      REMOVAL-IVC FILTER N/A 6/6/2016    Performed by [facility] at [facility] OR [address] [place] [address]    THROMBECTOMY      TONSILLECTOMY      TUBAL LIGATION      US P[facility]UR[facility]M [employer] [employer] [facility]AG[facility]  1/11/2016            Time Tracking:     OT Date of Treatment: 06/09/19  OT Start Time: 0641  OT Stop Time: 0705  OT Total Time (min): 24 min    Billable Minutes:Evaluation 14  Therapeutic Activity 10    DARREL Rebolledo  6/9/2019

## 2019-06-09 NOTE — SUBJECTIVE & OBJECTIVE
Neurologic Chief Complaint: left sided weakness     Subjective:     Interval History: Patient is seen for follow-up neurological assessment and treatment recommendations:Blood pressure improving.  Patient transferred out of the NCC unit to stroke progressive unit yesterday.  U/A positive for urinary tract infection.  Started 3 day course of bactrim.  Patient using mouth to breath and saturating at 100% on 2 liters of nasal cannula.  Patient states she usually breathes out of her mouth and does not feel short of breath.  Will plan to wean off oxygen. No areas of decreased breath sounds on physical exam.      HPI, Past Medical, Family, and Social History remains the same as documented in the initial encounter.     Review of Systems   Constitutional: Negative for chills and fever.   Respiratory: Negative for cough, shortness of breath and wheezing.    Gastrointestinal: Negative for nausea and vomiting.     Scheduled Meds:   atorvastatin  40 mg Per OG tube Daily    clopidogrel  75 mg Oral Daily    heparin (porcine)  5,000 Units Subcutaneous Q8H    pantoprazole  40 mg Per OG tube BID    senna-docusate 8.6-50 mg  1 tablet Per NG tube Daily    sulfamethoxazole-trimethoprim 800-160mg  1 tablet Oral BID     Continuous Infusions:   sodium chloride 0.9% 75 mL/hr at 06/09/19 1009     PRN Meds:acetaminophen, Dextrose 10% Bolus, glucagon (human recombinant), insulin aspart U-100, sodium chloride 0.9%    Objective:     Vital Signs (Most Recent):  Temp: 97.5 °F (36.4 °C) (06/09/19 0348)  Pulse: 81 (06/09/19 1116)  Resp: 19 (06/09/19 0348)  BP: 134/68 (06/09/19 0900)  SpO2: 97 % (06/09/19 0348)  BP Location: Right arm    Vital Signs Range (Last 24H):  Temp:  [96.7 °F (35.9 °C)-97.8 °F (36.6 °C)]   Pulse:  [81-91]   Resp:  [16-19]   BP: (101-134)/(58-71)   SpO2:  [96 %-100 %]   BP Location: Right arm    Physical Exam   Constitutional: She appears well-developed and well-nourished.   HENT:   Head: Normocephalic and atraumatic.    Eyes: Pupils are equal, round, and reactive to light. EOM are normal.   Pulmonary/Chest: Effort normal. No respiratory distress.       Neurological Exam:   LOC: alert  Attention Span: Good   Language: No aphasia  Articulation: No dysarthria  Orientation: oriented to month and age  Visual Fields: Full  EOM (CN III, IV, VI): Full/intact  Pupils (CN II, III): PERRL  Facial Sensation (CN V): Normal  Facial Movement (CN VII): Lower facial weakness on the Right  Motor: Arm left  Paresis: 4/5  Leg left  Paresis: 3/5  Arm right  Paresis: 4/5  Leg right Paresis: 2/5  Cebellar: No evidence of appendicular or axial ataxia  Sensation: Intact to light touch, temperature and vibration  Tone: Normal tone throughout    Laboratory:  CMP:   Recent Labs   Lab 06/09/19  0403   CALCIUM 8.4*   ALBUMIN 2.2*   PROT 5.0*      K 3.8   CO2 22*      BUN 17   CREATININE 0.7   ALKPHOS 53*   ALT 17   AST 24   BILITOT 0.6     CBC:   Recent Labs   Lab 06/09/19  0403   WBC 11.41   RBC 3.56*   HGB 11.2*   HCT 34.8*      MCV 98   MCH 31.5*   MCHC 32.2     Lipid Panel:   Recent Labs   Lab 06/06/19  1814   CHOL 141   LDLCALC 57.6*   HDL 69   TRIG 72     Hgb A1C:   Recent Labs   Lab 06/06/19  1831   HGBA1C 5.6     TSH:   Recent Labs   Lab 06/06/19  1611   TSH 3.270       Diagnostic Results     Brain Imaging   MRI brain 06/07/19    Restricted diffusion in right basal ganglia and punctate infract in left parietal lobe  Cytotoxic cerebral edema  Remote left MCA infarct     Vessel Imaging   CTA head/neck 06/07/19  CTA head: No evidence for significant proximal stenosis or occlusion.    CTA neck: Atherosclerotic disease carotid bifurcations and proximal ICAs with less than 50% proximal ICA stenosis by NASCET criteria.    Cardiac Imaging   06/07/19  · Mildly decreased left ventricular systolic function. The estimated ejection fraction is 40%  · Apico septal and apico inferior part of the apex is aneursymal.  · Concentric left ventricular  remodeling.  · Indeterminate left ventricular diastolic function.  · Normal right ventricular systolic function.  · IVC not visualized.

## 2019-06-09 NOTE — ASSESSMENT & PLAN NOTE
77 y/o F with CAD, HTN, HLD, DM II, and previous L MCA stroke, p/w R MCA syndrome. s/p tPA. No LVO on CTA, but mild bilateral carotid artery atherosclerosis. MRI with R BG infarct. TTE with apical aneurysm. 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 Patient needs implantable loop recorder before discharge.     - Antithrombotics for secondary stroke prevention: plavix 75mg qd    - Statins for secondary stroke prevention and hyperlipidemia, if present: Statins: Atorvastatin- 40 mg daily  - Aggressive risk factor modification: HTN, DM, HLD, Diet, Exercise, CAD  - Rehab efforts:    PT evaluate and treat, OT evaluate and treat, recommending SNF   SLP evaluate and treat, passed MBSS this morning, recommending mechanical soft and nectar thick  - Diagnostics ordered/pending: None  - VTE prophylaxis: Heparin 5000 units SQ every 8 hours, SCDs  - BP parameters: Infarct: Post tPA, SBP <180

## 2019-06-09 NOTE — ASSESSMENT & PLAN NOTE
Ms Rowell is a 77 y/o CF with PMHx of CAD, HTN, HLD, DM type II, prior L MCA stroke who presented to the ED with Right MCA syndrome. Pt symptoms improved post tPA.  No LVO on CTA head and neck but mild bilateral carotid artery atherosclerosis.  MRI brain shows right BG infarct and left punctate infarct in the parietal lobe.  Echocardiogram shows apical aneurysm, concentric remodeling and EF 40%.  Etiology ESUS.  Suspect cardioembolic due to bilateral hemisphere involvement (prior L MCA and now acute R MCA stroke).  Last admission patient was discharged with 30 day cardiac event monitor but atrial fibrillation not detected.  Patient needs implantable loop recorder before discharge.  Consult placed 06/09      Antithrombotics for secondary stroke prevention: continue plavix 75mg qd      Statins for secondary stroke prevention and hyperlipidemia, if present:   Statins: Atorvastatin- 40 mg daily    Aggressive risk factor modification: HTN, DM, HLD, Diet, Exercise, CAD     Rehab efforts: The patient has been evaluated by a stroke team provider and the therapy needs have been fully considered based off the presenting complaints and exam findings. The following therapy evaluations are needed: PT evaluate and treat, OT evaluate and treat, SLP evaluate and treat, PM&R evaluate for appropriate placement SNF    Diagnostics ordered/pending: HgbA1C to assess blood glucose levels, Lipid Profile to assess cholesterol levels, MRI head without contrast to assess brain parenchyma, TTE to assess cardiac function/status , TSH to assess thyroid function    VTE prophylaxis: Heparin 5000 units SQ every 8 hours and place SCDs    BP parameters: Infarct: Post tPA, SBP <160

## 2019-06-09 NOTE — ASSESSMENT & PLAN NOTE
Secondary to stroke    - passed MBSS this morning  - started on mechanical soft, nectar thick  - continue SLP

## 2019-06-09 NOTE — PT/OT/SLP PROGRESS
"Speech Language Pathology Treatment    Patient Name:  Michelle Rowell   MRN:  [id number]  Admitting Diagnosis: Embolic stroke involving right middle cerebral artery    Recommendations:                 General Recommendations:  Speech language evaluation and Modified barium swallow study  Diet recommendations:  NPO, Liquid Diet Level: NPO   Aspiration Precautions: Frequent oral care, HOB to 90 degrees, Meds crushed in puree, Monitor for s/s of aspiration and Strict aspiration precautions   General Precautions: Standard, aspiration, fall  Communication strategies:  provide increased time to answer and go to room if call light pushed    Subjective     "I want a hamburger."    Pain/Comfort:  · Pain Rating 1: 0/10    Objective:     Has the patient been evaluated by SLP for swallowing?   Yes  Keep patient NPO? No(PO meds crushed in puree)   Current Respiratory Status: nasal cannula      Pt seen at bedside this afternoon with daughter present. Pt continues to display open mouth breathing.  Impaired resonance and muffled speech noted.  Decreased velar elevation observed upon phonation of "ah." Pt was unable to elicit a dry swallow upon request.  Volitional cough weak.  Pt accepted the following PO trials: ice chip x 1, 1/2 tsp thin water x 1, full tsp thin water x 1, cup sip x 1, straw sips x 2, 1/2 tsp puree x 1, and full tsp puree x 1.  Pt with significantly delayed swallow response, wet vocal quality, and delayed cough present after ice chip trial.  Pt displayed change in vocal quality again after cup sip trial of thin water.  Pt also burping after liquid trials.  Leakage from right corner of mouth from pooling of thin liquid residuals observed.  No overt s/s of aspiration observed with pureed trials.  SLP explained to pt and daughter that she is unable to r/o aspiration or determine safest PO diet at the bedside.  SLP explained that an MBSS would be warranted at this time.  Education provided to pt regarding concerns " for aspiration, decreased airway protection, vocal cord function as it relates to airway protection during the swallow and in the event of aspiration, and MBSS procedure.  SLP discussed continued recommendations to remain NPO except for medications crushed/buried in puree at this time.  Pt and daughter demonstrated understanding, but pt will likely benefit from continued reinforcement. White board updated. Veronica with Stroke team notified of recommendations for MBSS for tomorrow.     Assessment:     Michelle Rowell is a 78 y.o. female with an SLP diagnosis of Dysphagia.      Goals:   Multidisciplinary Problems     SLP Goals        Problem: SLP Goal    Goal Priority Disciplines Outcome   SLP Goal     SLP Ongoing (interventions implemented as appropriate)   Description:  Speech Language Pathology Goals  Goals expected to be met by 6/15/19    1.  Pt will participate in ongoing swallow assessment to determine readiness to resume po intake and the least restrictive diet.  2.  Pt will participate in Speech Language Cognitive evaluation to determine the need for additional goals.                        Plan:     · Patient to be seen:  5 x/week   · Plan of Care expires:  07/07/19  · Plan of Care reviewed with:  patient, daughter   · SLP Follow-Up:  Yes       Discharge recommendations:  nursing facility, skilled     Time Tracking:     SLP Treatment Date:   06/09/19  Speech Start Time:  1341  Speech Stop Time:  1402     Speech Total Time (min):  21 min    Billable Minutes: Treatment Swallowing Dysfunction 13 and Seld Care/Home Management Training 8    DAGMAR Quijano, CCC-SLP  06/09/2019     DAGMAR Quijano, CCC-SLP  Speech Language Pathologist  [phone]  6/9/2019

## 2019-06-09 NOTE — ASSESSMENT & PLAN NOTE
Apical septal aneurysm and inferior apical aneurysm   Seen on echocardiogram   Increases stroke risk

## 2019-06-10 PROBLEM — Z92.82 S/P ADMN TPA IN DIFF FAC W/N LAST 24 HR BEF ADM TO CRNT FAC: Status: RESOLVED | Noted: 2019-06-06 | Resolved: 2019-06-10

## 2019-06-10 LAB
ALBUMIN SERPL BCP-MCNC: 1.9 G/DL (ref 3.5–5.2)
ALP SERPL-CCNC: 44 U/L (ref 55–135)
ALT SERPL W/O P-5'-P-CCNC: 14 U/L (ref 10–44)
ANION GAP SERPL CALC-SCNC: 11 MMOL/L (ref 8–16)
AST SERPL-CCNC: 21 U/L (ref 10–40)
BASOPHILS # BLD AUTO: 0.06 K/UL (ref 0–0.2)
BASOPHILS NFR BLD: 0.8 % (ref 0–1.9)
BILIRUB SERPL-MCNC: 0.5 MG/DL (ref 0.1–1)
BUN SERPL-MCNC: 13 MG/DL (ref 8–23)
CALCIUM SERPL-MCNC: 7.9 MG/DL (ref 8.7–10.5)
CHLORIDE SERPL-SCNC: 106 MMOL/L (ref 95–110)
CO2 SERPL-SCNC: 20 MMOL/L (ref 23–29)
CREAT SERPL-MCNC: 0.6 MG/DL (ref 0.5–1.4)
DIFFERENTIAL METHOD: ABNORMAL
EOSINOPHIL # BLD AUTO: 0.1 K/UL (ref 0–0.5)
EOSINOPHIL NFR BLD: 1.2 % (ref 0–8)
ERYTHROCYTE [DISTWIDTH] IN BLOOD BY AUTOMATED COUNT: 15.6 % (ref 11.5–14.5)
EST. GFR  (AFRICAN AMERICAN): >60 ML/MIN/1.73 M^2
EST. GFR  (NON AFRICAN AMERICAN): >60 ML/MIN/1.73 M^2
GLUCOSE SERPL-MCNC: 74 MG/DL (ref 70–110)
HCT VFR BLD AUTO: 31 % (ref 37–48.5)
HGB BLD-MCNC: 10.2 G/DL (ref 12–16)
IMM GRANULOCYTES # BLD AUTO: 0.04 K/UL (ref 0–0.04)
IMM GRANULOCYTES NFR BLD AUTO: 0.5 % (ref 0–0.5)
LYMPHOCYTES # BLD AUTO: 3.1 K/UL (ref 1–4.8)
LYMPHOCYTES NFR BLD: 41 % (ref 18–48)
MAGNESIUM SERPL-MCNC: 1.2 MG/DL (ref 1.6–2.6)
MCH RBC QN AUTO: 31.3 PG (ref 27–31)
MCHC RBC AUTO-ENTMCNC: 32.9 G/DL (ref 32–36)
MCV RBC AUTO: 95 FL (ref 82–98)
MONOCYTES # BLD AUTO: 0.7 K/UL (ref 0.3–1)
MONOCYTES NFR BLD: 9 % (ref 4–15)
NEUTROPHILS # BLD AUTO: 3.6 K/UL (ref 1.8–7.7)
NEUTROPHILS NFR BLD: 47.5 % (ref 38–73)
NRBC BLD-RTO: 0 /100 WBC
PHOSPHATE SERPL-MCNC: 2.3 MG/DL (ref 2.7–4.5)
PLATELET # BLD AUTO: 245 K/UL (ref 150–350)
PMV BLD AUTO: 11.3 FL (ref 9.2–12.9)
POCT GLUCOSE: 146 MG/DL (ref 70–110)
POCT GLUCOSE: 156 MG/DL (ref 70–110)
POCT GLUCOSE: 71 MG/DL (ref 70–110)
POTASSIUM SERPL-SCNC: 3.3 MMOL/L (ref 3.5–5.1)
PROT SERPL-MCNC: 4.3 G/DL (ref 6–8.4)
RBC # BLD AUTO: 3.26 M/UL (ref 4–5.4)
SODIUM SERPL-SCNC: 137 MMOL/L (ref 136–145)
WBC # BLD AUTO: 7.47 K/UL (ref 3.9–12.7)

## 2019-06-10 PROCEDURE — 99222 PR INITIAL HOSPITAL CARE,LEVL II: ICD-10-PCS | Mod: HCNC,,, | Performed by: NURSE PRACTITIONER

## 2019-06-10 PROCEDURE — 20600001 HC STEP DOWN PRIVATE ROOM: Mod: HCNC

## 2019-06-10 PROCEDURE — 85025 COMPLETE CBC W/AUTO DIFF WBC: CPT | Mod: HCNC

## 2019-06-10 PROCEDURE — 97530 THERAPEUTIC ACTIVITIES: CPT | Mod: HCNC

## 2019-06-10 PROCEDURE — 25000003 PHARM REV CODE 250: Mod: HCNC | Performed by: NURSE PRACTITIONER

## 2019-06-10 PROCEDURE — 36415 COLL VENOUS BLD VENIPUNCTURE: CPT | Mod: HCNC

## 2019-06-10 PROCEDURE — 63600175 PHARM REV CODE 636 W HCPCS: Mod: HCNC | Performed by: STUDENT IN AN ORGANIZED HEALTH CARE EDUCATION/TRAINING PROGRAM

## 2019-06-10 PROCEDURE — 83735 ASSAY OF MAGNESIUM: CPT | Mod: HCNC

## 2019-06-10 PROCEDURE — 92611 MOTION FLUOROSCOPY/SWALLOW: CPT | Mod: HCNC

## 2019-06-10 PROCEDURE — 25000003 PHARM REV CODE 250: Mod: HCNC | Performed by: PHYSICIAN ASSISTANT

## 2019-06-10 PROCEDURE — 84100 ASSAY OF PHOSPHORUS: CPT | Mod: HCNC

## 2019-06-10 PROCEDURE — 80053 COMPREHEN METABOLIC PANEL: CPT | Mod: HCNC

## 2019-06-10 PROCEDURE — 99233 SBSQ HOSP IP/OBS HIGH 50: CPT | Mod: HCNC,GC,, | Performed by: PSYCHIATRY & NEUROLOGY

## 2019-06-10 PROCEDURE — 99222 1ST HOSP IP/OBS MODERATE 55: CPT | Mod: HCNC,,, | Performed by: NURSE PRACTITIONER

## 2019-06-10 PROCEDURE — 25000003 PHARM REV CODE 250: Mod: HCNC | Performed by: STUDENT IN AN ORGANIZED HEALTH CARE EDUCATION/TRAINING PROGRAM

## 2019-06-10 PROCEDURE — 63600175 PHARM REV CODE 636 W HCPCS: Mod: HCNC | Performed by: PHYSICIAN ASSISTANT

## 2019-06-10 PROCEDURE — 97112 NEUROMUSCULAR REEDUCATION: CPT | Mod: HCNC

## 2019-06-10 PROCEDURE — 99233 PR SUBSEQUENT HOSPITAL CARE,LEVL III: ICD-10-PCS | Mod: HCNC,GC,, | Performed by: PSYCHIATRY & NEUROLOGY

## 2019-06-10 RX ORDER — PANTOPRAZOLE SODIUM 40 MG/1
40 FOR SUSPENSION ORAL 2 TIMES DAILY
Status: DISCONTINUED | OUTPATIENT
Start: 2019-06-10 | End: 2019-06-12 | Stop reason: HOSPADM

## 2019-06-10 RX ORDER — ATORVASTATIN CALCIUM 20 MG/1
40 TABLET, FILM COATED ORAL DAILY
Status: DISCONTINUED | OUTPATIENT
Start: 2019-06-11 | End: 2019-06-12 | Stop reason: HOSPADM

## 2019-06-10 RX ORDER — POTASSIUM CHLORIDE 20 MEQ/15ML
20 SOLUTION ORAL ONCE
Status: COMPLETED | OUTPATIENT
Start: 2019-06-10 | End: 2019-06-10

## 2019-06-10 RX ORDER — AMOXICILLIN 250 MG
1 CAPSULE ORAL DAILY
Status: DISCONTINUED | OUTPATIENT
Start: 2019-06-11 | End: 2019-06-12 | Stop reason: HOSPADM

## 2019-06-10 RX ORDER — SODIUM CHLORIDE, SODIUM LACTATE, POTASSIUM CHLORIDE, CALCIUM CHLORIDE 600; 310; 30; 20 MG/100ML; MG/100ML; MG/100ML; MG/100ML
INJECTION, SOLUTION INTRAVENOUS CONTINUOUS
Status: DISCONTINUED | OUTPATIENT
Start: 2019-06-10 | End: 2019-06-12 | Stop reason: HOSPADM

## 2019-06-10 RX ORDER — MAGNESIUM SULFATE HEPTAHYDRATE 40 MG/ML
2 INJECTION, SOLUTION INTRAVENOUS ONCE
Status: COMPLETED | OUTPATIENT
Start: 2019-06-10 | End: 2019-06-10

## 2019-06-10 RX ORDER — SODIUM,POTASSIUM PHOSPHATES 280-250MG
2 POWDER IN PACKET (EA) ORAL ONCE
Status: COMPLETED | OUTPATIENT
Start: 2019-06-10 | End: 2019-06-10

## 2019-06-10 RX ADMIN — CLOPIDOGREL 75 MG: 75 TABLET, FILM COATED ORAL at 08:06

## 2019-06-10 RX ADMIN — HEPARIN SODIUM 5000 UNITS: 5000 INJECTION, SOLUTION INTRAVENOUS; SUBCUTANEOUS at 10:06

## 2019-06-10 RX ADMIN — PANTOPRAZOLE SODIUM 40 MG: 40 GRANULE, DELAYED RELEASE ORAL at 08:06

## 2019-06-10 RX ADMIN — SODIUM CHLORIDE, SODIUM LACTATE, POTASSIUM CHLORIDE, AND CALCIUM CHLORIDE 500 ML: .6; .31; .03; .02 INJECTION, SOLUTION INTRAVENOUS at 12:06

## 2019-06-10 RX ADMIN — POTASSIUM CHLORIDE 20 MEQ: 20 SOLUTION ORAL at 12:06

## 2019-06-10 RX ADMIN — PANTOPRAZOLE SODIUM 40 MG: 40 GRANULE, DELAYED RELEASE ORAL at 09:06

## 2019-06-10 RX ADMIN — SULFAMETHOXAZOLE AND TRIMETHOPRIM 1 TABLET: 800; 160 TABLET ORAL at 08:06

## 2019-06-10 RX ADMIN — HEPARIN SODIUM 5000 UNITS: 5000 INJECTION, SOLUTION INTRAVENOUS; SUBCUTANEOUS at 02:06

## 2019-06-10 RX ADMIN — ATORVASTATIN CALCIUM 40 MG: 20 TABLET, FILM COATED ORAL at 08:06

## 2019-06-10 RX ADMIN — SENNOSIDES,DOCUSATE SODIUM 1 TABLET: 8.6; 5 TABLET, FILM COATED ORAL at 08:06

## 2019-06-10 RX ADMIN — SODIUM CHLORIDE, SODIUM LACTATE, POTASSIUM CHLORIDE, AND CALCIUM CHLORIDE: .6; .31; .03; .02 INJECTION, SOLUTION INTRAVENOUS at 02:06

## 2019-06-10 RX ADMIN — MAGNESIUM SULFATE IN WATER 2 G: 40 INJECTION, SOLUTION INTRAVENOUS at 12:06

## 2019-06-10 RX ADMIN — SULFAMETHOXAZOLE AND TRIMETHOPRIM 1 TABLET: 800; 160 TABLET ORAL at 09:06

## 2019-06-10 RX ADMIN — POTASSIUM & SODIUM PHOSPHATES POWDER PACK 280-160-250 MG 2 PACKET: 280-160-250 PACK at 04:06

## 2019-06-10 NOTE — HOSPITAL COURSE
06/8/2019: Bed mobility Christian .  Sit to stand Christian.  Ambulated 12 small steps Christian & RW. . SLP diagnosis of Dysphagia  06/9/2019: Bed mobility ModA.  Sit to stand and transfers ModA.  UBD MaxA and LBD MaxA. Grooming Christian.   06/10/2019: Bed mobility Max-TA x 2 ppl.  Sit to stand MaxA.

## 2019-06-10 NOTE — PLAN OF CARE
06/10/19 1104   Post-Acute Status   Post-Acute Authorization Placement   Post-Acute Placement Status Referrals Sent       List given to patient and she wants to go to 1. [facility] 2. [facility] 3. [facility].  Pt wouldn't give any other names and will review the list for other names and get back to us.  Referrals made to above 3 through [facility].  SW in contact with CM and Medical staff. Will continue to follow and offer support as needed.     Farhan Hein, SAMANTHA  [facility]   Ext. [phone]

## 2019-06-10 NOTE — PLAN OF CARE
Problem: Physical Therapy Goal  Goal: Physical Therapy Goal  Goals to be met by: 2019     Patient will increase functional independence with mobility by performin. Supine to sit with Stand-by Assistance  2. Sit to supine with Stand-by Assistance  3. Sit to stand transfer with Stand-by Assistance  4. Bed to chair transfer with Stand-by Assistance using Rolling Walker  5. Gait  x 100 feet with Stand-by Assistance using Rolling Walker.   6. Lower extremity exercise program x15 reps per handout, with supervision     Outcome: Ongoing (interventions implemented as appropriate)  Goals reviewed and remain appropriate. Pt progressing towards goals.    Ashia Tavera, PT, DPT   6/10/2019  [phone]

## 2019-06-10 NOTE — PROGRESS NOTES
[facility]  Vascular Neurology  Comprehensive Stroke Center  Progress Note    Assessment/Plan:     * Embolic stroke involving right middle cerebral artery  77 y/o F with CAD, HTN, HLD, DM II, and previous L MCA stroke, p/w R MCA syndrome. s/p tPA. No LVO on CTA, but mild bilateral carotid artery atherosclerosis. MRI with R BG infarct. TTE with apical aneurysm. 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 Patient needs implantable loop recorder before discharge.     - Antithrombotics for secondary stroke prevention: plavix 75mg qd    - Statins for secondary stroke prevention and hyperlipidemia, if present: Statins: Atorvastatin- 40 mg daily  - Aggressive risk factor modification: HTN, DM, HLD, Diet, Exercise, CAD  - Rehab efforts:    PT evaluate and treat, OT evaluate and treat, recommending SNF   SLP evaluate and treat, passed MBSS this morning, recommending mechanical soft and nectar thick  - Diagnostics ordered/pending: None  - VTE prophylaxis: Heparin 5000 units SQ every 8 hours, SCDs  - BP parameters: Infarct: Post tPA, SBP <180    Essential hypertension  Risk factor for stroke     - post tPA goal sBP <180  - long term goal <140    Type 2 diabetes mellitus with neurologic complication  risk factor for stroke, well controlled, HgA1C 5.6    - LD SSI    Cytotoxic cerebral edema  Area of cytotoxic cerebral edema identified when reviewing brain imaging in the territory of the right middle cerebral artery. There is no mass effect associated with it. We will continue to monitor the patients clinical exam for any worsening of symptoms which may indicate expansion of the stroke or the area of the edema resulting in the clinical change.     Right hemiplegia  Continue PT/OT    Oropharyngeal dysphagia  Secondary to stroke    - passed MBSS this morning  - started on mechanical soft, nectar thick  - continue SLP    UTI (urinary tract infection), uncomplicated  Urine cx -> E-coli    - Bactrim day #2/3, WBC down to  normal    Aneurysm of heart  Apical aneurysm, increased risk of stroke    Leukocytosis  Secondary to UTI, resolved with initiation of abx    - Bactrim, day #2/3       Hospital Course:  Patient admitted to [facility] for new acute infarcts.  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  Extubated today.    06/08/19 Patient has been hypotensive overnight received IV bolus 500cc.  Normal saline gtt. Held due to EF 40%. Patient with leukocytosis and urinary retention.  U/A pending.    06/09/19 Patient transferred out of the NCC unit to stroke progressive unit yesterday.  Patient blood pressure improved. U/A positive for urinary tract infection.  Started 3 day course of bactrim.  Patient using mouth to breath and saturating at 100% on 2 liters of nasal cannula.  Patient states she usually breathes out of her mouth and does not feel short of breath.  Will plan to wean off oxygen. No areas of decreased breath sounds on physical exam.    6/10: passed MBSS this morning for mechanical soft/nectar thick. WBC improved on abx (day #2 of Bactrim today). Electrolyte abnormalities (Mg, K) replaced.     STROKE DOCUMENTATION   Acute Stroke Times   Last Known Normal Date: 06/06/19  Last Known Normal Time: 1200  Symptom Onset Date: 06/06/19  Symptom Onset Time: (unkown)  Stroke Team Called Date: 06/06/19  Stroke Team Called Time: 1550  Stroke Team Arrival Date: 06/06/19  Stroke Team Arrival Time: 1600  CT Interpretation Time: 1608  Decision to Treat Time for IR: 1615    NIH Scale:  1a. Level of Consciousness: 0-->Alert, keenly responsive  1b. LOC Questions: 0-->Answers both questions correctly  1c. LOC Commands: 0-->Performs both tasks correctly  2. Best Gaze: 0-->Normal  3. Visual: 0-->No visual loss  4. Facial Palsy: 1-->Minor paralysis (flattened nasolabial fold, asymmetry on smiling)  5a. Motor Arm, Left: 1-->Drift, limb holds 90 (or 45) degrees, but drifts down before full 10 seconds, does not hit bed or other support  5b. Motor Arm, Right: 2-->Some  effort against gravity, limb cannot get to or maintain (if cued) 90 (or 45) degrees, drifts down to bed, but has some effort against gravity  6a. Motor Leg, Left: 2-->Some effort against gravity, leg falls to bed by 5 secs, but has some effort against gravity  6b. Motor Leg, Right: 2-->Some effort against gravity, leg falls to bed by 5 secs, but has some effort against gravity  7. Limb Ataxia: 0-->Absent  8. Sensory: 0-->Normal, no sensory loss  9. Best Language: 1-->Mild-to-moderate aphasia, some obvious loss of fluency or facility of comprehension, without significant limitation on ideas expressed or form of expression. Reduction of speech and/or comprehension, however, makes conversation. . . (see row details)  10. Dysarthria: 0-->Normal  11. Extinction and Inattention (formerly Neglect): 0-->No abnormality  Total (NIH Stroke Scale): 9       Modified Winkler Score: 1  Ryan Coma Scale:    ABCD2 Score:    [id number] Score:   HAS -BLED Score:   ICH Score:   Hunt & Vizcaino Classification:      Hemorrhagic change of an Ischemic Stroke: Does this patient have an ischemic stroke with hemorrhagic changes? No     Neurologic Chief Complaint: left sided weakness     Subjective:     Interval History: Patient is seen for follow-up neurological assessment and treatment recommendations: Please see hospital course    HPI, Past Medical, Family, and Social History remains the same as documented in the initial encounter.     Review of Systems   Constitutional: Negative for chills and fever.   HENT: Positive for trouble swallowing. Negative for drooling.    Respiratory: Negative for choking and shortness of breath.    Gastrointestinal: Negative for nausea and vomiting.   Skin: Negative for color change and rash.   Allergic/Immunologic: Negative for immunocompromised state.   Neurological: Positive for facial asymmetry and speech difficulty.   Psychiatric/Behavioral: Negative for agitation and confusion.     Scheduled Meds:    atorvastatin  40 mg Per OG tube Daily    clopidogrel  75 mg Oral Daily    heparin (porcine)  5,000 Units Subcutaneous Q8H    pantoprazole  40 mg Per OG tube BID    senna-docusate 8.6-50 mg  1 tablet Per NG tube Daily    sulfamethoxazole-trimethoprim 800-160mg  1 tablet Oral BID     Continuous Infusions:   lactated ringers 75 mL/hr at 06/10/19 0240     PRN Meds:acetaminophen, Dextrose 10% Bolus, glucagon (human recombinant), insulin aspart U-100, sodium chloride 0.9%    Objective:     Vital Signs (Most Recent):  Temp: 97.2 °F (36.2 °C) (06/10/19 0820)  Pulse: 97 (06/10/19 0820)  Resp: 18 (06/10/19 0424)  BP: (!) 124/90 (06/10/19 0820)  SpO2: (!) 93 % (06/10/19 0820)  BP Location: Right arm    Vital Signs Range (Last 24H):  Temp:  [97.2 °F (36.2 °C)-98 °F (36.7 °C)]   Pulse:  [80-97]   Resp:  [18-19]   BP: ()/(53-90)   SpO2:  [93 %-95 %]   BP Location: Right arm    Physical Exam   Constitutional: She appears well-developed and well-nourished.   HENT:   Head: Normocephalic and atraumatic.   Eyes: Pupils are equal, round, and reactive to light. EOM are normal.   Pulmonary/Chest: Effort normal. No respiratory distress.       Neurological Exam:   LOC: alert  Attention Span: Good   Language: Moderate aphasia  Articulation: No dysarthria  Orientation: oriented to month and age  Visual Fields: Full  EOM (CN III, IV, VI): Full/intact  Pupils (CN II, III): PERRL  Facial Sensation (CN V): Normal  Facial Movement (CN VII): Lower facial weakness on the Right  Motor: Arm left  Paresis: 4/5  Leg left  Paresis: 3/5  Arm right  Paresis: 3/5  Leg right Paresis: 3/5  Cebellar: No evidence of appendicular or axial ataxia  Sensation: Intact to light touch, temperature and vibration  Tone: Normal tone throughout    Laboratory:  CMP:   Recent Labs   Lab 06/10/19  1986   CALCIUM 7.9*   ALBUMIN 1.9*   PROT 4.3*      K 3.3*   CO2 20*      BUN 13   CREATININE 0.6   ALKPHOS 44*   ALT 14   AST 21   BILITOT 0.5     CBC:    Recent Labs   Lab 06/10/19  0419   WBC 7.47   RBC 3.26*   HGB 10.2*   HCT 31.0*      MCV 95   MCH 31.3*   MCHC 32.9     Lipid Panel:   Recent Labs   Lab 06/06/19  1814   CHOL 141   LDLCALC 57.6*   HDL 69   TRIG 72     Hgb A1C:   Recent Labs   Lab 06/06/19  1831   HGBA1C 5.6     TSH:   Recent Labs   Lab 06/06/19  1611   TSH 3.270       Diagnostic Results     Brain Imaging     MRI brain WO (6/7/19):  Acute infarct in right basal ganglia + Cytotoxic cerebral edema  Remote left MCA infarct         Vessel Imaging     CTA H&N (6/7/19):  - No evidence for significant proximal stenosis or occlusion.  - Atherosclerotic disease carotid bifurcations and proximal ICAs with less than 50% proximal ICA stenosis by NASCET criteria.    Cardiac Imaging     TTE (6/7/19):  EF 40%  Apical aneursym.          Sherrill Avelar MD  Comprehensive Stroke Center  Department of Vascular Neurology   [facility]

## 2019-06-10 NOTE — SUBJECTIVE & OBJECTIVE
Neurologic Chief Complaint: left sided weakness     Subjective:     Interval History: Patient is seen for follow-up neurological assessment and treatment recommendations: Please see hospital course    HPI, Past Medical, Family, and Social History remains the same as documented in the initial encounter.     Review of Systems   Constitutional: Negative for chills and fever.   HENT: Positive for trouble swallowing. Negative for drooling.    Respiratory: Negative for choking and shortness of breath.    Gastrointestinal: Negative for nausea and vomiting.   Skin: Negative for color change and rash.   Allergic/Immunologic: Negative for immunocompromised state.   Neurological: Positive for facial asymmetry and speech difficulty.   Psychiatric/Behavioral: Negative for agitation and confusion.     Scheduled Meds:   atorvastatin  40 mg Per OG tube Daily    clopidogrel  75 mg Oral Daily    heparin (porcine)  5,000 Units Subcutaneous Q8H    pantoprazole  40 mg Per OG tube BID    senna-docusate 8.6-50 mg  1 tablet Per NG tube Daily    sulfamethoxazole-trimethoprim 800-160mg  1 tablet Oral BID     Continuous Infusions:   lactated ringers 75 mL/hr at 06/10/19 0240     PRN Meds:acetaminophen, Dextrose 10% Bolus, glucagon (human recombinant), insulin aspart U-100, sodium chloride 0.9%    Objective:     Vital Signs (Most Recent):  Temp: 97.2 °F (36.2 °C) (06/10/19 0820)  Pulse: 97 (06/10/19 0820)  Resp: 18 (06/10/19 0424)  BP: (!) 124/90 (06/10/19 0820)  SpO2: (!) 93 % (06/10/19 0820)  BP Location: Right arm    Vital Signs Range (Last 24H):  Temp:  [97.2 °F (36.2 °C)-98 °F (36.7 °C)]   Pulse:  [80-97]   Resp:  [18-19]   BP: ()/(53-90)   SpO2:  [93 %-95 %]   BP Location: Right arm    Physical Exam   Constitutional: She appears well-developed and well-nourished.   HENT:   Head: Normocephalic and atraumatic.   Eyes: Pupils are equal, round, and reactive to light. EOM are normal.   Pulmonary/Chest: Effort normal. No  respiratory distress.       Neurological Exam:   LOC: alert  Attention Span: Good   Language: Moderate aphasia  Articulation: No dysarthria  Orientation: oriented to month and age  Visual Fields: Full  EOM (CN III, IV, VI): Full/intact  Pupils (CN II, III): PERRL  Facial Sensation (CN V): Normal  Facial Movement (CN VII): Lower facial weakness on the Right  Motor: Arm left  Paresis: 4/5  Leg left  Paresis: 3/5  Arm right  Paresis: 3/5  Leg right Paresis: 3/5  Cebellar: No evidence of appendicular or axial ataxia  Sensation: Intact to light touch, temperature and vibration  Tone: Normal tone throughout    Laboratory:  CMP:   Recent Labs   Lab 06/10/19  0419   CALCIUM 7.9*   ALBUMIN 1.9*   PROT 4.3*      K 3.3*   CO2 20*      BUN 13   CREATININE 0.6   ALKPHOS 44*   ALT 14   AST 21   BILITOT 0.5     CBC:   Recent Labs   Lab 06/10/19  0419   WBC 7.47   RBC 3.26*   HGB 10.2*   HCT 31.0*      MCV 95   MCH 31.3*   MCHC 32.9     Lipid Panel:   Recent Labs   Lab 06/06/19  1814   CHOL 141   LDLCALC 57.6*   HDL 69   TRIG 72     Hgb A1C:   Recent Labs   Lab 06/06/19  1831   HGBA1C 5.6     TSH:   Recent Labs   Lab 06/06/19  1611   TSH 3.270       Diagnostic Results     Brain Imaging     MRI brain WO (6/7/19):  Acute infarct in right basal ganglia + Cytotoxic cerebral edema  Remote left MCA infarct         Vessel Imaging     CTA H&N (6/7/19):  - No evidence for significant proximal stenosis or occlusion.  - Atherosclerotic disease carotid bifurcations and proximal ICAs with less than 50% proximal ICA stenosis by NASCET criteria.    Cardiac Imaging     TTE (6/7/19):  EF 40%  Apical aneursym.

## 2019-06-10 NOTE — PT/OT/SLP PROGRESS
"Physical Therapy Treatment    Patient Name:  Michelle Rowell   MRN:  [id number]    Recommendations:     Discharge Recommendations:  nursing facility, skilled   Discharge Equipment Recommendations: (TBD)   Barriers to discharge: Decreased caregiver support at current functional level    Assessment:     Michelle Rowell is a 78 y.o. female admitted with a medical diagnosis of Embolic stroke involving right middle cerebral artery.  She presents with the following impairments/functional limitations:  weakness, impaired functional mobilty, impaired endurance, gait instability, impaired balance, impaired self care skills, decreased coordination, decreased safety awareness, decreased lower extremity function, decreased upper extremity function. Pt requiring increased assist this date compared to initial PT evaluation. Pt demo'ing decreased seated postural control, requiring assist throughout for maintaining seated balance. Attempted standing x1 trial, but unable to achieve full upright position. Increased fatigue noted throughout, limiting further therapy. Pt would continue to benefit from skilled acute PT in order to address current deficits and progress functional mobility.     Rehab Prognosis: Good; patient would benefit from acute skilled PT services to address these deficits and reach maximum level of function.    Recent Surgery: * No surgery found *      Plan:     During this hospitalization, patient to be seen 4 x/week to address the identified rehab impairments via gait training, therapeutic activities, therapeutic exercises, neuromuscular re-education and progress toward the following goals:    · Plan of Care Expires:  07/08/19    Subjective     Chief Complaint: fatigue   Patient/Family Comments/goals: "I look like shit." pt stating upon looking into mirror  Pain/Comfort:  · Pain Rating 1: 0/10      Objective:     Communicated with RN prior to session.  Patient found supine with telemetry, peripheral IV, " Will upon PT entry to room.     General Precautions: Standard, fall, aspiration, nectar thick   Orthopedic Precautions:N/A   Braces: N/A     Functional Mobility:  · Bed Mobility:    · Rolling Right: maximal assistance with side rail   · Scooting: total assistance and of 2 persons with drawsheet to HOB  · Supine to Sit: maximal assistance with side rail and HOB elevated  · Sit to Supine: maximal assistance with side rail   · Transfers:     · Sit to Stand:  maximal assistance with hand-held assist  · Unable to achieve full upright position with pt demo'ing increased knee flex and hip flex BLE, forward flexed posture, and posterior lean  · Balance: sitting: ranged from min-maxA (mostly modA throughout)  · Demo'd increased L posterolateral lean. Required increased assist for anterior weight-shifting and maintaining midline. Cues throughout for hand placement, postural control, and balance corrections      AM-PAC 6 CLICK MOBILITY  Turning over in bed (including adjusting bedclothes, sheets and blankets)?: 2  Sitting down on and standing up from a chair with arms (e.g., wheelchair, bedside commode, etc.): 2  Moving from lying on back to sitting on the side of the bed?: 2  Moving to and from a bed to a chair (including a wheelchair)?: 1  Need to walk in hospital room?: 1  Climbing 3-5 steps with a railing?: 1  Basic Mobility Total Score: 9       Therapeutic Activities and Exercises   Sitting EOB x~25 min with assist ranging from min-maxA. Increased L posterolateral lean noted. Cues provided for upright posture, midline position, balance corrections, hand placement, appropriate weight-shifts, anterior pelvic tilt, and thoracic ext. Cues throughout for increased alertness and active engagement in task at hand. Mirror position in front of pt as visual cues for assist with postural corrections. Performed face-washing with pt using LUE to bring washcloth to her face. Performed posterior>anterior weight-shifts with B HHA x8  reps; max cues for appropriate weight-shifts, abdominal engagement, and postural control. Performed scapular retractions BUE x15 reps with min-modA for exercise technique and maintaining seated balance.     Patient left supine with all lines intact, call button in reach and daughter present..    GOALS:   Multidisciplinary Problems     Physical Therapy Goals        Problem: Physical Therapy Goal    Goal Priority Disciplines Outcome Goal Variances Interventions   Physical Therapy Goal     PT, PT/OT Ongoing (interventions implemented as appropriate)     Description:  Goals to be met by: 2019     Patient will increase functional independence with mobility by performin. Supine to sit with Stand-by Assistance  2. Sit to supine with Stand-by Assistance  3. Sit to stand transfer with Stand-by Assistance  4. Bed to chair transfer with Stand-by Assistance using Rolling Walker  5. Gait  x 100 feet with Stand-by Assistance using Rolling Walker.   6. Lower extremity exercise program x15 reps per handout, with supervision                      Time Tracking:     PT Received On: 06/10/19  PT Start Time: 1334     PT Stop Time: 1414  PT Total Time (min): 40 min     Billable Minutes: Therapeutic Activity 15 and Neuromuscular Re-education 25    Treatment Type: Treatment  PT/PTA: PT     PTA Visit Number: 0     Ashia Tavera, PT, DPT   6/10/2019  [phone]

## 2019-06-10 NOTE — CONSULTS
[facility]  Physical Medicine & Rehab  Consult Note    Patient Name: Michelle Rowell  MRN: [id number]  Admission Date: 6/6/2019  Hospital Length of Stay: 4 days  Attending Physician: Johnny Georges MD     Inpatient consult to Physical Medicine & Rehabilitation  Consult performed by: Melody Locke NP  Consult requested by:  Johnny Georges MD    Reason for Consult:  assess rehabilitation needs  Consults  Subjective:     Principal Problem: Embolic stroke involving right middle cerebral artery    HPI: Michelle Rowell is a 78-year-old female with PMHx of previous L MCA stroke with residual RSW, DVTs s/p IVC filter placement, DM, HLD, & HTN.  Patient presented to OSH with L sided weakness and R gaze preference.  CTH revealed no acute pathology.  A telemedicine consult was placed. tPA was recommended and administered. Transferred to [facility] on 6/6 for further evaluation and management.  Upon admission, CTA revealed no LVO.  MRI brain revealed moderate-sized acute right basal ganglia infarct. Echocardiogram shows apical aneurysm, concentric remodeling and EF 40%.  Etiology ESUS.  Suspect cardioembolic due to bilateral hemisphere involvement (prior L MCA and now acute R MCA stroke). Patient needs implantable loop recorder before discharge per stroke team. Hospital course complicated by dysphagia, UTI, & hypotension .     Functional History: Patient lives in [place] with daughter and granddaughter in a single story home with no steps  to enter.  Prior to admission, (I) with ADLs and Mod (I) with rollator for mobility.  (A) for shower transfers. DME: rollator .    Hospital Course: 06/8/2019: Bed mobility Christian .  Sit to stand Christian.  Ambulated 12 small steps Christian & RW. . SLP diagnosis of Dysphagia  06/9/2019: Bed mobility ModA.  Sit to stand and transfers ModA.  UBD MaxA and LBD MaxA. Grooming Christian.    6/10/19: Cog eval pending. MBSS pending.       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    THROMBECTOMY      TONSILLECTOMY      TUBAL LIGATION      US PSEUDOANEURYSM REPAIR INC IMAGING  1/11/2016          Review of patient's allergies indicates:  No Known Allergies    Scheduled Medications:    atorvastatin  40 mg Per OG tube Daily    clopidogrel  75 mg Oral Daily    heparin (porcine)  5,000 Units Subcutaneous Q8H    pantoprazole  40 mg Per OG tube BID    senna-docusate 8.6-50 mg  1 tablet Per NG tube Daily    sulfamethoxazole-trimethoprim 800-160mg  1 tablet Oral BID       PRN Medications: acetaminophen, Dextrose 10% Bolus, glucagon (human recombinant), insulin aspart U-100, sodium chloride 0.9%    Family History     Problem Relation (Age of Onset)    Cancer Father    Heart disease Father    Thyroid disease Daughter        Tobacco Use    Smoking status: Former Smoker    Smokeless tobacco: Never Used    Tobacco comment: quit in 1990s   Substance and Sexual Activity    Alcohol use: No    Drug use: No    Sexual activity: Not on file     Review of Systems   Constitutional: Positive for activity change. Negative for chills, fatigue and fever.   HENT: Positive for trouble swallowing. Negative for voice change.    Eyes: Negative for photophobia and visual disturbance.   Respiratory: Negative for cough and shortness of breath.    Cardiovascular: Negative for chest pain and palpitations.   Gastrointestinal: Negative for nausea and vomiting.   Genitourinary: Positive for  difficulty urinating. Negative for flank pain.   Musculoskeletal: Positive for arthralgias and gait problem.   Skin: Negative for color change and rash.   Neurological: Positive for facial asymmetry and weakness. Negative for numbness.   Psychiatric/Behavioral: Negative for agitation and confusion.     Objective:     Vital Signs (Most Recent):  Temp: 97.2 °F (36.2 °C) (06/10/19 0820)  Pulse: 97 (06/10/19 0820)  Resp: 18 (06/10/19 0424)  BP: (!) 124/90 (06/10/19 0820)  SpO2: (!) 93 % (06/10/19 0820)    Vital Signs (24h Range):  Temp:  [97.2 °F (36.2 °C)-98 °F (36.7 °C)] 97.2 °F (36.2 °C)  Pulse:  [80-97] 97  Resp:  [18-19] 18  SpO2:  [93 %-95 %] 93 %  BP: ()/(53-90) 124/90     Body mass index is 21.99 kg/m².    Physical Exam   Constitutional: She is oriented to person, place, and time. She appears well-developed and well-nourished.   HENT:   Head: Normocephalic and atraumatic.   Eyes: Right eye exhibits no discharge. Left eye exhibits no discharge.   Neck: Neck supple.   Cardiovascular: Intact distal pulses.   Pulmonary/Chest: Effort normal. No respiratory distress.   On NC    Abdominal: Soft. There is no tenderness.   Musculoskeletal: She exhibits no edema or deformity.   RUE: 3/5.  LUE: 4/5.  RLE: 3/5.  LLE: 4/5.  Neurological: She is alert and oriented to person, place, and time.   + facial asymmetry and dysarthria  Follows commands    Skin: Skin is warm and dry.   Psychiatric: She has a normal mood and affect. Her behavior is normal.   Vitals reviewed.      Diagnostic Results:   Labs: Reviewed  ECG: Reviewed  X-Ray: Reviewed  CT: Reviewed  MRI: Reviewed    Assessment/Plan:     * Embolic stroke involving right middle cerebral artery  See hospital course for functional, cognitive/speech/language, and nutrition/swallow status.      Recommendations  -  Encourage mobility, OOB in chair at least 3 hours per day, and early ambulation as appropriate   -  PT/OT evaluate and treat  -  SLP speech and cognitive  evaluate and treat  -  Monitor sleep disturbances and establish consistent sleep-wake cycle  -  Monitor for bowel and bladder dysfunction  -  Monitor for shoulder pain, subluxation, & spasticity  -  Monitor for and prevent skin breakdown and pressure ulcers  · Early mobility, repositioning/weight shifting every 20-30 minutes when sitting, turn patient every 2 hours, proper mattress/overlay and chair cushioning, pressure relief/heel protector boots  -  DVT prophylaxis (if appropriate)  -  Reviewed discharge options (IP rehab, SNF, HH therapy, and OP therapy)    UTI (urinary tract infection), uncomplicated  -on bactrim x 3 days     Oropharyngeal dysphagia  -MBSS pending for today     Aneurysm of heart  -seen on Echo     Right hemiplegia  -residual from previous stroke      Participating with therapy. Will follow progress and discuss with rehab team for post acute care/rehab recommendation.        Thank you for your consult.     Melody Locke NP  Department of Physical Medicine & Rehab  [facility]

## 2019-06-10 NOTE — ASSESSMENT & PLAN NOTE
See hospital course for functional, cognitive/speech/language, and nutrition/swallow status.      Recommendations  -  Encourage mobility, OOB in chair at least 3 hours per day, and early ambulation as appropriate   -  PT/OT evaluate and treat  -  SLP speech and cognitive evaluate and treat  -  Monitor sleep disturbances and establish consistent sleep-wake cycle  -  Monitor for bowel and bladder dysfunction  -  Monitor for shoulder pain, subluxation, & spasticity  -  Monitor for and prevent skin breakdown and pressure ulcers  · Early mobility, repositioning/weight shifting every 20-30 minutes when sitting, turn patient every 2 hours, proper mattress/overlay and chair cushioning, pressure relief/heel protector boots  -  DVT prophylaxis (if appropriate)  -  Reviewed discharge options (IP rehab, SNF, HH therapy, and OP therapy)

## 2019-06-10 NOTE — PLAN OF CARE
Problem: SLP Goal  Goal: SLP Goal  Speech Language Pathology Goals  Goals expected to be met by 6/15/19    1.  Pt will tolerate a MECHANICAL SOFT DIET/NECTAR-THICK LIQUIDS with no s/s of aspiration (MBSS 6/10).  2.  Pt will participate in Speech Language Cognitive evaluation to determine the need for additional goals.  3.  Pt will complete dysphagia and oral motor exercises x10 per session with mod assist in order to strengthen oropharyngeal swallow.       Patient seen for modified barium swallow study. SLP recommending a MECHANICAL SOFT DIET/NECTAR-THICK LIQUIDS at this time. Patient should adhere to strict aspiration precautions with no straws and meds crushed in puree. Detailed results of MBSS and POC in ST note.     Fredo Fry CF-SLP  Speech-Language Pathology  Pager: [phone]

## 2019-06-10 NOTE — HPI
Michelle Rowell is a 78-year-old female with PMHx of previous L MCA stroke with residual RSW, DVTs s/p IVC filter placement, DM, HLD, HTN  .  Patient presented to OSH with L sided weakness and R gaze preference.  CTH revealed no acute pathology.  A telemedicine consult was placed. tPA was recommended and administered. While in ED, she was intubated for airway protection. Transferred to [facility] on 6/6 for further evaluation and management.  Upon admission, CTA revealed no LVO.  MRI brain revealed moderate-sized acute right basal ganglia infarct. Echocardiogram shows apical aneurysm, concentric remodeling and EF 40%.  Etiology ESUS.  Suspect cardioembolic due to bilateral hemisphere involvement (prior L MCA and now acute R MCA stroke). Patient needs implantable loop recorder before discharge per stroke team. Hospital course complicated by dysphagia, UTI, & hypotension .     Functional History: Patient lives in [place] with daughter and granddaughter in a single story home with no steps  to enter.  Prior to admission, (I) with ADLs and Mod (I) with rollator for mobility.  (A) for shower transfers. DME: rollator .

## 2019-06-10 NOTE — SUBJECTIVE & OBJECTIVE
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    Stroke     Type 2 diabetes mellitus with neurologic complication 12/1/2015     Past Surgical History:   Procedure Laterality Date    CORONARY ARTERY BYPASS GRAFT      JARED FILTER PLACEMENT      REMOVAL-IVC FILTER N/A 6/6/2016    Performed by [facility] Provider at [facility] OR [address] [place] [address]    THROMBECTOMY      TONSILLECTOMY      TUBAL LIGATION      US PSEUDOANEURYSM REPAIR INC IMAGING  1/11/2016          Review of patient's allergies indicates:  No Known Allergies    Scheduled Medications:    atorvastatin  40 mg Per OG tube Daily    clopidogrel  75 mg Oral Daily    heparin (porcine)  5,000 Units Subcutaneous Q8H    pantoprazole  40 mg Per OG tube BID    senna-docusate 8.6-50 mg  1 tablet Per NG tube Daily    sulfamethoxazole-trimethoprim 800-160mg  1 tablet Oral BID       PRN Medications: acetaminophen, Dextrose 10% Bolus, glucagon (human recombinant), insulin aspart U-100, sodium chloride 0.9%    Family History     Problem Relation (Age of Onset)    Cancer Father    Heart disease Father    Thyroid disease Daughter        Tobacco Use    Smoking status: Former Smoker    Smokeless tobacco: Never Used    Tobacco comment: quit in 1990s   Substance and Sexual Activity    Alcohol use: No    Drug use: No    Sexual activity: Not on file     Review of Systems   Constitutional: Positive for activity change. Negative for chills, fatigue and fever.   HENT: Positive for trouble swallowing. Negative for voice change.    Eyes: Negative for photophobia and visual disturbance.   Respiratory: Negative for cough and shortness of breath.    Cardiovascular: Negative for chest pain and palpitations.   Gastrointestinal: Negative  for nausea and vomiting.   Genitourinary: Positive for difficulty urinating. Negative for flank pain.   Musculoskeletal: Positive for arthralgias and gait problem.   Skin: Negative for color change and rash.   Neurological: Positive for facial asymmetry and weakness. Negative for numbness.   Psychiatric/Behavioral: Negative for agitation and confusion.     Objective:     Vital Signs (Most Recent):  Temp: 97.2 °F (36.2 °C) (06/10/19 0820)  Pulse: 97 (06/10/19 0820)  Resp: 18 (06/10/19 0424)  BP: (!) 124/90 (06/10/19 0820)  SpO2: (!) 93 % (06/10/19 0820)    Vital Signs (24h Range):  Temp:  [97.2 °F (36.2 °C)-98 °F (36.7 °C)] 97.2 °F (36.2 °C)  Pulse:  [80-97] 97  Resp:  [18-19] 18  SpO2:  [93 %-95 %] 93 %  BP: ()/(53-90) 124/90     Body mass index is 21.99 kg/m².    Physical Exam   Constitutional: She is oriented to person, place, and time. She appears well-developed and well-nourished.   HENT:   Head: Normocephalic and atraumatic.   Eyes: Right eye exhibits no discharge. Left eye exhibits no discharge.   Neck: Neck supple.   Cardiovascular: Intact distal pulses.   Pulmonary/Chest: Effort normal. No respiratory distress.   On NC    Abdominal: Soft. There is no tenderness.   Musculoskeletal: She exhibits no edema or deformity.   RLE weakness    Neurological: She is alert and oriented to person, place, and time.   + facial asymmetry and dysarthria  Follows commands    Skin: Skin is warm and dry.   Psychiatric: She has a normal mood and affect. Her behavior is normal.   Vitals reviewed.    NEUROLOGICAL EXAMINATION:     MENTAL STATUS   Oriented to person, place, and time.       Diagnostic Results:   Labs: Reviewed  ECG: Reviewed  X-Ray: Reviewed  CT: Reviewed  MRI: Reviewed

## 2019-06-10 NOTE — PROCEDURES
Modified Barium Swallow    Patient Name:  Michelle Rowell   MRN:  [id number]      Recommendations:     Recommendations:                General Recommendations:  Dysphagia therapy, Speech language evaluation and Cognitive-linguistic evaluation  Diet recommendations:  Mechanical soft, [place] Thick   Aspiration Precautions: 1 bite/sip at a time, Assistance with thickening liquids, Assistance with meals and Assistance with thickening liquids, Check for pocketing/oral residue, Double swallow with each bite/sip, Eliminate distractions, Feed only when awake/alert, HOB to 90 degrees, Meds crushed in puree, Monitor for s/s of aspiration, No straws, Remain upright 30 minutes post meal, Small bites/sips and Strict aspiration precautions   General Precautions: Standard, aspiration, fall, nectar thick  Communication strategies:  provide increased time to answer and go to room if call light pushed    Referral     Reason for Referral  Patient was referred for a Modified Barium Swallow Study to assess the efficiency of his/her swallow function, rule out aspiration and make recommendations regarding safe dietary consistencies, effective compensatory strategies, and safe eating environment.     Diagnosis: Embolic stroke involving right middle cerebral artery       History:     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    Stroke     Type 2 diabetes mellitus with neurologic complication 12/1/2015       Objective:     Current Respiratory Status: 06/10/19    Alert: yes    Cooperative: yes    Follows Directions: inconsistent    Visualization  · Patient was seen in the lateral view     **Visibility of certain stages of the swallow affected by contrast on image as well as patient  movement    Oral Peripheral Examination  · Oral Musculature: general weakness  · Dentition: uses dentures to eat, upper dentures  · Secretion Management: adequate  · Mucosal Quality: dry  · Mandibular Strength and Mobility: WFL  · Oral Labial Strength and Mobility: impaired pursing, impaired seal, impaired retraction  · Lingual Strength and Mobility: impaired strength, impaired right lateral movement  · Buccal Strength and Mobility: WFL  · Volitional Cough: weak, non-productive  · Volitional Swallow: present  · Voice Prior to PO Intake: clear, decreased intensity    Consistencies Assessed  · Thin 30mL (3 single cup-edge sips)  · Nectar thick 20mL (2 single cup-edge sips)  · Puree 5mL (tsp barium pudding mixed with applesauce)  · Solids 5mL (1/4 saltine coated with barium pudding)    Oral Preparation/Oral Phase  · Intermittent anterior loss of liquid bolus  · Prolonged A-P transfer with puree and solid trials  · Prolonged mastication with solids  · Oral residue present post swallow with puree and solid trials    Pharyngeal Phase   · Premature pooling in valleculae across consistencies with spillage to pyriforms with thick and nectar-thick liquids trials  · Delayed swallow initiation across trials  · Mildly reduced hyoid excursion and epiglottic inversion across trials  · Inconsistent laryngeal penetration above the chords prior to swallow with thin liquids  · Material penetrated deeper into vestibule during the swallow   · Patient without an immediate response to penetrated material, but cued throat clears were ineffective in clearing residue  · No aspiration present, but patient at a high-risk for eventual aspiration with further thin liquid intake  · Inconsistent flash penetration of nectar-thick liquids during swallow that cleared spontaneously prior to reaching vocal cords  · No penetration or aspiration of puree or solid trials  · Piecemeal deglutition of solid trials that required three spontaneous swallows to  clear  · Mild vallecular residue present post-swallow with puree and solid residue that cleared with multiple swallows    *Use of compensatory strategies was limited 2' to inconsistent command following     Cervical Esophageal Phase  · UES appeared to accommodate all bolus types without stasis or retrograde movement observed     Assessment:     Impressions  · Patient demonstrates mild oropharyngeal dysphagia characterized by delayed swallow initiation and limited epiglottic mobility resulting in laryngeal penetration of thin liquids both before and during the swallow as well as flash penetration of nectar-thick liquids during the swallow. Patient with reduced vocal strength resulted in ability to adequately clear penetrated material. During the oral phase, labial and lingual weakness resulted in anterior spillage of liquids and delayed A-P transit with prolonged mastication of solids. No penetration or aspiration of puree and solid trials.     Prognosis: Good    Barriers:  · Generalized weakness from both current and prior strokes    Plan  · Initiate MECHANICAL SOFT DIET/NECTAR-THICK LIQUIDS at this time. Patient should strictly adhere to precautions listed above. SLP will trial dysphagia therapy and oral motor exercises in order to improve both oral and pharyngeal phases of swallowing, specifically the timeliness of swallow initiation.     Education  Results were discussed at length with patient and daughter; SLP provided daughter with thickener packets and measuring cups and trained her on the process for thickening liquids. Results were discussed with Medical Team who was in agreement with plan.     Goals:   Multidisciplinary Problems     SLP Goals        Problem: SLP Goal    Goal Priority Disciplines Outcome   SLP Goal     SLP Ongoing (interventions implemented as appropriate)   Description:  Speech Language Pathology Goals  Goals expected to be met by 6/15/19    1.  Pt will tolerate a MECHANICAL SOFT  DIET/NECTAR-THICK LIQUIDS with no s/s of aspiration (MBSS 6/10).  2.  Pt will participate in Speech Language Cognitive evaluation to determine the need for additional goals.  3.  Pt will complete dysphagia and oral motor exercises x10 per session with mod assist in order to strengthen oropharyngeal swallow.                         Plan:   · Patient to be seen:  Therapy Frequency: 4 x/week   · Plan of Care expires:  07/07/19  · Plan of Care reviewed with:  patient, daughter        Discharge recommendations:  nursing facility, skilled   Barriers to Discharge:  Level of Skilled Assistance Needed     Time Tracking:   SLP Treatment Date:   06/10/19  Speech Start Time:  0901  Speech Stop Time:  0910     Speech Total Time (min):  9 min    MARY Franklin-SLP  Speech-Language Pathology  Pager: [phone]   06/10/2019

## 2019-06-10 NOTE — ASSESSMENT & PLAN NOTE
Area of cytotoxic cerebral edema identified when reviewing brain imaging in the territory of the right middle cerebral artery. There is no mass effect associated with it. We will continue to monitor the patients clinical exam for any worsening of symptoms which may indicate expansion of the stroke or the area of the edema resulting in the clinical change.

## 2019-06-11 PROBLEM — D72.829 LEUKOCYTOSIS: Status: RESOLVED | Noted: 2019-06-07 | Resolved: 2019-06-11

## 2019-06-11 LAB
ALBUMIN SERPL BCP-MCNC: 2 G/DL (ref 3.5–5.2)
ALP SERPL-CCNC: 48 U/L (ref 55–135)
ALT SERPL W/O P-5'-P-CCNC: 11 U/L (ref 10–44)
ANION GAP SERPL CALC-SCNC: 7 MMOL/L (ref 8–16)
AST SERPL-CCNC: 19 U/L (ref 10–40)
BASOPHILS # BLD AUTO: 0.06 K/UL (ref 0–0.2)
BASOPHILS NFR BLD: 0.8 % (ref 0–1.9)
BILIRUB SERPL-MCNC: 0.5 MG/DL (ref 0.1–1)
BUN SERPL-MCNC: 9 MG/DL (ref 8–23)
CALCIUM SERPL-MCNC: 8.2 MG/DL (ref 8.7–10.5)
CHLORIDE SERPL-SCNC: 103 MMOL/L (ref 95–110)
CO2 SERPL-SCNC: 23 MMOL/L (ref 23–29)
CREAT SERPL-MCNC: 0.6 MG/DL (ref 0.5–1.4)
DIFFERENTIAL METHOD: ABNORMAL
EOSINOPHIL # BLD AUTO: 0.1 K/UL (ref 0–0.5)
EOSINOPHIL NFR BLD: 1.4 % (ref 0–8)
ERYTHROCYTE [DISTWIDTH] IN BLOOD BY AUTOMATED COUNT: 15.2 % (ref 11.5–14.5)
EST. GFR  (AFRICAN AMERICAN): >60 ML/MIN/1.73 M^2
EST. GFR  (NON AFRICAN AMERICAN): >60 ML/MIN/1.73 M^2
GLUCOSE SERPL-MCNC: 92 MG/DL (ref 70–110)
HCT VFR BLD AUTO: 33.1 % (ref 37–48.5)
HGB BLD-MCNC: 11.2 G/DL (ref 12–16)
IMM GRANULOCYTES # BLD AUTO: 0.04 K/UL (ref 0–0.04)
IMM GRANULOCYTES NFR BLD AUTO: 0.5 % (ref 0–0.5)
LYMPHOCYTES # BLD AUTO: 3.3 K/UL (ref 1–4.8)
LYMPHOCYTES NFR BLD: 42.9 % (ref 18–48)
MAGNESIUM SERPL-MCNC: 1.5 MG/DL (ref 1.6–2.6)
MCH RBC QN AUTO: 31.4 PG (ref 27–31)
MCHC RBC AUTO-ENTMCNC: 33.8 G/DL (ref 32–36)
MCV RBC AUTO: 93 FL (ref 82–98)
MONOCYTES # BLD AUTO: 0.9 K/UL (ref 0.3–1)
MONOCYTES NFR BLD: 11.8 % (ref 4–15)
NEUTROPHILS # BLD AUTO: 3.3 K/UL (ref 1.8–7.7)
NEUTROPHILS NFR BLD: 42.6 % (ref 38–73)
NRBC BLD-RTO: 0 /100 WBC
PHOSPHATE SERPL-MCNC: 2.1 MG/DL (ref 2.7–4.5)
PLATELET # BLD AUTO: 258 K/UL (ref 150–350)
PMV BLD AUTO: 11.6 FL (ref 9.2–12.9)
POCT GLUCOSE: 128 MG/DL (ref 70–110)
POCT GLUCOSE: 98 MG/DL (ref 70–110)
POTASSIUM SERPL-SCNC: 3.7 MMOL/L (ref 3.5–5.1)
PROT SERPL-MCNC: 4.6 G/DL (ref 6–8.4)
RBC # BLD AUTO: 3.57 M/UL (ref 4–5.4)
SODIUM SERPL-SCNC: 133 MMOL/L (ref 136–145)
WBC # BLD AUTO: 7.64 K/UL (ref 3.9–12.7)

## 2019-06-11 PROCEDURE — 84100 ASSAY OF PHOSPHORUS: CPT | Mod: HCNC

## 2019-06-11 PROCEDURE — 92526 ORAL FUNCTION THERAPY: CPT | Mod: HCNC

## 2019-06-11 PROCEDURE — 80053 COMPREHEN METABOLIC PANEL: CPT | Mod: HCNC

## 2019-06-11 PROCEDURE — 97803 MED NUTRITION INDIV SUBSEQ: CPT | Mod: HCNC

## 2019-06-11 PROCEDURE — 25000003 PHARM REV CODE 250: Mod: HCNC | Performed by: PHYSICIAN ASSISTANT

## 2019-06-11 PROCEDURE — 99232 SBSQ HOSP IP/OBS MODERATE 35: CPT | Mod: HCNC,,, | Performed by: NURSE PRACTITIONER

## 2019-06-11 PROCEDURE — 25000003 PHARM REV CODE 250: Mod: HCNC | Performed by: NURSE PRACTITIONER

## 2019-06-11 PROCEDURE — 97112 NEUROMUSCULAR REEDUCATION: CPT | Mod: HCNC

## 2019-06-11 PROCEDURE — 86580 TB INTRADERMAL TEST: CPT | Mod: HCNC | Performed by: STUDENT IN AN ORGANIZED HEALTH CARE EDUCATION/TRAINING PROGRAM

## 2019-06-11 PROCEDURE — 83735 ASSAY OF MAGNESIUM: CPT | Mod: HCNC

## 2019-06-11 PROCEDURE — 25000003 PHARM REV CODE 250: Mod: HCNC | Performed by: STUDENT IN AN ORGANIZED HEALTH CARE EDUCATION/TRAINING PROGRAM

## 2019-06-11 PROCEDURE — 97530 THERAPEUTIC ACTIVITIES: CPT | Mod: HCNC

## 2019-06-11 PROCEDURE — 99233 PR SUBSEQUENT HOSPITAL CARE,LEVL III: ICD-10-PCS | Mod: HCNC,GC,, | Performed by: PSYCHIATRY & NEUROLOGY

## 2019-06-11 PROCEDURE — 99233 SBSQ HOSP IP/OBS HIGH 50: CPT | Mod: HCNC,GC,, | Performed by: PSYCHIATRY & NEUROLOGY

## 2019-06-11 PROCEDURE — 92523 SPEECH SOUND LANG COMPREHEN: CPT | Mod: HCNC

## 2019-06-11 PROCEDURE — 63600175 PHARM REV CODE 636 W HCPCS: Mod: HCNC | Performed by: PHYSICIAN ASSISTANT

## 2019-06-11 PROCEDURE — 20600001 HC STEP DOWN PRIVATE ROOM: Mod: HCNC

## 2019-06-11 PROCEDURE — 30200315 PPD INTRADERMAL TEST REV CODE 302: Mod: HCNC | Performed by: STUDENT IN AN ORGANIZED HEALTH CARE EDUCATION/TRAINING PROGRAM

## 2019-06-11 PROCEDURE — 63600175 PHARM REV CODE 636 W HCPCS: Mod: HCNC | Performed by: STUDENT IN AN ORGANIZED HEALTH CARE EDUCATION/TRAINING PROGRAM

## 2019-06-11 PROCEDURE — 99232 PR SUBSEQUENT HOSPITAL CARE,LEVL II: ICD-10-PCS | Mod: HCNC,,, | Performed by: NURSE PRACTITIONER

## 2019-06-11 PROCEDURE — 36415 COLL VENOUS BLD VENIPUNCTURE: CPT | Mod: HCNC

## 2019-06-11 PROCEDURE — 97535 SELF CARE MNGMENT TRAINING: CPT | Mod: HCNC

## 2019-06-11 PROCEDURE — 85025 COMPLETE CBC W/AUTO DIFF WBC: CPT | Mod: HCNC

## 2019-06-11 RX ORDER — SODIUM,POTASSIUM PHOSPHATES 280-250MG
2 POWDER IN PACKET (EA) ORAL ONCE
Status: COMPLETED | OUTPATIENT
Start: 2019-06-11 | End: 2019-06-11

## 2019-06-11 RX ORDER — MAGNESIUM SULFATE HEPTAHYDRATE 40 MG/ML
2 INJECTION, SOLUTION INTRAVENOUS ONCE
Status: COMPLETED | OUTPATIENT
Start: 2019-06-11 | End: 2019-06-11

## 2019-06-11 RX ADMIN — HEPARIN SODIUM 5000 UNITS: 5000 INJECTION, SOLUTION INTRAVENOUS; SUBCUTANEOUS at 10:06

## 2019-06-11 RX ADMIN — PANTOPRAZOLE SODIUM 40 MG: 40 GRANULE, DELAYED RELEASE ORAL at 10:06

## 2019-06-11 RX ADMIN — PANTOPRAZOLE SODIUM 40 MG: 40 GRANULE, DELAYED RELEASE ORAL at 08:06

## 2019-06-11 RX ADMIN — SENNOSIDES,DOCUSATE SODIUM 1 TABLET: 8.6; 5 TABLET, FILM COATED ORAL at 08:06

## 2019-06-11 RX ADMIN — POTASSIUM & SODIUM PHOSPHATES POWDER PACK 280-160-250 MG 2 PACKET: 280-160-250 PACK at 10:06

## 2019-06-11 RX ADMIN — MAGNESIUM SULFATE 2 G: 2 INJECTION INTRAVENOUS at 10:06

## 2019-06-11 RX ADMIN — SULFAMETHOXAZOLE AND TRIMETHOPRIM 1 TABLET: 800; 160 TABLET ORAL at 08:06

## 2019-06-11 RX ADMIN — ATORVASTATIN CALCIUM 40 MG: 20 TABLET, FILM COATED ORAL at 08:06

## 2019-06-11 RX ADMIN — HEPARIN SODIUM 5000 UNITS: 5000 INJECTION, SOLUTION INTRAVENOUS; SUBCUTANEOUS at 06:06

## 2019-06-11 RX ADMIN — SULFAMETHOXAZOLE AND TRIMETHOPRIM 1 TABLET: 800; 160 TABLET ORAL at 10:06

## 2019-06-11 RX ADMIN — CLOPIDOGREL 75 MG: 75 TABLET, FILM COATED ORAL at 08:06

## 2019-06-11 RX ADMIN — SODIUM CHLORIDE, SODIUM LACTATE, POTASSIUM CHLORIDE, AND CALCIUM CHLORIDE: .6; .31; .03; .02 INJECTION, SOLUTION INTRAVENOUS at 02:06

## 2019-06-11 RX ADMIN — Medication 5 UNITS: at 03:06

## 2019-06-11 RX ADMIN — HEPARIN SODIUM 5000 UNITS: 5000 INJECTION, SOLUTION INTRAVENOUS; SUBCUTANEOUS at 03:06

## 2019-06-11 NOTE — ASSESSMENT & PLAN NOTE
Area of cytotoxic cerebral edema identified when reviewing brain imaging in the territory of the right middle cerebral artery. There is no mass effect associated with it. We will continue to monitor the patients clinical exam for any worsening of symptoms.

## 2019-06-11 NOTE — PLAN OF CARE
Problem: Physical Therapy Goal  Goal: Physical Therapy Goal  Goals to be met by: 2019     Patient will increase functional independence with mobility by performin. Supine to sit with Stand-by Assistance  2. Sit to supine with Stand-by Assistance  3. Sit to stand transfer with Stand-by Assistance  4. Bed to chair transfer with Stand-by Assistance using Rolling Walker  5. Gait  x 100 feet with Stand-by Assistance using Rolling Walker.   6. Lower extremity exercise program x15 reps per handout, with supervision     Outcome: Ongoing (interventions implemented as appropriate)  Goals reviewed and remain appropriate. Pt progressing towards goals.    Ashia Tavera, PT, DPT   2019  [phone]

## 2019-06-11 NOTE — PLAN OF CARE
Problem: Adult Inpatient Plan of Care  Goal: Plan of Care Review  Recommendation:   1. Continue 2000kcal DM mechanical soft diet with nectar thick liquids   2. Consider adding boost pudding tid if PO intake <75%     Goals: Meet % EEN, EPN

## 2019-06-11 NOTE — PROGRESS NOTES
[facility]  Vascular Neurology  Comprehensive Stroke Center  Progress Note    Assessment/Plan:     * Embolic stroke involving right middle cerebral artery  79 y/o F with CAD, HTN, HLD, DM II, and previous L MCA stroke, p/w R MCA syndrome. s/p tPA. No LVO on CTA, but mild bilateral carotid artery atherosclerosis. MRI with R BG infarct. TTE with apical aneurysm. 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     - Antithrombotics for secondary stroke prevention: switched from aspirin to plavix 75mg qd upon admission   - Statins for secondary stroke prevention and hyperlipidemia, if present: Statins: Atorvastatin- 40 mg daily  - Aggressive risk factor modification: HTN, DM, HLD, Diet, Exercise, CAD  - Rehab efforts:    PT evaluate and treat, OT evaluate and treat, recommending SNF, pending insurance authorization   SLP evaluate and treat, recommending mechanical soft and nectar thick  - Diagnostics ordered/pending: None  - VTE prophylaxis: Heparin 5000 units SQ every 8 hours, SCDs  - BP parameters: Infarct: Post tPA, SBP <180  - Patient needs ILR upon discharge    Essential hypertension  Risk factor for stroke, currently at goal     - long term goal <140    Type 2 diabetes mellitus with neurologic complication  risk factor for stroke, well controlled, HgA1C 5.6    - LD SSI  - diabetic diet    Cytotoxic cerebral edema  Area of cytotoxic cerebral edema identified when reviewing brain imaging in the territory of the right middle cerebral artery. There is no mass effect associated with it. We will continue to monitor the patients clinical exam for any worsening of symptoms.    Right hemiplegia  - Continue PT/OT  - disposition: SNF    Oropharyngeal dysphagia  Secondary to stroke    - passed MBSS on 6/10  - started on mechanical soft, nectar thick  - continue speech and swallow therapy    UTI (urinary tract infection), uncomplicated  Urine cx -> E-coli    - completed 3-day course of Bactrim today, WBC down to  normal    Aneurysm of heart  Apical aneurysm, increased risk of stroke    - outpatient referral to cardiology upon discharge to work up etiology and management    History of ischemic left MCA stroke  Residual right sided weakness  On ASA and simvastatin at home       Hospital Course:  Patient admitted to [facility] for new acute infarcts.  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  Extubated today.    06/08/19 Patient has been hypotensive overnight received IV bolus 500cc.  Normal saline gtt. Held due to EF 40%. Patient with leukocytosis and urinary retention.  U/A pending.    06/09/19 Patient transferred out of the NCC unit to stroke progressive unit yesterday.  Patient blood pressure improved. U/A positive for urinary tract infection.  Started 3 day course of bactrim.  Patient using mouth to breath and saturating at 100% on 2 liters of nasal cannula.  Patient states she usually breathes out of her mouth and does not feel short of breath.  Will plan to wean off oxygen. No areas of decreased breath sounds on physical exam.    6/10: passed MBSS this morning for mechanical soft/nectar thick. WBC improved on abx (day #2 of Bactrim today). Electrolyte abnormalities (Mg, K) replaced.   6/11: NAEON. Electrolytes replaced. continued on plavix. Pending insurance auth for SNF placement     STROKE DOCUMENTATION   Acute Stroke Times   Last Known Normal Date: 06/06/19  Last Known Normal Time: 1200  Symptom Onset Date: 06/06/19  Symptom Onset Time: (unkown)  Stroke Team Called Date: 06/06/19  Stroke Team Called Time: 1550  Stroke Team Arrival Date: 06/06/19  Stroke Team Arrival Time: 1600  CT Interpretation Time: 1608  Decision to Treat Time for IR: 1615    NIH Scale:  1a. Level of Consciousness: 0-->Alert, keenly responsive  1b. LOC Questions: 0-->Answers both questions correctly  1c. LOC Commands: 0-->Performs both tasks correctly  2. Best Gaze: 0-->Normal  3. Visual: 0-->No visual loss  4. Facial Palsy: 2-->Partial paralysis (total or  near-total paralysis of lower face)  5a. Motor Arm, Left: 1-->Drift, limb holds 90 (or 45) degrees, but drifts down before full 10 seconds, does not hit bed or other support  5b. Motor Arm, Right: 2-->Some effort against gravity, limb cannot get to or maintain (if cued) 90 (or 45) degrees, drifts down to bed, but has some effort against gravity  6a. Motor Leg, Left: 2-->Some effort against gravity, leg falls to bed by 5 secs, but has some effort against gravity  6b. Motor Leg, Right: 2-->Some effort against gravity, leg falls to bed by 5 secs, but has some effort against gravity  7. Limb Ataxia: 0-->Absent  8. Sensory: 0-->Normal, no sensory loss  9. Best Language: 1-->Mild-to-moderate aphasia, some obvious loss of fluency or facility of comprehension, without significant limitation on ideas expressed or form of expression. Reduction of speech and/or comprehension, however, makes conversation. . . (see row details)  10. Dysarthria: 0-->Normal  11. Extinction and Inattention (formerly Neglect): 0-->No abnormality  Total (NIH Stroke Scale): 10       Modified [place] Score: 2  Ryan Coma Scale:    ABCD2 Score:    [id number] Score:   HAS -BLED Score:   ICH Score:   Hunt & Vizcaino Classification:      Hemorrhagic change of an Ischemic Stroke: Does this patient have an ischemic stroke with hemorrhagic changes? No     Neurologic Chief Complaint: left sided weakness     Subjective:     Interval History: Patient is seen for follow-up neurological assessment and treatment recommendations: Please see hospital course    HPI, Past Medical, Family, and Social History remains the same as documented in the initial encounter.     Review of Systems   Constitutional: Negative for chills and fever.   HENT: Positive for trouble swallowing. Negative for drooling.    Respiratory: Negative for choking and shortness of breath.    Gastrointestinal: Negative for nausea and vomiting.   Skin: Negative for color change and rash.    Allergic/Immunologic: Negative for immunocompromised state.   Neurological: Positive for facial asymmetry and speech difficulty.   Psychiatric/Behavioral: Negative for agitation and confusion.     Scheduled Meds:   atorvastatin  40 mg Oral Daily    clopidogrel  75 mg Oral Daily    heparin (porcine)  5,000 Units Subcutaneous Q8H    pantoprazole  40 mg Oral BID    senna-docusate 8.6-50 mg  1 tablet Oral Daily    sulfamethoxazole-trimethoprim 800-160mg  1 tablet Oral BID     Continuous Infusions:   lactated ringers 75 mL/hr at 06/11/19 0200     PRN Meds:acetaminophen, Dextrose 10% Bolus, glucagon (human recombinant), insulin aspart U-100, sodium chloride 0.9%    Objective:     Vital Signs (Most Recent):  Temp: 97.4 °F (36.3 °C) (06/11/19 1109)  Pulse: 90 (06/11/19 1109)  Resp: 17 (06/11/19 1109)  BP: 136/77 (06/11/19 1109)  SpO2: (!) 93 % (06/11/19 1109)  BP Location: Right arm    Vital Signs Range (Last 24H):  Temp:  [96.1 °F (35.6 °C)-97.7 °F (36.5 °C)]   Pulse:  [85-97]   Resp:  [17-18]   BP: (119-136)/(68-80)   SpO2:  [92 %-95 %]   BP Location: Right arm    Physical Exam   Constitutional: She appears well-developed and well-nourished. No distress.   HENT:   Head: Normocephalic and atraumatic.   Eyes: Pupils are equal, round, and reactive to light. EOM are normal.   Neck: Normal range of motion.   Cardiovascular: Normal rate.   Pulmonary/Chest: Effort normal. No respiratory distress.   Abdominal: Soft. She exhibits no distension.   Musculoskeletal: Normal range of motion. She exhibits no edema.   Neurological: She is alert.   Psychiatric: She has a normal mood and affect.   Nursing note and vitals reviewed.      Neurological Exam:   LOC: alert  Attention Span: Good   Language: Mild aphasia  Articulation: No dysarthria  Orientation: oriented to month and age  Visual Fields: Full  EOM (CN III, IV, VI): Full/intact  Pupils (CN II, III): PERRL  Facial Sensation (CN V): Normal  Facial Movement (CN VII): Lower  facial weakness on the Right  Motor: Arm left  Paresis: 4/5  Leg left  Paresis: 3/5  Arm right  Paresis: 3/5  Leg right Paresis: 3/5      Laboratory:  CMP:   Recent Labs   Lab 06/11/19  0504   CALCIUM 8.2*   ALBUMIN 2.0*   PROT 4.6*   *   K 3.7   CO2 23      BUN 9   CREATININE 0.6   ALKPHOS 48*   ALT 11   AST 19   BILITOT 0.5     CBC:   Recent Labs   Lab 06/11/19  0505   WBC 7.64   RBC 3.57*   HGB 11.2*   HCT 33.1*      MCV 93   MCH 31.4*   MCHC 33.8     Lipid Panel:   Recent Labs   Lab 06/06/19  1814   CHOL 141   LDLCALC 57.6*   HDL 69   TRIG 72     Hgb A1C:   Recent Labs   Lab 06/06/19  1831   HGBA1C 5.6     TSH:   Recent Labs   Lab 06/06/19  1611   TSH 3.270       Diagnostic Results     Brain Imaging     MRI brain WO (6/7/19):  Acute infarct in right basal ganglia + Cytotoxic cerebral edema  Remote left MCA infarct         Vessel Imaging     CTA H&N (6/7/19):  - No evidence for significant proximal stenosis or occlusion.  - Atherosclerotic disease carotid bifurcations and proximal ICAs with less than 50% proximal ICA stenosis by NASCET criteria.    Cardiac Imaging     TTE (6/7/19):  EF 40%  Apical aneursym.          Sherrill Avelar MD  Comprehensive Stroke Center  Department of Vascular Neurology   [facility]

## 2019-06-11 NOTE — PLAN OF CARE
06/11/19 1205   Post-Acute Status   Post-Acute Authorization Placement   Post-Acute Placement Status Patient/Family Changed Mind       Pt's daughter has changed her mind due to feeling that her Mom is not progressing and that she will need long-term care after. She wants her to go to  [facility].  Let [facility] know of daughters decision.  SW in contact with CM and Medical staff. Will continue to follow and offer support as needed.     Farhan Hein, SAMANTHA  [facility]   Ext. [phone]

## 2019-06-11 NOTE — PLAN OF CARE
06/11/19 1243   Post-Acute Status   Post-Acute Authorization Placement   Post-Acute Placement Status Pending State Certification       PASSR completed and 142 called in to the state. Awaiting 142 to be sent to the  to complete referrals for NH placement.   in contact with CM and Medical staff. Will continue to follow and offer support as needed.     Farhan Hein LMSW  [facility]   Ext. [phone]

## 2019-06-11 NOTE — PLAN OF CARE
Patient has been accepted to [facility]-pending insurance auth which was submitted yesterday.     06/11/19 1046   Post-Acute Status   Post-Acute Authorization Placement   Post-Acute Placement Status Pending Payor Review

## 2019-06-11 NOTE — PLAN OF CARE
06/11/19 1145   Post-Acute Status   Post-Acute Authorization Placement   Post-Acute Placement Status Authorization Obtained       Auth obtained for [facility] bed.  Doctor working on D/C orders. Once they are completed will send to [place] for final approval.  SW in contact with CM and Medical staff. Will continue to follow and offer support as needed.     Farhan Hein, SAMANTHA  [facility]   Ext. [phone]

## 2019-06-11 NOTE — SUBJECTIVE & OBJECTIVE
Neurologic Chief Complaint: left sided weakness     Subjective:     Interval History: Patient is seen for follow-up neurological assessment and treatment recommendations: Please see hospital course    HPI, Past Medical, Family, and Social History remains the same as documented in the initial encounter.     Review of Systems   Constitutional: Negative for chills and fever.   HENT: Positive for trouble swallowing. Negative for drooling.    Respiratory: Negative for choking and shortness of breath.    Gastrointestinal: Negative for nausea and vomiting.   Skin: Negative for color change and rash.   Allergic/Immunologic: Negative for immunocompromised state.   Neurological: Positive for facial asymmetry and speech difficulty.   Psychiatric/Behavioral: Negative for agitation and confusion.     Scheduled Meds:   atorvastatin  40 mg Oral Daily    clopidogrel  75 mg Oral Daily    heparin (porcine)  5,000 Units Subcutaneous Q8H    pantoprazole  40 mg Oral BID    senna-docusate 8.6-50 mg  1 tablet Oral Daily    sulfamethoxazole-trimethoprim 800-160mg  1 tablet Oral BID     Continuous Infusions:   lactated ringers 75 mL/hr at 06/11/19 0200     PRN Meds:acetaminophen, Dextrose 10% Bolus, glucagon (human recombinant), insulin aspart U-100, sodium chloride 0.9%    Objective:     Vital Signs (Most Recent):  Temp: 97.4 °F (36.3 °C) (06/11/19 1109)  Pulse: 90 (06/11/19 1109)  Resp: 17 (06/11/19 1109)  BP: 136/77 (06/11/19 1109)  SpO2: (!) 93 % (06/11/19 1109)  BP Location: Right arm    Vital Signs Range (Last 24H):  Temp:  [96.1 °F (35.6 °C)-97.7 °F (36.5 °C)]   Pulse:  [85-97]   Resp:  [17-18]   BP: (119-136)/(68-80)   SpO2:  [92 %-95 %]   BP Location: Right arm    Physical Exam   Constitutional: She appears well-developed and well-nourished. No distress.   HENT:   Head: Normocephalic and atraumatic.   Eyes: Pupils are equal, round, and reactive to light. EOM are normal.   Neck: Normal range of motion.   Cardiovascular:  Normal rate.   Pulmonary/Chest: Effort normal. No respiratory distress.   Abdominal: Soft. She exhibits no distension.   Musculoskeletal: Normal range of motion. She exhibits no edema.   Neurological: She is alert.   Psychiatric: She has a normal mood and affect.   Nursing note and vitals reviewed.      Neurological Exam:   LOC: alert  Attention Span: Good   Language: Mild aphasia  Articulation: No dysarthria  Orientation: oriented to month and age  Visual Fields: Full  EOM (CN III, IV, VI): Full/intact  Pupils (CN II, III): PERRL  Facial Sensation (CN V): Normal  Facial Movement (CN VII): Lower facial weakness on the Right  Motor: Arm left  Paresis: 4/5  Leg left  Paresis: 3/5  Arm right  Paresis: 3/5  Leg right Paresis: 3/5      Laboratory:  CMP:   Recent Labs   Lab 06/11/19  0504   CALCIUM 8.2*   ALBUMIN 2.0*   PROT 4.6*   *   K 3.7   CO2 23      BUN 9   CREATININE 0.6   ALKPHOS 48*   ALT 11   AST 19   BILITOT 0.5     CBC:   Recent Labs   Lab 06/11/19  0505   WBC 7.64   RBC 3.57*   HGB 11.2*   HCT 33.1*      MCV 93   MCH 31.4*   MCHC 33.8     Lipid Panel:   Recent Labs   Lab 06/06/19  1814   CHOL 141   LDLCALC 57.6*   HDL 69   TRIG 72     Hgb A1C:   Recent Labs   Lab 06/06/19  1831   HGBA1C 5.6     TSH:   Recent Labs   Lab 06/06/19  1611   TSH 3.270       Diagnostic Results     Brain Imaging     MRI brain WO (6/7/19):  Acute infarct in right basal ganglia + Cytotoxic cerebral edema  Remote left MCA infarct         Vessel Imaging     CTA H&N (6/7/19):  - No evidence for significant proximal stenosis or occlusion.  - Atherosclerotic disease carotid bifurcations and proximal ICAs with less than 50% proximal ICA stenosis by NASCET criteria.    Cardiac Imaging     TTE (6/7/19):  EF 40%  Apical aneursym.

## 2019-06-11 NOTE — PLAN OF CARE
1402-CM spoke to Katalina at [facility]-notified of patient's family changing their mind. She will release auth from [facility] and send to [place].    1331-CM left message for Katalina with [facility] to notify patient's family changed their mind from [facility] to [facility] SNF. Awaiting return call.

## 2019-06-11 NOTE — ASSESSMENT & PLAN NOTE
Secondary to stroke    - passed MBSS on 6/10  - started on mechanical soft, nectar thick  - continue speech and swallow therapy

## 2019-06-11 NOTE — PT/OT/SLP PROGRESS
Physical Therapy Treatment    Patient Name:  Michelle Rowell   MRN:  [id number]    Recommendations:     Discharge Recommendations:  nursing facility, skilled   Discharge Equipment Recommendations: (TBD)   Barriers to discharge: Decreased caregiver support at current functional level    Assessment:     Michelle Rowell is a 78 y.o. female admitted with a medical diagnosis of Embolic stroke involving right middle cerebral artery.  She presents with the following impairments/functional limitations:  weakness, impaired functional mobilty, impaired cognition, decreased safety awareness, gait instability, decreased coordination, impaired coordination, impaired balance, impaired self care skills, decreased lower extremity function, decreased upper extremity function, impaired skin, decreased ROM, impaired endurance. Pt continues to require increased assist to complete functional mobility. Pt able to maintain sitting balance briefly without physical assist; however, mostly requiring assist to maintain with poor postural control noted. Remains with increased fatigue, limiting activity tolerance. Pt would continue to benefit from skilled acute PT in order to address current deficits and progress functional mobility.     Rehab Prognosis: fair-good; patient would benefit from acute skilled PT services to address these deficits and reach maximum level of function.     Recent Surgery: * No surgery found *      Plan:     During this hospitalization, patient to be seen 4 x/week to address the identified rehab impairments via gait training, therapeutic activities, therapeutic exercises, neuromuscular re-education and progress toward the following goals:    · Plan of Care Expires:  07/08/19    Subjective     Chief Complaint: fatigue   Pain/Comfort:  · Pain Rating 1: 0/10      Objective:     Communicated with RN prior to session.  Patient found supine with bed alarm, PureWick, peripheral IV, telemetry, SCD upon PT entry to  room.   Therapy tech Vin present throughout session     General Precautions: Standard, fall, aspiration, nectar thick   Orthopedic Precautions:N/A   Braces: N/A     Functional Mobility:  · Bed Mobility:    ? Rolling Right: maximal assistance with side rail and HOB elevated  ? Scooting: max assistance and of 2 persons, seated lateral scooting x1 rep   ? Supine to Sit: maximal assistance with side rail and HOB elevated  ? With cues for sequencing and technique   ? Sit to Supine: maximal assistance with side rail   · Transfers:     ? Sit to Stand:  maximal assistance and of 2 persons, x2 reps   ? Max cues for hand and foot placement, anterior weight-shift, and use of forward momentum   ? Decreased ability to achieve full upright position with pt demo'ing increased knee flex and hip flex BLE, forward flexed posture, and posterior lean  · Balance: sitting: ranged from CGA-maxA (mostly modA throughout)  ? Demo'd increased L posterolateral lean. Required increased assist for anterior weight-shifting and maintaining midline. Cues throughout for postural control and balance corrections      AM-PAC 6 CLICK MOBILITY  Turning over in bed (including adjusting bedclothes, sheets and blankets)?: 2  Sitting down on and standing up from a chair with arms (e.g., wheelchair, bedside commode, etc.): 2  Moving from lying on back to sitting on the side of the bed?: 2  Moving to and from a bed to a chair (including a wheelchair)?: 1  Need to walk in hospital room?: 1  Climbing 3-5 steps with a railing?: 1  Basic Mobility Total Score: 9       Therapeutic Activities and Exercises:   Sitting EOB x~20-25 min with assist ranging from CGA-maxA. Increased L posterolateral lean noted. Cues provided for upright posture, midline position, balance corrections, appropriate weight-shifts, anterior pelvic tilt, and thoracic ext. Mirror in front of pt as visual cues for assist with postural corrections. Improved ability to complete weight-shifts with  B HHA. Performed reaching off ARELY in alternate directions; using LUE to assist RUE to achieve target; Regino provided for appropriate weight-shifts.   Static standing x2 trials with maxAx2, x~15 sec. Demo'd increased B knee and hip flex, forward flexed posture, increased posterior lean. Max cues for appropriate weight-shift, increased hip and knee ext, glute activation, upright posture, and forward gaze. Seated rest between standing trials.     Patient left supine with all lines intact, call button in reach, bed alarm on, RN notified and pt's daughter present..    GOALS:   Multidisciplinary Problems     Physical Therapy Goals        Problem: Physical Therapy Goal    Goal Priority Disciplines Outcome Goal Variances Interventions   Physical Therapy Goal     PT, PT/OT Ongoing (interventions implemented as appropriate)     Description:  Goals to be met by: 2019     Patient will increase functional independence with mobility by performin. Supine to sit with Stand-by Assistance  2. Sit to supine with Stand-by Assistance  3. Sit to stand transfer with Stand-by Assistance  4. Bed to chair transfer with Stand-by Assistance using Rolling Walker  5. Gait  x 100 feet with Stand-by Assistance using Rolling Walker.   6. Lower extremity exercise program x15 reps per handout, with supervision                      Time Tracking:     PT Received On: 19  PT Start Time: 1253     PT Stop Time: 1321  PT Total Time (min): 28 min     Billable Minutes: Therapeutic Activity 8 and Neuromuscular Re-education 20    Treatment Type: Treatment  PT/PTA: PT     PTA Visit Number: 0     Ashia Tavera, PT, DPT   2019

## 2019-06-11 NOTE — PHYSICIAN QUERY
PT Name: Michelle Rowell  MR #: [id number]     PHYSICIAN QUERY -  ELECTROLYTE CLARIFICATION      CDS/: Ebony Lozano RN, CDS               Contact information: [email or web address]  This form is a permanent document in the medical record.     Query Date: June 11, 2019    By submitting this query, we are merely seeking further clarification of documentation to reflect the severity of illness of your patient. Please utilize your independent clinical judgment when addressing the question(s) below.    The Medical record reflects the following:     Indicators   Supporting Clinical Findings Location in Medical Record   x Lab Value(s)   Potassium 3.9 3.8 3.3 (L) 3.7       Magnesium 1.1 (L) 1.1 (L) 1.1 (L) 1.2 (L) 1.5 (L)        Lab 6/8->6/11      Labs 6/7->6/11   x Treatment                                 Medication magnesium sulfate 2g in water 50mL IVPB x 1 (on 6/10)  magnesium sulfate 2g in water 50mL IVPB x 1  (due 6/11)    potassium chloride 10% oral solution 20 mEq PO x1 (6/10) MAR      MAR     x Other --Electrolyte abnormalities (Mg, K) replaced     Vas Neuro Note 6/10     Provider, please specify the diagnosis or diagnoses that correspond(s) to the above indicators. [place] all that apply:    [ x  ] Hypokalemia   [ x  ] Hypomagnesemia   [   ] Other electrolyte disturbance (please specify): _______   [   ]  Clinically Undetermined       Please document in your progress notes daily for the duration of treatment until resolved, and include in your discharge summary.

## 2019-06-11 NOTE — PLAN OF CARE
06/11/19 1617   Post-Acute Status   Post-Acute Authorization Placement       142 received. Updates, 142 and PASSR sent to [facility] for admit. SW in contact with CM and Medical staff. Will continue to follow and offer support as needed.     Farhan Hein LMSW  [facility]   Ext. [phone]

## 2019-06-11 NOTE — PLAN OF CARE
Problem: SLP Goal  Goal: SLP Goal  Speech Language Pathology Goals  Goals expected to be met by 6/15/19    1. Pt will tolerate a MECHANICAL SOFT DIET/NECTAR-THICK LIQUIDS with no s/s of aspiration (MBSS 6/10).  2. Pt will participate in Speech Language Cognitive evaluation to determine the need for additional goals (met & ongoing).  3. Pt will complete dysphagia and oral motor exercises x10 per session with mod assist in order to strengthen oropharyngeal swallow.  4. Pt will recall and utilize three clear speech strategies per session with min assist.  5. Pt will name 12 items in a category in one minute with min assist.     6. Pt will recall 4/4 unrelated objects with a 5-minute delay and min assist.   7. Pt will participate in ongoing assessment of reading/writing/visuospatial skills.  8. Educate patient and family regarding role of ST in patient's POC.        Patient seen for a speech/language/cognitive eval as well as ongoing dysphagia therapy.     Fredo Fry CF-SLP  Speech-Language Pathology  Pager: [phone]

## 2019-06-11 NOTE — PT/OT/SLP EVAL
"Speech Language Pathology Evaluation  Cognitive Communication    Patient Name:  Michelle Rowell   MRN:  [id number]  Admitting Diagnosis: Embolic stroke involving right middle cerebral artery    Recommendations:     Recommendations:                General Recommendations:  Dysphagia therapy and Speech/language therapy  Diet recommendations:  Mechanical soft, [place] Thick   Aspiration Precautions: 1 bite/sip at a time, Assistance with meals and Assistance with thickening liquids, Check for pocketing/oral residue, Double swallow with each bite/sip, Eliminate distractions, HOB to 90 degrees, Meds crushed in puree, Monitor for s/s of aspiration, No straws, Small bites/sips and Strict aspiration precautions   General Precautions: Standard, aspiration, fall, nectar thick  Communication strategies:  provide increased time to answer and go to room if call light pushed    History:     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    Stroke     Type 2 diabetes mellitus with neurologic complication 12/1/2015       Past Surgical History:   Procedure Laterality Date    CORONARY ARTERY BYPASS GRAFT      [place] FILTER PLACEMENT      REMOVAL-IVC FILTER N/A 6/6/2016    Performed by [facility] at [facility] OR 2ND FLR    THROMBECTOMY      TONSILLECTOMY      TUBAL LIGATION      US PSEUDOANEURYSM REPAIR INC IMAGING  1/11/2016            Social History: Patient lives with her daughter in [place], [place].    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 She enjoys watching reality TV shows and playing games on her phone.    Subjective     Patient awake and alert during session  "It was delicious!" Patient referring to " lunch   Communicated with nurse prior to session     Pain/Comfort:  · Pain Rating 1: 0/10  · Pain Rating Post-Intervention 1: 0/10    Objective:   Cognitive Status:    Arousal/Alertness Appropriate response to stimuli  Attention No obvious deficits observed throughout session  Orientation Oriented x4  Memory Immediate Recall: WFL, Delayed Recall: independently recalled 2/4 unrelated objects after a 5-minute delay, increasing to 3/4 with mod assist and recall general information: WFL  Problem Solving Conclusions: WFL, Sequencing: WFL and Compare/contrast: WFL  Simple calculation WFL  Reasoning Cause/effect: WFL      Receptive Language:   Comprehension:   Questions Complex yes/no: WFL  Open ended questions WFL  Commands  multistep basic commands: WFL    Pragmatics:    Patient with a somewhat flat affect     Expressive Language:  Verbal:    Repetition Sentences: WFL  Naming Confrontation: WFL, Convergent: WFL and Divergent responsive: independently named 7 objects in a category in one minute, increasing to 8 with min assist  Sentence formulation WFL      Motor Speech:  Moderate dysarthria across contexts that worsens as utterance length increases.    Voice:   Patient with adequate vocal quality, but with severely diminished vocal intensity    Visual-Spatial:  WFL    Reading:   TBA     Written Expression:   TBA    Treatment: Patient seen for a speech/language/cognitive eval as well as ongoing dysphagia therapy. Patient was sitting up in bed during session. She reported no difficulties with current diet level and was pleased with being able to resume diet. She recalled her current diet level, the results of the MBSS and safe swallow precautions with min assist. Patient stated that she felt as if she was at baseline with the exception of her speech. During PO trials, she tolerated nectar-thick liquids x5 (via cup-edge) and solids x2 (андрей crackers) with no overt signs of aspiration. She exhibited mild oral residue after  solid trials which she cleared effectively with lingual sweeps and a liquid wash. SLP educated her regarding POC and she verbalized understanding. No further questions. Whiteboard updated. Patient left in bed with call light within reach.    Assessment:   Michelle Rowell is a 78 y.o. female with an SLP diagnosis of Dysphagia, Dysarthria and Cognitive-Linguistic Impairment.     Goals:   Multidisciplinary Problems     SLP Goals        Problem: SLP Goal    Goal Priority Disciplines Outcome   SLP Goal     SLP Ongoing (interventions implemented as appropriate)   Description:  Speech Language Pathology Goals  Goals expected to be met by 6/15/19    1. Pt will tolerate a MECHANICAL SOFT DIET/NECTAR-THICK LIQUIDS with no s/s of aspiration (MBSS 6/10).  2. Pt will participate in Speech Language Cognitive evaluation to determine the need for additional goals (met & ongoing).  3. Pt will complete dysphagia and oral motor exercises x10 per session with mod assist in order to strengthen oropharyngeal swallow.  4. Pt will recall and utilize three clear speech strategies per session with min assist.  5. Pt will name 12 items in a category in one minute with min assist.     6. Pt will recall 4/4 unrelated objects with a 5-minute delay and min assist.   7. Pt will participate in ongoing assessment of reading/writing/visuospatial skills.  8. Educate patient and family regarding role of ST in patient's POC.                          Plan:   · Patient to be seen:  4 x/week   · Plan of Care expires:  07/07/19  · Plan of Care reviewed with:  patient   · SLP Follow-Up:  Yes       Discharge recommendations:  Discharge Facility/Level of Care Needs: nursing facility, skilled   Barriers to Discharge:  Level of Skilled Assistance Needed     Time Tracking:   SLP Treatment Date:   06/11/19  Speech Start Time:  1346  Speech Stop Time:  1408     Speech Total Time (min):  22 min    Billable Minutes: Eval 14  and Treatment Swallowing Dysfunction  8    Fredo Fry CF-SLP  Speech-Language Pathology  Pager: [phone]   06/11/2019

## 2019-06-11 NOTE — ASSESSMENT & PLAN NOTE
Apical aneurysm, increased risk of stroke    - outpatient referral to cardiology upon discharge to work up etiology and management

## 2019-06-11 NOTE — PLAN OF CARE
Problem: Occupational Therapy Goal  Goal: Occupational Therapy Goal  Goals set 6/9 to be addressed for 14 days with expiration date, 6/23:  Patient will increase functional independence with ADLs by performing:    Patient will demonstrate rolling to the right with min assist.  Not met   Patient will demonstrate rolling to the left with min assist.   Not met  Patient will demonstrate supine -sit with min assist.   Not met  Patient will demonstrate stand pivot transfers with min assist.   Not met  Patient will demonstrate grooming while standing with min assist.   Not met  Patient will demonstrate upper body dressing with min assist while seated EOB.   Not met  Patient will demonstrate lower body dressing with mod assist while seated EOB.   Not met  Patient will demonstrate toileting with min assist.   Not met  Patient will demonstrate bathing while seated EOB with min assist.   Not met  Patient's family / caregiver will demonstrate independence and safety with assisting patient with self-care skills and functional mobility.     Not met  Patient and/or patient's family will verbalize understanding of stroke prevention guidelines, personal risk factors and stroke warning signs via teachback method.  Not met          Goals remain appropriate.  DARREL Rebolledo  6/11/2019

## 2019-06-11 NOTE — PROGRESS NOTES
"[facility]  Adult Nutrition  Progress Note    SUMMARY       Recommendations    Recommendation:   1. Continue 2000kcal DM mechanical soft diet with nectar thick liquids   2. Consider adding boost pudding tid if PO intake <75%    Goals: Meet % EEN, EPN    Nutrition Goal Status: progressing towards goal  Communication of RD Recs: other (comment)(POC)    Reason for Assessment    Reason For Assessment: RD follow-up  Diagnosis: other (see comments)(CVA)  Relevant Medical History: HTN, HLD, DM  Interdisciplinary Rounds: did not attend    General Information Comments: Pt unable to provide meaningful history. On mechanical soft diet with nectar thick liquids per SLP. Good PO intake noted. Pt nods when asked if PO intake is good. Wt relatively stable per chart +/- 5 lbs over the past year. Nutrition focused physical exam performed, pt has some moderate muscle depletion c/w age but no physical s/s of malnutrition at this time.     Nutrition Discharge Planning: Unable to determine    Nutrition Risk Screen    Nutrition Risk Screen: no indicators present    Nutrition/Diet History    Patient Reported Diet/Restrictions/Preferences: other (see comments)(KEEGAN)  Spiritual, Cultural Beliefs, Methodist Practices, Values that Affect Care: no  Factors Affecting Nutritional Intake: difficulty/impaired swallowing    Anthropometrics    Temp: 97.4 °F (36.3 °C)  Height: 5' 8" (172.7 cm)  Height (inches): 68 in  Weight Method: Bed Scale  Weight: 65.6 kg (144 lb 10 oz)  Weight (lb): 144.62 lb  Ideal Body Weight (IBW), Female: 140 lb  % Ideal Body Weight, Female (lb): 103.57 lb  BMI (Calculated): 22.1  BMI Grade: 18.5-24.9 - normal     Lab/Procedures/Meds    Pertinent Labs: Reviewed  Lab Results   Component Value Date     (L) 06/11/2019    CALCIUM 8.2 (L) 06/11/2019    PHOS 2.1 (L) 06/11/2019    MG 1.5 (L) 06/11/2019    ALBUMIN 2.0 (L) 06/11/2019     POCT Glucose   Date Value Ref Range Status   06/11/2019 128 (H) 70 " - 110 mg/dL Final   06/11/2019 98 70 - 110 mg/dL Final   06/10/2019 146 (H) 70 - 110 mg/dL Final   06/10/2019 156 (H) 70 - 110 mg/dL Final   06/10/2019 71 70 - 110 mg/dL Final   06/09/2019 83 70 - 110 mg/dL Final   06/09/2019 99 70 - 110 mg/dL Final     Lab Results   Component Value Date    HGBA1C 5.6 06/06/2019     Pertinent Meds: Reviewed  Scheduled Meds:   atorvastatin  40 mg Oral Daily    clopidogrel  75 mg Oral Daily    heparin (porcine)  5,000 Units Subcutaneous Q8H    pantoprazole  40 mg Oral BID    senna-docusate 8.6-50 mg  1 tablet Oral Daily    sulfamethoxazole-trimethoprim 800-160mg  1 tablet Oral BID     Continuous Infusions:   lactated ringers 75 mL/hr at 06/11/19 0200     Estimated/Assessed Needs    Weight Used For Calorie Calculations: 65.6 kg (144 lb 10 oz)  Energy Calorie Requirements (kcal): 2068-1028  Energy Need Method: Kcal/kg(25-30 kcal/lkg)  Protein Requirements: 66-79 gm/d (1-1.2 gm/kg)  Weight Used For Protein Calculations: 65.6 kg (144 lb 10 oz)  Fluid Requirements (mL): 1650(or per team)  Estimated Fluid Requirement Method: RDA Method  RDA Method (mL): 1650  CHO Requirement: 50% total kcals    Nutrition Prescription Ordered    Current Diet Order: mechanical soft 2000kcal DM  Nutrition Order Comments: nectar thick liquids    Evaluation of Received Nutrient/Fluid Intake    % Intake of Estimated Energy Needs: 50 - 75 %  % Meal Intake: 50 - 75 %    Nutrition Risk    Level of Risk/Frequency of Follow-up: low     Assessment and Plan    Nutrition Problem  difficulty swallowing    Related to (etiology):   CVA    Signs and Symptoms (as evidenced by):   SLP recommending mechanical soft diet with nectar thick liquids     Interventions:  Collaboration w/ providers    Nutrition Diagnosis Status:   New    Monitor and Evaluation    Food and Nutrient Intake: energy intake, food and beverage intake  Food and Nutrient Adminstration: diet order  Physical Activity and Function: nutrition-related ADLs  and IADLs  Anthropometric Measurements: weight, weight change  Biochemical Data, Medical Tests and Procedures: inflammatory profile, lipid profile, glucose/endocrine profile, gastrointestinal profile, electrolyte and renal panel  Nutrition-Focused Physical Findings: overall appearance     Malnutrition Assessment    Orbital Region (Subcutaneous Fat Loss): well nourished  Upper Arm Region (Subcutaneous Fat Loss): well nourished   [place] Region (Muscle Loss): well nourished  Clavicle Bone Region (Muscle Loss): moderate depletion  Clavicle and Acromion Bone Region (Muscle Loss): well nourished  Patellar Region (Muscle Loss): well nourished  Anterior Thigh Region (Muscle Loss): well nourished     Nutrition Follow-Up    RD Follow-up?: Yes

## 2019-06-11 NOTE — PT/OT/SLP PROGRESS
"Occupational Therapy   Treatment    Name: Michelle Rowell  MRN: [id number]  Admitting Diagnosis:  Embolic stroke involving right middle cerebral artery       Recommendations:     Discharge Recommendations: nursing facility, skilled  Discharge Equipment Recommendations:  (TBD closer to d/c)  Barriers to discharge:  Inaccessible home environment, Decreased caregiver support    Assessment:     Michelle Rowell is a 78 y.o. female with a medical diagnosis of Embolic stroke involving right middle cerebral artery.  She presents with performance deficits affecting function are weakness, impaired self care skills, impaired balance, decreased coordination, decreased safety awareness, impaired endurance, impaired functional mobilty, decreased upper extremity function, gait instability, decreased lower extremity function.     Rehab Prognosis:  Good; patient would benefit from acute skilled OT services to address these deficits and reach maximum level of function.       Plan:     Patient to be seen 3 x/week to address the above listed problems via neuromuscular re-education, self-care/home management, therapeutic activities, therapeutic exercises  · Plan of Care Expires: 07/06/19  · Plan of Care Reviewed with: patient, daughter    Subjective   Patient: "I like to watch TV and play on my phone."  Dtg:  "She is doing much better today than when she got up yesterday."    Pain/Comfort:  · Pain Rating 1: 0/10  · Pain Rating Post-Intervention 1: 0/10    Objective:     Communicated with: Nurse prior to session.  Patient found supine with telemetry, peripheral IV, PureWick, bed alarm upon OT entry to room.  Dtg present.    General Precautions: Standard, aspiration, fall, nectar thick   Orthopedic Precautions:N/A   Braces: N/A     Occupational Performance:     Bed Mobility:    · Patient completed Rolling/Turning to Right with minimum assistance  · Patient completed Supine to Sit with moderate assistance  · Patient completed Sit " to Supine with moderate assistance     Functional Mobility/Transfers:  · Patient completed Sit <> Stand Transfer with maximal assistance  with  no assistive device   · Patient completed Bed <> Chair Transfer using Stand Pivot technique with maximal assistance with no assistive device    Activities of Daily Living:  · Grooming: minimum assistance while seated EOB  · Upper Body Dressing: moderate assistance while seated EOB  · Lower Body Dressing: total assistance while seated EOB      AMPAC 6 Click ADL: 13    Treatment & Education:  Patient/ Family education provided for stroke warning signs, prevention guidelines and personal risk factors.  Patient/ Family verbalizing understanding via teach back method.  Patient education provided on role of OT and need for SNF upon discharge.  Patient education provided on dressing technique, positioning, postural control, and transfers.  Continued education, patient/ family training recommended.  Patient alert and oriented x 3; able to follow 4/4 one step commands.  Patient attentive and interactive throughout the session.  Patient able to identify 4/4 body parts.  Able to name 3/3 objects.  Able to sequence 7/7 days of the week and 12/12 months of the year.  CGA-SBA with postural control while seated EOB; increased weight shift to the right, able to self-correct.  Drooling noted right side.   Patient's functional status and disposition recommendation discussed with stroke team in daily rounds.  White board updated in patient's room.  OT asked if there were any other questions; patient/ family had no further questions.    Patient left supine with all lines intact and call button in reachEducation:      GOALS:   Multidisciplinary Problems     Occupational Therapy Goals        Problem: Occupational Therapy Goal    Goal Priority Disciplines Outcome Interventions   Occupational Therapy Goal     OT, PT/OT     Description:  Goals set 6/9 to be addressed for 14 days with expiration  date, 6/23:  Patient will increase functional independence with ADLs by performing:    Patient will demonstrate rolling to the right with min assist.  Not met   Patient will demonstrate rolling to the left with min assist.   Not met  Patient will demonstrate supine -sit with min assist.   Not met  Patient will demonstrate stand pivot transfers with min assist.   Not met  Patient will demonstrate grooming while standing with min assist.   Not met  Patient will demonstrate upper body dressing with min assist while seated EOB.   Not met  Patient will demonstrate lower body dressing with mod assist while seated EOB.   Not met  Patient will demonstrate toileting with min assist.   Not met  Patient will demonstrate bathing while seated EOB with min assist.   Not met  Patient's family / caregiver will demonstrate independence and safety with assisting patient with self-care skills and functional mobility.     Not met  Patient and/or patient's family will verbalize understanding of stroke prevention guidelines, personal risk factors and stroke warning signs via teachback method.  Not met                           Time Tracking:     OT Date of Treatment: 06/11/19  OT Start Time: 0816  OT Stop Time: 0842  OT Total Time (min): 26 min    Billable Minutes:Self Care/Home Management 26    DARREL Rebolledo  6/11/2019

## 2019-06-11 NOTE — SUBJECTIVE & OBJECTIVE
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    Stroke     Type 2 diabetes mellitus with neurologic complication 12/1/2015     Past Surgical History:   Procedure Laterality Date    CORONARY ARTERY BYPASS GRAFT      JARED FILTER PLACEMENT      REMOVAL-IVC FILTER N/A 6/6/2016    Performed by [facility] Provider at [facility] OR [address] [place] [address]    THROMBECTOMY      TONSILLECTOMY      TUBAL LIGATION      US PSEUDOANEURYSM REPAIR INC IMAGING  1/11/2016          Review of patient's allergies indicates:  No Known Allergies    Scheduled Medications:    atorvastatin  40 mg Oral Daily    clopidogrel  75 mg Oral Daily    heparin (porcine)  5,000 Units Subcutaneous Q8H    magnesium sulfate IVPB  2 g Intravenous Once    pantoprazole  40 mg Oral BID    potassium, sodium phosphates  2 packet Oral Once    senna-docusate 8.6-50 mg  1 tablet Oral Daily    sulfamethoxazole-trimethoprim 800-160mg  1 tablet Oral BID       PRN Medications: acetaminophen, Dextrose 10% Bolus, glucagon (human recombinant), insulin aspart U-100, sodium chloride 0.9%    Family History     Problem Relation (Age of Onset)    Cancer Father    Heart disease Father    Thyroid disease Daughter        Tobacco Use    Smoking status: Former Smoker    Smokeless tobacco: Never Used    Tobacco comment: quit in 1990s   Substance and Sexual Activity    Alcohol use: No    Drug use: No    Sexual activity: Not on file     Review of Systems   Constitutional: Positive for activity change. Negative for fever.   HENT: Positive for trouble swallowing and voice change.    Respiratory: Negative for cough and shortness of breath.    Cardiovascular: Negative for chest pain and palpitations.   Gastrointestinal: Negative for  nausea and vomiting.   Musculoskeletal: Positive for arthralgias and gait problem.   Neurological: Positive for facial asymmetry and weakness.   Psychiatric/Behavioral: Negative for agitation and confusion.     Objective:     Vital Signs (Most Recent):  Temp: 96.3 °F (35.7 °C) (06/11/19 0513)  Pulse: 93 (06/11/19 0754)  Resp: 18 (06/11/19 0513)  BP: 123/68 (06/11/19 0513)  SpO2: (!) 93 % (06/11/19 0513)    Vital Signs (24h Range):  Temp:  [96.1 °F (35.6 °C)-97.7 °F (36.5 °C)] 96.3 °F (35.7 °C)  Pulse:  [85-97] 93  Resp:  [17-18] 18  SpO2:  [92 %-95 %] 93 %  BP: (119-133)/(68-90) 123/68     Body mass index is 21.99 kg/m².    Physical Exam   Constitutional: She is oriented to person, place, and time. She appears well-developed and well-nourished.   HENT:   Head: Normocephalic and atraumatic.   Eyes: Right eye exhibits no discharge. Left eye exhibits no discharge.   Neck: Neck supple.   Pulmonary/Chest: Effort normal. No respiratory distress.   On NC    Abdominal: Soft. There is no tenderness.   Musculoskeletal: She exhibits no edema or deformity.   RLE weakness    Neurological: She is alert and oriented to person, place, and time.   + facial asymmetry and dysarthria  Follows commands    Skin: Skin is warm and dry.   Psychiatric: She has a normal mood and affect. Her behavior is normal.   Vitals reviewed.    NEUROLOGICAL EXAMINATION:     MENTAL STATUS   Oriented to person, place, and time.       Diagnostic Results: Labs: Reviewed

## 2019-06-11 NOTE — PHYSICIAN QUERY
"PT Name: Michelle Rowell  MR #: [id number]    Physician Query Form - Heart  Condition Clarification     CDS/: Ebony Lozano RN, CDS               Contact information: [email or web address]  This form is a permanent document in the medical record.     Query Date: June 11, 2019    By submitting this query, we are merely seeking further clarification of documentation. Please utilize your independent clinical judgment when addressing the question(s) below.    The medical record contains the following   Indicators     Supporting Clinical Findings Location in Medical Record    BNP     x EF EF: 40%   TTE 6/7   x Radiology findings -- The cardiac silhouette is within normal limits considering portable technique. No focal infiltrate or effusion    --Heart size normal.  No pleural effusion. CXR 6/6        CXR 6/7     x Echo Results · Mildly decreased left ventricular systolic function.The estimated ejection fraction is 40%   · Apico septal and apico inferior part of the apex is aneursymal.  · Concentric left ventricular remodeling.  · Indeterminate left ventricular diastolic function.  · Normal right ventricular systolic function   TTE 6/7    "Ascites" documented      "SOB" or "GOMEZ" documented      "Hypoxia" documented      Heart Failure documented      "Edema" documented      Diuretics/Meds     x Treatment: --Normal saline gtt. Held due to EF 40%   Vas Neuro Note 6/8   x Other:  --TTE w/ apical septum and inferior apical aneurysm w/ EF 40% which is a cardiomyopathy not present on last echo   --Essential hypertension   -- Atrial septal aneurysm       CPK 62   CPK MB 0.8   MB% 1.3   Troponin I <0.006    Vas Neuro Note 6/7              Labs 6/6 @1405   Heart failure (HF) can be acute, chronic or both. It is generally further specificed as systolic, diastolic, or combined. Lastly, it is important to identify an underlying etiology if known or suspected.   Common clues to acute exacerbation:  Rapidly " progressive symptoms (w/in 2 weeks of presentation), using IV diuretics to treat, using supplemental O2, pulmonary edema on Xray, MI w/in 4 weeks, and/or BNP >500  Systolic Heart Failure: is defined as chart documentation of a left ventricular ejection fraction (LVEF) less than 40%   Diastolic Heart Failure: is defined as a left ventricular ejection fraction (LVEF) greater than 40%   +      Evidence of diastolic dysfunction on echocardiography OR    Right heart catheterization wedge pressure above 12 mm Hg OR    Left heart catheterization left ventricular end diastolic pressure 18 mm Hg or above.    References: *American Heart Association    The clinical guidelines noted below are only system guidelines, and do not replace the providers clinical judgment.         Provider, please specify the diagnosis associated with above clinical findings      [   ] Acute Systolic Heart Failure - New diagnosis.  EF < 40%  and acute HF symptoms documented  [ x  ] Chronic Systolic Heart Failure - Pre-existing systolic HF diagnosis.  EF < 40%  without  acute HF symptoms documented    [   ] Other Type of Heart Failure (please specify type): _________________________    [   ] Other diagnosis (please specify): ___________________________________    [   ] Clinically Undetermined                          Please document in your progress notes daily for the duration of treatment until resolved and include in your discharge summary.

## 2019-06-11 NOTE — ASSESSMENT & PLAN NOTE
79 y/o F with CAD, HTN, HLD, DM II, and previous L MCA stroke, p/w R MCA syndrome. s/p tPA. No LVO on CTA, but mild bilateral carotid artery atherosclerosis. MRI with R BG infarct. TTE with apical aneurysm. 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     - Antithrombotics for secondary stroke prevention: switched from aspirin to plavix 75mg qd upon admission   - Statins for secondary stroke prevention and hyperlipidemia, if present: Statins: Atorvastatin- 40 mg daily  - Aggressive risk factor modification: HTN, DM, HLD, Diet, Exercise, CAD  - Rehab efforts:    PT evaluate and treat, OT evaluate and treat, recommending SNF, pending insurance authorization   SLP evaluate and treat, recommending mechanical soft and nectar thick  - Diagnostics ordered/pending: None  - VTE prophylaxis: Heparin 5000 units SQ every 8 hours, SCDs  - BP parameters: Infarct: Post tPA, SBP <180  - Patient needs ILR upon discharge

## 2019-06-11 NOTE — PROGRESS NOTES
[facility]  Physical Medicine & Rehab  Progress Note    Patient Name: Michelle Rowell  MRN: [id number]  Admission Date: 6/6/2019  Length of Stay: 5 days  Attending Physician: Johnny Georges MD    Subjective:     Principal Problem:Embolic stroke involving right middle cerebral artery     (06/11/2019): Patient is seen for follow-up rehab evaluation and recommendations.  Participating with therapy.     Hospital Course:   06/8/2019: Bed mobility Christian .  Sit to stand Christian.  Ambulated 12 small steps Christian & RW. . SLP diagnosis of Dysphagia  06/9/2019: Bed mobility ModA.  Sit to stand and transfers ModA.  UBD MaxA and LBD MaxA. Grooming Christian.   06/10/2019: Bed mobility Max-TA x 2 ppl.  Sit to stand MaxA.      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    Stroke     Type 2 diabetes mellitus with neurologic complication 12/1/2015     Past Surgical History:   Procedure Laterality Date    CORONARY ARTERY BYPASS GRAFT      [place] FILTER PLACEMENT      REMOVAL-IVC FILTER N/A 6/6/2016    Performed by [facility] Provider at [facility] OR [address] [place] [address]    THROMBECTOMY      TONSILLECTOMY      TUBAL LIGATION      US PSEUDOANEURYSM REPAIR INC IMAGING  1/11/2016          Review of patient's allergies indicates:  No Known Allergies    Scheduled Medications:    atorvastatin  40 mg Oral Daily    clopidogrel  75 mg Oral Daily    heparin (porcine)  5,000 Units Subcutaneous Q8H    magnesium sulfate IVPB  2 g Intravenous Once    pantoprazole  40 mg Oral BID    potassium, sodium phosphates  2 packet Oral Once    senna-docusate 8.6-50 mg  1 tablet Oral Daily    sulfamethoxazole-trimethoprim 800-160mg  1 tablet Oral BID       PRN Medications: acetaminophen, Dextrose  10% Bolus, glucagon (human recombinant), insulin aspart U-100, sodium chloride 0.9%    Family History     Problem Relation (Age of Onset)    Cancer Father    Heart disease Father    Thyroid disease Daughter        Tobacco Use    Smoking status: Former Smoker    Smokeless tobacco: Never Used    Tobacco comment: quit in 1990s   Substance and Sexual Activity    Alcohol use: No    Drug use: No    Sexual activity: Not on file     Review of Systems   Constitutional: Positive for activity change. Negative for fever.   HENT: Positive for trouble swallowing.  Gastrointestinal: Negative for nausea and vomiting.   Musculoskeletal: Positive for arthralgias and gait problem.   Neurological: Positive for facial asymmetry and weakness.      Objective:     Vital Signs (Most Recent):  Temp: 96.3 °F (35.7 °C) (06/11/19 0513)  Pulse: 93 (06/11/19 0754)  Resp: 18 (06/11/19 0513)  BP: 123/68 (06/11/19 0513)  SpO2: (!) 93 % (06/11/19 0513)    Vital Signs (24h Range):  Temp:  [96.1 °F (35.6 °C)-97.7 °F (36.5 °C)] 96.3 °F (35.7 °C)  Pulse:  [85-97] 93  Resp:  [17-18] 18  SpO2:  [92 %-95 %] 93 %  BP: (119-133)/(68-90) 123/68     Body mass index is 21.99 kg/m².    Physical Exam   Constitutional: She is oriented to person, place, and time. She appears well-developed and well-nourished.   HENT:   Head: Normocephalic and atraumatic.   Eyes: Right eye exhibits no discharge. Left eye exhibits no discharge.   Neck: Neck supple.   Pulmonary/Chest: Effort normal. No respiratory distress.   On NC    Abdominal: Soft. There is no tenderness.   Musculoskeletal: She exhibits no edema or deformity.   RLE weakness    Neurological: She is alert and oriented to person, place, and time.   + facial asymmetry and dysarthria  Follows commands    Skin: Skin is warm and dry.   Psychiatric: She has a normal mood and affect. Her behavior is normal.   Vitals reviewed.      Diagnostic Results: Labs: Reviewed    Assessment/Plan:      * Embolic stroke involving  right middle cerebral artery  See hospital course for functional, cognitive/speech/language, and nutrition/swallow status.      Recommendations  -  Encourage mobility, OOB in chair at least 3 hours per day, and early ambulation as appropriate   -  PT/OT evaluate and treat  -  SLP speech and cognitive evaluate and treat  -  Monitor sleep disturbances and establish consistent sleep-wake cycle  -  Monitor for bowel and bladder dysfunction  -  Monitor for shoulder pain, subluxation, & spasticity  -  Monitor for and prevent skin breakdown and pressure ulcers  · Early mobility, repositioning/weight shifting every 20-30 minutes when sitting, turn patient every 2 hours, proper mattress/overlay and chair cushioning, pressure relief/heel protector boots  -  DVT prophylaxis (if appropriate)  -  Reviewed discharge options (IP rehab, SNF, HH therapy, and OP therapy)    UTI (urinary tract infection), uncomplicated  -on bactrim x 3 days     Oropharyngeal dysphagia  -passed MBSS     Aneurysm of heart  -seen on Echo     Right hemiplegia  -residual from previous stroke      Recommend Inpatient Rehab per ; however, daughter requests SNF. Per daughter, patient has been medically accepted to [facility] pending insurance auth.         Melody Locke NP  Department of Physical Medicine & Rehab   [facility]

## 2019-06-12 VITALS
OXYGEN SATURATION: 93 % | RESPIRATION RATE: 20 BRPM | DIASTOLIC BLOOD PRESSURE: 87 MMHG | BODY MASS INDEX: 21.92 KG/M2 | HEART RATE: 104 BPM | TEMPERATURE: 98 F | WEIGHT: 144.63 LBS | SYSTOLIC BLOOD PRESSURE: 135 MMHG | HEIGHT: 68 IN

## 2019-06-12 PROBLEM — N39.0 UTI (URINARY TRACT INFECTION), UNCOMPLICATED: Status: RESOLVED | Noted: 2019-06-09 | Resolved: 2019-06-12

## 2019-06-12 LAB
ALBUMIN SERPL BCP-MCNC: 2.2 G/DL (ref 3.5–5.2)
ALP SERPL-CCNC: 54 U/L (ref 55–135)
ALT SERPL W/O P-5'-P-CCNC: 11 U/L (ref 10–44)
ANION GAP SERPL CALC-SCNC: 10 MMOL/L (ref 8–16)
AST SERPL-CCNC: 23 U/L (ref 10–40)
BACTERIA UR CULT: NORMAL
BILIRUB SERPL-MCNC: 0.4 MG/DL (ref 0.1–1)
BUN SERPL-MCNC: 7 MG/DL (ref 8–23)
CALCIUM SERPL-MCNC: 8.4 MG/DL (ref 8.7–10.5)
CHLORIDE SERPL-SCNC: 99 MMOL/L (ref 95–110)
CO2 SERPL-SCNC: 24 MMOL/L (ref 23–29)
CREAT SERPL-MCNC: 0.7 MG/DL (ref 0.5–1.4)
EST. GFR  (AFRICAN AMERICAN): >60 ML/MIN/1.73 M^2
EST. GFR  (NON AFRICAN AMERICAN): >60 ML/MIN/1.73 M^2
GLUCOSE SERPL-MCNC: 116 MG/DL (ref 70–110)
MAGNESIUM SERPL-MCNC: 1.6 MG/DL (ref 1.6–2.6)
PHOSPHATE SERPL-MCNC: 2.9 MG/DL (ref 2.7–4.5)
POCT GLUCOSE: 132 MG/DL (ref 70–110)
POTASSIUM SERPL-SCNC: 4.1 MMOL/L (ref 3.5–5.1)
PROT SERPL-MCNC: 5 G/DL (ref 6–8.4)
SODIUM SERPL-SCNC: 133 MMOL/L (ref 136–145)

## 2019-06-12 PROCEDURE — 36415 COLL VENOUS BLD VENIPUNCTURE: CPT | Mod: HCNC

## 2019-06-12 PROCEDURE — 25000003 PHARM REV CODE 250: Mod: HCNC | Performed by: STUDENT IN AN ORGANIZED HEALTH CARE EDUCATION/TRAINING PROGRAM

## 2019-06-12 PROCEDURE — 84100 ASSAY OF PHOSPHORUS: CPT | Mod: HCNC

## 2019-06-12 PROCEDURE — 99233 PR SUBSEQUENT HOSPITAL CARE,LEVL III: ICD-10-PCS | Mod: HCNC,GC,, | Performed by: PSYCHIATRY & NEUROLOGY

## 2019-06-12 PROCEDURE — 25000003 PHARM REV CODE 250: Mod: HCNC | Performed by: PHYSICIAN ASSISTANT

## 2019-06-12 PROCEDURE — 83735 ASSAY OF MAGNESIUM: CPT | Mod: HCNC

## 2019-06-12 PROCEDURE — 80053 COMPREHEN METABOLIC PANEL: CPT | Mod: HCNC

## 2019-06-12 PROCEDURE — 99233 SBSQ HOSP IP/OBS HIGH 50: CPT | Mod: HCNC,GC,, | Performed by: PSYCHIATRY & NEUROLOGY

## 2019-06-12 PROCEDURE — 63600175 PHARM REV CODE 636 W HCPCS: Mod: HCNC | Performed by: PHYSICIAN ASSISTANT

## 2019-06-12 PROCEDURE — 97535 SELF CARE MNGMENT TRAINING: CPT | Mod: HCNC

## 2019-06-12 RX ORDER — ATORVASTATIN CALCIUM 40 MG/1
40 TABLET, FILM COATED ORAL DAILY
Qty: 90 TABLET | Refills: 3
Start: 2019-06-12 | End: 2019-08-13

## 2019-06-12 RX ORDER — HEPARIN SODIUM 5000 [USP'U]/ML
5000 INJECTION, SOLUTION INTRAVENOUS; SUBCUTANEOUS EVERY 8 HOURS
Start: 2019-06-12 | End: 2019-08-13

## 2019-06-12 RX ORDER — PANTOPRAZOLE SODIUM 40 MG/1
40 FOR SUSPENSION ORAL 2 TIMES DAILY
Qty: 60 PACKET | Refills: 11
Start: 2019-06-12 | End: 2020-06-11

## 2019-06-12 RX ORDER — CLOPIDOGREL BISULFATE 75 MG/1
75 TABLET ORAL DAILY
Qty: 30 TABLET | Refills: 11
Start: 2019-06-12 | End: 2020-06-11

## 2019-06-12 RX ORDER — INSULIN ASPART 100 [IU]/ML
0-5 INJECTION, SOLUTION INTRAVENOUS; SUBCUTANEOUS EVERY 6 HOURS PRN
Refills: 0
Start: 2019-06-12 | End: 2020-06-11

## 2019-06-12 RX ORDER — AMOXICILLIN 250 MG
1 CAPSULE ORAL DAILY
Start: 2019-06-12

## 2019-06-12 RX ADMIN — SENNOSIDES,DOCUSATE SODIUM 1 TABLET: 8.6; 5 TABLET, FILM COATED ORAL at 08:06

## 2019-06-12 RX ADMIN — CLOPIDOGREL 75 MG: 75 TABLET, FILM COATED ORAL at 08:06

## 2019-06-12 RX ADMIN — ATORVASTATIN CALCIUM 40 MG: 20 TABLET, FILM COATED ORAL at 08:06

## 2019-06-12 RX ADMIN — PANTOPRAZOLE SODIUM 40 MG: 40 GRANULE, DELAYED RELEASE ORAL at 08:06

## 2019-06-12 RX ADMIN — HEPARIN SODIUM 5000 UNITS: 5000 INJECTION, SOLUTION INTRAVENOUS; SUBCUTANEOUS at 06:06

## 2019-06-12 NOTE — PLAN OF CARE
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]  Yes     Priority: 4     Right hemiplegia [G81.91]  Yes     Priority: 5     Oropharyngeal dysphagia [R13.12]  Yes     Priority: 6     Aneurysm of heart [I25.3]  Yes    History of ischemic left MCA stroke [Z86.73]  Not Applicable    History of deep vein thrombosis [Z86.718]  Not Applicable      Resolved Hospital Problems    Diagnosis Date Resolved POA    UTI (urinary tract infection), uncomplicated [N39.0] 06/12/2019 Unknown    Rectal bleeding [K62.5] 06/08/2019 Yes    Leukocytosis [D72.829] 06/11/2019 Yes    S/P admn tPA in diff fac w/n last 24 hr bef adm to crnt fac [Z92.82] 06/10/2019 Not Applicable    Acute respiratory failure [J96.00] 06/08/2019 Yes       Patient is homebound due to:  Embolic stroke involving right middle cerebral artery    Allergies:Review of patient's allergies indicates:  No Known Allergies    Vitals:       Every shift (Skilled Nursing patients)    Diet:   1. Continue 2000kcal DM mechanical soft diet with nectar thick liquids   2. Consider adding boost pudding tid if PO intake <75%    Acitivities:      - Up in a chair each morning as tolerated    LABS:     BMP, Mg and Phos every week for 1 month   Then per facility protocol     Nursing Precautions:     - Aspiration precautions:             - Total assistance with meals            -  Upright 90 degrees  befor during and after meals             -  Suction at bedside          - Fall precautions per nursing home protocol   - Decubitus precautions:        -  for positioning   - Pressure reducing foam mattress   - Turn patient every two hours. Use wedge pillows to anchor patient    CONSULTS:     Physical Therapy to evaluate and treat     Occupational Therapy to evaluate and treat     Speech Therapy  to evaluate and treat      MISCELLANEOUS CARE:       Routine Skin for Bedridden Patients:  Apply moisture barrier cream to all    skin folds and wet areas in perineal area daily and after baths and                           all bowel movements.                   DIABETES CARE:        Check blood sugar:     Fingerstick blood sugar AC and HS   Fingerstick blood sugar every 6 hours if unable to eat      Report CBG < 60 or > 400 to physician.                                          Insulin Sliding Scale          Glucose  Novolog Insulin Subcutaneous        0 - 60   Orange juice or glucose tablet, hold insulin      No insulin   201-250  2 units   251-300  4 units   301-350  6 units   351-400  8 units   >400   10 units then call physician      Medications: Discontinue all previous medication orders, if any. See new list below.     Teodora Rowell   Home Medication Instructions ISIAH:[id number]    Printed on:06/12/19 0319   Medication Information                      atorvastatin (LIPITOR) 40 MG tablet  Take 1 tablet (40 mg total) by mouth once daily.   Dx: HLD          carvedilol (COREG) 12.5 MG tablet  Take 1 tablet (12.5 mg total) by mouth 2 (two) times daily.   Dx: CHF          cholecalciferol, vitamin D3, (VITAMIN D3) 1,000 unit capsule  Take 1,000 Units by mouth once daily.   Dx: psteoporosis           clopidogrel (PLAVIX) 75 mg tablet  Take 1 tablet (75 mg total) by mouth once daily.   Dx: stroke          heparin sodium,porcine (HEPARIN, PORCINE,) 5,000 unit/mL injection  Inject 1 mL (5,000 Units total) into  the skin every 8 (eight) hours.   Dx: high risk for VTE (DVT prophylaxis)          insulin aspart U-100 (NOVOLOG) 100 unit/mL (3 mL) InPn pen  Inject 0-5 Units into the skin every 6 (six) hours as needed (Hyperglycemia).   Dx: DM          pantoprazole (PROTONIX) 40 mg GrPS  Take 1 packet (40 mg total) by mouth 2 (two) times daily.   Dx: home medication (acid reflux)          senna-docusate 8.6-50 mg (PERICOLACE) 8.6-50 mg per tablet  Take 1 tablet by mouth once daily.   Dx: constipation                _________________________________  Sherrill Avelar MD  06/12/2019

## 2019-06-12 NOTE — PT/OT/SLP PROGRESS
"Occupational Therapy   Treatment    Name: Michelle Rowell  MRN: [id number]  Admitting Diagnosis:  Embolic stroke involving right middle cerebral artery       Recommendations:     Discharge Recommendations: nursing facility, skilled  Discharge Equipment Recommendations:  tub bench, wheelchair, manual  Barriers to discharge:  Inaccessible home environment, Decreased caregiver support    Assessment:     Michelle Rowell is a 78 y.o. female with a medical diagnosis of Embolic stroke involving right middle cerebral artery.  She presents with performance deficits affecting function are weakness, impaired self care skills, impaired balance, decreased coordination, decreased safety awareness, impaired endurance, impaired functional mobilty, decreased upper extremity function, impaired sensation, gait instability, decreased lower extremity function, impaired fine motor, impaired coordination.     Rehab Prognosis:  Good; patient would benefit from acute skilled OT services to address these deficits and reach maximum level of function.       Plan:     Patient to be seen 3 x/week to address the above listed problems via self-care/home management, therapeutic exercises, therapeutic activities, neuromuscular re-education  · Plan of Care Expires: 07/06/19  · Plan of Care Reviewed with: patient, daughter    Subjective   Patient:  "I had a good night."  "I am always afraid I am going to fall."  Pain/Comfort:  · Pain Rating 1: 0/10  · Pain Rating Post-Intervention 1: 0/10    Objective:     Communicated with: Nurse prior to session.  Patient found supine with bed alarm, PureWick, peripheral IV, SCD, telemetry upon OT entry to room.    General Precautions: Standard, aspiration, fall, nectar thick   Orthopedic Precautions:N/A   Braces: N/A     Occupational Performance:     Bed Mobility:    · Patient completed Rolling/Turning to Right with minimum assistance  · Patient completed Supine to Sit with moderate assistance  · Patient " completed Sit to Supine with moderate assistance     Functional Mobility/Transfers:  · Max assist with squat pivot transfers    Activities of Daily Living:  · Feeding:  moderate assistance while seated EOB  · Grooming: moderate assistance while seated EOB    [facility] 6 Click ADL: 12    Treatment & Education:  Patient education provided on role of OT and need for SNF upon discharge.  Patient education provided on dressing technique, positioning, postural control, and transfers.  Continued education, patient/ family training recommended.  Patient alert and oriented x 3; able to follow 4/4 one step commands.  Patient attentive and interactive throughout the session.  Min assist with postural control while seated EOB; increased posterior weight shift.  Drooling noted right side.   Patient's functional status and disposition recommendation discussed with stroke team in daily rounds.  White board updated in patient's room.  OT asked if there were any other questions; patient/ family had no further questions.    Patient left supine with all lines intact and call button in reachEducation:      GOALS:   Multidisciplinary Problems     Occupational Therapy Goals        Problem: Occupational Therapy Goal    Goal Priority Disciplines Outcome Interventions   Occupational Therapy Goal     OT, PT/OT     Description:  Goals set 6/9 to be addressed for 14 days with expiration date, 6/23:  Patient will increase functional independence with ADLs by performing:    Patient will demonstrate rolling to the right with min assist.  Not met   Patient will demonstrate rolling to the left with min assist.   Not met  Patient will demonstrate supine -sit with min assist.   Not met  Patient will demonstrate stand pivot transfers with min assist.   Not met  Patient will demonstrate grooming while standing with min assist.   Not met  Patient will demonstrate upper body dressing with min assist while seated EOB.   Not met  Patient will demonstrate lower  body dressing with mod assist while seated EOB.   Not met  Patient will demonstrate toileting with min assist.   Not met  Patient will demonstrate bathing while seated EOB with min assist.   Not met  Patient's family / caregiver will demonstrate independence and safety with assisting patient with self-care skills and functional mobility.     Not met  Patient and/or patient's family will verbalize understanding of stroke prevention guidelines, personal risk factors and stroke warning signs via teachback method.  Not met                           Time Tracking:     OT Date of Treatment: 06/12/19  OT Start Time: 0728  OT Stop Time: 0753  OT Total Time (min): 25 min    Billable Minutes:Self Care/Home Management 25    DARREL Rebolledo  6/12/2019

## 2019-06-12 NOTE — PLAN OF CARE
Insurance auth has been received by [facility]. Patient can discharge today if medically ready. Notified team.      06/12/19 0862   Post-Acute Status   Post-Acute Authorization Placement   Post-Acute Placement Status Authorization Obtained

## 2019-06-12 NOTE — ASSESSMENT & PLAN NOTE
77 y/o F with CAD, HTN, HLD, DM II, and previous L MCA stroke, p/w R MCA syndrome. s/p tPA. No LVO on CTA, but mild bilateral carotid artery atherosclerosis. MRI with R BG infarct. TTE with apical aneurysm. 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     - Antithrombotics for secondary stroke prevention: switched home ASA to plavix 75mg qd, continue upon D/C  - Statins for secondary stroke prevention and hyperlipidemia, if present: Statins: Atorvastatin- 40 mg daily  - Aggressive risk factor modification: HTN, DM, HLD, Diet, Exercise, CAD  - Rehab efforts:    PT evaluate and treat, OT evaluate and treat   SLP evaluate and treat, recommending mechanical soft and nectar thick  - Diagnostics ordered/pending: None  - VTE prophylaxis: Heparin 5000 units SQ every 8 hours, SCDs  - BP parameters: Infarct: Post tPA, SBP <180  - Plans for 30-day event monitor to be set up as outpatient  - will have her follow up in VN clinic in 4-6 weeks

## 2019-06-12 NOTE — PLAN OF CARE
06/12/19 1144   Post-Acute Status   Post-Acute Authorization Placement   Post-Acute Placement Status Set-up Complete       Pt has been accepted by [facility].  Nurse can call report to [phone] and ask for 400 zamudio nurse.  Transport setup with ambulance for 1pm.  SW in contact with CM and Medical staff. Will continue to follow and offer support as needed.     Farhan Hein, SAMANTHA  [facility]   Ext. [phone]

## 2019-06-12 NOTE — ASSESSMENT & PLAN NOTE
Area of cytotoxic cerebral edema identified when reviewing brain imaging in the territory of the right middle cerebral artery. There is no mass effect associated with it. Patient remained neurologically stable.

## 2019-06-12 NOTE — PROGRESS NOTES
[facility]  Vascular Neurology  Comprehensive Stroke Center  Progress Note    Assessment/Plan:     * Embolic stroke involving right middle cerebral artery  79 y/o F with CAD, HTN, HLD, DM II, and previous L MCA stroke, p/w R MCA syndrome. s/p tPA. No LVO on CTA, but mild bilateral carotid artery atherosclerosis. MRI with R BG infarct. TTE with apical aneurysm. 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     - Antithrombotics for secondary stroke prevention: switched home ASA to plavix 75mg qd, continue upon D/C  - Statins for secondary stroke prevention and hyperlipidemia, if present: Statins: Atorvastatin- 40 mg daily  - Aggressive risk factor modification: HTN, DM, HLD, Diet, Exercise, CAD  - Rehab efforts:    PT evaluate and treat, OT evaluate and treat   SLP evaluate and treat, recommending mechanical soft and nectar thick  - Diagnostics ordered/pending: None  - VTE prophylaxis: Heparin 5000 units SQ every 8 hours, SCDs  - BP parameters: Infarct: Post tPA, SBP <180  - Plans for 30-day event monitor to be set up as outpatient  - will have her follow up in VN clinic in 4-6 weeks    Essential hypertension  Risk factor for stroke, currently at goal     - long term goal sBP <140  - resume carvedilol 12.5 q12 upon D/C  - will defer resuming HCTZ/lisinopril to outpatient cardiology  - outpatient referral to cardiology    Type 2 diabetes mellitus with neurologic complication  risk factor for stroke, well controlled, HgA1C 5.6    - LD SSI  - diabetic diet    Cytotoxic cerebral edema  Area of cytotoxic cerebral edema identified when reviewing brain imaging in the territory of the right middle cerebral artery. There is no mass effect associated with it. Patient remained neurologically stable.    Right hemiplegia  Improved significantly    - Continue PT/OT  - disposition: SNF    Oropharyngeal dysphagia  Secondary to stroke    - passed MBSS on 6/10  - started on mechanical soft, nectar thick  - continue speech and  swallow therapy    Aneurysm of heart  Apical aneurysm, increased risk of stroke    - outpatient referral to cardiology upon discharge to work up etiology and management    History of ischemic left MCA stroke  Residual right sided weakness  On ASA and simvastatin at home       Hospital Course:  Patient admitted to [facility] for new acute infarcts.  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  Extubated today.    06/08/19 Patient has been hypotensive overnight received IV bolus 500cc.  Normal saline gtt. Held due to EF 40%. Patient with leukocytosis and urinary retention.  U/A pending.    06/09/19 Patient transferred out of the NCC unit to stroke progressive unit yesterday.  Patient blood pressure improved. U/A positive for urinary tract infection.  Started 3 day course of bactrim.  Patient using mouth to breath and saturating at 100% on 2 liters of nasal cannula.  Patient states she usually breathes out of her mouth and does not feel short of breath.  Will plan to wean off oxygen. No areas of decreased breath sounds on physical exam.    6/10: passed MBSS this morning for mechanical soft/nectar thick. WBC improved on abx (day #2 of Bactrim today). Electrolyte abnormalities (Mg, K) replaced.   6/11: NAEON. Electrolytes replaced. continued on plavix. Pending insurance auth for SNF placement   6/12: no major events overnight. Plans to transfer to SNF today    STROKE DOCUMENTATION   Acute Stroke Times   Last Known Normal Date: 06/06/19  Last Known Normal Time: 1200  Symptom Onset Date: 06/06/19  Symptom Onset Time: (unk[place])  Stroke Team Called Date: 06/06/19  Stroke Team Called Time: 1550  Stroke Team Arrival Date: 06/06/19  Stroke Team Arrival Time: 1600  CT Interpretation Time: 1608  Decision to Treat Time for IR: 1615    NIH Scale:  Interval: 7 days or at discharge (whichever comes first)  1a. Level of Consciousness: 0-->Alert, keenly responsive  1b. LOC Questions: 0-->Answers both questions correctly  1c. LOC Commands: 0-->Performs both  tasks correctly  2. Best Gaze: 0-->Normal  3. Visual: 0-->No visual loss  4. Facial Palsy: 2-->Partial paralysis (total or near-total paralysis of lower face)  5a. Motor Arm, Left: 0-->No drift, limb holds 90 (or 45) degrees for full 10 secs  5b. Motor Arm, Right: 2-->Some effort against gravity, limb cannot get to or maintain (if cued) 90 (or 45) degrees, drifts down to bed, but has some effort against gravity  6a. Motor Leg, Left: 2-->Some effort against gravity, leg falls to bed by 5 secs, but has some effort against gravity  6b. Motor Leg, Right: 2-->Some effort against gravity, leg falls to bed by 5 secs, but has some effort against gravity  7. Limb Ataxia: 0-->Absent  8. Sensory: 0-->Normal, no sensory loss  9. Best Language: 1-->Mild-to-moderate aphasia, some obvious loss of fluency or facility of comprehension, without significant limitation on ideas expressed or form of expression. Reduction of speech and/or comprehension, however, makes conversation. . . (see row details)  10. Dysarthria: 0-->Normal  11. Extinction and Inattention (formerly Neglect): 0-->No abnormality  Total (NIH Stroke Scale): 9       Modified Plumas Score: 4  Ryan Coma Scale:    ABCD2 Score:    [id number] Score:   HAS -BLED Score:   ICH Score:   Hunt & Vizcaino Classification:      Hemorrhagic change of an Ischemic Stroke: Does this patient have an ischemic stroke with hemorrhagic changes? No     Neurologic Chief Complaint: left sided weakness     Subjective:     Interval History: Patient is seen for follow-up neurological assessment and treatment recommendations: Please see hospital course    HPI, Past Medical, Family, and Social History remains the same as documented in the initial encounter.     Review of Systems   Constitutional: Negative for chills and fever.   HENT: Positive for trouble swallowing. Negative for drooling.    Respiratory: Negative for choking and shortness of breath.    Gastrointestinal: Negative for nausea and  vomiting.   Skin: Negative for color change and rash.   Allergic/Immunologic: Negative for immunocompromised state.   Neurological: Positive for facial asymmetry and speech difficulty.   Psychiatric/Behavioral: Negative for agitation and confusion.     Scheduled Meds:   atorvastatin  40 mg Oral Daily    clopidogrel  75 mg Oral Daily    heparin (porcine)  5,000 Units Subcutaneous Q8H    pantoprazole  40 mg Oral BID    senna-docusate 8.6-50 mg  1 tablet Oral Daily     Continuous Infusions:   lactated ringers 75 mL/hr at 06/11/19 0200     PRN Meds:acetaminophen, Dextrose 10% Bolus, glucagon (human recombinant), insulin aspart U-100, sodium chloride 0.9%    Objective:     Vital Signs (Most Recent):  Temp: 97.8 °F (36.6 °C) (06/12/19 0405)  Pulse: 110 (06/12/19 0741)  Resp: 18 (06/12/19 0405)  BP: 131/76 (06/12/19 0405)  SpO2: (!) 91 % (06/12/19 0405)  BP Location: Right arm    Vital Signs Range (Last 24H):  Temp:  [97.4 °F (36.3 °C)-97.8 °F (36.6 °C)]   Pulse:  []   Resp:  [17-20]   BP: (120-136)/(72-76)   SpO2:  [91 %-94 %]   BP Location: Right arm    Physical Exam   Constitutional: She appears well-developed and well-nourished. No distress.   HENT:   Head: Normocephalic and atraumatic.   Eyes: Pupils are equal, round, and reactive to light. EOM are normal.   Neck: Normal range of motion.   Cardiovascular: Normal rate.   Pulmonary/Chest: Effort normal. No respiratory distress.   Abdominal: Soft. She exhibits no distension.   Musculoskeletal: Normal range of motion. She exhibits no edema.   Neurological: She is alert.   Psychiatric: She has a normal mood and affect.   Nursing note and vitals reviewed.      Neurological Exam:   LOC: alert  Attention Span: Good   Language: Mild aphasia  Articulation: No dysarthria  Orientation: oriented to month and age  Visual Fields: Full  EOM (CN III, IV, VI): Full/intact  Pupils (CN II, III): PERRL  Facial Sensation (CN V): Normal  Facial Movement (CN VII): Lower facial  weakness on the Right  Motor: Arm left  Paresis: 4/5  Leg left  Paresis: 3/5  Arm right  Paresis: 3/5  Leg right Paresis: 3/5      Laboratory:  CMP:   Recent Labs   Lab 06/12/19  0451   CALCIUM 8.4*   ALBUMIN 2.2*   PROT 5.0*   *   K 4.1   CO2 24   CL 99   BUN 7*   CREATININE 0.7   ALKPHOS 54*   ALT 11   AST 23   BILITOT 0.4     CBC:   Recent Labs   Lab 06/11/19  0505   WBC 7.64   RBC 3.57*   HGB 11.2*   HCT 33.1*      MCV 93   MCH 31.4*   MCHC 33.8     Lipid Panel:   Recent Labs   Lab 06/06/19  1814   CHOL 141   LDLCALC 57.6*   HDL 69   TRIG 72     Hgb A1C:   Recent Labs   Lab 06/06/19  1831   HGBA1C 5.6     TSH:   Recent Labs   Lab 06/06/19  1611   TSH 3.270       Diagnostic Results     Brain Imaging     MRI brain WO (6/7/19):  Acute infarct in right basal ganglia + Cytotoxic cerebral edema  Remote left MCA infarct         Vessel Imaging     CTA H&N (6/7/19):  - No evidence for significant proximal stenosis or occlusion.  - Atherosclerotic disease carotid bifurcations and proximal ICAs with less than 50% proximal ICA stenosis by NASCET criteria.    Cardiac Imaging     TTE (6/7/19):  EF 40%  Apical aneursym.          Sherrill Avelar MD  Comprehensive Stroke Center  Department of Vascular Neurology   [facility]

## 2019-06-12 NOTE — ASSESSMENT & PLAN NOTE
Risk factor for stroke, currently at goal     - long term goal sBP <140  - resume carvedilol 12.5 q12 upon D/C  - will defer resuming HCTZ/lisinopril to outpatient cardiology  - outpatient referral to cardiology

## 2019-06-12 NOTE — SUBJECTIVE & OBJECTIVE
Neurologic Chief Complaint: left sided weakness     Subjective:     Interval History: Patient is seen for follow-up neurological assessment and treatment recommendations: Please see hospital course    HPI, Past Medical, Family, and Social History remains the same as documented in the initial encounter.     Review of Systems   Constitutional: Negative for chills and fever.   HENT: Positive for trouble swallowing. Negative for drooling.    Respiratory: Negative for choking and shortness of breath.    Gastrointestinal: Negative for nausea and vomiting.   Skin: Negative for color change and rash.   Allergic/Immunologic: Negative for immunocompromised state.   Neurological: Positive for facial asymmetry and speech difficulty.   Psychiatric/Behavioral: Negative for agitation and confusion.     Scheduled Meds:   atorvastatin  40 mg Oral Daily    clopidogrel  75 mg Oral Daily    heparin (porcine)  5,000 Units Subcutaneous Q8H    pantoprazole  40 mg Oral BID    senna-docusate 8.6-50 mg  1 tablet Oral Daily     Continuous Infusions:   lactated ringers 75 mL/hr at 06/11/19 0200     PRN Meds:acetaminophen, Dextrose 10% Bolus, glucagon (human recombinant), insulin aspart U-100, sodium chloride 0.9%    Objective:     Vital Signs (Most Recent):  Temp: 97.8 °F (36.6 °C) (06/12/19 0405)  Pulse: 110 (06/12/19 0741)  Resp: 18 (06/12/19 0405)  BP: 131/76 (06/12/19 0405)  SpO2: (!) 91 % (06/12/19 0405)  BP Location: Right arm    Vital Signs Range (Last 24H):  Temp:  [97.4 °F (36.3 °C)-97.8 °F (36.6 °C)]   Pulse:  []   Resp:  [17-20]   BP: (120-136)/(72-76)   SpO2:  [91 %-94 %]   BP Location: Right arm    Physical Exam   Constitutional: She appears well-developed and well-nourished. No distress.   HENT:   Head: Normocephalic and atraumatic.   Eyes: Pupils are equal, round, and reactive to light. EOM are normal.   Neck: Normal range of motion.   Cardiovascular: Normal rate.   Pulmonary/Chest: Effort normal. No respiratory  distress.   Abdominal: Soft. She exhibits no distension.   Musculoskeletal: Normal range of motion. She exhibits no edema.   Neurological: She is alert.   Psychiatric: She has a normal mood and affect.   Nursing note and vitals reviewed.      Neurological Exam:   LOC: alert  Attention Span: Good   Language: Mild aphasia  Articulation: No dysarthria  Orientation: oriented to month and age  Visual Fields: Full  EOM (CN III, IV, VI): Full/intact  Pupils (CN II, III): PERRL  Facial Sensation (CN V): Normal  Facial Movement (CN VII): Lower facial weakness on the Right  Motor: Arm left  Paresis: 4/5  Leg left  Paresis: 3/5  Arm right  Paresis: 3/5  Leg right Paresis: 3/5      Laboratory:  CMP:   Recent Labs   Lab 06/12/19  0451   CALCIUM 8.4*   ALBUMIN 2.2*   PROT 5.0*   *   K 4.1   CO2 24   CL 99   BUN 7*   CREATININE 0.7   ALKPHOS 54*   ALT 11   AST 23   BILITOT 0.4     CBC:   Recent Labs   Lab 06/11/19  0505   WBC 7.64   RBC 3.57*   HGB 11.2*   HCT 33.1*      MCV 93   MCH 31.4*   MCHC 33.8     Lipid Panel:   Recent Labs   Lab 06/06/19  1814   CHOL 141   LDLCALC 57.6*   HDL 69   TRIG 72     Hgb A1C:   Recent Labs   Lab 06/06/19  1831   HGBA1C 5.6     TSH:   Recent Labs   Lab 06/06/19  1611   TSH 3.270       Diagnostic Results     Brain Imaging     MRI brain WO (6/7/19):  Acute infarct in right basal ganglia + Cytotoxic cerebral edema  Remote left MCA infarct         Vessel Imaging     CTA H&N (6/7/19):  - No evidence for significant proximal stenosis or occlusion.  - Atherosclerotic disease carotid bifurcations and proximal ICAs with less than 50% proximal ICA stenosis by NASCET criteria.    Cardiac Imaging     TTE (6/7/19):  EF 40%  Apical aneursym.

## 2019-06-12 NOTE — PLAN OF CARE
06/12/19 1150   Final Note   Assessment Type Final Discharge Note   Anticipated Discharge Disposition SNF   Hospital Follow Up  Appt(s) scheduled? No   Discharge plans and expectations educations in teach back method with documentation complete? No   Right Care Referral Info   Post Acute Recommendation Home-care   Referral Type SNF   Facility Name [facility] [id number] [address] [address]

## 2019-06-12 NOTE — PLAN OF CARE
Problem: Occupational Therapy Goal  Goal: Occupational Therapy Goal  Goals set 6/9 to be addressed for 14 days with expiration date, 6/23:  Patient will increase functional independence with ADLs by performing:    Patient will demonstrate rolling to the right with min assist.  Not met   Patient will demonstrate rolling to the left with min assist.   Not met  Patient will demonstrate supine -sit with min assist.   Not met  Patient will demonstrate stand pivot transfers with min assist.   Not met  Patient will demonstrate grooming while standing with min assist.   Not met  Patient will demonstrate upper body dressing with min assist while seated EOB.   Not met  Patient will demonstrate lower body dressing with mod assist while seated EOB.   Not met  Patient will demonstrate toileting with min assist.   Not met  Patient will demonstrate bathing while seated EOB with min assist.   Not met  Patient's family / caregiver will demonstrate independence and safety with assisting patient with self-care skills and functional mobility.     Not met  Patient and/or patient's family will verbalize understanding of stroke prevention guidelines, personal risk factors and stroke warning signs via teachback method.  Not met          Goals remain appropriate.  DARREL Rebolledo  6/12/2019

## 2019-06-12 NOTE — NURSING
Transferred pt to stretcher for transport. Called facility to give report but was told that they would call me back. D/c Iv to R arm. Family is with her. No distress noted at this time.

## 2019-06-12 NOTE — PLAN OF CARE
06/12/19 1442   Final Note   Assessment Type Final Discharge Note   Anticipated Discharge Disposition SNF  ([facility])   Right Care Referral Info   Post Acute Recommendation SNF / Sub-Acute Rehab

## 2019-06-12 NOTE — PT/OT/SLP DISCHARGE
Occupational Therapy Discharge Summary    Michelle Rowell  MRN: [id number]   Principal Problem: Embolic stroke involving right middle cerebral artery      Patient Discharged from acute Occupational Therapy on 6/12.  Please refer to prior OT note dated 6/12 for functional status.    Assessment:      Patient appropriate for care in another setting.    Objective:     GOALS:   Multidisciplinary Problems     Occupational Therapy Goals        Problem: Occupational Therapy Goal    Goal Priority Disciplines Outcome Interventions   Occupational Therapy Goal     OT, PT/OT     Description:  Goals set 6/9 to be addressed for 14 days with expiration date, 6/23:  Patient will increase functional independence with ADLs by performing:    Patient will demonstrate rolling to the right with min assist.  Not met   Patient will demonstrate rolling to the left with min assist.   Not met  Patient will demonstrate supine -sit with min assist.   Not met  Patient will demonstrate stand pivot transfers with min assist.   Not met  Patient will demonstrate grooming while standing with min assist.   Not met  Patient will demonstrate upper body dressing with min assist while seated EOB.   Not met  Patient will demonstrate lower body dressing with mod assist while seated EOB.   Not met  Patient will demonstrate toileting with min assist.   Not met  Patient will demonstrate bathing while seated EOB with min assist.   Not met  Patient's family / caregiver will demonstrate independence and safety with assisting patient with self-care skills and functional mobility.     Not met  Patient and/or patient's family will verbalize understanding of stroke prevention guidelines, personal risk factors and stroke warning signs via teachback method.  Not met                           Reasons for Discontinuation of Therapy Services  Transfer to alternate level of care.      Plan:     Patient Discharged to: Skilled Nursing Facility    Milvia Aj  LOTR  6/12/2019

## 2019-06-13 NOTE — DISCHARGE SUMMARY
[facility]  Vascular Neurology  Comprehensive Stroke Center  Discharge Summary     Summary:     Admit Date: 6/6/2019  3:49 PM    Discharge Date and Time: 6/12/2019  1:24 PM    Attending Physician: Scarlet att. providers found     Discharge Provider: Sherrill Avelar MD    History of Present Illness: Ms Rowell is a 79 y/o CF with PMHx of HTN, CABG, HLD, DM type II, Left MCA ischemic stroke presented to the ED with right MCA stroke syndrome.     Per chart review, patient was LKN at 12 noon and she was found on the floor at 1:30 pm with not able to move her left side and drooling from the left. She was taken to ED at [facility] where telestroke was consulted and tPA was administered at 14:41. Pt had to be intubated to protect her airway. Propofol was switched to Ketamine due to low BP. Vecuronium was also administered. [place] [facility] [place] was normal and CTA was neg for any high grade stenosis or Occlusions. Pt was able to follow commands and moving all her 4 extremities.     Hospital Course (synopsis of major diagnoses, care, treatment, and services provided during the course of the hospital stay): Hospital Course:  Patient admitted to [facility] for new acute infarcts.  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  Extubated today.    06/08/19 Patient has been hypotensive overnight received IV bolus 500cc.  Normal saline gtt. Held due to EF 40%. Patient with leukocytosis and urinary retention.  U/A pending.    06/09/19 Patient transferred out of the NCC unit to stroke progressive unit yesterday.  Patient blood pressure improved. U/A positive for urinary tract infection.  Started 3 day course of bactrim.  Patient using mouth to breath and saturating at 100% on 2 liters of nasal cannula.  Patient states she usually breathes out of her mouth and does not feel short of breath.  Will plan to wean off oxygen. No areas of decreased breath sounds on physical exam.    6/10: passed MBSS this morning for mechanical soft/nectar  thick. WBC improved on abx (day #2 of Bactrim today). Electrolyte abnormalities (Mg, K) replaced.   6/11: NAEON. Electrolytes replaced. continued on plavix. Pending insurance auth for SNF placement   6/12: no major events overnight. 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 Level of Consciousness: 0-->Alert, keenly responsive  1b. LOC Questions: 0-->Answers both questions correctly  1c. LOC Commands: 0-->Performs both tasks correctly  2. Best Gaze: 0-->Normal  3. Visual: 0-->No visual loss  4. Facial Palsy: 2-->Partial paralysis (total or near-total paralysis of lower face)  5a. Motor Arm, Left: 0-->No drift, limb holds 90 (or 45) degrees for full 10 secs  5b. Motor Arm, Right: 2-->Some effort against gravity, limb cannot get to or maintain (if cued) 90 (or 45) degrees, drifts down to bed, but has some effort against gravity  6a. Motor Leg, Left: 2-->Some effort against gravity, leg falls to bed by 5 secs, but has some effort against gravity  6b. Motor Leg, Right: 2-->Some effort against gravity, leg falls to bed by 5 secs, but has some effort against gravity  7. Limb Ataxia: 0-->Absent  8. Sensory: 0-->Normal, no sensory loss  9. Best Language: 1-->Mild-to-moderate aphasia, some obvious loss of fluency or facility of comprehension, without significant limitation on ideas expressed or form of expression. Reduction of speech and/or comprehension, however, makes conversation. . . (see row details)  10. Dysarthria:  0-->Normal  11. Extinction and Inattention (formerly Neglect): 0-->No abnormality  Total (NIH Stroke Scale): 9      Modified Denver Score: 4  Herrick Coma Scale:    ABCD2 Score:    [id number] Score:   HAS -BLED Score:   ICH Score:   Hunt & Vizcaino Classification:       Assessment/Plan:     Diagnostic Results:    Brain Imaging      MRI brain WO (6/7/19):  Acute infarct in right basal ganglia + Cytotoxic cerebral edema  Remote left MCA infarct      Vessel Imaging      CTA H&N (6/7/19):  - No evidence for significant proximal stenosis or occlusion.  - Atherosclerotic disease carotid bifurcations and proximal ICAs with less than 50% proximal ICA stenosis by NASCET criteria.     Cardiac Imaging      TTE (6/7/19):  EF 40%  Apical aneursym.    Interventions: None    Complications: None    Disposition: Another Health Care Inst*    Final Active Diagnoses:    Diagnosis Date Noted POA    PRINCIPAL PROBLEM:  Embolic stroke involving right middle cerebral artery [I63.411] 06/06/2019 Yes    Essential hypertension [I10] 12/01/2015 Yes    Type 2 diabetes mellitus with neurologic complication [E11.49] 12/01/2015 Yes    Cytotoxic cerebral edema [G93.6] 01/06/2016 Yes    Right hemiplegia [G81.91] 01/06/2016 Yes    Oropharyngeal dysphagia [R13.12] 06/08/2019 Yes    Aneurysm of heart [I25.3] 06/07/2019 Yes    History of ischemic left MCA stroke [Z86.73] 06/06/2019 Not Applicable      Problems Resolved During this Admission:    Diagnosis Date Noted Date Resolved POA    UTI (urinary tract infection), uncomplicated [N39.0] 06/09/2019 06/12/2019 Yes    Rectal bleeding [K62.5] 06/07/2019 06/08/2019 Yes    Leukocytosis [D72.829] 06/07/2019 06/11/2019 Yes    S/P admn tPA in diff fac w/n last 24 hr bef adm to crnt fac [Z92.82] 06/06/2019 06/10/2019 Not Applicable    Acute respiratory failure [J96.00] 06/06/2019 06/08/2019 Yes     * Embolic stroke involving right middle cerebral artery  77 y/o F with CAD, HTN, HLD, DM II, and  previous L MCA stroke, p/w R MCA syndrome. s/p tPA. No LVO on CTA, but mild bilateral carotid artery atherosclerosis. MRI with R BG infarct. TTE with apical aneurysm. 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     - Antithrombotics for secondary stroke prevention: switched home ASA to plavix 75mg qd, will continue upon D/C  - Statins for secondary stroke prevention and hyperlipidemia, if present: Statins: Atorvastatin- 40 mg daily  - Aggressive risk factor modification: HTN, DM, HLD, Diet, Exercise, CAD  - Rehab efforts:    PT evaluate and treat, OT evaluate and treat   SLP evaluate and treat, recommending mechanical soft and nectar thick  - Diagnostics ordered/pending: None  - VTE prophylaxis: Heparin 5000 units SQ every 8 hours, SCDs  - BP parameters: Infarct: Post tPA, SBP <180  - Plans for 30-day event monitor to be set up as outpatient  - will have her follow up in VN clinic in 4-6 weeks    Essential hypertension  Risk factor for stroke, currently at goal     - long term goal sBP <140  - resume carvedilol 12.5 q12 upon D/C  - will defer resuming HCTZ/lisinopril to outpatient cardiology vs PCP  - outpatient referral to cardiology    Type 2 diabetes mellitus with neurologic complication  risk factor for stroke, well controlled, HgA1C 5.6    - LD SSI  - diabetic diet    Cytotoxic cerebral edema  Area of cytotoxic cerebral edema identified when reviewing brain imaging in the territory of the right middle cerebral artery. There is no mass effect associated with it. Patient remained neurologically stable.    Right hemiplegia  Improved significantly    - Continue PT/OT  - disposition: SNF    Oropharyngeal dysphagia  Secondary to stroke    - MBSS on 6/10: Episodes of supraglottic laryngeal penetration with thin liquids. No aspiration  - continue on mechanical soft, nectar thick  - continue speech and swallow therapy    Aneurysm of heart  Apical aneurysm, increased risk of stroke    - outpatient referral to cardiology upon  discharge to work up etiology and management    History of ischemic left MCA stroke  Residual right sided weakness  On ASA and simvastatin at home       Recommendations:     Post-discharge complication risks: Falls, Skin breakdown    Stroke Education given to: family and caregiver    Follow-up in Stroke Clinic in 4-6 weeks     Discharge Plan:  Antithrombotics: Clopidogrel 75mg  Statin: Atorvastatin 40 mg    Follow Up:  Follow-up Information     Reno Hilario MD.    Specialty:  Family Medicine  Why:  Outpatient Services  Contact information:  [address]  [facility] [place], [place] [address]  [phone]                   Patient Instructions:      Ambulatory Referral to Cardiology   Referral Priority: Routine Referral Type: Consultation   Referral Reason: Specialty Services Required   Requested Specialty: Cardiology   Number of Visits Requested: 1     Ambulatory Referral to Vascular Neurology   Referral Priority: Routine Referral Type: Consultation   Referral Reason: Specialty Services Required   Requested Specialty: Vascular Neurology   Number of Visits Requested: 1     Cardiac event monitor   Standing Status: Future Standing Exp. Date: 06/12/20     Order Specific Question Answer Comments   Cardiac Event Monitor Auto Trigger        Medications:  Reconciled Home Medications:      Medication List      START taking these medications    atorvastatin 40 MG tablet  Commonly known as:  LIPITOR  Take 1 tablet (40 mg total) by mouth once daily.     clopidogrel 75 mg tablet  Commonly known as:  PLAVIX  Take 1 tablet (75 mg total) by mouth once daily.     heparin (porcine) 5,000 unit/mL injection  Inject 1 mL (5,000 Units total) into the skin every 8 (eight) hours.     insulin aspart U-100 100 unit/mL (3 mL) Inpn pen  Commonly known as:  NovoLOG  Inject 0-5 Units into the skin every 6 (six) hours as needed (Hyperglycemia).     pantoprazole 40 mg Grps  Commonly known as:  PROTONIX  Take 1 packet (40  "mg total) by mouth 2 (two) times daily.  Replaces:  pantoprazole 40 MG tablet     senna-docusate 8.6-50 mg 8.6-50 mg per tablet  Commonly known as:  PERICOLACE  Take 1 tablet by mouth once daily.        CONTINUE taking these medications    carvedilol 12.5 MG tablet  Commonly known as:  COREG  Take 1 tablet (12.5 mg total) by mouth 2 (two) times daily.     VITAMIN D3 1,000 unit capsule  Generic drug:  cholecalciferol (vitamin D3)  Take 1,000 Units by mouth once daily.        STOP taking these medications    aspirin 81 MG EC tablet  Commonly known as:  ECOTRIN     foam bandage 3 X 3 " Bndg  Commonly known as:  MEPILEX BORDER     latanoprost 0.005 % ophthalmic solution     lisinopril-hydrochlorothiazide 20-12.5 mg per tablet  Commonly known as:  PRINZIDE,ZESTORETIC     metFORMIN 1000 MG tablet  Commonly known as:  GLUCOPHAGE     mirtazapine 30 MG tablet  Commonly known as:  REMERON     pantoprazole 40 MG tablet  Commonly known as:  PROTONIX  Replaced by:  pantoprazole 40 mg Grps     simvastatin 40 MG tablet  Commonly known as:  KEVONR            Sherrill Avelar MD  Comprehensive Stroke Center  Department of Vascular Neurology   [facility]   "

## 2019-06-13 NOTE — ASSESSMENT & PLAN NOTE
Secondary to stroke    - MBSS on 6/10: Episodes of supraglottic laryngeal penetration with thin liquids. No aspiration  - continue on mechanical soft, nectar thick  - continue speech and swallow therapy

## 2019-06-13 NOTE — ASSESSMENT & PLAN NOTE
77 y/o F with CAD, HTN, HLD, DM II, and previous L MCA stroke, p/w R MCA syndrome. s/p tPA. No LVO on CTA, but mild bilateral carotid artery atherosclerosis. MRI with R BG infarct. TTE with apical aneurysm. 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     - Antithrombotics for secondary stroke prevention: switched home ASA to plavix 75mg qd, will continue upon D/C  - Statins for secondary stroke prevention and hyperlipidemia, if present: Statins: Atorvastatin- 40 mg daily  - Aggressive risk factor modification: HTN, DM, HLD, Diet, Exercise, CAD  - Rehab efforts:    PT evaluate and treat, OT evaluate and treat   SLP evaluate and treat, recommending mechanical soft and nectar thick  - Diagnostics ordered/pending: None  - VTE prophylaxis: Heparin 5000 units SQ every 8 hours, SCDs  - BP parameters: Infarct: Post tPA, SBP <180  - Plans for 30-day event monitor to be set up as outpatient  - will have her follow up in VN clinic in 4-6 weeks

## 2019-06-13 NOTE — ASSESSMENT & PLAN NOTE
Risk factor for stroke, currently at goal     - long term goal sBP <140  - resume carvedilol 12.5 q12 upon D/C  - will defer resuming HCTZ/lisinopril to outpatient cardiology vs PCP  - outpatient referral to cardiology

## 2019-06-14 ENCOUNTER — PES CALL (OUTPATIENT)
Dept: ADMINISTRATIVE | Facility: CLINIC | Age: 79
End: 2019-06-14

## 2019-06-17 DIAGNOSIS — I10 ESSENTIAL HYPERTENSION: ICD-10-CM

## 2019-06-17 RX ORDER — LISINOPRIL AND HYDROCHLOROTHIAZIDE 12.5; 2 MG/1; MG/1
TABLET ORAL
Qty: 90 TABLET | Refills: 1 | Status: SHIPPED | OUTPATIENT
Start: 2019-06-17 | End: 2019-08-13

## 2019-06-26 ENCOUNTER — LAB VISIT (OUTPATIENT)
Dept: LAB | Facility: HOSPITAL | Age: 79
End: 2019-06-26
Attending: INTERNAL MEDICINE
Payer: MEDICARE

## 2019-06-26 DIAGNOSIS — Z79.01 ANTICOAGULANT LONG-TERM USE: Primary | ICD-10-CM

## 2019-06-26 LAB
APTT BLDCRRT: 31.9 SEC (ref 21–32)
INR PPP: 1 (ref 0.8–1.2)
PROTHROMBIN TIME: 10.9 SEC (ref 9–12.5)

## 2019-06-26 PROCEDURE — 36415 COLL VENOUS BLD VENIPUNCTURE: CPT | Mod: HCNC

## 2019-06-26 PROCEDURE — 85610 PROTHROMBIN TIME: CPT | Mod: HCNC

## 2019-06-26 PROCEDURE — 85730 THROMBOPLASTIN TIME PARTIAL: CPT | Mod: HCNC

## 2019-07-20 DIAGNOSIS — R63.0 POOR APPETITE: ICD-10-CM

## 2019-07-20 DIAGNOSIS — F32.A DEPRESSION, UNSPECIFIED DEPRESSION TYPE: ICD-10-CM

## 2019-07-20 DIAGNOSIS — G47.00 INSOMNIA, UNSPECIFIED TYPE: ICD-10-CM

## 2019-07-21 RX ORDER — MIRTAZAPINE 30 MG/1
TABLET, FILM COATED ORAL
Qty: 30 TABLET | Refills: 5 | Status: ON HOLD | OUTPATIENT
Start: 2019-07-21 | End: 2023-01-01 | Stop reason: HOSPADM

## 2019-08-13 ENCOUNTER — OFFICE VISIT (OUTPATIENT)
Dept: NEUROLOGY | Facility: CLINIC | Age: 79
End: 2019-08-13
Payer: MEDICARE

## 2019-08-13 VITALS
RESPIRATION RATE: 14 BRPM | SYSTOLIC BLOOD PRESSURE: 116 MMHG | WEIGHT: 145 LBS | DIASTOLIC BLOOD PRESSURE: 74 MMHG | BODY MASS INDEX: 21.98 KG/M2 | HEART RATE: 82 BPM | HEIGHT: 68 IN

## 2019-08-13 DIAGNOSIS — I63.411 EMBOLIC STROKE INVOLVING RIGHT MIDDLE CEREBRAL ARTERY: Primary | ICD-10-CM

## 2019-08-13 DIAGNOSIS — I25.3 ANEURYSM OF HEART: ICD-10-CM

## 2019-08-13 DIAGNOSIS — G81.91 RIGHT HEMIPLEGIA: ICD-10-CM

## 2019-08-13 DIAGNOSIS — I10 ESSENTIAL HYPERTENSION: ICD-10-CM

## 2019-08-13 DIAGNOSIS — Z95.1 S/P CABG X 4: ICD-10-CM

## 2019-08-13 DIAGNOSIS — Z86.73 HISTORY OF ISCHEMIC LEFT MCA STROKE: ICD-10-CM

## 2019-08-13 DIAGNOSIS — E11.40 TYPE 2 DIABETES MELLITUS WITH DIABETIC NEUROPATHY, WITHOUT LONG-TERM CURRENT USE OF INSULIN: ICD-10-CM

## 2019-08-13 DIAGNOSIS — Z86.718 HISTORY OF DEEP VEIN THROMBOSIS: ICD-10-CM

## 2019-08-13 DIAGNOSIS — G81.94 LEFT HEMIPLEGIA: ICD-10-CM

## 2019-08-13 PROBLEM — R13.12 OROPHARYNGEAL DYSPHAGIA: Status: RESOLVED | Noted: 2019-06-08 | Resolved: 2019-08-13

## 2019-08-13 PROCEDURE — 99214 OFFICE O/P EST MOD 30 MIN: CPT | Mod: PBBFAC | Performed by: NURSE PRACTITIONER

## 2019-08-13 PROCEDURE — 99499 RISK ADDL DX/OHS AUDIT: ICD-10-PCS | Mod: S$PBB,,, | Performed by: NURSE PRACTITIONER

## 2019-08-13 PROCEDURE — 99999 PR STA SHADOW: CPT | Mod: PBBFAC,,, | Performed by: NURSE PRACTITIONER

## 2019-08-13 PROCEDURE — 99499 UNLISTED E&M SERVICE: CPT | Mod: S$PBB,,, | Performed by: NURSE PRACTITIONER

## 2019-08-13 PROCEDURE — 99999 PR PBB SHADOW E&M-EST. PATIENT-LVL IV: CPT | Mod: PBBFAC,,, | Performed by: NURSE PRACTITIONER

## 2019-08-13 PROCEDURE — 99999 PR PBB SHADOW E&M-EST. PATIENT-LVL IV: ICD-10-PCS | Mod: PBBFAC,,, | Performed by: NURSE PRACTITIONER

## 2019-08-13 PROCEDURE — 99214 OFFICE O/P EST MOD 30 MIN: CPT | Mod: S$PBB | Performed by: NURSE PRACTITIONER

## 2019-08-13 RX ORDER — LANOLIN ALCOHOL/MO/W.PET/CERES
400 CREAM (GRAM) TOPICAL 2 TIMES DAILY
Status: ON HOLD | COMMUNITY
End: 2023-01-01 | Stop reason: HOSPADM

## 2019-08-13 RX ORDER — SIMVASTATIN 40 MG/1
40 TABLET, FILM COATED ORAL NIGHTLY
Status: ON HOLD | COMMUNITY
End: 2023-01-01 | Stop reason: HOSPADM

## 2019-08-13 NOTE — PATIENT INSTRUCTIONS
Please clarify with Dr. Michelle whether she must continue on both Plavix (clopidogrel) AND Eliquis for stroke prevention, and let clinic know.     Please have Cardiology send us a copy of her next visit note and her visit notes since her CVA in 6/2019.

## 2019-08-13 NOTE — PROGRESS NOTES
"HPI: Michelle Rowell is a 78 y.o. female with years of neck pain and tingling in the right neck.  CT spine, showed "subluxation" at C3/4 and likely at least moderate spinal stenosis". Follow up MRI 3/2015 shows moderate spinal stenosis and "very minimal anterolisthesis of C3 in respect to C4 by " which is not concerning for subluxation. 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 She has DM II, HLD, and is s/p CABG.     She presents today for a hospital follow up for another CVA in 6/2019. She presented with left hemiparesis and drooling and was found to have a right MCA/right basal ganglia infarct, suspected to be from the apical aneurysm (cardio-aortic embolism). She was discharged on Plavix; however, Eliquis was started per Cardiology and she continues on Plavix, as well. Cardiology is planning to insert a loop recorder soon per patient's granddaughter.     Her left hemiparesis has improved somewhat, but she remains non-ambulatory since her second CVA. She is at the [facility] for Rehab purposes, and does attempt to ambulate with PT/OT, but otherwise remains in her wheelchair. No further dysphagia since she was discharged. No mood changes, memory loss, or insomnia since her second CVA.     No neck pain.     Review of Systems   Constitutional: Negative for fever and weight loss.   HENT: Negative for nosebleeds.    Eyes: Negative for double vision.   Respiratory: Negative for hemoptysis.    Cardiovascular: Negative for leg swelling.   Gastrointestinal: Negative for blood in stool.   Genitourinary: Negative for hematuria.   Musculoskeletal: Negative for falls.   Skin: Negative for rash.   Neurological: Positive for focal weakness. Negative for dizziness, tingling, tremors, sensory change, speech change, seizures, loss of consciousness, weakness and headaches.   Endo/Heme/Allergies: Does not bruise/bleed easily. " "  Psychiatric/Behavioral: Negative for depression and memory loss. The patient is not nervous/anxious and does not have insomnia.      Exam:  Gen Appearance, well developed/nourished in no apparent distress  CV: 2+ distal pulses with no edema or swelling  Neuro:  MS: Awake, alert,  Sustains attention. Recent/remote memory intact, Language is reduced to mildly to spontaneous speech with some slurred speech mildly as prior. She has some hesitancy at times but comprehension is intact. Fund of Knowledge is full  CN: Optic discs are flat with normal vasculature, PERRL, Extraoccular movements and visual fields are full. Normal facial sensation and strength,   Tongue and Palate are midline and strong. Shoulder Shrug symmetric and strong.  Motor: Normal bulk, tone increased on the right, no abnormal movements. 5/5 strength bilateral upper with some difficulty with right sided movements from late effect CVA, but new residual hemiparesis affecting the LLE since her last visit from CVA in 6/2019, strength 3/5 LLE/lower extremities with 1+ reflexes    Sensory: symmetric to temp and vibration  Cerebellar: Finger-nose, Rapid alternating movements intact  Gait: not done; wheelchair bound due to chronic right hemiparesis from first CVA, and now new left hemiparesis affecting the LLE from 6/2019 CVA.     Imaging:   Workup for 6/2019 CVA:   MRI brain WO (6/7/19):  Acute infarct in right basal ganglia + Cytotoxic cerebral edema  Remote left MCA infarct      Vessel Imaging      CTA H&N (6/7/19):  - No evidence for significant proximal stenosis or occlusion.  - Atherosclerotic disease carotid bifurcations and proximal ICAs with less than 50% proximal ICA stenosis by NASCET criteria.     Cardiac Imaging      TTE (6/7/19):  EF 40%  Apical aneursym.      2015 MRI C spine: Degenerative changes of the cervical spine contributing to multilevel central canal stenosis and neural foraminal narrowing as detailed in the above report.  NOTE: "very " "minimal anterolisthesis of C3 in respect to C4 by "  Left thyroid nodule measuring 1.3 x 1.6 cm. Consider further evaluation with a thyroid ultrasound.  ______________________________________     RA factor negative    MRI brain 1/2016:   MRI following thrombectomy for left MCA occlusion demonstrates small zones of infarction in the left frontal cortex which are mostly perisylvian, left insula, left putamen and caudate. There is no hemorrhage.      Electronically signed by: NITHYA DELANEY MD  Date: 01/08/16  Time: 07:33        Encounter     View Encounter           CTA: CT demonstrates no evidence of acute intracranial hemorrhage. There are minimal early CT changes in the left subinsular region although the majority of the gray-white differentiation is preserved.    CTA demonstrates a left M1 segment occlusion.    CT perfusion demonstrates a large area of ischemic penumbra with only a small area of core infarction in the left basal ganglia region.    The patient was taken emergently to the neuro- interventional radiology catheter lab for emergent mechanical thrombectomy.    Angio:   No carotid stenosis  1. Successful resolution thrombectomy of the left middle cerebral artery  2. TICI 3 of the left middle cerebral artery artery    Echo:1 - Technically difficult portable study.   2 - Concentric remodeling.   3 - Normal left ventricular systolic function (EF 60-65%).   4 - Normal right ventricular systolic function .   5 - Grade I left ventricular diastolic dysfunction.     2018 A1C is 6 and LDL is 86  TSH normal 2017/2018 6/2019 LDL less than goal of 70.     Cardiac event monitor since CVA:  There were a total of 26 patient-triggered events.  The majority of these revealed normal sinus rhythm.  Two transmissions revealed normal sinus rhythm with rare PVCs.  One transmission revealed normal sinus rhythm with a PAC.  There were no significant   arrhythmias noted.    Assessment/Plan: Michelle Rowell is a 78 y.o. " "female with years of neck pain and tingling in the right neck   CT spine, showed "subluxation" at C3/4 and likely at least moderate spinal stenosis". Follow up MRI 3/2015 shows moderate spinal stenosis and "very minimal anterolisthesis of C3 in respect to C4 by " which is not concerning for subluxation. S/p Thombectomy for embolic Left MCA Stroke in 1/2016 resulting in aphasia and right hemiparesis with an Right MCA distribution CVA in 6/2019, believed to be cardioembolic in nature, from an apical aneurysm.     I recommend:   1. New CVA believed to be cardioembolic in nature from an apical aneursym. She was discharged on Plavix but is now taking both Eliquis and Plavix, which I would like to review with Cardiology to determine the need for dual therapy.   2. She is non ambulatory since her CVA, but fall risks were discussed as she does attempt to ambulate with PT/OT. She should continue PT/OT at the [facility].     3. For prior CVA workup, event monitor was negative. She had no other reason for anticoagulation once PE/DVT treatment was completed, and continued ASA only after this.  4. Stroke risk factors: DM, HTN, HLD. Continue treatment for stroke prevention. Goal A1C is less than 7 (at goal) and goal LDL is less than 70 for stroke prevention (at goal). BP at goal  5. Remain off driving.  6. Her neck pain is resolved/ no further treatment needed for this at this time for her C spine stenosis/ no subluxation.     FU 3 months                  "

## 2019-11-10 ENCOUNTER — PATIENT OUTREACH (OUTPATIENT)
Dept: ADMINISTRATIVE | Facility: OTHER | Age: 79
End: 2019-11-10

## 2019-11-10 NOTE — LETTER
AUTHORIZATION FOR RELEASE OF   CONFIDENTIAL INFORMATION    Dear Radha Og OD,    We are seeing Michelle Rowell, date of birth 1940, in the clinic at [facility]. Reno Hilario MD is the patient's PCP. Michelle Rowell has an outstanding lab/procedure at the time we reviewed her chart. In order to help keep her health information updated, she has authorized us to request the following medical record(s):        (  )  MAMMOGRAM                                      (  )  COLONOSCOPY      (  )  PAP SMEAR                                          (  )  OUTSIDE LAB RESULTS     (  )  DEXA SCAN                                          ( x )  EYE EXAM            (  )  FOOT EXAM                                          (  )  ENTIRE RECORD     (  )  OUTSIDE IMMUNIZATIONS                 (  )  _______________         Please fax records to [facility], Andre D Duplantis, MD, [phone]     If you have any questions, please contact Karime Sheriff at [phone].           Patient Name: Michelle Rowell  : 1940  Patient Phone #: [phone]

## 2019-11-12 ENCOUNTER — PATIENT OUTREACH (OUTPATIENT)
Dept: ADMINISTRATIVE | Facility: OTHER | Age: 79
End: 2019-11-12

## 2019-12-01 ENCOUNTER — PATIENT OUTREACH (OUTPATIENT)
Dept: ADMINISTRATIVE | Facility: OTHER | Age: 79
End: 2019-12-01

## 2019-12-01 DIAGNOSIS — E11.40 TYPE 2 DIABETES MELLITUS WITH DIABETIC NEUROPATHY, WITHOUT LONG-TERM CURRENT USE OF INSULIN: Primary | ICD-10-CM

## 2019-12-04 ENCOUNTER — OFFICE VISIT (OUTPATIENT)
Dept: NEUROLOGY | Facility: CLINIC | Age: 79
End: 2019-12-04
Payer: MEDICARE

## 2019-12-04 VITALS
WEIGHT: 143 LBS | HEART RATE: 72 BPM | HEIGHT: 68 IN | SYSTOLIC BLOOD PRESSURE: 132 MMHG | RESPIRATION RATE: 14 BRPM | BODY MASS INDEX: 21.67 KG/M2 | DIASTOLIC BLOOD PRESSURE: 68 MMHG

## 2019-12-04 DIAGNOSIS — I10 ESSENTIAL HYPERTENSION: ICD-10-CM

## 2019-12-04 DIAGNOSIS — E78.5 HYPERLIPIDEMIA, UNSPECIFIED HYPERLIPIDEMIA TYPE: ICD-10-CM

## 2019-12-04 DIAGNOSIS — G81.94 LEFT HEMIPLEGIA: ICD-10-CM

## 2019-12-04 DIAGNOSIS — G81.91 RIGHT HEMIPLEGIA: ICD-10-CM

## 2019-12-04 DIAGNOSIS — I69.90 LATE EFFECTS OF CVA (CEREBROVASCULAR ACCIDENT): Primary | ICD-10-CM

## 2019-12-04 DIAGNOSIS — R47.01 APHASIA: ICD-10-CM

## 2019-12-04 DIAGNOSIS — Z86.718 HISTORY OF DEEP VEIN THROMBOSIS: ICD-10-CM

## 2019-12-04 PROBLEM — F32.A DEPRESSION: Status: RESOLVED | Noted: 2017-07-31 | Resolved: 2019-12-04

## 2019-12-04 PROBLEM — R63.0 POOR APPETITE: Status: RESOLVED | Noted: 2017-07-31 | Resolved: 2019-12-04

## 2019-12-04 PROCEDURE — 99214 OFFICE O/P EST MOD 30 MIN: CPT | Mod: S$PBB | Performed by: NURSE PRACTITIONER

## 2019-12-04 PROCEDURE — 99999 PR PBB SHADOW E&M-EST. PATIENT-LVL III: ICD-10-PCS | Mod: PBBFAC,,, | Performed by: NURSE PRACTITIONER

## 2019-12-04 PROCEDURE — 99213 OFFICE O/P EST LOW 20 MIN: CPT | Mod: PBBFAC | Performed by: NURSE PRACTITIONER

## 2019-12-04 PROCEDURE — 99999 PR PBB SHADOW E&M-EST. PATIENT-LVL III: CPT | Mod: PBBFAC,,, | Performed by: NURSE PRACTITIONER

## 2019-12-04 PROCEDURE — 99999 PR STA SHADOW: CPT | Mod: PBBFAC,,, | Performed by: NURSE PRACTITIONER

## 2019-12-04 RX ORDER — RAMIPRIL 2.5 MG/1
2.5 CAPSULE ORAL DAILY
Status: ON HOLD | COMMUNITY
End: 2023-01-01 | Stop reason: HOSPADM

## 2019-12-04 NOTE — PROGRESS NOTES
"HPI: Michelle Rowell is a 78 y.o. female with years of neck pain and tingling in the right neck.  CT spine, showed "subluxation" at C3/4 and likely at least moderate spinal stenosis". Follow up MRI 3/2015 shows moderate spinal stenosis and "very minimal anterolisthesis of C3 in respect to C4 by " which is not concerning for subluxation. 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 She has DM II, HLD, and is s/p CABG.     She presents today for a follow up visit. No change to her right or left hemiparesis since her last visit, despite PT/OT at [facility]. She does ambulate with PT, but remains wheelchair bound.     She continues on both Eliquis and Plavix per Cardiology, which was verified per family.     She had a 30 day cardiac event monitor with Cardiology, which was unremarkable per daughter. Stress test showed a possible MI.     No neck pain.     Review of Systems   Constitutional: Negative for fever and weight loss.   HENT: Negative for nosebleeds.    Eyes: Negative for double vision.   Respiratory: Negative for hemoptysis.    Cardiovascular: Negative for leg swelling.   Gastrointestinal: Negative for blood in stool.   Genitourinary: Negative for hematuria.   Musculoskeletal: Negative for falls.   Skin: Negative for rash.   Neurological: Positive for focal weakness. Negative for dizziness, tingling, tremors, sensory change, speech change, seizures, loss of consciousness, weakness and headaches.   Endo/Heme/Allergies: Does not bruise/bleed easily.   Psychiatric/Behavioral: Negative for depression and memory loss. The patient is not nervous/anxious and does not have insomnia.      Exam:  Gen Appearance, well developed/nourished in " "no apparent distress  CV: 2+ distal pulses with no edema or swelling  Neuro:  MS: Awake, alert,  Sustains attention. Recent/remote memory intact, Language is reduced to mildly to spontaneous speech with some slurred speech mildly as prior. She has some hesitancy at times but comprehension is intact. Fund of Knowledge is full  CN: Optic discs are flat with normal vasculature, PERRL, Extraoccular movements and visual fields are full. Normal facial sensation and strength,   Tongue and Palate are midline and strong. Shoulder Shrug symmetric and strong.  Motor: Normal bulk, tone increased on the right, no abnormal movements. 5/5 strength bilateral upper with some difficulty with right sided movements from late effect CVA, but new residual hemiparesis affecting the LLE since her last visit from CVA in 6/2019, strength 3/5 LLE/lower extremities with 1+ reflexes    Sensory: symmetric to temp and vibration  Cerebellar: Finger-nose, Rapid alternating movements intact  Gait: not done; wheelchair bound due to chronic right hemiparesis from first CVA, and now new left hemiparesis affecting the LLE from 6/2019 CVA.     Imaging:   Workup for 6/2019 CVA:   MRI brain WO (6/7/19):  Acute infarct in right basal ganglia + Cytotoxic cerebral edema  Remote left MCA infarct      Vessel Imaging      CTA H&N (6/7/19):  - No evidence for significant proximal stenosis or occlusion.  - Atherosclerotic disease carotid bifurcations and proximal ICAs with less than 50% proximal ICA stenosis by NASCET criteria.     Cardiac Imaging      TTE (6/7/19):  EF 40%  Apical aneursym.      2015 MRI C spine: Degenerative changes of the cervical spine contributing to multilevel central canal stenosis and neural foraminal narrowing as detailed in the above report.  NOTE: "very minimal anterolisthesis of C3 in respect to C4 by "  Left thyroid nodule measuring 1.3 x 1.6 cm. Consider further evaluation with a thyroid " "ultrasound.  ______________________________________     RA factor negative    MRI brain 1/2016:   MRI following thrombectomy for left MCA occlusion demonstrates small zones of infarction in the left frontal cortex which are mostly perisylvian, left insula, left putamen and caudate. There is no hemorrhage.      Electronically signed by: NITHYA DELANEY MD  Date: 01/08/16  Time: 07:33        Encounter     View Encounter           CTA: CT demonstrates no evidence of acute intracranial hemorrhage. There are minimal early CT changes in the left subinsular region although the majority of the gray-white differentiation is preserved.    CTA demonstrates a left M1 segment occlusion.    CT perfusion demonstrates a large area of ischemic penumbra with only a small area of core infarction in the left basal ganglia region.    The patient was taken emergently to the neuro- interventional radiology catheter lab for emergent mechanical thrombectomy.    Angio:   No carotid stenosis  1. Successful resolution thrombectomy of the left middle cerebral artery  2. TICI 3 of the left middle cerebral artery artery    Echo:1 - Technically difficult portable study.   2 - Concentric remodeling.   3 - Normal left ventricular systolic function (EF 60-65%).   4 - Normal right ventricular systolic function .   5 - Grade I left ventricular diastolic dysfunction.     2018 A1C is 6 and LDL is 86  TSH normal 2017/2018 6/2019 LDL less than goal of 70.     Cardiac event monitor since CVA:  There were a total of 26 patient-triggered events.  The majority of these revealed normal sinus rhythm.  Two transmissions revealed normal sinus rhythm with rare PVCs.  One transmission revealed normal sinus rhythm with a PAC.  There were no significant   arrhythmias noted.    Assessment/Plan: Michelle Rowell is a 78 y.o. female with years of neck pain and tingling in the right neck   CT spine, showed "subluxation" at C3/4 and likely at least moderate spinal " "stenosis". Follow up MRI 3/2015 shows moderate spinal stenosis and "very minimal anterolisthesis of C3 in respect to C4 by " which is not concerning for subluxation. S/p Thombectomy for embolic Left MCA Stroke in 1/2016 resulting in aphasia and right hemiparesis with an Right MCA distribution CVA in 6/2019, believed to be cardioembolic in nature, from an apical aneurysm.     I recommend:   1. Second CVA believed to be cardioembolic in nature from an apical aneursym. She was discharged on Plavix but is now taking both Eliquis and Plavix, which family did verify with Cardiology to determine need.   2. She is non ambulatory since her CVA, but fall risks were discussed as she does attempt to ambulate with PT/OT. She should continue PT/OT at the [facility].     3. For prior CVA workup, event monitor was negative. She had no other reason for anticoagulation once PE/DVT treatment was completed, and continued ASA only after this.  4. Stroke risk factors: DM, HTN, HLD. Continue treatment for stroke prevention. Goal A1C is less than 7 (at goal) and goal LDL is less than 70 for stroke prevention (at goal). BP at goal  5. Remain off driving. She is wheelchair bound at this point.   6. Her neck pain is resolved/ no further treatment needed for this at this time for her C spine stenosis/ no subluxation.     FU 6 months                  "

## 2020-01-23 ENCOUNTER — PES CALL (OUTPATIENT)
Dept: ADMINISTRATIVE | Facility: CLINIC | Age: 80
End: 2020-01-23

## 2020-02-14 ENCOUNTER — PES CALL (OUTPATIENT)
Dept: ADMINISTRATIVE | Facility: CLINIC | Age: 80
End: 2020-02-14

## 2020-05-12 ENCOUNTER — PATIENT MESSAGE (OUTPATIENT)
Dept: ADMINISTRATIVE | Facility: HOSPITAL | Age: 80
End: 2020-05-12

## 2020-05-28 ENCOUNTER — PATIENT OUTREACH (OUTPATIENT)
Dept: ADMINISTRATIVE | Facility: OTHER | Age: 80
End: 2020-05-28

## 2020-06-18 ENCOUNTER — PATIENT OUTREACH (OUTPATIENT)
Dept: ADMINISTRATIVE | Facility: OTHER | Age: 80
End: 2020-06-18

## 2020-10-01 ENCOUNTER — PATIENT MESSAGE (OUTPATIENT)
Dept: OTHER | Facility: OTHER | Age: 80
End: 2020-10-01

## 2020-12-11 ENCOUNTER — PATIENT MESSAGE (OUTPATIENT)
Dept: OTHER | Facility: OTHER | Age: 80
End: 2020-12-11

## 2021-01-04 ENCOUNTER — PATIENT MESSAGE (OUTPATIENT)
Dept: ADMINISTRATIVE | Facility: HOSPITAL | Age: 81
End: 2021-01-04

## 2021-04-05 ENCOUNTER — PATIENT MESSAGE (OUTPATIENT)
Dept: ADMINISTRATIVE | Facility: HOSPITAL | Age: 81
End: 2021-04-05

## 2021-07-06 ENCOUNTER — PATIENT MESSAGE (OUTPATIENT)
Dept: ADMINISTRATIVE | Facility: HOSPITAL | Age: 81
End: 2021-07-06

## 2022-01-01 ENCOUNTER — LAB VISIT (OUTPATIENT)
Dept: LAB | Facility: HOSPITAL | Age: 82
End: 2022-01-01
Attending: HOSPITALIST
Payer: MEDICARE

## 2022-01-01 DIAGNOSIS — N39.0 URINARY TRACT INFECTION, SITE NOT SPECIFIED: Primary | ICD-10-CM

## 2022-01-01 DIAGNOSIS — R30.0 DYSURIA: Primary | ICD-10-CM

## 2022-01-01 LAB
AMORPH CRY URNS QL MICRO: ABNORMAL
BACTERIA #/AREA URNS HPF: ABNORMAL /HPF
BACTERIA #/AREA URNS HPF: ABNORMAL /HPF
BACTERIA UR CULT: ABNORMAL
BILIRUB UR QL STRIP: ABNORMAL
BILIRUB UR QL STRIP: NEGATIVE
CLARITY UR: ABNORMAL
CLARITY UR: ABNORMAL
COLOR UR: YELLOW
COLOR UR: YELLOW
GLUCOSE UR QL STRIP: NEGATIVE
GLUCOSE UR QL STRIP: NEGATIVE
HGB UR QL STRIP: ABNORMAL
HGB UR QL STRIP: NEGATIVE
HYALINE CASTS #/AREA URNS LPF: 0 /LPF
KETONES UR QL STRIP: ABNORMAL
KETONES UR QL STRIP: NEGATIVE
LEUKOCYTE ESTERASE UR QL STRIP: ABNORMAL
LEUKOCYTE ESTERASE UR QL STRIP: ABNORMAL
MICROSCOPIC COMMENT: ABNORMAL
MICROSCOPIC COMMENT: ABNORMAL
NITRITE UR QL STRIP: NEGATIVE
NITRITE UR QL STRIP: NEGATIVE
NON-SQ EPI CELLS #/AREA URNS HPF: 2 /HPF
PH UR STRIP: 6 [PH] (ref 5–8)
PH UR STRIP: >8 [PH] (ref 5–8)
PROT UR QL STRIP: ABNORMAL
PROT UR QL STRIP: NEGATIVE
RBC #/AREA URNS HPF: 10 /HPF (ref 0–4)
RBC #/AREA URNS HPF: 3 /HPF (ref 0–4)
SP GR UR STRIP: 1.02 (ref 1–1.03)
SP GR UR STRIP: >=1.03 (ref 1–1.03)
SQUAMOUS #/AREA URNS HPF: 3 /HPF
URN SPEC COLLECT METH UR: ABNORMAL
URN SPEC COLLECT METH UR: ABNORMAL
UROBILINOGEN UR STRIP-ACNC: 1 EU/DL
UROBILINOGEN UR STRIP-ACNC: 1 EU/DL
WBC #/AREA URNS HPF: 3 /HPF (ref 0–5)
WBC #/AREA URNS HPF: 50 /HPF (ref 0–5)

## 2022-01-01 PROCEDURE — 81000 URINALYSIS NONAUTO W/SCOPE: CPT

## 2022-01-01 PROCEDURE — 87086 URINE CULTURE/COLONY COUNT: CPT

## 2022-01-01 PROCEDURE — 87186 SC STD MICRODIL/AGAR DIL: CPT

## 2022-01-01 PROCEDURE — 87088 URINE BACTERIA CULTURE: CPT

## 2022-01-01 PROCEDURE — 87077 CULTURE AEROBIC IDENTIFY: CPT

## 2022-02-10 ENCOUNTER — PATIENT MESSAGE (OUTPATIENT)
Dept: ADMINISTRATIVE | Facility: HOSPITAL | Age: 82
End: 2022-02-10
Payer: MEDICARE

## 2022-04-04 ENCOUNTER — PATIENT MESSAGE (OUTPATIENT)
Dept: ADMINISTRATIVE | Facility: HOSPITAL | Age: 82
End: 2022-04-04
Payer: MEDICARE

## 2023-01-01 ENCOUNTER — HOSPITAL ENCOUNTER (INPATIENT)
Facility: HOSPITAL | Age: 83
LOS: 8 days | Discharge: HOME | DRG: 871 | End: 2023-04-06
Attending: STUDENT IN AN ORGANIZED HEALTH CARE EDUCATION/TRAINING PROGRAM | Admitting: INTERNAL MEDICINE
Payer: MEDICARE

## 2023-01-01 VITALS
BODY MASS INDEX: 26.64 KG/M2 | DIASTOLIC BLOOD PRESSURE: 56 MMHG | OXYGEN SATURATION: 92 % | SYSTOLIC BLOOD PRESSURE: 120 MMHG | TEMPERATURE: 98 F | WEIGHT: 186.06 LBS | RESPIRATION RATE: 20 BRPM | HEART RATE: 49 BPM | HEIGHT: 70 IN

## 2023-01-01 DIAGNOSIS — Z45.2 ENCOUNTER FOR CENTRAL LINE PLACEMENT: ICD-10-CM

## 2023-01-01 DIAGNOSIS — R65.21 SEPTIC SHOCK: Primary | ICD-10-CM

## 2023-01-01 DIAGNOSIS — I95.9 HYPOTENSION: ICD-10-CM

## 2023-01-01 DIAGNOSIS — T68.XXXA HYPOTHERMIA, INITIAL ENCOUNTER: ICD-10-CM

## 2023-01-01 DIAGNOSIS — R65.20 SEVERE SEPSIS: ICD-10-CM

## 2023-01-01 DIAGNOSIS — J18.9 PNEUMONIA OF BOTH LOWER LOBES DUE TO INFECTIOUS ORGANISM: ICD-10-CM

## 2023-01-01 DIAGNOSIS — R00.1 BRADYCARDIA: ICD-10-CM

## 2023-01-01 DIAGNOSIS — A41.9 SEPTIC SHOCK: Primary | ICD-10-CM

## 2023-01-01 DIAGNOSIS — A41.9 SEPSIS: ICD-10-CM

## 2023-01-01 DIAGNOSIS — A41.9 SEVERE SEPSIS: ICD-10-CM

## 2023-01-01 LAB
ABO + RH BLD: NORMAL
ACINETOBACTER CALCOACETICUS/BAUMANNII COMPLEX: NOT DETECTED
ALBUMIN SERPL BCP-MCNC: 1.1 G/DL (ref 3.5–5.2)
ALBUMIN SERPL BCP-MCNC: 1.4 G/DL (ref 3.5–5.2)
ALBUMIN SERPL BCP-MCNC: 1.6 G/DL (ref 3.5–5.2)
ALBUMIN SERPL BCP-MCNC: 1.9 G/DL (ref 3.5–5.2)
ALP SERPL-CCNC: 35 U/L (ref 55–135)
ALP SERPL-CCNC: 37 U/L (ref 55–135)
ALP SERPL-CCNC: 42 U/L (ref 55–135)
ALP SERPL-CCNC: 55 U/L (ref 55–135)
ALP SERPL-CCNC: 68 U/L (ref 55–135)
ALP SERPL-CCNC: 72 U/L (ref 55–135)
ALP SERPL-CCNC: 79 U/L (ref 55–135)
ALT SERPL W/O P-5'-P-CCNC: 13 U/L (ref 10–44)
ALT SERPL W/O P-5'-P-CCNC: 14 U/L (ref 10–44)
ALT SERPL W/O P-5'-P-CCNC: 21 U/L (ref 10–44)
ALT SERPL W/O P-5'-P-CCNC: 26 U/L (ref 10–44)
ALT SERPL W/O P-5'-P-CCNC: 27 U/L (ref 10–44)
ANION GAP SERPL CALC-SCNC: 12 MMOL/L (ref 8–16)
ANION GAP SERPL CALC-SCNC: 4 MMOL/L (ref 8–16)
ANION GAP SERPL CALC-SCNC: 5 MMOL/L (ref 8–16)
ANION GAP SERPL CALC-SCNC: 6 MMOL/L (ref 8–16)
ANION GAP SERPL CALC-SCNC: 9 MMOL/L (ref 8–16)
ASCENDING AORTA: 2.66 CM
AST SERPL-CCNC: 14 U/L (ref 10–40)
AST SERPL-CCNC: 14 U/L (ref 10–40)
AST SERPL-CCNC: 18 U/L (ref 10–40)
AST SERPL-CCNC: 19 U/L (ref 10–40)
AST SERPL-CCNC: 26 U/L (ref 10–40)
AST SERPL-CCNC: 37 U/L (ref 10–40)
AST SERPL-CCNC: 38 U/L (ref 10–40)
AV INDEX (PROSTH): 0.94
AV MEAN GRADIENT: 3 MMHG
AV PEAK GRADIENT: 5 MMHG
AV VALVE AREA: 2.96 CM2
AV VELOCITY RATIO: 1.02
BACTERIA #/AREA URNS HPF: ABNORMAL /HPF
BACTERIA BLD CULT: ABNORMAL
BACTERIA BLD CULT: NORMAL
BACTERIA BLD CULT: NORMAL
BACTERIA STL CULT: NORMAL
BACTEROIDES FRAGILIS: NOT DETECTED
BASOPHILS # BLD AUTO: 0.02 K/UL (ref 0–0.2)
BASOPHILS # BLD AUTO: 0.03 K/UL (ref 0–0.2)
BASOPHILS # BLD AUTO: 0.04 K/UL (ref 0–0.2)
BASOPHILS # BLD AUTO: 0.06 K/UL (ref 0–0.2)
BASOPHILS NFR BLD: 0.1 % (ref 0–1.9)
BASOPHILS NFR BLD: 0.2 % (ref 0–1.9)
BASOPHILS NFR BLD: 0.2 % (ref 0–1.9)
BASOPHILS NFR BLD: 0.3 % (ref 0–1.9)
BASOPHILS NFR BLD: 0.3 % (ref 0–1.9)
BASOPHILS NFR BLD: 0.4 % (ref 0–1.9)
BASOPHILS NFR BLD: 0.4 % (ref 0–1.9)
BASOPHILS NFR BLD: 0.5 % (ref 0–1.9)
BASOPHILS NFR BLD: 0.5 % (ref 0–1.9)
BASOPHILS NFR BLD: 0.6 % (ref 0–1.9)
BILIRUB SERPL-MCNC: 0.2 MG/DL (ref 0.1–1)
BILIRUB SERPL-MCNC: 0.3 MG/DL (ref 0.1–1)
BILIRUB SERPL-MCNC: 0.4 MG/DL (ref 0.1–1)
BILIRUB SERPL-MCNC: 0.5 MG/DL (ref 0.1–1)
BILIRUB UR QL STRIP: ABNORMAL
BLD GP AB SCN CELLS X3 SERPL QL: NORMAL
BLD PROD TYP BPU: NORMAL
BLOOD UNIT EXPIRATION DATE: NORMAL
BLOOD UNIT TYPE CODE: 6200
BLOOD UNIT TYPE: NORMAL
BSA FOR ECHO PROCEDURE: 1.8 M2
BUN SERPL-MCNC: 15 MG/DL (ref 8–23)
BUN SERPL-MCNC: 17 MG/DL (ref 8–23)
BUN SERPL-MCNC: 22 MG/DL (ref 8–23)
BUN SERPL-MCNC: 23 MG/DL (ref 8–23)
BUN SERPL-MCNC: 26 MG/DL (ref 8–23)
BUN SERPL-MCNC: 36 MG/DL (ref 8–23)
BUN SERPL-MCNC: 47 MG/DL (ref 8–23)
BUN SERPL-MCNC: 58 MG/DL (ref 8–23)
BUN SERPL-MCNC: 64 MG/DL (ref 8–23)
BUN SERPL-MCNC: 70 MG/DL (ref 8–23)
C DIFF GDH STL QL: NEGATIVE
C DIFF TOX A+B STL QL IA: NEGATIVE
CALCIUM SERPL-MCNC: 7.2 MG/DL (ref 8.7–10.5)
CALCIUM SERPL-MCNC: 7.3 MG/DL (ref 8.7–10.5)
CALCIUM SERPL-MCNC: 7.4 MG/DL (ref 8.7–10.5)
CALCIUM SERPL-MCNC: 7.5 MG/DL (ref 8.7–10.5)
CALCIUM SERPL-MCNC: 7.6 MG/DL (ref 8.7–10.5)
CALCIUM SERPL-MCNC: 7.7 MG/DL (ref 8.7–10.5)
CALCIUM SERPL-MCNC: 7.7 MG/DL (ref 8.7–10.5)
CALCIUM SERPL-MCNC: 8.4 MG/DL (ref 8.7–10.5)
CANDIDA ALBICANS: NOT DETECTED
CANDIDA AURIS: NOT DETECTED
CANDIDA GLABRATA: NOT DETECTED
CANDIDA KRUSEI: NOT DETECTED
CANDIDA PARAPSILOSIS: NOT DETECTED
CANDIDA TROPICALIS: NOT DETECTED
CHLORIDE SERPL-SCNC: 103 MMOL/L (ref 95–110)
CHLORIDE SERPL-SCNC: 105 MMOL/L (ref 95–110)
CHLORIDE SERPL-SCNC: 105 MMOL/L (ref 95–110)
CHLORIDE SERPL-SCNC: 106 MMOL/L (ref 95–110)
CHLORIDE SERPL-SCNC: 107 MMOL/L (ref 95–110)
CHLORIDE SERPL-SCNC: 107 MMOL/L (ref 95–110)
CHLORIDE SERPL-SCNC: 110 MMOL/L (ref 95–110)
CHLORIDE SERPL-SCNC: 110 MMOL/L (ref 95–110)
CLARITY UR: CLEAR
CO2 SERPL-SCNC: 16 MMOL/L (ref 23–29)
CO2 SERPL-SCNC: 19 MMOL/L (ref 23–29)
CO2 SERPL-SCNC: 19 MMOL/L (ref 23–29)
CO2 SERPL-SCNC: 20 MMOL/L (ref 23–29)
CO2 SERPL-SCNC: 21 MMOL/L (ref 23–29)
CO2 SERPL-SCNC: 22 MMOL/L (ref 23–29)
CODING SYSTEM: NORMAL
COLOR UR: YELLOW
CREAT SERPL-MCNC: 0.4 MG/DL (ref 0.5–1.4)
CREAT SERPL-MCNC: 0.5 MG/DL (ref 0.5–1.4)
CREAT SERPL-MCNC: 0.7 MG/DL (ref 0.5–1.4)
CREAT SERPL-MCNC: 0.9 MG/DL (ref 0.5–1.4)
CREAT SERPL-MCNC: 0.9 MG/DL (ref 0.5–1.4)
CREAT SERPL-MCNC: 1 MG/DL (ref 0.5–1.4)
CROSSMATCH INTERPRETATION: NORMAL
CRYPTOCOCCUS NEOFORMANS/GATTII: NOT DETECTED
CRYPTOSP AG STL QL IA: NEGATIVE
CTX-M GENE: ABNORMAL
CV ECHO LV RWT: 0.58 CM
DIFFERENTIAL METHOD: ABNORMAL
DISPENSE STATUS: NORMAL
DOP CALC AO PEAK VEL: 1.07 M/S
DOP CALC AO VTI: 24.1 CM
DOP CALC LVOT AREA: 3.1 CM2
DOP CALC LVOT DIAMETER: 2 CM
DOP CALC LVOT PEAK VEL: 1.09 M/S
DOP CALC LVOT STROKE VOLUME: 71.28 CM3
DOP CALC RVOT PEAK VEL: 0.97 M/S
DOP CALC RVOT VTI: 26.8 CM
DOP CALCLVOT PEAK VEL VTI: 22.7 CM
E COLI SXT1 STL QL IA: NEGATIVE
E COLI SXT2 STL QL IA: NEGATIVE
ECHO LV POSTERIOR WALL: 0.92 CM (ref 0.6–1.1)
EJECTION FRACTION: 70 %
ENTEROBACTER CLOACAE COMPLEX: NOT DETECTED
ENTEROBACTERALES: NOT DETECTED
ENTEROCOCCUS FAECALIS: NOT DETECTED
ENTEROCOCCUS FAECIUM: NOT DETECTED
EOSINOPHIL # BLD AUTO: 0 K/UL (ref 0–0.5)
EOSINOPHIL # BLD AUTO: 0 K/UL (ref 0–0.5)
EOSINOPHIL # BLD AUTO: 0.1 K/UL (ref 0–0.5)
EOSINOPHIL # BLD AUTO: 0.2 K/UL (ref 0–0.5)
EOSINOPHIL # BLD AUTO: 0.3 K/UL (ref 0–0.5)
EOSINOPHIL NFR BLD: 0.1 % (ref 0–8)
EOSINOPHIL NFR BLD: 0.1 % (ref 0–8)
EOSINOPHIL NFR BLD: 0.8 % (ref 0–8)
EOSINOPHIL NFR BLD: 1.8 % (ref 0–8)
EOSINOPHIL NFR BLD: 2.2 % (ref 0–8)
EOSINOPHIL NFR BLD: 2.6 % (ref 0–8)
EOSINOPHIL NFR BLD: 2.8 % (ref 0–8)
EOSINOPHIL NFR BLD: 3.4 % (ref 0–8)
EOSINOPHIL NFR BLD: 3.8 % (ref 0–8)
EOSINOPHIL NFR BLD: 4 % (ref 0–8)
ERYTHROCYTE [DISTWIDTH] IN BLOOD BY AUTOMATED COUNT: 16.9 % (ref 11.5–14.5)
ERYTHROCYTE [DISTWIDTH] IN BLOOD BY AUTOMATED COUNT: 17.1 % (ref 11.5–14.5)
ERYTHROCYTE [DISTWIDTH] IN BLOOD BY AUTOMATED COUNT: 17.2 % (ref 11.5–14.5)
ERYTHROCYTE [DISTWIDTH] IN BLOOD BY AUTOMATED COUNT: 17.5 % (ref 11.5–14.5)
ERYTHROCYTE [DISTWIDTH] IN BLOOD BY AUTOMATED COUNT: 18.5 % (ref 11.5–14.5)
ERYTHROCYTE [DISTWIDTH] IN BLOOD BY AUTOMATED COUNT: 19.1 % (ref 11.5–14.5)
ERYTHROCYTE [DISTWIDTH] IN BLOOD BY AUTOMATED COUNT: 19.7 % (ref 11.5–14.5)
ERYTHROCYTE [DISTWIDTH] IN BLOOD BY AUTOMATED COUNT: 19.7 % (ref 11.5–14.5)
ESCHERICHIA COLI: NOT DETECTED
EST. GFR  (NO RACE VARIABLE): 57 ML/MIN/1.73 M^2
EST. GFR  (NO RACE VARIABLE): >60 ML/MIN/1.73 M^2
ESTIMATED AVG GLUCOSE: 94 MG/DL (ref 68–131)
FERRITIN SERPL-MCNC: 110 NG/ML (ref 20–300)
FRACTIONAL SHORTENING: 14 % (ref 28–44)
G LAMBLIA AG STL QL IA: NEGATIVE
GLUCOSE SERPL-MCNC: 113 MG/DL (ref 70–110)
GLUCOSE SERPL-MCNC: 121 MG/DL (ref 70–110)
GLUCOSE SERPL-MCNC: 130 MG/DL (ref 70–110)
GLUCOSE SERPL-MCNC: 304 MG/DL (ref 70–110)
GLUCOSE SERPL-MCNC: 307 MG/DL (ref 70–110)
GLUCOSE SERPL-MCNC: 323 MG/DL (ref 70–110)
GLUCOSE SERPL-MCNC: 73 MG/DL (ref 70–110)
GLUCOSE SERPL-MCNC: 80 MG/DL (ref 70–110)
GLUCOSE SERPL-MCNC: 86 MG/DL (ref 70–110)
GLUCOSE SERPL-MCNC: 94 MG/DL (ref 70–110)
GLUCOSE UR QL STRIP: NEGATIVE
HAEMOPHILUS INFLUENZAE: NOT DETECTED
HBA1C MFR BLD: 4.9 % (ref 4–5.6)
HCT VFR BLD AUTO: 22.4 % (ref 37–48.5)
HCT VFR BLD AUTO: 24.6 % (ref 37–48.5)
HCT VFR BLD AUTO: 25 % (ref 37–48.5)
HCT VFR BLD AUTO: 26.4 % (ref 37–48.5)
HCT VFR BLD AUTO: 26.6 % (ref 37–48.5)
HCT VFR BLD AUTO: 26.9 % (ref 37–48.5)
HCT VFR BLD AUTO: 27.2 % (ref 37–48.5)
HCT VFR BLD AUTO: 28 % (ref 37–48.5)
HCT VFR BLD AUTO: 28.3 % (ref 37–48.5)
HCT VFR BLD AUTO: 29.8 % (ref 37–48.5)
HGB BLD-MCNC: 7.5 G/DL (ref 12–16)
HGB BLD-MCNC: 8.3 G/DL (ref 12–16)
HGB BLD-MCNC: 8.4 G/DL (ref 12–16)
HGB BLD-MCNC: 8.6 G/DL (ref 12–16)
HGB BLD-MCNC: 8.8 G/DL (ref 12–16)
HGB BLD-MCNC: 8.9 G/DL (ref 12–16)
HGB BLD-MCNC: 9 G/DL (ref 12–16)
HGB BLD-MCNC: 9.3 G/DL (ref 12–16)
HGB BLD-MCNC: 9.8 G/DL (ref 12–16)
HGB BLD-MCNC: 9.8 G/DL (ref 12–16)
HGB UR QL STRIP: NEGATIVE
HYALINE CASTS #/AREA URNS LPF: 10 /LPF
IMM GRANULOCYTES # BLD AUTO: 0.02 K/UL (ref 0–0.04)
IMM GRANULOCYTES # BLD AUTO: 0.03 K/UL (ref 0–0.04)
IMM GRANULOCYTES # BLD AUTO: 0.08 K/UL (ref 0–0.04)
IMM GRANULOCYTES # BLD AUTO: 0.1 K/UL (ref 0–0.04)
IMM GRANULOCYTES # BLD AUTO: 0.12 K/UL (ref 0–0.04)
IMM GRANULOCYTES # BLD AUTO: 0.16 K/UL (ref 0–0.04)
IMM GRANULOCYTES # BLD AUTO: 0.23 K/UL (ref 0–0.04)
IMM GRANULOCYTES NFR BLD AUTO: 0.3 % (ref 0–0.5)
IMM GRANULOCYTES NFR BLD AUTO: 0.4 % (ref 0–0.5)
IMM GRANULOCYTES NFR BLD AUTO: 0.5 % (ref 0–0.5)
IMM GRANULOCYTES NFR BLD AUTO: 0.6 % (ref 0–0.5)
IMM GRANULOCYTES NFR BLD AUTO: 0.7 % (ref 0–0.5)
IMM GRANULOCYTES NFR BLD AUTO: 0.9 % (ref 0–0.5)
IMM GRANULOCYTES NFR BLD AUTO: 1.1 % (ref 0–0.5)
IMM GRANULOCYTES NFR BLD AUTO: 1.2 % (ref 0–0.5)
IMP GENE: ABNORMAL
INTERVENTRICULAR SEPTUM: 1.13 CM (ref 0.6–1.1)
IRON SERPL-MCNC: 36 UG/DL (ref 30–160)
IVRT: 205.52 MSEC
KETONES UR QL STRIP: ABNORMAL
KLEBSIELLA AEROGENES: NOT DETECTED
KLEBSIELLA OXYTOCA: NOT DETECTED
KLEBSIELLA PNEUMONIAE GROUP: NOT DETECTED
KPC: ABNORMAL
LA MAJOR: 5.1 CM
LA MINOR: 4.75 CM
LACTATE SERPL-SCNC: 1.6 MMOL/L (ref 0.5–2.2)
LEFT ATRIUM SIZE: 2.86 CM
LEFT ATRIUM VOLUME INDEX MOD: 21.7 ML/M2
LEFT ATRIUM VOLUME MOD: 41.08 CM3
LEFT INTERNAL DIMENSION IN SYSTOLE: 2.73 CM (ref 2.1–4)
LEFT VENTRICLE DIASTOLIC VOLUME INDEX: 21.56 ML/M2
LEFT VENTRICLE DIASTOLIC VOLUME: 40.75 ML
LEFT VENTRICLE MASS INDEX: 49 G/M2
LEFT VENTRICLE SYSTOLIC VOLUME INDEX: 14.7 ML/M2
LEFT VENTRICLE SYSTOLIC VOLUME: 27.83 ML
LEFT VENTRICULAR INTERNAL DIMENSION IN DIASTOLE: 3.19 CM (ref 3.5–6)
LEFT VENTRICULAR MASS: 93.3 G
LEUKOCYTE ESTERASE UR QL STRIP: ABNORMAL
LISTERIA MONOCYTOGENES: NOT DETECTED
LVOT MG: 3.47 MMHG
LVOT MV: 0.91 CM/S
LYMPHOCYTES # BLD AUTO: 1.9 K/UL (ref 1–4.8)
LYMPHOCYTES # BLD AUTO: 2 K/UL (ref 1–4.8)
LYMPHOCYTES # BLD AUTO: 2.1 K/UL (ref 1–4.8)
LYMPHOCYTES # BLD AUTO: 2.2 K/UL (ref 1–4.8)
LYMPHOCYTES # BLD AUTO: 2.3 K/UL (ref 1–4.8)
LYMPHOCYTES # BLD AUTO: 2.8 K/UL (ref 1–4.8)
LYMPHOCYTES # BLD AUTO: 3 K/UL (ref 1–4.8)
LYMPHOCYTES # BLD AUTO: 3.2 K/UL (ref 1–4.8)
LYMPHOCYTES # BLD AUTO: 4.1 K/UL (ref 1–4.8)
LYMPHOCYTES # BLD AUTO: 4.4 K/UL (ref 1–4.8)
LYMPHOCYTES NFR BLD: 16.5 % (ref 18–48)
LYMPHOCYTES NFR BLD: 23.9 % (ref 18–48)
LYMPHOCYTES NFR BLD: 25.3 % (ref 18–48)
LYMPHOCYTES NFR BLD: 25.4 % (ref 18–48)
LYMPHOCYTES NFR BLD: 27.1 % (ref 18–48)
LYMPHOCYTES NFR BLD: 28.2 % (ref 18–48)
LYMPHOCYTES NFR BLD: 28.9 % (ref 18–48)
LYMPHOCYTES NFR BLD: 34.1 % (ref 18–48)
LYMPHOCYTES NFR BLD: 36.6 % (ref 18–48)
LYMPHOCYTES NFR BLD: 39.5 % (ref 18–48)
MAGNESIUM SERPL-MCNC: 1.4 MG/DL (ref 1.6–2.6)
MAGNESIUM SERPL-MCNC: 1.7 MG/DL (ref 1.6–2.6)
MAGNESIUM SERPL-MCNC: 1.9 MG/DL (ref 1.6–2.6)
MAGNESIUM SERPL-MCNC: 1.9 MG/DL (ref 1.6–2.6)
MAGNESIUM SERPL-MCNC: 2.2 MG/DL (ref 1.6–2.6)
MCH RBC QN AUTO: 29.8 PG (ref 27–31)
MCH RBC QN AUTO: 30.2 PG (ref 27–31)
MCH RBC QN AUTO: 30.4 PG (ref 27–31)
MCH RBC QN AUTO: 30.6 PG (ref 27–31)
MCH RBC QN AUTO: 30.6 PG (ref 27–31)
MCH RBC QN AUTO: 30.7 PG (ref 27–31)
MCH RBC QN AUTO: 30.7 PG (ref 27–31)
MCH RBC QN AUTO: 30.9 PG (ref 27–31)
MCH RBC QN AUTO: 31 PG (ref 27–31)
MCH RBC QN AUTO: 31.1 PG (ref 27–31)
MCHC RBC AUTO-ENTMCNC: 32.4 G/DL (ref 32–36)
MCHC RBC AUTO-ENTMCNC: 32.6 G/DL (ref 32–36)
MCHC RBC AUTO-ENTMCNC: 32.9 G/DL (ref 32–36)
MCHC RBC AUTO-ENTMCNC: 33.2 G/DL (ref 32–36)
MCHC RBC AUTO-ENTMCNC: 33.2 G/DL (ref 32–36)
MCHC RBC AUTO-ENTMCNC: 33.5 G/DL (ref 32–36)
MCHC RBC AUTO-ENTMCNC: 34.1 G/DL (ref 32–36)
MCHC RBC AUTO-ENTMCNC: 34.6 G/DL (ref 32–36)
MCR-1: ABNORMAL
MCV RBC AUTO: 89 FL (ref 82–98)
MCV RBC AUTO: 90 FL (ref 82–98)
MCV RBC AUTO: 91 FL (ref 82–98)
MCV RBC AUTO: 92 FL (ref 82–98)
MCV RBC AUTO: 93 FL (ref 82–98)
MCV RBC AUTO: 93 FL (ref 82–98)
MCV RBC AUTO: 94 FL (ref 82–98)
MCV RBC AUTO: 94 FL (ref 82–98)
MEC A/C AND MREJ (MRSA): ABNORMAL
MEC A/C: DETECTED
MICROSCOPIC COMMENT: ABNORMAL
MONOCYTES # BLD AUTO: 0.8 K/UL (ref 0.3–1)
MONOCYTES # BLD AUTO: 0.9 K/UL (ref 0.3–1)
MONOCYTES # BLD AUTO: 1.2 K/UL (ref 0.3–1)
MONOCYTES # BLD AUTO: 1.2 K/UL (ref 0.3–1)
MONOCYTES # BLD AUTO: 1.4 K/UL (ref 0.3–1)
MONOCYTES # BLD AUTO: 2.1 K/UL (ref 0.3–1)
MONOCYTES NFR BLD: 11 % (ref 4–15)
MONOCYTES NFR BLD: 11.2 % (ref 4–15)
MONOCYTES NFR BLD: 11.7 % (ref 4–15)
MONOCYTES NFR BLD: 11.7 % (ref 4–15)
MONOCYTES NFR BLD: 13.7 % (ref 4–15)
MONOCYTES NFR BLD: 13.9 % (ref 4–15)
MONOCYTES NFR BLD: 15 % (ref 4–15)
MONOCYTES NFR BLD: 7 % (ref 4–15)
MONOCYTES NFR BLD: 7.5 % (ref 4–15)
MONOCYTES NFR BLD: 8.6 % (ref 4–15)
NDM GENE: ABNORMAL
NEISSERIA MENINGITIDIS: NOT DETECTED
NEUTROPHILS # BLD AUTO: 10.6 K/UL (ref 1.8–7.7)
NEUTROPHILS # BLD AUTO: 11.6 K/UL (ref 1.8–7.7)
NEUTROPHILS # BLD AUTO: 14.6 K/UL (ref 1.8–7.7)
NEUTROPHILS # BLD AUTO: 2.5 K/UL (ref 1.8–7.7)
NEUTROPHILS # BLD AUTO: 2.5 K/UL (ref 1.8–7.7)
NEUTROPHILS # BLD AUTO: 2.9 K/UL (ref 1.8–7.7)
NEUTROPHILS # BLD AUTO: 3.7 K/UL (ref 1.8–7.7)
NEUTROPHILS # BLD AUTO: 4.2 K/UL (ref 1.8–7.7)
NEUTROPHILS # BLD AUTO: 6.5 K/UL (ref 1.8–7.7)
NEUTROPHILS # BLD AUTO: 6.7 K/UL (ref 1.8–7.7)
NEUTROPHILS NFR BLD: 43.9 % (ref 38–73)
NEUTROPHILS NFR BLD: 45 % (ref 38–73)
NEUTROPHILS NFR BLD: 47.5 % (ref 38–73)
NEUTROPHILS NFR BLD: 55.3 % (ref 38–73)
NEUTROPHILS NFR BLD: 55.5 % (ref 38–73)
NEUTROPHILS NFR BLD: 60.1 % (ref 38–73)
NEUTROPHILS NFR BLD: 60.2 % (ref 38–73)
NEUTROPHILS NFR BLD: 63 % (ref 38–73)
NEUTROPHILS NFR BLD: 66.3 % (ref 38–73)
NEUTROPHILS NFR BLD: 75 % (ref 38–73)
NITRITE UR QL STRIP: NEGATIVE
NRBC BLD-RTO: 0 /100 WBC
NUM UNITS TRANS PACKED RBC: NORMAL
O+P STL MICRO: NORMAL
OB PNL STL: POSITIVE
OXA-48-LIKE: ABNORMAL
PH UR STRIP: 6 [PH] (ref 5–8)
PISA TR MAX VEL: 2.82 M/S
PLATELET # BLD AUTO: 136 K/UL (ref 150–450)
PLATELET # BLD AUTO: 139 K/UL (ref 150–450)
PLATELET # BLD AUTO: 149 K/UL (ref 150–450)
PLATELET # BLD AUTO: 166 K/UL (ref 150–450)
PLATELET # BLD AUTO: 177 K/UL (ref 150–450)
PLATELET # BLD AUTO: 182 K/UL (ref 150–450)
PLATELET # BLD AUTO: 209 K/UL (ref 150–450)
PLATELET # BLD AUTO: 296 K/UL (ref 150–450)
PLATELET # BLD AUTO: 300 K/UL (ref 150–450)
PLATELET # BLD AUTO: 309 K/UL (ref 150–450)
PMV BLD AUTO: 10.1 FL (ref 9.2–12.9)
PMV BLD AUTO: 10.2 FL (ref 9.2–12.9)
PMV BLD AUTO: 10.2 FL (ref 9.2–12.9)
PMV BLD AUTO: 10.3 FL (ref 9.2–12.9)
PMV BLD AUTO: 10.4 FL (ref 9.2–12.9)
PMV BLD AUTO: 10.6 FL (ref 9.2–12.9)
PMV BLD AUTO: 10.8 FL (ref 9.2–12.9)
PMV BLD AUTO: 10.8 FL (ref 9.2–12.9)
PMV BLD AUTO: 10.9 FL (ref 9.2–12.9)
PMV BLD AUTO: 11.3 FL (ref 9.2–12.9)
POCT GLUCOSE: 102 MG/DL (ref 70–110)
POCT GLUCOSE: 102 MG/DL (ref 70–110)
POCT GLUCOSE: 106 MG/DL (ref 70–110)
POCT GLUCOSE: 108 MG/DL (ref 70–110)
POCT GLUCOSE: 115 MG/DL (ref 70–110)
POCT GLUCOSE: 116 MG/DL (ref 70–110)
POCT GLUCOSE: 120 MG/DL (ref 70–110)
POCT GLUCOSE: 128 MG/DL (ref 70–110)
POCT GLUCOSE: 129 MG/DL (ref 70–110)
POCT GLUCOSE: 132 MG/DL (ref 70–110)
POCT GLUCOSE: 139 MG/DL (ref 70–110)
POCT GLUCOSE: 150 MG/DL (ref 70–110)
POCT GLUCOSE: 168 MG/DL (ref 70–110)
POCT GLUCOSE: 175 MG/DL (ref 70–110)
POCT GLUCOSE: 207 MG/DL (ref 70–110)
POCT GLUCOSE: 256 MG/DL (ref 70–110)
POCT GLUCOSE: 263 MG/DL (ref 70–110)
POCT GLUCOSE: 266 MG/DL (ref 70–110)
POCT GLUCOSE: 274 MG/DL (ref 70–110)
POCT GLUCOSE: 284 MG/DL (ref 70–110)
POCT GLUCOSE: 298 MG/DL (ref 70–110)
POCT GLUCOSE: 323 MG/DL (ref 70–110)
POCT GLUCOSE: 334 MG/DL (ref 70–110)
POCT GLUCOSE: 343 MG/DL (ref 70–110)
POCT GLUCOSE: 356 MG/DL (ref 70–110)
POCT GLUCOSE: 383 MG/DL (ref 70–110)
POCT GLUCOSE: 44 MG/DL (ref 70–110)
POCT GLUCOSE: 64 MG/DL (ref 70–110)
POCT GLUCOSE: 66 MG/DL (ref 70–110)
POCT GLUCOSE: 84 MG/DL (ref 70–110)
POCT GLUCOSE: 85 MG/DL (ref 70–110)
POCT GLUCOSE: 88 MG/DL (ref 70–110)
POCT GLUCOSE: 91 MG/DL (ref 70–110)
POCT GLUCOSE: 94 MG/DL (ref 70–110)
POCT GLUCOSE: 94 MG/DL (ref 70–110)
POTASSIUM SERPL-SCNC: 3.5 MMOL/L (ref 3.5–5.1)
POTASSIUM SERPL-SCNC: 3.6 MMOL/L (ref 3.5–5.1)
POTASSIUM SERPL-SCNC: 3.8 MMOL/L (ref 3.5–5.1)
POTASSIUM SERPL-SCNC: 3.9 MMOL/L (ref 3.5–5.1)
POTASSIUM SERPL-SCNC: 4 MMOL/L (ref 3.5–5.1)
POTASSIUM SERPL-SCNC: 4 MMOL/L (ref 3.5–5.1)
POTASSIUM SERPL-SCNC: 4.3 MMOL/L (ref 3.5–5.1)
POTASSIUM SERPL-SCNC: 4.4 MMOL/L (ref 3.5–5.1)
POTASSIUM SERPL-SCNC: 4.8 MMOL/L (ref 3.5–5.1)
POTASSIUM SERPL-SCNC: 5.4 MMOL/L (ref 3.5–5.1)
PROCALCITONIN SERPL IA-MCNC: 0.15 NG/ML
PROT SERPL-MCNC: 3.6 G/DL (ref 6–8.4)
PROT SERPL-MCNC: 3.8 G/DL (ref 6–8.4)
PROT SERPL-MCNC: 3.8 G/DL (ref 6–8.4)
PROT SERPL-MCNC: 3.9 G/DL (ref 6–8.4)
PROT SERPL-MCNC: 4.2 G/DL (ref 6–8.4)
PROT SERPL-MCNC: 4.3 G/DL (ref 6–8.4)
PROT SERPL-MCNC: 5 G/DL (ref 6–8.4)
PROT UR QL STRIP: ABNORMAL
PROTEUS SPECIES: NOT DETECTED
PSEUDOMONAS AERUGINOSA: NOT DETECTED
PV MEAN GRADIENT: 2.56 MMHG
PV MV: 0.97 M/S
PV PEAK VELOCITY: 1.22 CM/S
RA MAJOR: 4.2 CM
RA WIDTH: 3.47 CM
RBC # BLD AUTO: 2.45 M/UL (ref 4–5.4)
RBC # BLD AUTO: 2.7 M/UL (ref 4–5.4)
RBC # BLD AUTO: 2.7 M/UL (ref 4–5.4)
RBC # BLD AUTO: 2.89 M/UL (ref 4–5.4)
RBC # BLD AUTO: 2.89 M/UL (ref 4–5.4)
RBC # BLD AUTO: 2.9 M/UL (ref 4–5.4)
RBC # BLD AUTO: 2.95 M/UL (ref 4–5.4)
RBC # BLD AUTO: 3.04 M/UL (ref 4–5.4)
RBC # BLD AUTO: 3.17 M/UL (ref 4–5.4)
RBC # BLD AUTO: 3.19 M/UL (ref 4–5.4)
RBC #/AREA URNS HPF: 1 /HPF (ref 0–4)
RIGHT VENTRICULAR END-DIASTOLIC DIMENSION: 2.05 CM
SALMONELLA SP: NOT DETECTED
SARS-COV-2 RDRP RESP QL NAA+PROBE: NEGATIVE
SATURATED IRON: 40 % (ref 20–50)
SERRATIA MARCESCENS: NOT DETECTED
SINUS: 2.6 CM
SODIUM SERPL-SCNC: 127 MMOL/L (ref 136–145)
SODIUM SERPL-SCNC: 130 MMOL/L (ref 136–145)
SODIUM SERPL-SCNC: 131 MMOL/L (ref 136–145)
SODIUM SERPL-SCNC: 132 MMOL/L (ref 136–145)
SODIUM SERPL-SCNC: 133 MMOL/L (ref 136–145)
SODIUM SERPL-SCNC: 133 MMOL/L (ref 136–145)
SODIUM SERPL-SCNC: 134 MMOL/L (ref 136–145)
SODIUM SERPL-SCNC: 136 MMOL/L (ref 136–145)
SODIUM SERPL-SCNC: 136 MMOL/L (ref 136–145)
SODIUM SERPL-SCNC: 137 MMOL/L (ref 136–145)
SP GR UR STRIP: 1.02 (ref 1–1.03)
SPECIMEN OUTDATE: NORMAL
STAPHYLOCOCCUS AUREUS: NOT DETECTED
STAPHYLOCOCCUS EPIDERMIDIS: DETECTED
STAPHYLOCOCCUS LUGDUNESIS: NOT DETECTED
STAPHYLOCOCCUS SPECIES: ABNORMAL
STENOTROPHOMONAS MALTOPHILIA: NOT DETECTED
STJ: 2.52 CM
STREPTOCOCCUS AGALACTIAE: NOT DETECTED
STREPTOCOCCUS PNEUMONIAE: NOT DETECTED
STREPTOCOCCUS PYOGENES: NOT DETECTED
STREPTOCOCCUS SPECIES: NOT DETECTED
T4 FREE SERPL-MCNC: 0.91 NG/DL (ref 0.71–1.51)
TDI LATERAL: 0.08 M/S
TDI SEPTAL: 0.08 M/S
TDI: 0.08 M/S
TOTAL IRON BINDING CAPACITY: 90 UG/DL (ref 250–450)
TR MAX PG: 32 MMHG
TRANSFERRIN SERPL-MCNC: 61 MG/DL (ref 200–375)
TROPONIN I SERPL DL<=0.01 NG/ML-MCNC: 0.01 NG/ML (ref 0–0.03)
TSH SERPL DL<=0.005 MIU/L-ACNC: 7.21 UIU/ML (ref 0.4–4)
URN SPEC COLLECT METH UR: ABNORMAL
UROBILINOGEN UR STRIP-ACNC: NEGATIVE EU/DL
VAN A/B: ABNORMAL
VANCOMYCIN TROUGH SERPL-MCNC: 15.6 UG/ML (ref 10–22)
VANCOMYCIN TROUGH SERPL-MCNC: 19.1 UG/ML (ref 10–22)
VANCOMYCIN TROUGH SERPL-MCNC: 21.2 UG/ML (ref 10–22)
VIM GENE: ABNORMAL
VIT B12 SERPL-MCNC: 253 PG/ML (ref 210–950)
WBC # BLD AUTO: 10.88 K/UL (ref 3.9–12.7)
WBC # BLD AUTO: 11.16 K/UL (ref 3.9–12.7)
WBC # BLD AUTO: 15.97 K/UL (ref 3.9–12.7)
WBC # BLD AUTO: 18.36 K/UL (ref 3.9–12.7)
WBC # BLD AUTO: 19.46 K/UL (ref 3.9–12.7)
WBC # BLD AUTO: 5.55 K/UL (ref 3.9–12.7)
WBC # BLD AUTO: 5.7 K/UL (ref 3.9–12.7)
WBC # BLD AUTO: 6.19 K/UL (ref 3.9–12.7)
WBC # BLD AUTO: 6.57 K/UL (ref 3.9–12.7)
WBC # BLD AUTO: 7.67 K/UL (ref 3.9–12.7)
WBC #/AREA STL HPF: NORMAL /[HPF]
WBC #/AREA URNS HPF: 2 /HPF (ref 0–5)

## 2023-01-01 PROCEDURE — 99233 PR SUBSEQUENT HOSPITAL CARE,LEVL III: ICD-10-PCS | Mod: ,,, | Performed by: FAMILY MEDICINE

## 2023-01-01 PROCEDURE — 97163 PT EVAL HIGH COMPLEX 45 MIN: CPT

## 2023-01-01 PROCEDURE — 80048 BASIC METABOLIC PNL TOTAL CA: CPT | Performed by: INTERNAL MEDICINE

## 2023-01-01 PROCEDURE — 63600175 PHARM REV CODE 636 W HCPCS: Performed by: INTERNAL MEDICINE

## 2023-01-01 PROCEDURE — 85025 COMPLETE CBC W/AUTO DIFF WBC: CPT | Performed by: PHYSICIAN ASSISTANT

## 2023-01-01 PROCEDURE — 63600175 PHARM REV CODE 636 W HCPCS: Performed by: STUDENT IN AN ORGANIZED HEALTH CARE EDUCATION/TRAINING PROGRAM

## 2023-01-01 PROCEDURE — B4185 PARENTERAL SOL 10 GM LIPIDS: HCPCS | Performed by: PHYSICIAN ASSISTANT

## 2023-01-01 PROCEDURE — 36415 COLL VENOUS BLD VENIPUNCTURE: CPT | Performed by: INTERNAL MEDICINE

## 2023-01-01 PROCEDURE — 80053 COMPREHEN METABOLIC PANEL: CPT | Performed by: FAMILY MEDICINE

## 2023-01-01 PROCEDURE — 86920 COMPATIBILITY TEST SPIN: CPT | Performed by: PHYSICIAN ASSISTANT

## 2023-01-01 PROCEDURE — 86900 BLOOD TYPING SEROLOGIC ABO: CPT | Performed by: PHYSICIAN ASSISTANT

## 2023-01-01 PROCEDURE — 25000003 PHARM REV CODE 250: Performed by: PHYSICIAN ASSISTANT

## 2023-01-01 PROCEDURE — 99238 PR HOSPITAL DISCHARGE DAY,<30 MIN: ICD-10-PCS | Mod: ,,, | Performed by: PHYSICIAN ASSISTANT

## 2023-01-01 PROCEDURE — 93005 ELECTROCARDIOGRAM TRACING: CPT

## 2023-01-01 PROCEDURE — 99232 PR SUBSEQUENT HOSPITAL CARE,LEVL II: ICD-10-PCS | Mod: ,,, | Performed by: PHYSICIAN ASSISTANT

## 2023-01-01 PROCEDURE — 99223 PR INITIAL HOSPITAL CARE,LEVL III: ICD-10-PCS | Mod: ,,, | Performed by: FAMILY MEDICINE

## 2023-01-01 PROCEDURE — 87449 NOS EACH ORGANISM AG IA: CPT | Mod: 91 | Performed by: STUDENT IN AN ORGANIZED HEALTH CARE EDUCATION/TRAINING PROGRAM

## 2023-01-01 PROCEDURE — 25000003 PHARM REV CODE 250: Performed by: INTERNAL MEDICINE

## 2023-01-01 PROCEDURE — 99233 PR SUBSEQUENT HOSPITAL CARE,LEVL III: ICD-10-PCS | Mod: 95,,, | Performed by: PHYSICIAN ASSISTANT

## 2023-01-01 PROCEDURE — 36556 INSERT NON-TUNNEL CV CATH: CPT

## 2023-01-01 PROCEDURE — 36415 COLL VENOUS BLD VENIPUNCTURE: CPT | Performed by: PHYSICIAN ASSISTANT

## 2023-01-01 PROCEDURE — 82272 OCCULT BLD FECES 1-3 TESTS: CPT | Performed by: STUDENT IN AN ORGANIZED HEALTH CARE EDUCATION/TRAINING PROGRAM

## 2023-01-01 PROCEDURE — P9016 RBC LEUKOCYTES REDUCED: HCPCS | Performed by: PHYSICIAN ASSISTANT

## 2023-01-01 PROCEDURE — 83735 ASSAY OF MAGNESIUM: CPT | Performed by: PHYSICIAN ASSISTANT

## 2023-01-01 PROCEDURE — 84439 ASSAY OF FREE THYROXINE: CPT | Performed by: PHYSICIAN ASSISTANT

## 2023-01-01 PROCEDURE — 63600175 PHARM REV CODE 636 W HCPCS: Performed by: FAMILY MEDICINE

## 2023-01-01 PROCEDURE — 96366 THER/PROPH/DIAG IV INF ADDON: CPT | Mod: 59

## 2023-01-01 PROCEDURE — 20000000 HC ICU ROOM

## 2023-01-01 PROCEDURE — 87040 BLOOD CULTURE FOR BACTERIA: CPT | Mod: 59 | Performed by: STUDENT IN AN ORGANIZED HEALTH CARE EDUCATION/TRAINING PROGRAM

## 2023-01-01 PROCEDURE — 80053 COMPREHEN METABOLIC PANEL: CPT | Performed by: STUDENT IN AN ORGANIZED HEALTH CARE EDUCATION/TRAINING PROGRAM

## 2023-01-01 PROCEDURE — 92526 ORAL FUNCTION THERAPY: CPT

## 2023-01-01 PROCEDURE — 85025 COMPLETE CBC W/AUTO DIFF WBC: CPT | Performed by: INTERNAL MEDICINE

## 2023-01-01 PROCEDURE — 94761 N-INVAS EAR/PLS OXIMETRY MLT: CPT

## 2023-01-01 PROCEDURE — 93010 EKG 12-LEAD: ICD-10-PCS | Mod: ,,, | Performed by: INTERNAL MEDICINE

## 2023-01-01 PROCEDURE — 51702 INSERT TEMP BLADDER CATH: CPT

## 2023-01-01 PROCEDURE — 84466 ASSAY OF TRANSFERRIN: CPT | Performed by: PHYSICIAN ASSISTANT

## 2023-01-01 PROCEDURE — 83605 ASSAY OF LACTIC ACID: CPT | Performed by: STUDENT IN AN ORGANIZED HEALTH CARE EDUCATION/TRAINING PROGRAM

## 2023-01-01 PROCEDURE — 92610 EVALUATE SWALLOWING FUNCTION: CPT

## 2023-01-01 PROCEDURE — 11000001 HC ACUTE MED/SURG PRIVATE ROOM

## 2023-01-01 PROCEDURE — 25000003 PHARM REV CODE 250: Performed by: FAMILY MEDICINE

## 2023-01-01 PROCEDURE — 93010 ELECTROCARDIOGRAM REPORT: CPT | Mod: ,,, | Performed by: INTERNAL MEDICINE

## 2023-01-01 PROCEDURE — 80202 ASSAY OF VANCOMYCIN: CPT | Performed by: INTERNAL MEDICINE

## 2023-01-01 PROCEDURE — 99233 SBSQ HOSP IP/OBS HIGH 50: CPT | Mod: ,,, | Performed by: PHYSICIAN ASSISTANT

## 2023-01-01 PROCEDURE — 87186 SC STD MICRODIL/AGAR DIL: CPT | Mod: 59 | Performed by: STUDENT IN AN ORGANIZED HEALTH CARE EDUCATION/TRAINING PROGRAM

## 2023-01-01 PROCEDURE — 36415 COLL VENOUS BLD VENIPUNCTURE: CPT | Performed by: STUDENT IN AN ORGANIZED HEALTH CARE EDUCATION/TRAINING PROGRAM

## 2023-01-01 PROCEDURE — 25500020 PHARM REV CODE 255: Performed by: INTERNAL MEDICINE

## 2023-01-01 PROCEDURE — 99223 1ST HOSP IP/OBS HIGH 75: CPT | Mod: ,,, | Performed by: PHYSICIAN ASSISTANT

## 2023-01-01 PROCEDURE — 25000003 PHARM REV CODE 250: Performed by: STUDENT IN AN ORGANIZED HEALTH CARE EDUCATION/TRAINING PROGRAM

## 2023-01-01 PROCEDURE — 85025 COMPLETE CBC W/AUTO DIFF WBC: CPT | Performed by: STUDENT IN AN ORGANIZED HEALTH CARE EDUCATION/TRAINING PROGRAM

## 2023-01-01 PROCEDURE — 87177 OVA AND PARASITES SMEARS: CPT | Performed by: STUDENT IN AN ORGANIZED HEALTH CARE EDUCATION/TRAINING PROGRAM

## 2023-01-01 PROCEDURE — 99900035 HC TECH TIME PER 15 MIN (STAT)

## 2023-01-01 PROCEDURE — 99233 PR SUBSEQUENT HOSPITAL CARE,LEVL III: ICD-10-PCS | Mod: ,,, | Performed by: PHYSICIAN ASSISTANT

## 2023-01-01 PROCEDURE — 99223 1ST HOSP IP/OBS HIGH 75: CPT | Mod: ,,, | Performed by: FAMILY MEDICINE

## 2023-01-01 PROCEDURE — 83735 ASSAY OF MAGNESIUM: CPT | Performed by: INTERNAL MEDICINE

## 2023-01-01 PROCEDURE — 36415 COLL VENOUS BLD VENIPUNCTURE: CPT | Performed by: FAMILY MEDICINE

## 2023-01-01 PROCEDURE — 81000 URINALYSIS NONAUTO W/SCOPE: CPT | Performed by: STUDENT IN AN ORGANIZED HEALTH CARE EDUCATION/TRAINING PROGRAM

## 2023-01-01 PROCEDURE — 99223 PR INITIAL HOSPITAL CARE,LEVL III: ICD-10-PCS | Mod: ,,, | Performed by: PHYSICIAN ASSISTANT

## 2023-01-01 PROCEDURE — 99233 SBSQ HOSP IP/OBS HIGH 50: CPT | Mod: ,,, | Performed by: FAMILY MEDICINE

## 2023-01-01 PROCEDURE — 84484 ASSAY OF TROPONIN QUANT: CPT | Performed by: STUDENT IN AN ORGANIZED HEALTH CARE EDUCATION/TRAINING PROGRAM

## 2023-01-01 PROCEDURE — 87045 FECES CULTURE AEROBIC BACT: CPT | Performed by: STUDENT IN AN ORGANIZED HEALTH CARE EDUCATION/TRAINING PROGRAM

## 2023-01-01 PROCEDURE — 85025 COMPLETE CBC W/AUTO DIFF WBC: CPT | Performed by: FAMILY MEDICINE

## 2023-01-01 PROCEDURE — 99232 SBSQ HOSP IP/OBS MODERATE 35: CPT | Mod: 95,,, | Performed by: PHYSICIAN ASSISTANT

## 2023-01-01 PROCEDURE — 82728 ASSAY OF FERRITIN: CPT | Performed by: PHYSICIAN ASSISTANT

## 2023-01-01 PROCEDURE — 25000242 PHARM REV CODE 250 ALT 637 W/ HCPCS: Performed by: PHYSICIAN ASSISTANT

## 2023-01-01 PROCEDURE — 89055 LEUKOCYTE ASSESSMENT FECAL: CPT | Performed by: STUDENT IN AN ORGANIZED HEALTH CARE EDUCATION/TRAINING PROGRAM

## 2023-01-01 PROCEDURE — 99233 SBSQ HOSP IP/OBS HIGH 50: CPT | Mod: 95,,, | Performed by: PHYSICIAN ASSISTANT

## 2023-01-01 PROCEDURE — 84145 PROCALCITONIN (PCT): CPT | Performed by: STUDENT IN AN ORGANIZED HEALTH CARE EDUCATION/TRAINING PROGRAM

## 2023-01-01 PROCEDURE — 80053 COMPREHEN METABOLIC PANEL: CPT | Performed by: PHYSICIAN ASSISTANT

## 2023-01-01 PROCEDURE — 87046 STOOL CULTR AEROBIC BACT EA: CPT | Performed by: STUDENT IN AN ORGANIZED HEALTH CARE EDUCATION/TRAINING PROGRAM

## 2023-01-01 PROCEDURE — B4185 PARENTERAL SOL 10 GM LIPIDS: HCPCS | Performed by: FAMILY MEDICINE

## 2023-01-01 PROCEDURE — 87077 CULTURE AEROBIC IDENTIFY: CPT | Mod: 59 | Performed by: STUDENT IN AN ORGANIZED HEALTH CARE EDUCATION/TRAINING PROGRAM

## 2023-01-01 PROCEDURE — 87040 BLOOD CULTURE FOR BACTERIA: CPT | Performed by: PHYSICIAN ASSISTANT

## 2023-01-01 PROCEDURE — 87154 CUL TYP ID BLD PTHGN 6+ TRGT: CPT | Performed by: STUDENT IN AN ORGANIZED HEALTH CARE EDUCATION/TRAINING PROGRAM

## 2023-01-01 PROCEDURE — 63600175 PHARM REV CODE 636 W HCPCS: Performed by: PHYSICIAN ASSISTANT

## 2023-01-01 PROCEDURE — 36430 TRANSFUSION BLD/BLD COMPNT: CPT

## 2023-01-01 PROCEDURE — 99291 CRITICAL CARE FIRST HOUR: CPT

## 2023-01-01 PROCEDURE — 87209 SMEAR COMPLEX STAIN: CPT | Performed by: STUDENT IN AN ORGANIZED HEALTH CARE EDUCATION/TRAINING PROGRAM

## 2023-01-01 PROCEDURE — 99900031 HC PATIENT EDUCATION (STAT)

## 2023-01-01 PROCEDURE — 96365 THER/PROPH/DIAG IV INF INIT: CPT | Mod: 59

## 2023-01-01 PROCEDURE — 96361 HYDRATE IV INFUSION ADD-ON: CPT | Mod: 59

## 2023-01-01 PROCEDURE — 83036 HEMOGLOBIN GLYCOSYLATED A1C: CPT | Performed by: STUDENT IN AN ORGANIZED HEALTH CARE EDUCATION/TRAINING PROGRAM

## 2023-01-01 PROCEDURE — 96375 TX/PRO/DX INJ NEW DRUG ADDON: CPT

## 2023-01-01 PROCEDURE — 94640 AIRWAY INHALATION TREATMENT: CPT

## 2023-01-01 PROCEDURE — U0002 COVID-19 LAB TEST NON-CDC: HCPCS | Performed by: INTERNAL MEDICINE

## 2023-01-01 PROCEDURE — 87427 SHIGA-LIKE TOXIN AG IA: CPT | Mod: 59 | Performed by: STUDENT IN AN ORGANIZED HEALTH CARE EDUCATION/TRAINING PROGRAM

## 2023-01-01 PROCEDURE — 99232 SBSQ HOSP IP/OBS MODERATE 35: CPT | Mod: ,,, | Performed by: PHYSICIAN ASSISTANT

## 2023-01-01 PROCEDURE — 84484 ASSAY OF TROPONIN QUANT: CPT | Performed by: PHYSICIAN ASSISTANT

## 2023-01-01 PROCEDURE — 96367 TX/PROPH/DG ADDL SEQ IV INF: CPT

## 2023-01-01 PROCEDURE — 99232 PR SUBSEQUENT HOSPITAL CARE,LEVL II: ICD-10-PCS | Mod: 95,,, | Performed by: PHYSICIAN ASSISTANT

## 2023-01-01 PROCEDURE — 94760 N-INVAS EAR/PLS OXIMETRY 1: CPT

## 2023-01-01 PROCEDURE — 99238 HOSP IP/OBS DSCHRG MGMT 30/<: CPT | Mod: ,,, | Performed by: PHYSICIAN ASSISTANT

## 2023-01-01 PROCEDURE — 87449 NOS EACH ORGANISM AG IA: CPT | Performed by: STUDENT IN AN ORGANIZED HEALTH CARE EDUCATION/TRAINING PROGRAM

## 2023-01-01 PROCEDURE — 82607 VITAMIN B-12: CPT | Performed by: FAMILY MEDICINE

## 2023-01-01 PROCEDURE — 87329 GIARDIA AG IA: CPT | Performed by: STUDENT IN AN ORGANIZED HEALTH CARE EDUCATION/TRAINING PROGRAM

## 2023-01-01 PROCEDURE — 84443 ASSAY THYROID STIM HORMONE: CPT | Performed by: PHYSICIAN ASSISTANT

## 2023-01-01 PROCEDURE — 27000207 HC ISOLATION

## 2023-01-01 RX ORDER — HYDROCODONE BITARTRATE AND ACETAMINOPHEN 500; 5 MG/1; MG/1
TABLET ORAL
Status: DISCONTINUED | OUTPATIENT
Start: 2023-01-01 | End: 2023-01-01 | Stop reason: HOSPADM

## 2023-01-01 RX ORDER — ONDANSETRON 2 MG/ML
4 INJECTION INTRAMUSCULAR; INTRAVENOUS EVERY 8 HOURS PRN
Status: DISCONTINUED | OUTPATIENT
Start: 2023-01-01 | End: 2023-01-01 | Stop reason: HOSPADM

## 2023-01-01 RX ORDER — INSULIN ASPART 100 [IU]/ML
0-5 INJECTION, SOLUTION INTRAVENOUS; SUBCUTANEOUS
Status: DISCONTINUED | OUTPATIENT
Start: 2023-01-01 | End: 2023-01-01 | Stop reason: HOSPADM

## 2023-01-01 RX ORDER — ENOXAPARIN SODIUM 100 MG/ML
40 INJECTION SUBCUTANEOUS EVERY 24 HOURS
Status: DISCONTINUED | OUTPATIENT
Start: 2023-01-01 | End: 2023-01-01

## 2023-01-01 RX ORDER — SODIUM CHLORIDE 9 MG/ML
1000 INJECTION, SOLUTION INTRAVENOUS CONTINUOUS
Status: ACTIVE | OUTPATIENT
Start: 2023-01-01 | End: 2023-01-01

## 2023-01-01 RX ORDER — HYDROCODONE BITARTRATE AND ACETAMINOPHEN 5; 325 MG/1; MG/1
1 TABLET ORAL EVERY 4 HOURS PRN
Status: DISCONTINUED | OUTPATIENT
Start: 2023-01-01 | End: 2023-01-01 | Stop reason: HOSPADM

## 2023-01-01 RX ORDER — GLUCAGON 1 MG
1 KIT INJECTION
Status: DISCONTINUED | OUTPATIENT
Start: 2023-01-01 | End: 2023-01-01 | Stop reason: HOSPADM

## 2023-01-01 RX ORDER — FERROUS SULFATE 325(65) MG
325 TABLET, DELAYED RELEASE (ENTERIC COATED) ORAL DAILY
Qty: 30 TABLET
Start: 2023-01-01

## 2023-01-01 RX ORDER — IPRATROPIUM BROMIDE AND ALBUTEROL SULFATE 2.5; .5 MG/3ML; MG/3ML
3 SOLUTION RESPIRATORY (INHALATION) EVERY 6 HOURS
Status: COMPLETED | OUTPATIENT
Start: 2023-01-01 | End: 2023-01-01

## 2023-01-01 RX ORDER — MAGNESIUM SULFATE HEPTAHYDRATE 40 MG/ML
2 INJECTION, SOLUTION INTRAVENOUS ONCE
Status: COMPLETED | OUTPATIENT
Start: 2023-01-01 | End: 2023-01-01

## 2023-01-01 RX ORDER — SODIUM CHLORIDE 9 MG/ML
1000 INJECTION, SOLUTION INTRAVENOUS
Status: COMPLETED | OUTPATIENT
Start: 2023-01-01 | End: 2023-01-01

## 2023-01-01 RX ORDER — SODIUM CHLORIDE 9 MG/ML
INJECTION, SOLUTION INTRAVENOUS CONTINUOUS
Status: ACTIVE | OUTPATIENT
Start: 2023-01-01 | End: 2023-01-01

## 2023-01-01 RX ORDER — KETOROLAC TROMETHAMINE 30 MG/ML
15 INJECTION, SOLUTION INTRAMUSCULAR; INTRAVENOUS
Status: COMPLETED | OUTPATIENT
Start: 2023-01-01 | End: 2023-01-01

## 2023-01-01 RX ORDER — TALC
6 POWDER (GRAM) TOPICAL NIGHTLY PRN
Status: DISCONTINUED | OUTPATIENT
Start: 2023-01-01 | End: 2023-01-01 | Stop reason: HOSPADM

## 2023-01-01 RX ORDER — DOXYCYCLINE HYCLATE 100 MG
100 TABLET ORAL EVERY 12 HOURS
Qty: 10 TABLET | Refills: 0
Start: 2023-01-01 | End: 2023-01-01

## 2023-01-01 RX ORDER — DOXYCYCLINE HYCLATE 100 MG
100 TABLET ORAL EVERY 12 HOURS
Status: DISCONTINUED | OUTPATIENT
Start: 2023-01-01 | End: 2023-01-01 | Stop reason: HOSPADM

## 2023-01-01 RX ORDER — PREDNISONE 20 MG/1
20 TABLET ORAL ONCE
Status: COMPLETED | OUTPATIENT
Start: 2023-01-01 | End: 2023-01-01

## 2023-01-01 RX ORDER — MAGNESIUM SULFATE 1 G/100ML
1 INJECTION INTRAVENOUS ONCE
Status: COMPLETED | OUTPATIENT
Start: 2023-01-01 | End: 2023-01-01

## 2023-01-01 RX ORDER — MUPIROCIN 20 MG/G
OINTMENT TOPICAL 2 TIMES DAILY
Status: COMPLETED | OUTPATIENT
Start: 2023-01-01 | End: 2023-01-01

## 2023-01-01 RX ORDER — ACETAMINOPHEN 325 MG/1
650 TABLET ORAL EVERY 8 HOURS PRN
Status: DISCONTINUED | OUTPATIENT
Start: 2023-01-01 | End: 2023-01-01

## 2023-01-01 RX ORDER — ACETAMINOPHEN 325 MG/1
650 TABLET ORAL EVERY 8 HOURS PRN
Status: DISCONTINUED | OUTPATIENT
Start: 2023-01-01 | End: 2023-01-01 | Stop reason: HOSPADM

## 2023-01-01 RX ORDER — IBUPROFEN 600 MG/1
600 TABLET ORAL EVERY 6 HOURS PRN
Status: DISCONTINUED | OUTPATIENT
Start: 2023-01-01 | End: 2023-01-01

## 2023-01-01 RX ORDER — SODIUM CHLORIDE 9 MG/ML
1000 INJECTION, SOLUTION INTRAVENOUS CONTINUOUS
Status: DISCONTINUED | OUTPATIENT
Start: 2023-01-01 | End: 2023-01-01

## 2023-01-01 RX ORDER — SODIUM CHLORIDE 0.9 % (FLUSH) 0.9 %
10 SYRINGE (ML) INJECTION
Status: DISCONTINUED | OUTPATIENT
Start: 2023-01-01 | End: 2023-01-01 | Stop reason: HOSPADM

## 2023-01-01 RX ORDER — IBUPROFEN 200 MG
16 TABLET ORAL
Status: DISCONTINUED | OUTPATIENT
Start: 2023-01-01 | End: 2023-01-01 | Stop reason: HOSPADM

## 2023-01-01 RX ORDER — NOREPINEPHRINE BITARTRATE/D5W 4MG/250ML
0-3 PLASTIC BAG, INJECTION (ML) INTRAVENOUS CONTINUOUS
Status: DISCONTINUED | OUTPATIENT
Start: 2023-01-01 | End: 2023-01-01

## 2023-01-01 RX ORDER — INSULIN ASPART 100 [IU]/ML
0-5 INJECTION, SOLUTION INTRAVENOUS; SUBCUTANEOUS
Status: DISCONTINUED | OUTPATIENT
Start: 2023-01-01 | End: 2023-01-01

## 2023-01-01 RX ORDER — LANOLIN ALCOHOL/MO/W.PET/CERES
1 CREAM (GRAM) TOPICAL 2 TIMES DAILY
Status: DISCONTINUED | OUTPATIENT
Start: 2023-01-01 | End: 2023-01-01 | Stop reason: HOSPADM

## 2023-01-01 RX ORDER — PANTOPRAZOLE SODIUM 40 MG/1
40 TABLET, DELAYED RELEASE ORAL DAILY
Status: DISCONTINUED | OUTPATIENT
Start: 2023-01-01 | End: 2023-01-01 | Stop reason: HOSPADM

## 2023-01-01 RX ORDER — DEXTROSE 40 %
15 GEL (GRAM) ORAL
Status: DISCONTINUED | OUTPATIENT
Start: 2023-01-01 | End: 2023-01-01 | Stop reason: HOSPADM

## 2023-01-01 RX ORDER — INSULIN ASPART 100 [IU]/ML
5 INJECTION, SOLUTION INTRAVENOUS; SUBCUTANEOUS ONCE
Status: COMPLETED | OUTPATIENT
Start: 2023-01-01 | End: 2023-01-01

## 2023-01-01 RX ORDER — ATORVASTATIN CALCIUM 20 MG/1
20 TABLET, FILM COATED ORAL DAILY
Status: DISCONTINUED | OUTPATIENT
Start: 2023-01-01 | End: 2023-01-01 | Stop reason: HOSPADM

## 2023-01-01 RX ORDER — IBUPROFEN 200 MG
24 TABLET ORAL
Status: DISCONTINUED | OUTPATIENT
Start: 2023-01-01 | End: 2023-01-01 | Stop reason: HOSPADM

## 2023-01-01 RX ORDER — INSULIN ASPART 100 [IU]/ML
0-10 INJECTION, SOLUTION INTRAVENOUS; SUBCUTANEOUS
Status: DISCONTINUED | OUTPATIENT
Start: 2023-01-01 | End: 2023-01-01

## 2023-01-01 RX ADMIN — SODIUM CHLORIDE: 9 INJECTION, SOLUTION INTRAVENOUS at 03:04

## 2023-01-01 RX ADMIN — APIXABAN 5 MG: 5 TABLET, FILM COATED ORAL at 08:03

## 2023-01-01 RX ADMIN — LEUCINE, PHENYLALANINE, LYSINE, METHIONINE, ISOLEUCINE, VALINE, HISTIDINE, THREONINE, TRYPTOPHAN, ALANINE, GLYCINE, ARGININE, PROLINE, SERINE, TYROSINE, SODIUM ACETATE, DIBASIC POTASSIUM PHOSPHATE, MAGNESIUM CHLORIDE, SODIUM CHLORIDE, CALCIUM CHLORIDE, DEXTROSE
311; 238; 247; 170; 255; 247; 204; 179; 77; 880; 438; 489; 289; 213; 17; 297; 261; 51; 77; 33; 5 INJECTION INTRAVENOUS at 01:04

## 2023-01-01 RX ADMIN — SODIUM CHLORIDE 1000 ML: 9 INJECTION, SOLUTION INTRAVENOUS at 03:03

## 2023-01-01 RX ADMIN — Medication 24 G: at 08:04

## 2023-01-01 RX ADMIN — NOREPINEPHRINE BITARTRATE 0.42 MCG/KG/MIN: 1 INJECTION, SOLUTION, CONCENTRATE INTRAVENOUS at 04:03

## 2023-01-01 RX ADMIN — NOREPINEPHRINE BITARTRATE 0.2 MCG/KG/MIN: 4 INJECTION, SOLUTION INTRAVENOUS at 10:03

## 2023-01-01 RX ADMIN — ENOXAPARIN SODIUM 40 MG: 100 INJECTION SUBCUTANEOUS at 04:04

## 2023-01-01 RX ADMIN — PANTOPRAZOLE SODIUM 40 MG: 40 TABLET, DELAYED RELEASE ORAL at 02:03

## 2023-01-01 RX ADMIN — APIXABAN 5 MG: 5 TABLET, FILM COATED ORAL at 08:04

## 2023-01-01 RX ADMIN — PANTOPRAZOLE SODIUM 40 MG: 40 TABLET, DELAYED RELEASE ORAL at 09:04

## 2023-01-01 RX ADMIN — PANTOPRAZOLE SODIUM 40 MG: 40 TABLET, DELAYED RELEASE ORAL at 08:04

## 2023-01-01 RX ADMIN — VANCOMYCIN HYDROCHLORIDE 1000 MG: 1 INJECTION, POWDER, LYOPHILIZED, FOR SOLUTION INTRAVENOUS at 10:03

## 2023-01-01 RX ADMIN — MUPIROCIN: 20 OINTMENT TOPICAL at 09:03

## 2023-01-01 RX ADMIN — ATORVASTATIN CALCIUM 20 MG: 20 TABLET, FILM COATED ORAL at 08:04

## 2023-01-01 RX ADMIN — PIPERACILLIN AND TAZOBACTAM 4.5 G: 4; .5 INJECTION, POWDER, LYOPHILIZED, FOR SOLUTION INTRAVENOUS; PARENTERAL at 04:03

## 2023-01-01 RX ADMIN — APIXABAN 2.5 MG: 2.5 TABLET, FILM COATED ORAL at 08:04

## 2023-01-01 RX ADMIN — SODIUM CHLORIDE: 9 INJECTION, SOLUTION INTRAVENOUS at 09:03

## 2023-01-01 RX ADMIN — PANTOPRAZOLE SODIUM 40 MG: 40 TABLET, DELAYED RELEASE ORAL at 08:03

## 2023-01-01 RX ADMIN — DOXYCYCLINE HYCLATE 100 MG: 100 TABLET, COATED ORAL at 09:04

## 2023-01-01 RX ADMIN — PIPERACILLIN AND TAZOBACTAM 4.5 G: 4; .5 INJECTION, POWDER, LYOPHILIZED, FOR SOLUTION INTRAVENOUS; PARENTERAL at 01:04

## 2023-01-01 RX ADMIN — INSULIN ASPART 3 UNITS: 100 INJECTION, SOLUTION INTRAVENOUS; SUBCUTANEOUS at 04:03

## 2023-01-01 RX ADMIN — HYDROCODONE BITARTRATE AND ACETAMINOPHEN 1 TABLET: 5; 325 TABLET ORAL at 07:04

## 2023-01-01 RX ADMIN — NOREPINEPHRINE BITARTRATE 1 MCG/KG/MIN: 4 INJECTION, SOLUTION INTRAVENOUS at 02:03

## 2023-01-01 RX ADMIN — FERROUS SULFATE TAB 325 MG (65 MG ELEMENTAL FE) 1 EACH: 325 (65 FE) TAB at 09:04

## 2023-01-01 RX ADMIN — MAGNESIUM SULFATE IN DEXTROSE 1 G: 10 INJECTION, SOLUTION INTRAVENOUS at 10:04

## 2023-01-01 RX ADMIN — PIPERACILLIN AND TAZOBACTAM 4.5 G: 4; .5 INJECTION, POWDER, LYOPHILIZED, FOR SOLUTION INTRAVENOUS; PARENTERAL at 05:03

## 2023-01-01 RX ADMIN — INSULIN ASPART 1 UNITS: 100 INJECTION, SOLUTION INTRAVENOUS; SUBCUTANEOUS at 09:03

## 2023-01-01 RX ADMIN — INSULIN ASPART 3 UNITS: 100 INJECTION, SOLUTION INTRAVENOUS; SUBCUTANEOUS at 12:03

## 2023-01-01 RX ADMIN — NOREPINEPHRINE BITARTRATE 0.7 MCG/KG/MIN: 1 INJECTION, SOLUTION, CONCENTRATE INTRAVENOUS at 12:03

## 2023-01-01 RX ADMIN — PIPERACILLIN AND TAZOBACTAM 4.5 G: 4; .5 INJECTION, POWDER, LYOPHILIZED, FOR SOLUTION INTRAVENOUS; PARENTERAL at 08:03

## 2023-01-01 RX ADMIN — INSULIN ASPART 4 UNITS: 100 INJECTION, SOLUTION INTRAVENOUS; SUBCUTANEOUS at 11:03

## 2023-01-01 RX ADMIN — MUPIROCIN: 20 OINTMENT TOPICAL at 08:04

## 2023-01-01 RX ADMIN — LEUCINE, PHENYLALANINE, LYSINE, METHIONINE, ISOLEUCINE, VALINE, HISTIDINE, THREONINE, TRYPTOPHAN, ALANINE, GLYCINE, ARGININE, PROLINE, SERINE, TYROSINE, SODIUM ACETATE, DIBASIC POTASSIUM PHOSPHATE, MAGNESIUM CHLORIDE, SODIUM CHLORIDE, CALCIUM CHLORIDE, DEXTROSE
311; 238; 247; 170; 255; 247; 204; 179; 77; 880; 438; 489; 289; 213; 17; 297; 261; 51; 77; 33; 5 INJECTION INTRAVENOUS at 05:04

## 2023-01-01 RX ADMIN — SOYBEAN OIL 250 ML: 20 INJECTION, SOLUTION INTRAVENOUS at 09:04

## 2023-01-01 RX ADMIN — SODIUM CHLORIDE: 9 INJECTION, SOLUTION INTRAVENOUS at 05:04

## 2023-01-01 RX ADMIN — VANCOMYCIN HYDROCHLORIDE 1000 MG: 1 INJECTION, POWDER, LYOPHILIZED, FOR SOLUTION INTRAVENOUS at 09:03

## 2023-01-01 RX ADMIN — VANCOMYCIN HYDROCHLORIDE 1000 MG: 1 INJECTION, POWDER, LYOPHILIZED, FOR SOLUTION INTRAVENOUS at 10:04

## 2023-01-01 RX ADMIN — APIXABAN 5 MG: 5 TABLET, FILM COATED ORAL at 09:03

## 2023-01-01 RX ADMIN — HYDROCODONE BITARTRATE AND ACETAMINOPHEN 1 TABLET: 5; 325 TABLET ORAL at 11:04

## 2023-01-01 RX ADMIN — DOXYCYCLINE HYCLATE 100 MG: 100 TABLET, COATED ORAL at 08:04

## 2023-01-01 RX ADMIN — DEXTROSE 125 ML: 10 SOLUTION INTRAVENOUS at 12:04

## 2023-01-01 RX ADMIN — PIPERACILLIN AND TAZOBACTAM 4.5 G: 4; .5 INJECTION, POWDER, LYOPHILIZED, FOR SOLUTION INTRAVENOUS; PARENTERAL at 09:03

## 2023-01-01 RX ADMIN — FERROUS SULFATE TAB 325 MG (65 MG ELEMENTAL FE) 1 EACH: 325 (65 FE) TAB at 08:04

## 2023-01-01 RX ADMIN — NOREPINEPHRINE BITARTRATE 1 MCG/KG/MIN: 4 INJECTION, SOLUTION INTRAVENOUS at 03:03

## 2023-01-01 RX ADMIN — IPRATROPIUM BROMIDE AND ALBUTEROL SULFATE 3 ML: 2.5; .5 SOLUTION RESPIRATORY (INHALATION) at 09:04

## 2023-01-01 RX ADMIN — ATORVASTATIN CALCIUM 20 MG: 20 TABLET, FILM COATED ORAL at 09:03

## 2023-01-01 RX ADMIN — NOREPINEPHRINE BITARTRATE 1 MCG/KG/MIN: 1 INJECTION, SOLUTION, CONCENTRATE INTRAVENOUS at 08:03

## 2023-01-01 RX ADMIN — SODIUM CHLORIDE: 9 INJECTION, SOLUTION INTRAVENOUS at 09:04

## 2023-01-01 RX ADMIN — PREDNISONE 20 MG: 20 TABLET ORAL at 09:04

## 2023-01-01 RX ADMIN — NOREPINEPHRINE BITARTRATE 1 MCG/KG/MIN: 4 INJECTION, SOLUTION INTRAVENOUS at 04:03

## 2023-01-01 RX ADMIN — PIPERACILLIN AND TAZOBACTAM 4.5 G: 4; .5 INJECTION, POWDER, LYOPHILIZED, FOR SOLUTION INTRAVENOUS; PARENTERAL at 08:04

## 2023-01-01 RX ADMIN — PIPERACILLIN AND TAZOBACTAM 4.5 G: 4; .5 INJECTION, POWDER, LYOPHILIZED, FOR SOLUTION INTRAVENOUS; PARENTERAL at 02:03

## 2023-01-01 RX ADMIN — MUPIROCIN: 20 OINTMENT TOPICAL at 08:03

## 2023-01-01 RX ADMIN — NOREPINEPHRINE BITARTRATE 0.5 MCG/KG/MIN: 1 INJECTION, SOLUTION, CONCENTRATE INTRAVENOUS at 02:03

## 2023-01-01 RX ADMIN — APIXABAN 5 MG: 5 TABLET, FILM COATED ORAL at 09:04

## 2023-01-01 RX ADMIN — SODIUM CHLORIDE: 9 INJECTION, SOLUTION INTRAVENOUS at 01:04

## 2023-01-01 RX ADMIN — PIPERACILLIN AND TAZOBACTAM 4.5 G: 4; .5 INJECTION, POWDER, LYOPHILIZED, FOR SOLUTION INTRAVENOUS; PARENTERAL at 04:04

## 2023-01-01 RX ADMIN — PIPERACILLIN AND TAZOBACTAM 4.5 G: 4; .5 INJECTION, POWDER, LYOPHILIZED, FOR SOLUTION INTRAVENOUS; PARENTERAL at 12:03

## 2023-01-01 RX ADMIN — HYDROCODONE BITARTRATE AND ACETAMINOPHEN 1 TABLET: 5; 325 TABLET ORAL at 04:04

## 2023-01-01 RX ADMIN — IPRATROPIUM BROMIDE AND ALBUTEROL SULFATE 3 ML: 2.5; .5 SOLUTION RESPIRATORY (INHALATION) at 06:04

## 2023-01-01 RX ADMIN — NOREPINEPHRINE BITARTRATE 0.14 MCG/KG/MIN: 1 INJECTION, SOLUTION, CONCENTRATE INTRAVENOUS at 04:03

## 2023-01-01 RX ADMIN — INSULIN ASPART 2 UNITS: 100 INJECTION, SOLUTION INTRAVENOUS; SUBCUTANEOUS at 04:03

## 2023-01-01 RX ADMIN — PIPERACILLIN AND TAZOBACTAM 4.5 G: 4; .5 INJECTION, POWDER, LYOPHILIZED, FOR SOLUTION INTRAVENOUS; PARENTERAL at 12:04

## 2023-01-01 RX ADMIN — APIXABAN 5 MG: 5 TABLET, FILM COATED ORAL at 10:03

## 2023-01-01 RX ADMIN — INSULIN ASPART 3 UNITS: 100 INJECTION, SOLUTION INTRAVENOUS; SUBCUTANEOUS at 08:03

## 2023-01-01 RX ADMIN — INSULIN DETEMIR 9 UNITS: 100 INJECTION, SOLUTION SUBCUTANEOUS at 10:03

## 2023-01-01 RX ADMIN — MAGNESIUM SULFATE HEPTAHYDRATE 2 G: 40 INJECTION, SOLUTION INTRAVENOUS at 08:04

## 2023-01-01 RX ADMIN — SOYBEAN OIL 250 ML: 20 INJECTION, SOLUTION INTRAVENOUS at 02:03

## 2023-01-01 RX ADMIN — INSULIN ASPART 5 UNITS: 100 INJECTION, SOLUTION INTRAVENOUS; SUBCUTANEOUS at 05:03

## 2023-01-01 RX ADMIN — FERROUS SULFATE TAB 325 MG (65 MG ELEMENTAL FE) 1 EACH: 325 (65 FE) TAB at 01:04

## 2023-01-01 RX ADMIN — ATORVASTATIN CALCIUM 20 MG: 20 TABLET, FILM COATED ORAL at 02:03

## 2023-01-01 RX ADMIN — VANCOMYCIN HYDROCHLORIDE 1500 MG: 1.5 INJECTION, POWDER, LYOPHILIZED, FOR SOLUTION INTRAVENOUS at 10:03

## 2023-01-01 RX ADMIN — APIXABAN 2.5 MG: 2.5 TABLET, FILM COATED ORAL at 09:04

## 2023-01-01 RX ADMIN — PANTOPRAZOLE SODIUM 40 MG: 40 TABLET, DELAYED RELEASE ORAL at 09:03

## 2023-01-01 RX ADMIN — INSULIN ASPART 4 UNITS: 100 INJECTION, SOLUTION INTRAVENOUS; SUBCUTANEOUS at 08:03

## 2023-01-01 RX ADMIN — SODIUM CHLORIDE: 9 INJECTION, SOLUTION INTRAVENOUS at 04:03

## 2023-01-01 RX ADMIN — MUPIROCIN: 20 OINTMENT TOPICAL at 10:04

## 2023-01-01 RX ADMIN — KETOROLAC TROMETHAMINE 15 MG: 30 INJECTION, SOLUTION INTRAMUSCULAR at 11:03

## 2023-01-01 RX ADMIN — ATORVASTATIN CALCIUM 20 MG: 20 TABLET, FILM COATED ORAL at 08:03

## 2023-01-01 RX ADMIN — INSULIN ASPART 2 UNITS: 100 INJECTION, SOLUTION INTRAVENOUS; SUBCUTANEOUS at 10:03

## 2023-01-01 RX ADMIN — SODIUM CHLORIDE 2055 ML: 9 INJECTION, SOLUTION INTRAVENOUS at 09:03

## 2023-01-01 RX ADMIN — NOREPINEPHRINE BITARTRATE 0.54 MCG/KG/MIN: 1 INJECTION, SOLUTION, CONCENTRATE INTRAVENOUS at 02:03

## 2023-01-01 RX ADMIN — LEUCINE, PHENYLALANINE, LYSINE, METHIONINE, ISOLEUCINE, VALINE, HISTIDINE, THREONINE, TRYPTOPHAN, ALANINE, GLYCINE, ARGININE, PROLINE, SERINE, TYROSINE, SODIUM ACETATE, DIBASIC POTASSIUM PHOSPHATE, MAGNESIUM CHLORIDE, SODIUM CHLORIDE, CALCIUM CHLORIDE, DEXTROSE
311; 238; 247; 170; 255; 247; 204; 179; 77; 880; 438; 489; 289; 213; 17; 297; 261; 51; 77; 33; 5 INJECTION INTRAVENOUS at 02:03

## 2023-01-01 RX ADMIN — APIXABAN 2.5 MG: 2.5 TABLET, FILM COATED ORAL at 12:04

## 2023-01-01 RX ADMIN — HYDROCODONE BITARTRATE AND ACETAMINOPHEN 1 TABLET: 5; 325 TABLET ORAL at 03:04

## 2023-01-01 RX ADMIN — SOYBEAN OIL 250 ML: 20 INJECTION, SOLUTION INTRAVENOUS at 02:04

## 2023-01-01 RX ADMIN — NOREPINEPHRINE BITARTRATE 1 MCG/KG/MIN: 4 INJECTION, SOLUTION INTRAVENOUS at 05:03

## 2023-01-01 RX ADMIN — SODIUM CHLORIDE: 9 INJECTION, SOLUTION INTRAVENOUS at 12:04

## 2023-01-01 RX ADMIN — PIPERACILLIN AND TAZOBACTAM 4.5 G: 4; .5 INJECTION, POWDER, LYOPHILIZED, FOR SOLUTION INTRAVENOUS; PARENTERAL at 09:04

## 2023-01-01 RX ADMIN — SODIUM CHLORIDE: 9 INJECTION, SOLUTION INTRAVENOUS at 12:03

## 2023-01-01 RX ADMIN — NOREPINEPHRINE BITARTRATE 0.52 MCG/KG/MIN: 1 INJECTION, SOLUTION, CONCENTRATE INTRAVENOUS at 02:03

## 2023-01-01 RX ADMIN — HYDROCODONE BITARTRATE AND ACETAMINOPHEN 1 TABLET: 5; 325 TABLET ORAL at 12:04

## 2023-01-01 RX ADMIN — SODIUM CHLORIDE 1000 ML: 9 INJECTION, SOLUTION INTRAVENOUS at 01:03

## 2023-01-01 RX ADMIN — IOHEXOL 100 ML: 350 INJECTION, SOLUTION INTRAVENOUS at 01:03

## 2023-01-01 RX ADMIN — INSULIN ASPART 4 UNITS: 100 INJECTION, SOLUTION INTRAVENOUS; SUBCUTANEOUS at 12:03

## 2023-01-01 RX ADMIN — ATORVASTATIN CALCIUM 20 MG: 20 TABLET, FILM COATED ORAL at 09:04

## 2023-01-01 RX ADMIN — MUPIROCIN: 20 OINTMENT TOPICAL at 10:03

## 2023-01-01 RX ADMIN — SOYBEAN OIL 250 ML: 20 INJECTION, SOLUTION INTRAVENOUS at 01:04

## 2023-03-28 NOTE — Clinical Note
Diagnosis: Septic shock [086346]   Admitting Provider:: JAYSHREE REYES [[id number]]   Future Attending Provider: JAYSHREE REYES [[id number]]   Reason for IP Medical Treatment  (Clinical interventions that can only be accomplished in the IP setting? ) :: septic shock on levophed, Abx   I certify that Inpatient services for greater than or equal to 2 midnights are medically necessary:: Yes   Plans for Post-Acute care--if anticipated (pick the single best option):: A. No post acute care anticipated at this time

## 2023-03-29 PROBLEM — I82.409 ACUTE DVT (DEEP VENOUS THROMBOSIS): Status: ACTIVE | Noted: 2023-01-01

## 2023-03-29 PROBLEM — A41.9 SEVERE SEPSIS: Status: ACTIVE | Noted: 2023-01-01

## 2023-03-29 PROBLEM — E88.09 HYPOALBUMINEMIA: Status: ACTIVE | Noted: 2023-01-01

## 2023-03-29 PROBLEM — R65.20 SEVERE SEPSIS: Status: ACTIVE | Noted: 2023-01-01

## 2023-03-29 NOTE — H&P
[facility] Medicine  History & Physical    Patient Name: Michelle Rowell  MRN: [id number]  Patient Class: IP- Inpatient  Admission Date: 3/28/2023  Attending Physician: Liv Paredes MD   Primary Care Provider: Reno Hilario MD         Patient information was obtained from patient and ER records.     Subjective:     Principal Problem:Severe sepsis    Chief Complaint:   Chief Complaint   Patient presents with    Weakness     Patient to ED from the [facility] who reports the patient was hypoitensive in the 60s at 1900.  EMS reports patient was in the 70s on their arrival.  Patient received 500cc NS with pressure at 93/50.  Patient reports has not been eating or drinking well.  Daughter reports patient has a gauze wrap in her contracted right hand that may be infected.  Patient reports not sleeping well because her roommate plays music all night.  Patient AAOX3 in triage.        HPI: Patient is an 82 year old female with medical history of DVT, aneurysm of heart, HTN, HLD, DM type 2, CAD s/p CABG, R MCA stroke who was sent from nursing home for hypotension and confusion.  Patient has not been eating lately since her room mate was placed on hospice.  She denies fever, CP, SOB, nausea, vomiting and urinary complaints.      Admitted for concern of sepsis.  Currently on IV fluids, IV vanc/IV zosyn and levophed.          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     Current Outpatient Medications on File Prior to Encounter   Medication Sig    apixaban (ELIQUIS) 2.5 mg Tab Take 2.5 mg by mouth 2 (two) times daily.    apixaban (ELIQUIS) 2.5 mg Tab Take 1 tablet (2.5 mg total) by mouth 2 (two) times a day.    carvedilol (COREG) 12.5 MG tablet Take 1 tablet (12.5 mg total) by mouth 2 (two) times daily.    carvediloL (COREG) 12.5 MG tablet Take 1 tablet (12.5 mg total) by mouth 2 (two) times a day. HOLD for pulse less than 60.    cholecalciferol, vitamin D3, (VITAMIN D3) 1,000 unit capsule Take 1,000 Units by mouth once daily.    clopidogreL (PLAVIX) 75 mg tablet Take 1 tablet (75 mg total) by mouth once daily.    insulin aspart U-100 (NOVOLOG) 100 unit/mL (3 mL) InPn pen Inject 0-5 Units into the skin every 6 (six) hours as needed (Hyperglycemia).    magnesium oxide (MAG-OX) 400 mg (241.3 mg magnesium) tablet Take 400 mg by mouth 2 (two) times daily.    metFORMIN (GLUCOPHAGE) 500 MG tablet Take 1 tablet (500 mg total) by mouth 2 (two) times a day.    mirtazapine (REMERON) 30 MG tablet TAKE 1 TABLET BY MOUTH ONCE DAILY IN THE EVENING    mirtazapine (REMERON) 30 MG tablet Take 1 tablet (30 mg total) by mouth nightly    pantoprazole (PROTONIX) 40 MG tablet Take 1 tablet (40 mg total) by mouth once daily.    ramipril (ALTACE) 2.5 MG capsule Take 2.5 mg by mouth once daily.    ramipriL (ALTACE) 5 MG capsule Take 1 capsule (5 mg total) by mouth once daily.    senna-docusate 8.6-50 mg (PERICOLACE) 8.6-50 mg per tablet Take 1 tablet by mouth once daily.    simvastatin (ZOCOR) 20 MG tablet Take 1 tablet (20 mg total) by mouth every evening.    simvastatin (ZOCOR) 40 MG tablet Take 40 mg by mouth every evening.     Family History        Problem Relation (Age of Onset)    Cancer Father    Heart disease Father    Thyroid disease Daughter          Tobacco Use    Smoking status: Former     Types: Cigarettes    Smokeless tobacco: Never    Tobacco comments:     quit in 1990s   Substance and Sexual Activity    Alcohol use: No    Drug use: No    Sexual activity: Not Currently     Review of Systems   Constitutional:  Positive for appetite change. Negative for chills and fever.   Respiratory:  Negative for cough and shortness of breath.    Cardiovascular:  Negative for chest pain and leg swelling.   Gastrointestinal:  Negative for nausea and vomiting.   Genitourinary:  Negative for difficulty urinating and dysuria.   Musculoskeletal:  Negative for arthralgias and gait problem.   Psychiatric/Behavioral:  Negative for agitation and confusion.    Objective:     Vital Signs (Most Recent):  Temp: 98.2 °F (36.8 °C) (03/29/23 1103)  Pulse: 89 (03/29/23 1103)  Resp: 18 (03/29/23 1103)  BP: (!) 97/54 (03/29/23 1103)  SpO2: 97 % (03/29/23 1103)   Vital Signs (24h Range):  Temp:  [94.1 °F (34.5 °C)-98.2 °F (36.8 °C)] 98.2 °F (36.8 °C)  Pulse:  [61-93] 89  Resp:  [11-24] 18  SpO2:  [94 %-100 %] 97 %  BP: ()/(36-70) 97/54     Weight: 65.8 kg (145 lb)  Body mass index is 20.81 kg/m².    Physical Exam  Constitutional:       Appearance: Normal appearance.   Cardiovascular:      Rate and Rhythm: Normal rate and regular rhythm.   Pulmonary:      Effort: Pulmonary effort is normal.      Breath sounds: Normal breath sounds.   Abdominal:      General: Abdomen is flat. Bowel sounds are normal.      Palpations: Abdomen is soft.   Musculoskeletal:      Right lower leg: Edema (+2) present.      Left lower leg: Edema (+2) present.   Skin:     General: Skin is warm and dry.   Neurological:      Mental Status: She is alert and oriented to person, place, and time. Mental status is at baseline.           Significant Labs: A1C: No results for input(s): HGBA1C in the last  4320 hours.  ABGs: No results for input(s): PH, PCO2, HCO3, POCSATURATED, BE, TOTALHB, COHB, METHB, O2HB, POCFIO2, PO2 in the last 48 hours.  Bilirubin:   Recent Labs   Lab 03/28/23 2117 03/29/23  0430   BILITOT 0.5 0.4     Blood Culture:   Recent Labs   Lab 03/28/23 2117   LABBLOO No Growth to date  No Growth to date     BMP:   Recent Labs   Lab 03/29/23 0430   *   *   K 4.8      CO2 16*   BUN 64*   CREATININE 0.9   CALCIUM 7.3*     CBC:   Recent Labs   Lab 03/28/23 2117 03/29/23 0430   WBC 15.97* 19.46*   HGB 9.8* 9.0*   HCT 29.8* 26.9*    296     CMP:   Recent Labs   Lab 03/28/23 2117 03/29/23 0430    134*   K 5.4* 4.8    106   CO2 22* 16*   * 304*   BUN 70* 64*   CREATININE 1.0 0.9   CALCIUM 8.4* 7.3*   PROT 5.0* 4.3*   ALBUMIN 1.9* 1.6*   BILITOT 0.5 0.4   ALKPHOS 79 68   AST 18 19   ALT 13 14   ANIONGAP 9 12     Cardiac Markers: No results for input(s): CKMB, MYOGLOBIN, BNP, TROPISTAT in the last 48 hours.  Coagulation: No results for input(s): PT, INR, APTT in the last 48 hours.  Lactic Acid:   Recent Labs   Lab 03/28/23 2117   LACTATE 1.6     Lipase: No results for input(s): LIPASE in the last 48 hours.  Lipid Panel: No results for input(s): CHOL, HDL, LDLCALC, TRIG, CHOLHDL in the last 48 hours.  Magnesium: No results for input(s): MG in the last 48 hours.  POCT Glucose:   Recent Labs   Lab 03/29/23  0254 03/29/23  0823 03/29/23  1143   POCTGLUCOSE 263* 383* 274*     Prealbumin: No results for input(s): PREALBUMIN in the last 48 hours.  Respiratory Culture: No results for input(s): GSRESP, RESPIRATORYC in the last 48 hours.  Troponin:   Recent Labs   Lab 03/28/23  2117   TROPONINI 0.006     TSH: No results for input(s): TSH in the last 4320 hours.  Urine Culture: No results for input(s): LABURIN in the last 48 hours.  Urine Studies:   Recent Labs   Lab 03/29/23  0055   COLORU Yellow   APPEARANCEUA Clear   PHUR 6.0   SPECGRAV 1.025   PROTEINUA Trace*    GLUCUA Negative   KETONESU 1+*   BILIRUBINUA 2+*   OCCULTUA Negative   NITRITE Negative   UROBILINOGEN Negative   LEUKOCYTESUR Trace*   RBCUA 1   WBCUA 2   BACTERIA Rare   HYALINECASTS 10*       Significant Imaging: CXR: mild patch bibasilar airspace disease or atelectasis     Assessment/Plan:     * Severe sepsis  This patient does not have evidence of infective focus  My overall impression is septic shock due to MAP < 65.  Source: Unknown   Antibiotics given-   Antibiotics (72h ago, onward)    Start     Stop Route Frequency Ordered    03/29/23 2200  vancomycin (VANCOCIN) 1,000 mg in dextrose 5 % (D5W) 250 mL IVPB (Vial-Mate)         -- IV Every 24 hours (non-standard times) 03/29/23 0217    03/29/23 0900  mupirocin 2 % ointment         04/03 0859 Nasl 2 times daily 03/29/23 0213    03/29/23 0530  piperacillin-tazobactam (ZOSYN) 4.5 g in dextrose 5 % in water (D5W) 5 % 100 mL IVPB (MB+)  (ED Adult Sepsis Treatment)         03/29 2129 IV Every 8 hours (non-standard times) 03/28/23 2103    03/29/23 0304  vancomycin - pharmacy to dose  (vancomycin IVPB)        See Hyperspace for full Linked Orders Report.    -- IV pharmacy to manage frequency 03/29/23 0204        Latest lactate reviewed-  Recent Labs   Lab 03/28/23 2117   LACTATE 1.6     Organ dysfunction indicated by Encephalopathy     Fluid challenge Patient received 3.2 L bolus and now receiving 100cc hour of IV fluids.      Post- resuscitation assessment Yes Perfusion exam was performed within 6 hours of septic shock presentation after bolus shows Inadequate tissue perfusion assessed by invasive monitoring       Will Start Pressors- Levophed for MAP of 65  Source control achieved by: IV fluids, IV vanc and IV zosyn       Unsure etiology of sepsis.  No lactic acidosis or elevated procal at presentation.  Leukocytosis present but also has a DVT and anticoagulated dose only 2.5mg qd.  CT CHEST pending with PE protocol.    Embolic stroke involving right middle  cerebral artery  Continue statin and Eliquis       Acute DVT (deep venous thrombosis)  Patient with history of DVT  Not currently on appropriate dosage of Eliquis  Plan for CT with PE protocol due to hypotension and leukocytosis       Hypoalbuminemia  Albumin 1.6  Likely contributing to hypotension  Dietician consulted and started on TPN       Aneurysm of heart  Echo pending   Hypotension     Type 2 diabetes mellitus with neurologic complication  Patient's FSGs are controlled on current medication regimen.  Last A1c reviewed-   Lab Results   Component Value Date    HGBA1C 5.6 06/06/2019     Most recent fingerstick glucose reviewed-   Recent Labs   Lab 03/29/23  0254 03/29/23  0823 03/29/23  1143   POCTGLUCOSE 263* 383* 274*     Current correctional scale  Medium  Maintain anti-hyperglycemic dose as follows-   Antihyperglycemics (From admission, onward)    Start     Stop Route Frequency Ordered    03/29/23 0744  insulin aspart U-100 pen 0-10 Units         -- SubQ Before meals & nightly PRN 03/29/23 0745        Hold Oral hypoglycemics while patient is in the hospital.    Essential hypertension  Currently hypotensive  Hold all home bp medications       S/P CABG x 4  Denies CP  Continue statin and eliquis         VTE Risk Mitigation (From admission, onward)         Ordered     apixaban tablet 5 mg  2 times daily         03/29/23 1248     IP VTE HIGH RISK PATIENT  Once         03/29/23 0243     Place sequential compression device  Until discontinued         03/29/23 0243     Place KANWAL hose  Until discontinued         03/29/23 0243              Critical care time spent on the evaluation and treatment of severe organ dysfunction, review of pertinent labs and imaging studies, discussions with consulting providers and discussions with patient/family: 40 minutes.             Veronica Frank PA-C  Department of Hospital Medicine  [facility] - Intensive [facility]

## 2023-03-29 NOTE — ASSESSMENT & PLAN NOTE
Patient's FSGs are controlled on current medication regimen.  Last A1c reviewed-   Lab Results   Component Value Date    HGBA1C 5.6 06/06/2019     Most recent fingerstick glucose reviewed-   Recent Labs   Lab 03/29/23  0254 03/29/23  0823 03/29/23  1143   POCTGLUCOSE 263* 383* 274*     Current correctional scale  Medium  Maintain anti-hyperglycemic dose as follows-   Antihyperglycemics (From admission, onward)    Start     Stop Route Frequency Ordered    03/29/23 0744  insulin aspart U-100 pen 0-10 Units         -- SubQ Before meals & nightly PRN 03/29/23 0745        Hold Oral hypoglycemics while patient is in the hospital.

## 2023-03-29 NOTE — ED PROVIDER NOTES
Encounter Date: 3/28/2023    This document was partially completed using speech recognition software and may contain misspellings, grammatical errors, and/or unexpected word substitutions.       History     Chief Complaint   Patient presents with    Weakness     Patient to ED from the [facility] who reports the patient was hypoitensive in the 60s at 1900.  EMS reports patient was in the 70s on their arrival.  Patient received 500cc NS with pressure at 93/50.  Patient reports has not been eating or drinking well.  Daughter reports patient has a gauze wrap in her contracted right hand that may be infected.  Patient reports not sleeping well because her roommate plays music all night.  Patient AAOX3 in triage.     82 year old female with a PMHx of R MCA stroke, HLD, HTN, DM presents to the ED via EMS with her daughter from the [facility] with fatigue, hypotension. EMS reports the patient was hypotensive at 60s systolic at 7pm tonight. She was 70s systolic with EMS and had 500 cc of IV fluids with BP now 93/50. She has felt fatigued, depressed because her roommate is now on hospice, not sleeping well since music is played by her roommate all night. She denies SI, HI. She denies cough, pain, shortness of breath, cough, nausea, vomiting.    Per family and patient - she is confirmed DNR but aggressive treatment options including central line are permitted.      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 2016    Oropharyngeal dysphagia 6/8/2019    Pseudoaneurysm following procedure 1/11/2016    Stroke     Type 2 diabetes mellitus with neurologic complication 12/1/2015     Past Surgical History:   Procedure Laterality Date    CORONARY ARTERY  BYPASS GRAFT      JARED FILTER PLACEMENT      THROMBECTOMY      TONSILLECTOMY      TUBAL LIGATION      US PSEUDOANEURYSM REPAIR INC IMAGING  1/11/2016          Family History   Problem Relation Age of Onset    Heart disease Father     Cancer Father     Thyroid disease Daughter      Social History     Tobacco Use    Smoking status: Former    Smokeless tobacco: Never    Tobacco comments:     quit in 1990s   Substance Use Topics    Alcohol use: No    Drug use: No     Review of Systems   Constitutional:  Positive for fatigue. Negative for chills and fever.   HENT:  Negative for congestion, rhinorrhea and sneezing.    Eyes:  Negative for discharge and redness.   Respiratory:  Negative for cough and shortness of breath.    Cardiovascular:  Negative for chest pain and palpitations.   Gastrointestinal:  Negative for abdominal pain, diarrhea, nausea and vomiting.   Genitourinary:  Negative for dysuria, frequency, vaginal bleeding and vaginal discharge.   Musculoskeletal:  Negative for back pain and neck pain.   Skin:  Negative for rash and wound.   Neurological:  Negative for weakness, numbness and headaches.     Physical Exam     Initial Vitals [03/28/23 2046]   BP Pulse Resp Temp SpO2   (!) 93/50 93 18 97.5 °F (36.4 °C) 99 %      MAP       --         Physical Exam    Nursing note and vitals reviewed.  Constitutional: She appears well-developed. She is not diaphoretic. She appears ill. No distress.   HENT:   Head: Normocephalic and atraumatic.   Right Ear: External ear normal.   Left Ear: External ear normal.   Eyes: Right eye exhibits no discharge. Left eye exhibits no discharge. No scleral icterus.   Neck: Neck supple.   Cardiovascular:  Normal rate and regular rhythm.           Pulmonary/Chest: No stridor. No respiratory distress. She has decreased breath sounds. She has no wheezes. She has no rhonchi. She has no rales.   Abdominal: Abdomen is soft. There is no abdominal tenderness. There is no guarding.    Musculoskeletal:         General: No edema.      Cervical back: Neck supple.     Neurological: She is alert and oriented to person, place, and time.   Right hand/arm is contracted; she contracts her right hand tightly but after opening it to exam - some light blood from right ring finger bleeding at edge of nail but no obvious ulcers/wounds   Skin: Skin is warm and dry. Capillary refill takes less than 2 seconds.   Psychiatric: She has a normal mood and affect.       ED Course   Central Line    Date/Time: 3/28/2023 11:00 PM  Performed by: Dmitriy Gilmore DO  Authorized by: Dmitriy Gilmore DO     Location procedure was performed:  [facility] EMERGENCY DEPARTMENT  Consent Done ?:  Emergent Situation  Time out complete?: Verified correct patient, procedure, equipment, staff, and site/side    Indications:  Med administration  Anesthesia:  Local infiltration  Local anesthetic:  Lidocaine 1% without epinephrine  Anesthetic total (ml):  3  Preparation:  Skin prepped with ChloraPrep  Skin prep agent dried: Skin prep agent completely dried prior to procedure    Sterile barriers: All five maximal sterile barriers used - gloves, gown, cap, mask and large sterile sheet    Hand hygiene: Hand hygiene performed immediately prior to central venous catheter insertion    Location:  Right internal jugular  Catheter type:  Triple lumen  Catheter size:  8 Fr  Inserted Catheter Length (cm):  16  Ultrasound guidance: Yes    Vessel Caliber:  Medium   patent  Comprressibility:  Normal  Doppler:  Not done  Needle advanced into vessel with real time ultrasound guidance.    Guidewire confirmed in vessel.    Steril sheath on probe.    Sterile gel used.  Manometry: No    Number of attempts:  1  Securement:  Line sutured, chlorhexidine patch, sterile dressing applied and blood return through all ports  XRay:  Placement verified by x-ray, no pneumothorax on x-ray, successful placement and tip termination  Adverse Events:  NoneTermination Site: superior  vena cava  Critical Care    Date/Time: 3/29/2023 2:13 AM  Performed by: Dmitriy Gilmore DO  Authorized by: Dmitriy Gilmore DO   Direct patient critical care time: 30 minutes  Additional history critical care time: 5 minutes  Ordering / reviewing critical care time: 4 minutes  Documentation critical care time: 4 minutes  Consulting other physicians critical care time: 3 minutes  Consult with family critical care time: 5 minutes  Total critical care time (exclusive of procedural time) : 51 minutes  Critical care time was exclusive of separately billable procedures and treating other patients and teaching time.  Critical care was necessary to treat or prevent imminent or life-threatening deterioration of the following conditions: sepsis.  Critical care was time spent personally by me on the following activities: development of treatment plan with patient or surrogate, discussions with consultants, evaluation of patient's response to treatment, examination of patient, obtaining history from patient or surrogate, ordering and performing treatments and interventions, ordering and review of laboratory studies, ordering and review of radiographic studies and pulse oximetry.      Labs Reviewed   CBC W/ AUTO DIFFERENTIAL - Abnormal; Notable for the following components:       Result Value    WBC 15.97 (*)     RBC 3.17 (*)     Hemoglobin 9.8 (*)     Hematocrit 29.8 (*)     RDW 19.7 (*)     Immature Granulocytes 0.6 (*)     Gran # (ANC) 10.6 (*)     Immature Grans (Abs) 0.10 (*)     Mono # 1.2 (*)     All other components within normal limits   COMPREHENSIVE METABOLIC PANEL - Abnormal; Notable for the following components:    Potassium 5.4 (*)     CO2 22 (*)     Glucose 113 (*)     BUN 70 (*)     Calcium 8.4 (*)     Total Protein 5.0 (*)     Albumin 1.9 (*)     eGFR 57 (*)     All other components within normal limits   URINALYSIS, REFLEX TO URINE CULTURE - Abnormal; Notable for the following components:    Protein, UA Trace (*)      Ketones, UA 1+ (*)     Bilirubin (UA) 2+ (*)     Leukocytes, UA Trace (*)     All other components within normal limits    Narrative:     Specimen Source->Urine   URINALYSIS MICROSCOPIC - Abnormal; Notable for the following components:    Hyaline Casts, UA 10 (*)     All other components within normal limits    Narrative:     Specimen Source->Urine   CULTURE, BLOOD   CULTURE, BLOOD   LACTIC ACID, PLASMA   TROPONIN I   PROCALCITONIN     EKG Readings: (Independently Interpreted)   Previous EKG: Compared with most recent EKG Previous EKG Date: 6/6/2019.   Sinus tachycardia at 143 bpm with short NV. LAD. Low voltage. IRBBB. No STEMI.     Imaging Results              X-Ray Chest AP Portable (Final result)  Result time 03/28/23 23:01:19      Final result by Mazin Gillespie MD (03/28/23 23:01:19)                   Impression:      New right-sided catheter tip in the SVC.  No immediate complication.      Electronically signed by: Mazin Gillespie MD  Date:    03/28/2023  Time:    23:01               Narrative:    EXAMINATION:  XR CHEST AP PORTABLE    CLINICAL HISTORY:  Encounter for adjustment and management of vascular access device    TECHNIQUE:  Single frontal view of the chest was performed.    COMPARISON:  03/28/2023.    FINDINGS:  There is a new right-sided IJ catheter with its tip in the SVC.  Monitoring EKG leads are present.  There are postoperative changes in the base of the neck.    The trachea is unremarkable.  There are calcifications of the aortic knob.  The cardiomediastinal silhouette is within normal limits.  The hemidiaphragms are unremarkable.  There are no pleural effusions.  There is no evidence of a pneumothorax.  There is no evidence of pneumomediastinum.  The aeration of the lung fields are unchanged.  There are degenerative changes in the osseous structures.                                       X-Ray Chest AP Portable (Final result)  Result time 03/28/23 21:36:30      Final result by Flori  MD Bret (03/28/23 21:36:30)                   Impression:      Findings, as above.      Electronically signed by: Flori Queen  Date:    03/28/2023  Time:    21:36               Narrative:    EXAMINATION:  XR CHEST AP PORTABLE    CLINICAL HISTORY:  Sepsis;    TECHNIQUE:  Single frontal view of the chest was performed.    COMPARISON:  06/7/2019    FINDINGS:  Elevated left hemidiaphragm.  Mild patchy bibasilar airspace disease or atelectasis.  No pleural fluid or pneumothorax.  Normal heart size.  Sternotomy wires.                                       Medications   piperacillin-tazobactam (ZOSYN) 4.5 g in dextrose 5 % in water (D5W) 5 % 100 mL IVPB (MB+) (has no administration in time range)   NORepinephrine 4 mg in dextrose 5% 250 mL infusion (premix) (titrating) (1 mcg/kg/min × 65.8 kg Intravenous New Bag 3/29/23 0201)   0.9%  NaCl infusion (has no administration in time range)   vancomycin - pharmacy to dose (has no administration in time range)   sodium chloride 0.9% bolus 2,055 mL 2,055 mL (0 mLs Intravenous Stopped 3/28/23 2218)   vancomycin 1,500 mg in dextrose 5 % (D5W) 250 mL IVPB (Vial-Mate) (0 mg Intravenous Stopped 3/28/23 2340)   piperacillin-tazobactam (ZOSYN) 4.5 g in dextrose 5 % in water (D5W) 5 % 100 mL IVPB (MB+) (0 g Intravenous Stopped 3/28/23 2153)   ketorolac injection 15 mg (15 mg Intravenous Given 3/28/23 2310)   0.9%  NaCl infusion (1,000 mLs Intravenous New Bag 3/29/23 0124)     Medical Decision Making:   Differential Diagnosis:   Ddx: sepsis, UTI, PNA, cellulitis, septic shock, dehydration  ED Management:  Based on the patient's evaluation - patient is critically ill requiring ICU-level care. Sepsis order set initiated order including Bcx, lactic, 30 cc/kg IVF bolus, vanc/zosyn. Hypothermic on arrival so Dianne hugger applied. Discussed goals of care with the patient and family - confirmed DNR but assessment and aggressive treatment options are permitted.    ED workup with  leukocytosis, chronic anemia, mild hypoK, and bibasilar airspace disease vs atelectasis. Her right hand does not appear significantly infected to me. Patient has no headache, neck stiffness, photophobia, nausea, vomiting with lower concern for meningitis/encephalitis.     Spoke with Dr. Paredes who was happy to assist and accepts ICU admission.                        Clinical Impression:   Final diagnoses:  [I95.9] Hypotension  [Z45.2] Encounter for central line placement  [T68.XXXA] Hypothermia, initial encounter  [A41.9, R65.21] Septic shock (Primary)  [J18.9] Pneumonia of both lower lobes due to infectious organism        ED Disposition Condition    Admit Serious                Izaguirre DO Deirdre  03/29/23 0219

## 2023-03-29 NOTE — PLAN OF CARE
[facility] - Intensive Care  Initial Discharge Assessment       Primary Care Provider: Reno Hilario MD    Admission Diagnosis: Encounter for central line placement [Z45.2]  Hypotension [I95.9]  Septic shock [A41.9, R65.21]  Hypothermia, initial encounter [T68.XXXA]  Pneumonia of both lower lobes due to infectious organism [J18.9]    Admission Date: 3/28/2023  Expected Discharge Date:     Discharge Barriers Identified: None    Payor: MEDICARE / Plan: MEDICARE PART A & B / Product Type: Government /     Extended Emergency Contact Information  Primary Emergency Contact: Petra Rowell  Address: [address]            [place], [place] [id number] [place] [address]  Home Phone: [phone]  Mobile Phone: [phone]  Relation: Daughter    Discharge Plan A: Return to nursing home  Discharge Plan B: Skilled Nursing Facility      [facility] [address] [facility] [address]  [address] [place] [id number]  Phone: [phone] Fax: [phone]      Initial Assessment (most recent)       Adult Discharge Assessment - 03/29/23 1319          Discharge Assessment    Assessment Type Discharge Planning Assessment     Source of Information health record     Communicated CARLOS with patient/caregiver Date not available/Unable to determine     Reason For Admission Severe sepsis     People in Home facility resident     Facility Arrived From: [facility]     Do you expect to return to your current living situation? Yes     Do you have help at home or someone to help you manage your care at home? Yes     Who are your caregiver(s) and their phone number(s)? Petra Rowell (Daughter) [phone]     Prior to hospitilization cognitive status: Alert/Oriented     Current cognitive status: Alert/Oriented     Walking or Climbing Stairs ambulation difficulty, assistance 1 person;ambulation difficulty, requires equipment     Dressing/Bathing bathing difficulty, assistance 1 person     Home Accessibility wheelchair accessible     Home  Layout Able to live on 1st floor     Equipment Currently Used at Home walker, rolling;wheelchair;hospital bed     Readmission within 30 days? No     Patient currently being followed by outpatient case management? No     Do you take prescription medications? Yes     Do you have prescription coverage? Yes     Coverage Medicaid of [place]     Do you have any problems affording any of your prescribed medications? No     How do you get to doctors appointments? agency     Are you on dialysis? No     Do you take coumadin? No     Discharge Plan A Return to nursing home     Discharge Plan B Skilled Nursing Facility     DME Needed Upon Discharge  none     Discharge Barriers Identified None                      Discharge assessment completed. Patient a long-term resident of [facility] where she has resided since 2019. Will return upon discharge. SW to remain available.

## 2023-03-29 NOTE — ASSESSMENT & PLAN NOTE
This patient does not have evidence of infective focus  My overall impression is septic shock due to MAP < 65.  Source: Unknown   Antibiotics given-   Antibiotics (72h ago, onward)    Start     Stop Route Frequency Ordered    03/29/23 2200  vancomycin (VANCOCIN) 1,000 mg in dextrose 5 % (D5W) 250 mL IVPB (Vial-Mate)         -- IV Every 24 hours (non-standard times) 03/29/23 0217    03/29/23 0900  mupirocin 2 % ointment         04/03 0859 Nasl 2 times daily 03/29/23 0213    03/29/23 0530  piperacillin-tazobactam (ZOSYN) 4.5 g in dextrose 5 % in water (D5W) 5 % 100 mL IVPB (MB+)  (ED Adult Sepsis Treatment)         03/29 2129 IV Every 8 hours (non-standard times) 03/28/23 2103 03/29/23 0304  vancomycin - pharmacy to dose  (vancomycin IVPB)        See Hyperspace for full Linked Orders Report.    -- IV pharmacy to manage frequency 03/29/23 0204        Latest lactate reviewed-  Recent Labs   Lab 03/28/23 2117   LACTATE 1.6     Organ dysfunction indicated by Encephalopathy     Fluid challenge Patient received 3.2 L bolus and now receiving 100cc hour of IV fluids.      Post- resuscitation assessment Yes Perfusion exam was performed within 6 hours of septic shock presentation after bolus shows Inadequate tissue perfusion assessed by invasive monitoring       Will Start Pressors- Levophed for MAP of 65  Source control achieved by: IV fluids, IV vanc and IV zosyn       Unsure etiology of sepsis.  No lactic acidosis or elevated procal at presentation.  Leukocytosis present but also has a DVT and anticoagulated dose only 2.5mg qd.  CT CHEST pending with PE protocol.

## 2023-03-29 NOTE — ASSESSMENT & PLAN NOTE
Patient with history of DVT  Not currently on appropriate dosage of Eliquis  Plan for CT with PE protocol due to hypotension and leukocytosis

## 2023-03-29 NOTE — EICU
New Patient Evaluation    HPI:  82 F history of CVA with residual right sided weakness, CAD s/p CABG, ALI s/p thrombectomy, hypertension, dyslipidemia, DM brought from nursing home due to hypotension. Also report of depression and fatigue as roommate is now on hospice. Hypotensive and hypothermic on arrival. Started on antibiotics for pneumonia.    Camera Assessment:  /55 HR 75  O2 96% on norepinephrine  Seen asleep not in acute distress    Data:  CXR mild patchy bibasilar airspace disease or atelectasis  UA WBC 2  WBC 16, H/H 9.8/29.8, platelets 300  Na 137, K 5.4, CO2 22, AG 9, BUN 70 creatinine 1  Lactic acid 1    Assessment and Plans:   Septic shock, on pulmonary as likely source, Follow cultures.  Cautious hydration

## 2023-03-29 NOTE — PROGRESS NOTES
Pharmacokinetic Initial Assessment: IV Vancomycin    Assessment/Plan:    Initiate intravenous vancomycin with loading dose of 1500 mg once followed by a maintenance dose of vancomycin 1000mg IV every 24 hours  Desired empiric serum trough concentration is 15 to 20 mcg/mL  Draw vancomycin trough level 60 min prior to third dose on 3/30 at approximately 2100  Pharmacy will continue to follow and monitor vancomycin.      Please contact pharmacy at extension [phone] with any questions regarding this assessment.     Thank you for the consult,   Socorro Thomas       Patient brief summary:  Michelle Rowell is a 82 y.o. female initiated on antimicrobial therapy with IV Vancomycin for treatment of suspected sepsis    Drug Allergies:   Review of patient's allergies indicates:  No Known Allergies    Actual Body Weight:   65.8 kg    Renal Function:   Estimated Creatinine Clearance: 46.6 mL/min (based on SCr of 1 mg/dL).,     Dialysis Method (if applicable):  N/A    CBC (last 72 hours):  Recent Labs   Lab Result Units 03/28/23 2117   WBC K/uL 15.97*   Hemoglobin g/dL 9.8*   Hematocrit % 29.8*   Platelets K/uL 300   Gran % % 66.3   Lymph % % 25.4   Mono % % 7.5   Eosinophil % % 0.1   Basophil % % 0.1   Differential Method  Automated       Metabolic Panel (last 72 hours):  Recent Labs   Lab Result Units 03/28/23 2117 03/29/23  0055   Sodium mmol/L 137  --    Potassium mmol/L 5.4*  --    Chloride mmol/L 106  --    CO2 mmol/L 22*  --    Glucose mg/dL 113*  --    Glucose, UA   --  Negative   BUN mg/dL 70*  --    Creatinine mg/dL 1.0  --    Albumin g/dL 1.9*  --    Total Bilirubin mg/dL 0.5  --    Alkaline Phosphatase U/L 79  --    AST U/L 18  --    ALT U/L 13  --        Drug levels (last 3 results):  No results for input(s): VANCOMYCINRA, VANCORANDOM, VANCOMYCINPE, VANCOPEAK, VANCOMYCINTR, VANCOTROUGH in the last 72 hours.    Microbiologic Results:  Microbiology Results (last 7 days)       Procedure Component Value Units  Date/Time    Blood culture x two cultures. Draw prior to antibiotics. [[id number]] Collected: 03/28/23 2117    Order Status: Sent Specimen: Blood Updated: 03/29/23 0207    Blood culture x two cultures. Draw prior to antibiotics. [[id number]] Collected: 03/28/23 2117    Order Status: Sent Specimen: Blood Updated: 03/29/23 0207

## 2023-03-29 NOTE — EICU
Intervention Initiated From:  COR / EICU    Jayleen intervened regarding:  Documentation    Nurse Notified:  No    Doctor Notified:  No    Comments: video rounding complete, appears asleep, see MAR for critical care drips at this time, NAD, VSS

## 2023-03-29 NOTE — HPI
Patient is an 82 year old female with medical history of DVT, aneurysm of heart, HTN, HLD, DM type 2, CAD s/p CABG, R MCA stroke who was sent from nursing home for hypotension and confusion.  Patient has not been eating lately since her room mate was placed on hospice.  She denies fever, CP, SOB, nausea, vomiting and urinary complaints.      Admitted for concern of sepsis.  Currently on IV fluids, IV vanc/IV zosyn and levophed.

## 2023-03-29 NOTE — NURSING
0210 Received report from [facility] on a 82 year old female with diagnosis hypotension,  hypothermia, septic shock, and pneumonia of both lower lobes . Patient is alert and oriented times 4. Discussed plan of care with patient. Patient answered most of admission assessment questions however she did not know vaccination history or medications. Did not receive copy of medications from [facility] upon admission to ICU. Family here for short while then left. Continuous cardiac monitoring inprogress NSR with right BBB. Levophed infusing at .1 mcg/kg/min. Patient on room air. Singh intact and patient draining small amount of dark cloudy urine to gravity. Patient has contractures to right arm, unable to move bilateral lower extremeties, foot drop to both feet and only able to move left arm. Will continue to monitor. Side rails up times 2, call button in reach, and bed low and locked.    0323 NS continuous infusion begun to infuse at 150 ml/hr. Levophed infusing at 1 mcg/kg/min.        0620 Patient slept throughout the night without complications. Levophed remains at 1 mcg/kg/min.

## 2023-03-29 NOTE — EICU
Intervention Initiated From:  Bedside    Jayleen intervened regarding:  Rounding (Video assessment)    Alerted by Bedside RN of new admit  In bed, appears asleep.  NS infusing @150cc/hr.  Levophed infusing @ 1mcg/kg/min.    B/P 114/57, HR 72, RR 13 Sats 98%    Doctor Notified:  Yes    Comments:  Alexandra Smiley M.D. notified.

## 2023-03-29 NOTE — ED TRIAGE NOTES
82 y.o. female presents to ER ED 05/ED 05   Chief Complaint   Patient presents with    Weakness     Patient to ED from the [facility] who reports the patient was hypoitensive in the 60s at 1900.  EMS reports patient was in the 70s on their arrival.  Patient received 500cc NS with pressure at 93/50.  Patient reports has not been eating or drinking well.  Daughter reports patient has a gauze wrap in her contracted right hand that may be infected.  Patient reports not sleeping well because her roommate plays music all night.  Patient AAOX3 in triage.

## 2023-03-29 NOTE — PROGRESS NOTES
[facility]           Pharmacy        Current Drug Shortage     Due to national backorder and [facility] is critically low on inventory of Dextrose 50% (D50) Syringes and Vials, pharmacy has automatically switched from D50% to D10% IVPB at the equivalent dose until resolution of the shortage per P&T approved protocol.               Socorro Thomas, PharmD  [phone]

## 2023-03-29 NOTE — SUBJECTIVE & OBJECTIVE
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     Current Outpatient Medications on File Prior to Encounter   Medication Sig    apixaban (ELIQUIS) 2.5 mg Tab Take 2.5 mg by mouth 2 (two) times daily.    apixaban (ELIQUIS) 2.5 mg Tab Take 1 tablet (2.5 mg total) by mouth 2 (two) times a day.    carvedilol (COREG) 12.5 MG tablet Take 1 tablet (12.5 mg total) by mouth 2 (two) times daily.    carvediloL (COREG) 12.5 MG tablet Take 1 tablet (12.5 mg total) by mouth 2 (two) times a day. HOLD for pulse less than 60.    cholecalciferol, vitamin D3, (VITAMIN D3) 1,000 unit capsule Take 1,000 Units by mouth once daily.    clopidogreL (PLAVIX) 75 mg tablet Take 1 tablet (75 mg total) by mouth once daily.    insulin aspart U-100 (NOVOLOG) 100 unit/mL (3 mL) InPn pen Inject 0-5 Units into the skin every 6 (six) hours as needed (Hyperglycemia).    magnesium oxide (MAG-OX) 400 mg (241.3 mg magnesium) tablet Take 400 mg by mouth 2 (two) times daily.    metFORMIN (GLUCOPHAGE) 500 MG tablet Take 1 tablet (500 mg total) by mouth 2 (two) times a day.    mirtazapine (REMERON) 30 MG tablet TAKE 1 TABLET BY MOUTH ONCE DAILY IN THE EVENING    mirtazapine (REMERON) 30 MG tablet Take 1 tablet (30  mg total) by mouth nightly    pantoprazole (PROTONIX) 40 MG tablet Take 1 tablet (40 mg total) by mouth once daily.    ramipril (ALTACE) 2.5 MG capsule Take 2.5 mg by mouth once daily.    ramipriL (ALTACE) 5 MG capsule Take 1 capsule (5 mg total) by mouth once daily.    senna-docusate 8.6-50 mg (PERICOLACE) 8.6-50 mg per tablet Take 1 tablet by mouth once daily.    simvastatin (ZOCOR) 20 MG tablet Take 1 tablet (20 mg total) by mouth every evening.    simvastatin (ZOCOR) 40 MG tablet Take 40 mg by mouth every evening.     Family History       Problem Relation (Age of Onset)    Cancer Father    Heart disease Father    Thyroid disease Daughter          Tobacco Use    Smoking status: Former     Types: Cigarettes    Smokeless tobacco: Never    Tobacco comments:     quit in 1990s   Substance and Sexual Activity    Alcohol use: No    Drug use: No    Sexual activity: Not Currently     Review of Systems   Constitutional:  Positive for appetite change. Negative for chills and fever.   Respiratory:  Negative for cough and shortness of breath.    Cardiovascular:  Negative for chest pain and leg swelling.   Gastrointestinal:  Negative for nausea and vomiting.   Genitourinary:  Negative for difficulty urinating and dysuria.   Musculoskeletal:  Negative for arthralgias and gait problem.   Psychiatric/Behavioral:  Negative for agitation and confusion.    Objective:     Vital Signs (Most Recent):  Temp: 98.2 °F (36.8 °C) (03/29/23 1103)  Pulse: 89 (03/29/23 1103)  Resp: 18 (03/29/23 1103)  BP: (!) 97/54 (03/29/23 1103)  SpO2: 97 % (03/29/23 1103)   Vital Signs (24h Range):  Temp:  [94.1 °F (34.5 °C)-98.2 °F (36.8 °C)] 98.2 °F (36.8 °C)  Pulse:  [61-93] 89  Resp:  [11-24] 18  SpO2:  [94 %-100 %] 97 %  BP: ()/(36-70) 97/54     Weight: 65.8 kg (145 lb)  Body mass index is 20.81 kg/m².    Physical Exam  Constitutional:       Appearance: Normal appearance.   Cardiovascular:      Rate and Rhythm: Normal rate and regular rhythm.    Pulmonary:      Effort: Pulmonary effort is normal.      Breath sounds: Normal breath sounds.   Abdominal:      General: Abdomen is flat. Bowel sounds are normal.      Palpations: Abdomen is soft.   Musculoskeletal:      Right lower leg: Edema (+2) present.      Left lower leg: Edema (+2) present.   Skin:     General: Skin is warm and dry.   Neurological:      Mental Status: She is alert and oriented to person, place, and time. Mental status is at baseline.           Significant Labs: A1C: No results for input(s): HGBA1C in the last 4320 hours.  ABGs: No results for input(s): PH, PCO2, HCO3, POCSATURATED, BE, TOTALHB, COHB, METHB, O2HB, POCFIO2, PO2 in the last 48 hours.  Bilirubin:   Recent Labs   Lab 03/28/23 2117 03/29/23 0430   BILITOT 0.5 0.4     Blood Culture:   Recent Labs   Lab 03/28/23 2117   LABBLOO No Growth to date  No Growth to date     BMP:   Recent Labs   Lab 03/29/23 0430   *   *   K 4.8      CO2 16*   BUN 64*   CREATININE 0.9   CALCIUM 7.3*     CBC:   Recent Labs   Lab 03/28/23 2117 03/29/23  0430   WBC 15.97* 19.46*   HGB 9.8* 9.0*   HCT 29.8* 26.9*    296     CMP:   Recent Labs   Lab 03/28/23 2117 03/29/23  0430    134*   K 5.4* 4.8    106   CO2 22* 16*   * 304*   BUN 70* 64*   CREATININE 1.0 0.9   CALCIUM 8.4* 7.3*   PROT 5.0* 4.3*   ALBUMIN 1.9* 1.6*   BILITOT 0.5 0.4   ALKPHOS 79 68   AST 18 19   ALT 13 14   ANIONGAP 9 12     Cardiac Markers: No results for input(s): CKMB, MYOGLOBIN, BNP, TROPISTAT in the last 48 hours.  Coagulation: No results for input(s): PT, INR, APTT in the last 48 hours.  Lactic Acid:   Recent Labs   Lab 03/28/23 2117   LACTATE 1.6     Lipase: No results for input(s): LIPASE in the last 48 hours.  Lipid Panel: No results for input(s): CHOL, HDL, LDLCALC, TRIG, CHOLHDL in the last 48 hours.  Magnesium: No results for input(s): MG in the last 48 hours.  POCT Glucose:   Recent Labs   Lab 03/29/23  0254 03/29/23  1434  03/29/23  1143   POCTGLUCOSE 263* 383* 274*     Prealbumin: No results for input(s): PREALBUMIN in the last 48 hours.  Respiratory Culture: No results for input(s): GSRESP, RESPIRATORYC in the last 48 hours.  Troponin:   Recent Labs   Lab 03/28/23  2117   TROPONINI 0.006     TSH: No results for input(s): TSH in the last 4320 hours.  Urine Culture: No results for input(s): LABURIN in the last 48 hours.  Urine Studies:   Recent Labs   Lab 03/29/23  0055   COLORU Yellow   APPEARANCEUA Clear   PHUR 6.0   SPECGRAV 1.025   PROTEINUA Trace*   GLUCUA Negative   KETONESU 1+*   BILIRUBINUA 2+*   OCCULTUA Negative   NITRITE Negative   UROBILINOGEN Negative   LEUKOCYTESUR Trace*   RBCUA 1   WBCUA 2   BACTERIA Rare   HYALINECASTS 10*       Significant Imaging: CXR: mild patch bibasilar airspace disease or atelectasis

## 2023-03-29 NOTE — PLAN OF CARE
Problem: Infection  Goal: Absence of Infection Signs and Symptoms  Outcome: Ongoing, Progressing     Problem: Diabetes Comorbidity  Goal: Blood Glucose Level Within Targeted Range  Outcome: Ongoing, Progressing     Problem: Adjustment to Illness (Sepsis/Septic Shock)  Goal: Optimal Coping  Outcome: Ongoing, Progressing

## 2023-03-30 NOTE — HOSPITAL COURSE
3/30 Admitted for sepsis.  Still on pressor support, clinimix and IV fluids.  Will wean levophed to maintain MAP>65.  Staph bacteremia on PCR.  Final blood cultures pending.  CTA chest negative for PE, but lower lobe pneumonia present.  Will continue IV vanc and IV zosyn.  Plan for echocardiogram today due to gram positive bacteremia and bp on lower side.       3/31 Patient slowly weaning off levophed.  Adding lipids since albumin 1.1.  Hg 7.5 with positive FOBT.  1 unit of pRBC ordered.  Will d/c Eliquis at this time for DVT.  Echo with EF 70%.  Continue IV vanc and IV zosyn for pneumonia and staph bacteremia.        4/2  Off of pressors since 8pm last night.   Refuses anything purred (ST put her on purred diet), but does drink boost TID   Hgb did drop 1 point but she is still getting IVF and bun/creatinine has dropped as well. No bloody stools. VSS  Day 5 Vanc and Zosyn. Afebrile. Repeat blood CX 3/30 NGTD    4/3  Off levophed and remains HD stable.  Will start to wean clinimix and fluids.  SLP with concern for aspiration but patient doesn't want to do MBSS.  She will continue pureed diet and not interested in PEG.  Discussed resuming Eliquis at home dose 2.5mg BID and monitor h/h.  Staph epidermis/capitis bacteremia sensitive to doxycyline.  Will plan for 7-9 more days since she received IV vanc 6 days and IV zosyn5 days.  Plan for discharge in the next 48hours.      4/4 Transitioned from ICU to the floor overnight.  HD stable slowly discontinuing IV fluids.  Will increase Eliquis one more time in attempts for patient to have therapeutic level.  Repeat CBC tomorrow.  Likely d/c back to nursing home within the next 24 hours.        4/5 Sinus bradycardia.  Cardiology consulted and no further recommendations at this time.  Glucose 44 this morning.  D/C long acting insulin.  Switched diet from diabetic to cardiac.  Will monitor HR and glucose.  Possible d/c within the next 24 hours.         4/6 PT HD stable on room  air.  Repeat CBC remains stable.  Patient has intermittent asymptomatic sinus bradycardia. Patient not interested in any further workup/procedures. Cardiology recommend holding home carvedilol.  Glucose wnl today.  Discharge back to nursing home today.

## 2023-03-30 NOTE — ASSESSMENT & PLAN NOTE
Patient with history of DVT  Not currently on appropriate dosage of Eliquis  CTA negative for PE   Increased eliquis dosage.

## 2023-03-30 NOTE — NURSING
Patient drank for breakfast about 3/4 of boost and tried 1 bite of boost pudding declined anything else.    Patient did finish boost between breakfast and lunch.    Patient ate about 6-10 bites of oatmeal (Patient requested) for lunch. While feeding patient she seemed to not completely swallow food in her mouth before requesting another bite. Encouraged patient to swallow to clear mouth to reduce chance of choking on food. Patient would swallow to clear mouth but it did take about 2-3 swallows to fully clear mouth on some occasions. SLP asked to evaluate patient.    Patient has started to have weeping between thighs and right arm. Measures to keep patient dry implemented.    Patient ate about 10 bites of boost milkshake for dinner. Declined anything else.    Patient has had 3 stools this shift, all brown and loose. They have had pink tinged coloring on the edge of the stool when on the diaper/pad. Notified MD of this and MD placed orders.    Blood glucose has been elevated this shift. Levimir to start tonight. Additional aspart given before dinner per MD order.    Levophed weaned as tolerated.

## 2023-03-30 NOTE — PT/OT/SLP EVAL
Speech Language Pathology Evaluation  Bedside Swallow    Patient Name:  Michelle Rowell   MRN:  [id number]  Admitting Diagnosis: Severe sepsis    Recommendations:                 General Recommendations:  Dysphagia therapy and Modified barium swallow study  Diet recommendations:  Puree Diet - IDDSI Level 4, Thin liquids - IDDSI Level 0   Aspiration Precautions: 1 bite/sip at a time, Alternating bites/sips, Check for pocketing/oral residue, Eliminate distractions, Feed only when awake/alert, Frequent oral care, Meds crushed in puree, Puree for pleasure, Remain upright 30 minutes post meal, and Small bites/sips   General Precautions: Standard, aphasia, aspiration, pureed diet  Communication strategies:  yes/no questions only and provide increased time to answer    History:     Past Medical History:   Diagnosis Date    Acute ischemic right MCA stroke 6/8/2019    Heart disease     History of deep vein thrombosis 6/6/2019    Hyperlipidemia     Hypertension     Limb ischemia     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 2016    Oropharyngeal dysphagia 6/8/2019    Pseudoaneurysm following procedure 1/11/2016    Stroke     Type 2 diabetes mellitus with neurologic complication 12/1/2015       Past Surgical History:   Procedure Laterality Date    CORONARY ARTERY BYPASS GRAFT      JARED FILTER PLACEMENT      THROMBECTOMY      TONSILLECTOMY      TUBAL LIGATION      US PSEUDOANEURYSM REPAIR INC IMAGING  1/11/2016            Social History: Patient resides at [facility]    Modified Barium Swallow: 6/10/2019  Impressions  Patient demonstrates mild oropharyngeal dysphagia characterized by delayed swallow initiation and limited epiglottic mobility resulting in laryngeal penetration of thin liquids both before and during the swallow as well as flash penetration of nectar-thick liquids during the swallow. Patient with reduced vocal strength resulted in ability to adequately clear penetrated material.  During the oral phase, labial and lingual weakness resulted in anterior spillage of liquids and delayed A-P transit with prolonged mastication of solids. No penetration or aspiration of puree and solid trials.      Prognosis: Good     Barriers:  Generalized weakness from both current and prior strokes     Plan  Initiate MECHANICAL SOFT DIET/NECTAR-THICK LIQUIDS at this time. Patient should strictly adhere to precautions listed above. SLP will trial dysphagia therapy and oral motor exercises in order to improve both oral and pharyngeal phases of swallowing, specifically the timeliness of swallow initiation.     Chest CTA: 3/29/2023  FINDINGS:  No CT evidence of pulmonary thromboembolism.  No mediastinal adenopathy.  No suspicious pulmonary nodule or mass.  Multifocal airspace opacities suggesting pneumonia.  Small bilateral pleural fluid collections.  No abnormal pericardial fluid.  Upper abdominal images demonstrate at least mild ascites.     Impression:     1. No CT evidence of pulmonary thromboembolism.  2. Multifocal airspace disease, likely pneumonia.  3. Small bilateral pleural fluid collections.    Prior diet: Per patient and patient's family members, patient had been consuming a regular, thin (IDDSI 7,0) diet prior to admit.       Subjective       Patient goals: Not specified     Pain/Comfort:  Pain Rating 1:  (pain not quantified during this session)    Respiratory Status: Room air    Objective:     Oral Musculature Evaluation  Oral Musculature: general weakness  Dentition:  (lower dentition, upper edentulous. Pt has upper dentures but prefers not to wear them.)  Secretion Management: adequate  Mucosal Quality: sticky  Mandibular Strength and Mobility: impaired  Oral Labial Strength and Mobility: WFL  Lingual Strength and Mobility: impaired strength, impaired depression, impaired protrusion, impaired anterior elevation, impaired left lateral movement, impaired right lateral movement  Velar Elevation:  "WFL  Buccal Strength and Mobility: WFL  Volitional Cough: weak, wet  Volitional Swallow: not elicited  Voice Prior to PO Intake: low vocal intensity    Bedside Swallow Eval:   Consistencies Assessed:  Thin liquids --  via straw: consecutive swallows x2 sips, small sips controlled by SLP x8 sips  Puree x4 bites  Soft solids x1 bite      Oral Phase:   Patient exhibited adequate oral acceptance, containment, and clearance of then liquids. Patient exhibited difficulty accepting puree and soft solids from spoon requiring SLP to pull spoon upward to roof of mouth. Patient exhibited prolonged oral manipulation of puree bolus and slowed transit time before initiating swallow. Though she masticated the soft solids for prolonged time, she eventually expectorated cubed peaches whole stating she "could not eat them."     Pharyngeal Phase:   Patient  exhibited weak coughing following 2/2 large consecutive sips of thin liquids. Patient exhibited coughing following 1/8 smaller sips. Vocal quality and spO2 remained WNL. The patient exhibited no overt signs and symptoms during or immediately following puree presentations.      Compensatory Strategies  None        Assessment:     Michelle Rowell is a 82 y.o. female with an SLP diagnosis of oropharyngeal Dysphagia.  She presents with inadequate ability to safely tolerate prior level diet. Patient's swallow safety and efficiency are impaired likely 2/2 residual deficits from CVA and generalized weakness from current illness. When patient able to tolerate medically, she would likely benefit from MBSS to determine acute changes in function since last MBSS completed in 2019. Until patient deemed appropriate by nursing staff for radiology transfer, patient would benefit from bedside follow up to subjectively determine status and potential for diet upgrade.       Goals:   Multidisciplinary Problems       SLP Goals          Problem: SLP    Goal Priority Disciplines Outcome   SLP Goal "     SLP    Description: 1. Patient will participate in a modified barium swallow study to further assess the swallowing mechanism in order to objectively determine least-restrictive diet, effective compensatory techniques, and to identify any physiologic deficits to be targeted in therapy.                          Plan:     Patient to be seen:  5 x/week   Plan of Care expires:     Plan of Care reviewed with:  patient, father   SLP Follow-Up:  Yes       Discharge recommendations:  nursing facility, skilled (when medically stable)   Barriers to Discharge:  Level of Skilled Assistance Needed      Time Tracking:     SLP Treatment Date:   03/30/23  Speech Start Time:  1420  Speech Stop Time:  1447     Speech Total Time (min):  27 min    Billable Minutes: Eval Swallow and Oral Function - 27 minutes    03/30/2023

## 2023-03-30 NOTE — PROGRESS NOTES
[facility] Medicine  Progress Note    Patient Name: Michelle Rowell  MRN: [id number]  Patient Class: IP- Inpatient   Admission Date: 3/28/2023  Length of Stay: 1 days  Attending Physician: Liv Paredes MD  Primary Care Provider: Reno Hilario MD        Subjective:     Principal Problem:Severe sepsis        HPI:  Patient is an 82 year old female with medical history of DVT, aneurysm of heart, HTN, HLD, DM type 2, CAD s/p CABG, R MCA stroke who was sent from nursing home for hypotension and confusion.  Patient has not been eating lately since her room mate was placed on hospice.  She denies fever, CP, SOB, nausea, vomiting and urinary complaints.      Admitted for concern of sepsis.  Currently on IV fluids, IV vanc/IV zosyn and levophed.          Overview/Hospital Course:  3/30 Admitted for sepsis.  Still on pressor support, clinimix and IV fluids.  Will wean levophed to maintain MAP>65.  Staph bacteremia on PCR.  Final blood cultures pending.  CTA chest negative for PE, but lower lobe pneumonia present.  Will continue IV vanc and IV zosyn.  Plan for echocardiogram today due to gram positive bacteremia and bp on lower side.         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     Current Outpatient Medications on File Prior to Encounter   Medication Sig    apixaban (ELIQUIS) 2.5 mg Tab Take 2.5 mg by mouth 2 (two) times daily.    apixaban (ELIQUIS) 2.5 mg Tab Take 1 tablet (2.5 mg total) by mouth 2 (two) times a day.    carvedilol (COREG) 12.5 MG tablet Take 1 tablet (12.5 mg total) by mouth 2 (two) times daily.    carvediloL (COREG) 12.5 MG tablet Take 1 tablet (12.5 mg total) by mouth 2 (two) times a day. HOLD for pulse less than 60.    cholecalciferol, vitamin D3, (VITAMIN D3) 1,000 unit capsule Take 1,000 Units by mouth once daily.    clopidogreL (PLAVIX) 75 mg tablet Take 1 tablet (75 mg total) by mouth once daily.    insulin aspart U-100 (NOVOLOG) 100 unit/mL (3 mL) InPn pen Inject 0-5 Units into the skin every 6 (six) hours as needed (Hyperglycemia).    magnesium oxide (MAG-OX) 400 mg (241.3 mg magnesium) tablet Take 400 mg by mouth 2 (two) times daily.    metFORMIN (GLUCOPHAGE) 500 MG tablet Take 1 tablet (500 mg total) by mouth 2 (two) times a day.    mirtazapine (REMERON) 30 MG tablet TAKE 1 TABLET BY MOUTH ONCE DAILY IN THE EVENING    mirtazapine (REMERON) 30 MG tablet Take 1 tablet (30 mg total) by mouth nightly    pantoprazole (PROTONIX) 40 MG tablet Take 1 tablet (40 mg total) by mouth once daily.    ramipril (ALTACE) 2.5 MG capsule Take 2.5 mg by mouth once daily.    ramipriL (ALTACE) 5 MG capsule Take 1 capsule (5 mg total) by mouth once daily.    senna-docusate 8.6-50 mg (PERICOLACE) 8.6-50 mg per tablet Take 1 tablet by mouth once daily.    simvastatin (ZOCOR) 20 MG tablet Take 1 tablet (20 mg total) by mouth every evening.    simvastatin (ZOCOR) 40 MG tablet Take 40 mg by mouth every evening.     Family History       Problem Relation (Age of Onset)    Cancer Father    Heart disease Father    Thyroid disease Daughter          Tobacco Use    Smoking status: Former     Types: Cigarettes    Smokeless tobacco: Never    Tobacco  comments:     quit in 1990s   Substance and Sexual Activity    Alcohol use: No    Drug use: No    Sexual activity: Not Currently     Review of Systems   Constitutional:  Positive for appetite change. Negative for chills and fever.   Respiratory:  Negative for cough and shortness of breath.    Cardiovascular:  Negative for chest pain and leg swelling.   Gastrointestinal:  Negative for nausea and vomiting.   Genitourinary:  Negative for difficulty urinating and dysuria.   Musculoskeletal:  Negative for arthralgias and gait problem.   Psychiatric/Behavioral:  Negative for agitation and confusion.    Objective:     Vital Signs (Most Recent):  Temp: 96.3 °F (35.7 °C) (03/30/23 1144)  Pulse: 86 (03/30/23 1155)  Resp: 18 (03/30/23 1155)  BP: (!) 101/56 (03/30/23 1155)  SpO2: 97 % (03/30/23 1155)   Vital Signs (24h Range):  Temp:  [96.3 °F (35.7 °C)-98.3 °F (36.8 °C)] 96.3 °F (35.7 °C)  Pulse:  [70-97] 86  Resp:  [13-25] 18  SpO2:  [89 %-99 %] 97 %  BP: ()/(44-93) 101/56     Weight: 71.6 kg (157 lb 13.6 oz)  Body mass index is 22.65 kg/m².    Physical Exam  Constitutional:       Appearance: Normal appearance.   Cardiovascular:      Rate and Rhythm: Normal rate and regular rhythm.   Pulmonary:      Effort: Pulmonary effort is normal.      Breath sounds: Normal breath sounds.   Abdominal:      General: Abdomen is flat. Bowel sounds are normal.      Palpations: Abdomen is soft.   Musculoskeletal:      Right lower leg: Edema (+1) present.      Left lower leg: Edema (+1) present.   Skin:     General: Skin is warm and dry.   Neurological:      Mental Status: She is alert and oriented to person, place, and time. Mental status is at baseline.           Significant Labs: A1C:   Recent Labs   Lab 03/29/23  0430   HGBA1C 4.9     ABGs: No results for input(s): PH, PCO2, HCO3, POCSATURATED, BE, TOTALHB, COHB, METHB, O2HB, POCFIO2, PO2 in the last 48 hours.  Bilirubin:   Recent Labs   Lab 03/28/23  2117 03/29/23  0430  03/30/23  0316   BILITOT 0.5 0.4 0.3       Blood Culture:   Recent Labs   Lab 03/28/23 2117   LABBLOO Gram stain peds bottle: Gram positive cocci  Positive results previously called 03/29/2023  21:23  Gram stain peds bottle: Gram positive cocci  Results called to and read back by:daphnie alvarez  03/29/2023  21:23       BMP:   Recent Labs   Lab 03/30/23  0316   *   *   K 4.4      CO2 19*   BUN 58*   CREATININE 0.9   CALCIUM 7.4*   MG 2.2       CBC:   Recent Labs   Lab 03/28/23 2117 03/29/23  0430 03/30/23  0316   WBC 15.97* 19.46* 18.36*   HGB 9.8* 9.0* 9.3*   HCT 29.8* 26.9* 28.0*    296 309       CMP:   Recent Labs   Lab 03/28/23 2117 03/29/23 0430 03/30/23 0316    134* 127*   K 5.4* 4.8 4.4    106 103   CO2 22* 16* 19*   * 304* 323*   BUN 70* 64* 58*   CREATININE 1.0 0.9 0.9   CALCIUM 8.4* 7.3* 7.4*   PROT 5.0* 4.3* 4.2*   ALBUMIN 1.9* 1.6* 1.4*   BILITOT 0.5 0.4 0.3   ALKPHOS 79 68 72   AST 18 19 14   ALT 13 14 13   ANIONGAP 9 12 5*       Cardiac Markers: No results for input(s): CKMB, MYOGLOBIN, BNP, TROPISTAT in the last 48 hours.  Coagulation: No results for input(s): PT, INR, APTT in the last 48 hours.  Lactic Acid:   Recent Labs   Lab 03/28/23 2117   LACTATE 1.6       Lipase: No results for input(s): LIPASE in the last 48 hours.  Lipid Panel: No results for input(s): CHOL, HDL, LDLCALC, TRIG, CHOLHDL in the last 48 hours.  Magnesium:   Recent Labs   Lab 03/30/23  0316   MG 2.2     POCT Glucose:   Recent Labs   Lab 03/29/23  1859 03/30/23  0755 03/30/23  1142   POCTGLUCOSE 266* 323* 343*       Prealbumin: No results for input(s): PREALBUMIN in the last 48 hours.  Respiratory Culture: No results for input(s): GSRESP, RESPIRATORYC in the last 48 hours.  Troponin:   Recent Labs   Lab 03/28/23 2117   TROPONINI 0.006       TSH: No results for input(s): TSH in the last 4320 hours.  Urine Culture: No results for input(s): LABURIN in the last 48 hours.  Urine  Studies:   Recent Labs   Lab 03/29/23  0055   COLORU Yellow   APPEARANCEUA Clear   PHUR 6.0   SPECGRAV 1.025   PROTEINUA Trace*   GLUCUA Negative   KETONESU 1+*   BILIRUBINUA 2+*   OCCULTUA Negative   NITRITE Negative   UROBILINOGEN Negative   LEUKOCYTESUR Trace*   RBCUA 1   WBCUA 2   BACTERIA Rare   HYALINECASTS 10*         Significant Imaging: CXR: mild patch bibasilar airspace disease or atelectasis     CTA chest:      1. No CT evidence of pulmonary thromboembolism.  2. Multifocal airspace disease, likely pneumonia.  3. Small bilateral pleural fluid collections.      Assessment/Plan:      * Severe sepsis  This patient does not have evidence of infective focus  My overall impression is septic shock due to MAP < 65.  Source: Unknown   Antibiotics given-   Antibiotics (72h ago, onward)      Start     Stop Route Frequency Ordered    03/29/23 2245  piperacillin-tazobactam (ZOSYN) 4.5 g in dextrose 5 % in water (D5W) 5 % 100 mL IVPB (MB+)         -- IV Every 8 hours (non-standard times) 03/29/23 2139    03/29/23 2200  vancomycin (VANCOCIN) 1,000 mg in dextrose 5 % (D5W) 250 mL IVPB (Vial-Mate)         -- IV Every 24 hours (non-standard times) 03/29/23 0217    03/29/23 0900  mupirocin 2 % ointment         04/03 0859 Nasl 2 times daily 03/29/23 0213    03/29/23 0304  vancomycin - pharmacy to dose  (vancomycin IVPB)        See Hyperspace for full Linked Orders Report.    -- IV pharmacy to manage frequency 03/29/23 0204          Latest lactate reviewed-  Recent Labs   Lab 03/28/23  2117   LACTATE 1.6     Organ dysfunction indicated by Encephalopathy     Fluid challenge Patient received 3.2 L bolus and now receiving 100cc hour of IV fluids.      Post- resuscitation assessment Yes Perfusion exam was performed within 6 hours of septic shock presentation after bolus shows Inadequate tissue perfusion assessed by invasive monitoring       Will Start Pressors- Levophed for MAP of 65  Source control achieved by: IV fluids, IV vanc  and IV zosyn       No lactic acidosis or elevated procal at presentation.   + PCR blood staph.  Blood cultures gram positive cocci.    + PNA on CTA.    Echo pending     Embolic stroke involving right middle cerebral artery  Continue statin and Eliquis       Acute DVT (deep venous thrombosis)  Patient with history of DVT  Not currently on appropriate dosage of Eliquis  CTA negative for PE   Increased eliquis dosage.        Hypoalbuminemia  Albumin 1.4  Likely contributing to hypotension  Dietician consulted and started on TPN       Aneurysm of heart  Echo pending   Hypotension     Type 2 diabetes mellitus with neurologic complication  Patient's FSGs are controlled on current medication regimen.  Last A1c reviewed-   Lab Results   Component Value Date    HGBA1C 4.9 03/29/2023     Most recent fingerstick glucose reviewed-   Recent Labs   Lab 03/29/23  1533 03/29/23  1859 03/30/23  0755 03/30/23  1142   POCTGLUCOSE 256* 266* 323* 343*     Current correctional scale  Medium  Maintain anti-hyperglycemic dose as follows-   Antihyperglycemics (From admission, onward)      Start     Stop Route Frequency Ordered    03/30/23 2100  insulin detemir U-100 (Levemir) pen 9 Units         -- SubQ Nightly 03/30/23 1152    03/29/23 0744  insulin aspart U-100 pen 0-10 Units         -- SubQ Before meals & nightly PRN 03/29/23 0745          Hold Oral hypoglycemics while patient is in the hospital.    3/30 Currently uncontrolled.  Scheduled determir qhs.  Currently on clinimix and dextrose in solution.      Essential hypertension  Currently hypotensive  Hold all home bp medications       S/P CABG x 4  Denies CP  Continue statin and eliquis         VTE Risk Mitigation (From admission, onward)           Ordered     apixaban tablet 5 mg  2 times daily         03/29/23 1248     IP VTE HIGH RISK PATIENT  Once         03/29/23 0243     Place sequential compression device  Until discontinued         03/29/23 0243     Place KANWAL hose  Until  discontinued         03/29/23 0243                    Discharge Planning   CARLOS:      Code Status: DNR   Is the patient medically ready for discharge?:     Reason for patient still in hospital (select all that apply): Patient trending condition  Discharge Plan A: Return to nursing home            Critical care time spent on the evaluation and treatment of severe organ dysfunction, review of pertinent labs and imaging studies, discussions with consulting providers and discussions with patient/family: 35 minutes.      Veronica Frank PA-C  Department of Hospital Medicine   [facility]

## 2023-03-30 NOTE — SUBJECTIVE & OBJECTIVE
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     Current Outpatient Medications on File Prior to Encounter   Medication Sig    apixaban (ELIQUIS) 2.5 mg Tab Take 2.5 mg by mouth 2 (two) times daily.    apixaban (ELIQUIS) 2.5 mg Tab Take 1 tablet (2.5 mg total) by mouth 2 (two) times a day.    carvedilol (COREG) 12.5 MG tablet Take 1 tablet (12.5 mg total) by mouth 2 (two) times daily.    carvediloL (COREG) 12.5 MG tablet Take 1 tablet (12.5 mg total) by mouth 2 (two) times a day. HOLD for pulse less than 60.    cholecalciferol, vitamin D3, (VITAMIN D3) 1,000 unit capsule Take 1,000 Units by mouth once daily.    clopidogreL (PLAVIX) 75 mg tablet Take 1 tablet (75 mg total) by mouth once daily.    insulin aspart U-100 (NOVOLOG) 100 unit/mL (3 mL) InPn pen Inject 0-5 Units into the skin every 6 (six) hours as needed (Hyperglycemia).    magnesium oxide (MAG-OX) 400 mg (241.3 mg magnesium) tablet Take 400 mg by mouth 2 (two) times daily.    metFORMIN (GLUCOPHAGE) 500 MG tablet Take 1 tablet (500 mg total) by mouth 2 (two) times a day.    mirtazapine (REMERON) 30 MG tablet TAKE 1 TABLET BY MOUTH ONCE DAILY IN THE EVENING    mirtazapine (REMERON) 30 MG tablet Take 1 tablet (30  mg total) by mouth nightly    pantoprazole (PROTONIX) 40 MG tablet Take 1 tablet (40 mg total) by mouth once daily.    ramipril (ALTACE) 2.5 MG capsule Take 2.5 mg by mouth once daily.    ramipriL (ALTACE) 5 MG capsule Take 1 capsule (5 mg total) by mouth once daily.    senna-docusate 8.6-50 mg (PERICOLACE) 8.6-50 mg per tablet Take 1 tablet by mouth once daily.    simvastatin (ZOCOR) 20 MG tablet Take 1 tablet (20 mg total) by mouth every evening.    simvastatin (ZOCOR) 40 MG tablet Take 40 mg by mouth every evening.     Family History       Problem Relation (Age of Onset)    Cancer Father    Heart disease Father    Thyroid disease Daughter          Tobacco Use    Smoking status: Former     Types: Cigarettes    Smokeless tobacco: Never    Tobacco comments:     quit in 1990s   Substance and Sexual Activity    Alcohol use: No    Drug use: No    Sexual activity: Not Currently     Review of Systems   Constitutional:  Positive for appetite change. Negative for chills and fever.   Respiratory:  Negative for cough and shortness of breath.    Cardiovascular:  Negative for chest pain and leg swelling.   Gastrointestinal:  Negative for nausea and vomiting.   Genitourinary:  Negative for difficulty urinating and dysuria.   Musculoskeletal:  Negative for arthralgias and gait problem.   Psychiatric/Behavioral:  Negative for agitation and confusion.    Objective:     Vital Signs (Most Recent):  Temp: 96.3 °F (35.7 °C) (03/30/23 1144)  Pulse: 86 (03/30/23 1155)  Resp: 18 (03/30/23 1155)  BP: (!) 101/56 (03/30/23 1155)  SpO2: 97 % (03/30/23 1155)   Vital Signs (24h Range):  Temp:  [96.3 °F (35.7 °C)-98.3 °F (36.8 °C)] 96.3 °F (35.7 °C)  Pulse:  [70-97] 86  Resp:  [13-25] 18  SpO2:  [89 %-99 %] 97 %  BP: ()/(44-93) 101/56     Weight: 71.6 kg (157 lb 13.6 oz)  Body mass index is 22.65 kg/m².    Physical Exam  Constitutional:       Appearance: Normal appearance.   Cardiovascular:      Rate and Rhythm: Normal rate and regular  rhythm.   Pulmonary:      Effort: Pulmonary effort is normal.      Breath sounds: Normal breath sounds.   Abdominal:      General: Abdomen is flat. Bowel sounds are normal.      Palpations: Abdomen is soft.   Musculoskeletal:      Right lower leg: Edema (+1) present.      Left lower leg: Edema (+1) present.   Skin:     General: Skin is warm and dry.   Neurological:      Mental Status: She is alert and oriented to person, place, and time. Mental status is at baseline.           Significant Labs: A1C:   Recent Labs   Lab 03/29/23 0430   HGBA1C 4.9     ABGs: No results for input(s): PH, PCO2, HCO3, POCSATURATED, BE, TOTALHB, COHB, METHB, O2HB, POCFIO2, PO2 in the last 48 hours.  Bilirubin:   Recent Labs   Lab 03/28/23 2117 03/29/23 0430 03/30/23 0316   BILITOT 0.5 0.4 0.3       Blood Culture:   Recent Labs   Lab 03/28/23 2117   LABBLOO Gram stain peds bottle: Gram positive cocci  Positive results previously called 03/29/2023  21:23  Gram stain peds bottle: Gram positive cocci  Results called to and read back by:daphnie alvarez  03/29/2023  21:23       BMP:   Recent Labs   Lab 03/30/23 0316   *   *   K 4.4      CO2 19*   BUN 58*   CREATININE 0.9   CALCIUM 7.4*   MG 2.2       CBC:   Recent Labs   Lab 03/28/23 2117 03/29/23 0430 03/30/23  0316   WBC 15.97* 19.46* 18.36*   HGB 9.8* 9.0* 9.3*   HCT 29.8* 26.9* 28.0*    296 309       CMP:   Recent Labs   Lab 03/28/23 2117 03/29/23 0430 03/30/23  0316    134* 127*   K 5.4* 4.8 4.4    106 103   CO2 22* 16* 19*   * 304* 323*   BUN 70* 64* 58*   CREATININE 1.0 0.9 0.9   CALCIUM 8.4* 7.3* 7.4*   PROT 5.0* 4.3* 4.2*   ALBUMIN 1.9* 1.6* 1.4*   BILITOT 0.5 0.4 0.3   ALKPHOS 79 68 72   AST 18 19 14   ALT 13 14 13   ANIONGAP 9 12 5*       Cardiac Markers: No results for input(s): CKMB, MYOGLOBIN, BNP, TROPISTAT in the last 48 hours.  Coagulation: No results for input(s): PT, INR, APTT in the last 48 hours.  Lactic Acid:   Recent  Labs   Lab 03/28/23 2117   LACTATE 1.6       Lipase: No results for input(s): LIPASE in the last 48 hours.  Lipid Panel: No results for input(s): CHOL, HDL, LDLCALC, TRIG, CHOLHDL in the last 48 hours.  Magnesium:   Recent Labs   Lab 03/30/23  0316   MG 2.2     POCT Glucose:   Recent Labs   Lab 03/29/23  1859 03/30/23  0755 03/30/23  1142   POCTGLUCOSE 266* 323* 343*       Prealbumin: No results for input(s): PREALBUMIN in the last 48 hours.  Respiratory Culture: No results for input(s): GSRESP, RESPIRATORYC in the last 48 hours.  Troponin:   Recent Labs   Lab 03/28/23 2117   TROPONINI 0.006       TSH: No results for input(s): TSH in the last 4320 hours.  Urine Culture: No results for input(s): LABURIN in the last 48 hours.  Urine Studies:   Recent Labs   Lab 03/29/23  0055   COLORU Yellow   APPEARANCEUA Clear   PHUR 6.0   SPECGRAV 1.025   PROTEINUA Trace*   GLUCUA Negative   KETONESU 1+*   BILIRUBINUA 2+*   OCCULTUA Negative   NITRITE Negative   UROBILINOGEN Negative   LEUKOCYTESUR Trace*   RBCUA 1   WBCUA 2   BACTERIA Rare   HYALINECASTS 10*         Significant Imaging: CXR: mild patch bibasilar airspace disease or atelectasis     CTA chest:      1. No CT evidence of pulmonary thromboembolism.  2. Multifocal airspace disease, likely pneumonia.  3. Small bilateral pleural fluid collections.

## 2023-03-30 NOTE — CONSULTS
[facility] - Intensive Care  Adult Nutrition  Consult Note    SUMMARY     Recommendations    Recommendation/Intervention:   1. Rec'd continue diabetic diet with texture modifications per SLP rec's.   2. Rec'd continue Boost Glucose Control with meals to provide additional kcals and protein.   3. Rec'd honroing pt's food preferences to encourage PO intake.   4. Rec'd consideration of adding 20% lipids to TPN rc to provide an additional 500 kcals.   5. If lipids are given, rec'd checking serum TG, and if >/= 400, hold lipids.   6. Rec'd frequent weight measurements to assess for any acute weight changes.   7. RD to follow and make rec's accordingly.    Goals: Pt will meet at least 75% EEN by RD follow up.  Nutrition Goal Status: new  Communication of RD Recs: reviewed with RN (MAGED Cochran; POC)    Assessment and Plan    Nutrition Problem  Inadequate energy intake    Related to (etiology):   Chewing/swallowing difficulty    Signs and Symptoms (as evidenced by):   H/o oropharyngeal dysphagia; meeting 25-50% of EEN at this time     Interventions/Recommendations (treatment strategy):  Recommendation/Intervention:   1. Rec'd continue diabetic diet with texture modifications per SLP rec's.   2. Rec'd continue Boost Glucose Control with meals to provide additional kcals and protein.   3. Rec'd honroing pt's food preferences to encourage PO intake.   4. Rec'd consideration of adding 20% lipids to TPN rc to provide an additional 500 kcals.   5. If lipids are given, rec'd checking serum TG, and if >/= 400, hold lipids.   6. Rec'd frequent weight measurements to assess for any acute weight changes.   7. RD to follow and make rec's accordingly.    Goals: Pt will meet at least 75% EEN by RD follow up.  Nutrition Goal Status: new      Nutrition Diagnosis Status:   New        Malnutrition Assessment                                       Reason for Assessment    Reason For Assessment: consult, new TPN (hypoalbuminemia)  Diagnosis:  "infection/sepsis (severe sepsis)  Relevant Medical History: HTN, HLD, T2DM, CAD, s/p CABG, stroke, DVT, oropharyngeal dysphagia  Interdisciplinary Rounds: did not attend  General Information Comments: 81 yo female on pureed diet. Consulted for hypoalbuminemia. Albumin is no longer considered an indicator of nutritrional status, but rather an indicator of inflammatory metabolism, mobidity, mortality, or severity of illness. Spoke with MAGED Cochran about patient's current health status. Trying to encourage PO intake from patient, who is only consuming small amounts of meals. Pt was taking Remron PTA. Pt meeting protein needs with currnent TPN rate; however, meeting approx 25-50% EEN. Rec'd 20% lipids to add to TPN to provide an additional 500 kcals. LBM 3/30/2023. Pt requesting milkshake and mashed potatoes for dinner. Will continue to accommodate needs to encourage PO intake. Pt will meet energy needs if able to consume at least 2 Boost Glucose Control ONSper day in addition to current TPN Rx. Will continue to monitor pt for any nutrition related changes.  Nutrition Discharge Planning: TBD    Nutrition Risk Screen    Nutrition Risk Screen: tube feeding or parenteral nutrition, difficulty chewing/swallowing    Nutrition/Diet History    Spiritual, Cultural Beliefs, Yazidi Practices, Values that Affect Care: no  Vitamin/Mineral/Herbal Supplements: D3, Mg ox  Food Allergies: NKFA  Factors Affecting Nutritional Intake: difficulty/impaired swallowing, chewing difficulties/inability to chew food    Anthropometrics    Temp: 96.3 °F (35.7 °C)  Height Method: Estimated  Height: 5' 10" (177.8 cm)  Height (inches): 70 in  Weight Method: Bed Scale  Weight: 71.6 kg (157 lb 13.6 oz)  Weight (lb): 157.85 lb  Ideal Body Weight (IBW), Female: 150 lb  % Ideal Body Weight, Female (lb): 96.67 %  BMI (Calculated): 22.6  BMI Grade: 18.5-24.9 - normal       Lab/Procedures/Meds    Pertinent Labs Reviewed: reviewed  Pertinent Labs Comments: " RBC: 3.04 (L), H/H: 9.3/28 (L), Na: 127 (L), CO2: 19 (L), BUN: 58 (H), glucose: 323 (H), Ca:7.4 (L)  Pertinent Medications Reviewed: reviewed  Pertinent Medications Comments: statin, PPI, abx, insulin, NS @ 50 mL/hr    Physical Findings/Assessment         Estimated/Assessed Needs    Weight Used For Calorie Calculations: 65.8 kg (145 lb 1 oz)  Energy Calorie Requirements (kcal): 1450  Energy Need Method: [place]-St Jeor (x 1.2 AF)  Protein Requirements:  g  Weight Used For Protein Calculations: 65.8 kg (145 lb 1 oz) (1.2-2 g/kg/day for critical care)  Fluid Requirements (mL): 1645  Estimated Fluid Requirement Method: other (see comments) (age method - 25 mL/kg)  RDA Method (mL): 1450  CHO Requirement: 163-199 (45-55% of total kcals)      Nutrition Prescription Ordered    Current Diet Order: diabetic  Nutrition Order Comments: please puree foods  Current Nutrition Support Formula Ordered: Clinimix 4.25/5  Current Nutrition Support Rate Ordered: 75 (ml)  Current Nutrition Support Frequency Ordered: continuous  Oral Nutrition Supplement: Boost Glucose Control with meals    Evaluation of Received Nutrient/Fluid Intake    Parenteral Calories (kcal): 235 (692.1 mL TPN per I/O)  Parenteral Protein (gm): 29 (692.1 mL TPN per I/O)  Parenteral Fluid (mL): 692.1  % Kcal Needs: 25-50%  % Protein Needs: 50-75% (woth today's PO intake and received TPN)  I/O: +1722.6  Energy Calories Required: not meeting needs  Protein Required: not meeting needs  Tolerance: not tolerating  % Intake of Estimated Energy Needs: 25 - 50 %  % Meal Intake: 0 - 25 %    Nutrition Risk    Level of Risk/Frequency of Follow-up: moderate - high       Monitor and Evaluation    Food and Nutrient Intake: energy intake, food and beverage intake, parenteral nutrition intake  Food and Nutrient Adminstration: diet order  Knowledge/Beliefs/Attitudes: food and nutrition knowledge/skill, beliefs and attitudes  Physical Activity and Function: nutrition-related  ADLs and IADLs, factors affecting access to physical activity  Anthropometric Measurements: height/length, weight, weight change, body mass index  Biochemical Data, Medical Tests and Procedures: electrolyte and renal panel, gastrointestinal profile, glucose/endocrine profile, inflammatory profile, lipid profile  Nutrition-Focused Physical Findings: overall appearance       Nutrition Follow-Up    RD Follow-up?: Yes

## 2023-03-30 NOTE — ASSESSMENT & PLAN NOTE
This patient does not have evidence of infective focus  My overall impression is septic shock due to MAP < 65.  Source: Unknown   Antibiotics given-   Antibiotics (72h ago, onward)    Start     Stop Route Frequency Ordered    03/29/23 2245  piperacillin-tazobactam (ZOSYN) 4.5 g in dextrose 5 % in water (D5W) 5 % 100 mL IVPB (MB+)         -- IV Every 8 hours (non-standard times) 03/29/23 2139    03/29/23 2200  vancomycin (VANCOCIN) 1,000 mg in dextrose 5 % (D5W) 250 mL IVPB (Vial-Mate)         -- IV Every 24 hours (non-standard times) 03/29/23 0217    03/29/23 0900  mupirocin 2 % ointment         04/03 0859 Nasl 2 times daily 03/29/23 0213    03/29/23 0304  vancomycin - pharmacy to dose  (vancomycin IVPB)        See Hyperspace for full Linked Orders Report.    -- IV pharmacy to manage frequency 03/29/23 0204        Latest lactate reviewed-  Recent Labs   Lab 03/28/23  2117   LACTATE 1.6     Organ dysfunction indicated by Encephalopathy     Fluid challenge Patient received 3.2 L bolus and now receiving 100cc hour of IV fluids.      Post- resuscitation assessment Yes Perfusion exam was performed within 6 hours of septic shock presentation after bolus shows Inadequate tissue perfusion assessed by invasive monitoring       Will Start Pressors- Levophed for MAP of 65  Source control achieved by: IV fluids, IV vanc and IV zosyn       Unsure etiology of sepsis.  No lactic acidosis or elevated procal at presentation.  Leukocytosis present but also has a DVT and anticoagulated dose only 2.5mg qd.  CT CHEST pending with PE protocol.

## 2023-03-30 NOTE — ASSESSMENT & PLAN NOTE
Patient's FSGs are controlled on current medication regimen.  Last A1c reviewed-   Lab Results   Component Value Date    HGBA1C 4.9 03/29/2023     Most recent fingerstick glucose reviewed-   Recent Labs   Lab 03/29/23  1533 03/29/23  1859 03/30/23  0755 03/30/23  1142   POCTGLUCOSE 256* 266* 323* 343*     Current correctional scale  Medium  Maintain anti-hyperglycemic dose as follows-   Antihyperglycemics (From admission, onward)    Start     Stop Route Frequency Ordered    03/30/23 2100  insulin detemir U-100 (Levemir) pen 9 Units         -- SubQ Nightly 03/30/23 1152    03/29/23 0744  insulin aspart U-100 pen 0-10 Units         -- SubQ Before meals & nightly PRN 03/29/23 0745        Hold Oral hypoglycemics while patient is in the hospital.    3/30 Currently uncontrolled.  Scheduled determir qhs.  Currently on clinimix and dextrose in solution.

## 2023-03-30 NOTE — PLAN OF CARE
Problem: Nutrition Impaired (Sepsis/Septic Shock)  Goal: Optimal Nutrition Intake  3/30/2023 1725 by Sammie Woodall RN  Outcome: Ongoing, Not Progressing  3/30/2023 1724 by Sammie Woodall RN  Outcome: Ongoing, Progressing     Problem: Oral Intake Inadequate  Goal: Improved Oral Intake  Outcome: Ongoing, Not Progressing   Poor Oral intake. Encouraging high calorie foods/liquids and patient food preferences.      Problem: Infection  Goal: Absence of Infection Signs and Symptoms  3/30/2023 1725 by Sammie Woodall RN  Outcome: Ongoing, Progressing  3/30/2023 1724 by Sammie Woodall RN  Outcome: Ongoing, Progressing     Problem: Adult Inpatient Plan of Care  Goal: Plan of Care Review  3/30/2023 1725 by Sammie Woodall RN  Outcome: Ongoing, Progressing  3/30/2023 1724 by Sammie Woodall RN  Outcome: Ongoing, Progressing     Problem: Diabetes Comorbidity  Goal: Blood Glucose Level Within Targeted Range  3/30/2023 1725 by Sammie Woodall RN  Outcome: Ongoing, Progressing  3/30/2023 1724 by Sammie Woodall RN  Outcome: Ongoing, Progressing     Problem: Skin Injury Risk Increased  Goal: Skin Health and Integrity  3/30/2023 1725 by Sammie Woodall RN  Outcome: Ongoing, Progressing  3/30/2023 1724 by Sammie Woodall RN  Outcome: Ongoing, Progressing     Problem: Fall Injury Risk  Goal: Absence of Fall and Fall-Related Injury  3/30/2023 1725 by Sammie Woodall RN  Outcome: Ongoing, Progressing  3/30/2023 1724 by Sammie Woodall RN  Outcome: Ongoing, Progressing

## 2023-03-30 NOTE — PLAN OF CARE
Problem: Infection  Goal: Absence of Infection Signs and Symptoms  Outcome: Ongoing, Progressing     Problem: Adult Inpatient Plan of Care  Goal: Plan of Care Review  Outcome: Ongoing, Progressing  Goal: Patient-Specific Goal (Individualized)  Outcome: Ongoing, Progressing  Goal: Absence of Hospital-Acquired Illness or Injury  Outcome: Ongoing, Progressing  Goal: Optimal Comfort and Wellbeing  Outcome: Ongoing, Progressing  Goal: Readiness for Transition of Care  Outcome: Ongoing, Progressing     Problem: Diabetes Comorbidity  Goal: Blood Glucose Level Within Targeted Range  Outcome: Ongoing, Progressing     Problem: Skin Injury Risk Increased  Goal: Skin Health and Integrity  Outcome: Ongoing, Progressing     Problem: Adjustment to Illness (Sepsis/Septic Shock)  Goal: Optimal Coping  Outcome: Ongoing, Progressing     Problem: Bleeding (Sepsis/Septic Shock)  Goal: Absence of Bleeding  Outcome: Ongoing, Progressing     Problem: Glycemic Control Impaired (Sepsis/Septic Shock)  Goal: Blood Glucose Level Within Desired Range  Outcome: Ongoing, Progressing     Problem: Infection Progression (Sepsis/Septic Shock)  Goal: Absence of Infection Signs and Symptoms  Outcome: Ongoing, Progressing     Problem: Nutrition Impaired (Sepsis/Septic Shock)  Goal: Optimal Nutrition Intake  Outcome: Ongoing, Progressing   The patient remained stable throughout the night.  Levophed weaned as tolerated

## 2023-03-30 NOTE — EICU
Intervention Initiated From:  [facility] / EICU    Jayleen intervened regarding:  Rounding (Video assessment)    Nurse Notified:  No    Doctor Notified:  No    Comments: Resting in bed, appears asleep.  TPN infusing @ 75cc/hr.    NS @ 50cc/hr, Levophed infusing @0.8mcg/kg/min..    B/P 107/59, HR 87, RR 17, Sats 96%

## 2023-03-30 NOTE — PLAN OF CARE
Recommendation/Intervention:   1. Rec'd continue diabetic diet with texture modifications per SLP rec's.   2. Rec'd continue Boost Glucose Control with meals to provide additional kcals and protein.   3. Rec'd honroing pt's food preferences to encourage PO intake.   4. Rec'd consideration of adding 20% lipids to TPN rc to provide an additional 500 kcals.   5. If lipids are given, rec'd checking serum TG, and if >/= 400, hold lipids.   6. Rec'd frequent weight measurements to assess for any acute weight changes.   7. RD to follow and make rec's accordingly.    Goals: Pt will meet at least 75% EEN by RD follow up.  Nutrition Goal Status: new

## 2023-03-30 NOTE — ASSESSMENT & PLAN NOTE
This patient does not have evidence of infective focus  My overall impression is septic shock due to MAP < 65.  Source: Unknown   Antibiotics given-   Antibiotics (72h ago, onward)    Start     Stop Route Frequency Ordered    03/29/23 2245  piperacillin-tazobactam (ZOSYN) 4.5 g in dextrose 5 % in water (D5W) 5 % 100 mL IVPB (MB+)         -- IV Every 8 hours (non-standard times) 03/29/23 2139    03/29/23 2200  vancomycin (VANCOCIN) 1,000 mg in dextrose 5 % (D5W) 250 mL IVPB (Vial-Mate)         -- IV Every 24 hours (non-standard times) 03/29/23 0217    03/29/23 0900  mupirocin 2 % ointment         04/03 0859 Nasl 2 times daily 03/29/23 0213    03/29/23 0304  vancomycin - pharmacy to dose  (vancomycin IVPB)        See Hyperspace for full Linked Orders Report.    -- IV pharmacy to manage frequency 03/29/23 0204        Latest lactate reviewed-  Recent Labs   Lab 03/28/23 2117   LACTATE 1.6     Organ dysfunction indicated by Encephalopathy     Fluid challenge Patient received 3.2 L bolus and now receiving 100cc hour of IV fluids.      Post- resuscitation assessment Yes Perfusion exam was performed within 6 hours of septic shock presentation after bolus shows Inadequate tissue perfusion assessed by invasive monitoring       Will Start Pressors- Levophed for MAP of 65  Source control achieved by: IV fluids, IV vanc and IV zosyn       No lactic acidosis or elevated procal at presentation.   + PCR blood staph.  Blood cultures gram positive cocci.    + PNA on CTA.    Echo EF 70%

## 2023-03-30 NOTE — PHYSICIAN QUERY
PT Name: Michelle Rowell  MR #: [id number]    DOCUMENTATION CLARIFICATION     CDS/: Genesis Paredes RN CDI           Contact information: [email or web address]   This form is a permanent document in the medical record.     Query Date: March 30, 2023    By submitting this query, we are merely seeking further clarification of documentation. Please utilize your independent clinical judgment when addressing the question(s) below.    The Medical Record contains the following:   Indicators   Supporting Clinical Findings Location in Medical Record   X AMS, Confusion,  LOC, etc.  Neurological: She is alert and oriented to person, place, and time.    Neurological:      Mental Status: She is alert and oriented to person, place, and time. Mental status is at baseline.       ER 3/28 N Deirdre CLEMENTE    H&P 3/29 M Monika TROTTER / CAMILLA Campoverde MD   X Acute/Chronic Illness Sepsis with shock  Hx embolic stroke, DVT  Hx aneurysm of heart  Hx CABG x4  Hypoalbuminemia  HTN    Septic shock, pulmonary as likely source, Follow cultures. H&P 3/29 M Monika TROTTER / CAMILLA Campoverde MD            eICU 3/29 BEATRIZ Smiley MD     X Radiology Findings CT Chest 3/29  1. No CT evidence of pulmonary thromboembolism.  2. Multifocal airspace disease, likely pneumonia.  3. Small bilateral pleural fluid collections.   Radiology    X Electrolyte Imbalance Na  3/29 = 134   3/30 = 127    Glucose  3/28 113    3/29 304    3/30 323    CO2  3/28 22   3/29 16   3/30    Lab         X Medication IV fluids, IV vanc and IV zosyn    H&P 3/29 ALEXX Campoverde MD     X Treatment         Source control achieved by: IV fluids, IV vanc and IV zosyn  H&P 3/29 ALEXX Campoverde MD     X Other Sepsis Organ dysfunction indicated by Encephalopathy H&P 3/29 ALEXX Campoverde MD       The noted clinical guidelines are only system guidelines and do not replace the providers clinical judgment.    The National [place] of Neurologic  Disorders and Stroke (NINDS) of the NIH describes encephalopathy as any diffuse disease of the brain that alters brain function or structure.      Provider, please clarify the documented Encephalopathy diagnosis associated with above clinical findings, include supporting clinical indicators.    [   ] Metabolic Encephalopathy ruled in- Due to electrolyte imbalance, metabolic derangements,   or infectious processes, includes Septic Encephalopathy, Uremic Encephalopathy, include clinical indicators to support encephalopathy ____________________________     [   ] Other neurological condition- Includes Post-ictal altered mental status (please specify condition): __________include clinical indicators to support encephalopathy ____________________________     x Encephalopathy ruled out.   [   ]  Clinically Undetermined     Please document in your progress notes daily for the duration of treatment until resolved, and include in your discharge summary.    References:  FREDDIE Akers RN, CCDS. (2018, June 9). Notes from the Instructor: Encephalopathy tips. Retrieved October 22, 2020, from [email or web address]/articles/[email or web address]-[email or web address]-tips    ICD-9-CM Coding Clinic First Quarter 2013, Effective with discharges: October 21, 2013 [facility] § Seizure with encephalopathy due to postictal state (2013).    ICD-10-CM/[facility] [employer] Integrated Codebook (Version V.20.8.10.0) [Computer software]. (2020). Retrieved October 21, 2020.    National [place] of Neurological Disorders and Stroke. (2019, March 27). Retrieved October 22, 2020, from https://www.ninds.nih.gov/Disorders/All-Disorders/Gjnatidbibevyu-Pcximwuozkm-Hnoa    Form No. 99625

## 2023-03-31 PROBLEM — D64.9 ANEMIA: Status: ACTIVE | Noted: 2023-01-01

## 2023-03-31 PROBLEM — J18.9 PNEUMONIA: Status: ACTIVE | Noted: 2023-01-01

## 2023-03-31 PROBLEM — R78.81 BACTEREMIA: Status: ACTIVE | Noted: 2023-01-01

## 2023-03-31 NOTE — PROGRESS NOTES
[facility] Medicine  Progress Note    Patient Name: Michelle Rowell  MRN: [id number]  Patient Class: IP- Inpatient   Admission Date: 3/28/2023  Length of Stay: 2 days  Attending Physician: Liv Paredes MD  Primary Care Provider: Reno Hilario MD        Subjective:     Principal Problem:Severe sepsis        HPI:  Patient is an 82 year old female with medical history of DVT, aneurysm of heart, HTN, HLD, DM type 2, CAD s/p CABG, R MCA stroke who was sent from nursing home for hypotension and confusion.  Patient has not been eating lately since her room mate was placed on hospice.  She denies fever, CP, SOB, nausea, vomiting and urinary complaints.      Admitted for concern of sepsis.  Currently on IV fluids, IV vanc/IV zosyn and levophed.          Overview/Hospital Course:  3/30 Admitted for sepsis.  Still on pressor support, clinimix and IV fluids.  Will wean levophed to maintain MAP>65.  Staph bacteremia on PCR.  Final blood cultures pending.  CTA chest negative for PE, but lower lobe pneumonia present.  Will continue IV vanc and IV zosyn.  Plan for echocardiogram today due to gram positive bacteremia and bp on lower side.       3/31 Patient slowly weaning off levophed.  Adding lipids since albumin 1.1.  Hg 7.5 with positive FOBT.  1 unit of pRBC ordered.  Will d/c Eliquis at this time for DVT.  Echo with EF 70%.  Continue IV vanc and IV zosyn for pneumonia and staph bacteremia.        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     Current Outpatient Medications on File Prior to Encounter   Medication Sig    apixaban (ELIQUIS) 2.5 mg Tab Take 2.5 mg by mouth 2 (two) times daily.    apixaban (ELIQUIS) 2.5 mg Tab Take 1 tablet (2.5 mg total) by mouth 2 (two) times a day.    carvedilol (COREG) 12.5 MG tablet Take 1 tablet (12.5 mg total) by mouth 2 (two) times daily.    carvediloL (COREG) 12.5 MG tablet Take 1 tablet (12.5 mg total) by mouth 2 (two) times a day. HOLD for pulse less than 60.    cholecalciferol, vitamin D3, (VITAMIN D3) 1,000 unit capsule Take 1,000 Units by mouth once daily.    clopidogreL (PLAVIX) 75 mg tablet Take 1 tablet (75 mg total) by mouth once daily.    insulin aspart U-100 (NOVOLOG) 100 unit/mL (3 mL) InPn pen Inject 0-5 Units into the skin every 6 (six) hours as needed (Hyperglycemia).    magnesium oxide (MAG-OX) 400 mg (241.3 mg magnesium) tablet Take 400 mg by mouth 2 (two) times daily.    metFORMIN (GLUCOPHAGE) 500 MG tablet Take 1 tablet (500 mg total) by mouth 2 (two) times a day.    mirtazapine (REMERON) 30 MG tablet TAKE 1 TABLET BY MOUTH ONCE DAILY IN THE EVENING    mirtazapine (REMERON) 30 MG tablet Take 1 tablet (30 mg total) by mouth nightly    pantoprazole (PROTONIX) 40 MG tablet Take 1 tablet (40 mg total) by mouth once daily.    ramipril (ALTACE) 2.5 MG capsule Take 2.5 mg by mouth once daily.    ramipriL (ALTACE) 5 MG capsule Take 1 capsule (5 mg total) by mouth once daily.    senna-docusate 8.6-50 mg (PERICOLACE) 8.6-50 mg per tablet Take 1 tablet by mouth once daily.    simvastatin (ZOCOR) 20 MG tablet Take 1 tablet (20 mg total) by mouth every evening.    simvastatin  (ZOCOR) 40 MG tablet Take 40 mg by mouth every evening.     Family History       Problem Relation (Age of Onset)    Cancer Father    Heart disease Father    Thyroid disease Daughter          Tobacco Use    Smoking status: Former     Types: Cigarettes    Smokeless tobacco: Never    Tobacco comments:     quit in 1990s   Substance and Sexual Activity    Alcohol use: No    Drug use: No    Sexual activity: Not Currently     Review of Systems   Constitutional:  Positive for appetite change. Negative for chills and fever.   Respiratory:  Negative for cough and shortness of breath.    Cardiovascular:  Negative for chest pain and leg swelling.   Gastrointestinal:  Negative for nausea and vomiting.   Genitourinary:  Negative for difficulty urinating and dysuria.   Musculoskeletal:  Negative for arthralgias and gait problem.   Psychiatric/Behavioral:  Negative for agitation and confusion.    Objective:     Vital Signs (Most Recent):  Temp: 97 °F (36.1 °C) (03/31/23 1102)  Pulse: 82 (03/31/23 1102)  Resp: 19 (03/31/23 1102)  BP: (!) 108/57 (03/31/23 1102)  SpO2: 97 % (03/31/23 1102)   Vital Signs (24h Range):  Temp:  [96.3 °F (35.7 °C)-97.1 °F (36.2 °C)] 97 °F (36.1 °C)  Pulse:  [65-92] 82  Resp:  [13-28] 19  SpO2:  [94 %-99 %] 97 %  BP: ()/(44-71) 108/57     Weight: 71.6 kg (157 lb 13.6 oz)  Body mass index is 22.65 kg/m².    Physical Exam  Constitutional:       Appearance: Normal appearance.   Cardiovascular:      Rate and Rhythm: Normal rate and regular rhythm.   Pulmonary:      Effort: Pulmonary effort is normal.      Breath sounds: Normal breath sounds.   Abdominal:      General: Abdomen is flat. Bowel sounds are normal.      Palpations: Abdomen is soft.   Musculoskeletal:      Right lower leg: Edema (+2) present.      Left lower leg: Edema (+2) present.   Skin:     General: Skin is warm and dry.   Neurological:      Mental Status: She is alert and oriented to person, place, and time. Mental status is at  baseline.           Significant Labs: A1C:   Recent Labs   Lab 03/29/23  0430   HGBA1C 4.9       ABGs: No results for input(s): PH, PCO2, HCO3, POCSATURATED, BE, TOTALHB, COHB, METHB, O2HB, POCFIO2, PO2 in the last 48 hours.  Bilirubin:   Recent Labs   Lab 03/28/23  2117 03/29/23  0430 03/30/23  0316 03/31/23  0419   BILITOT 0.5 0.4 0.3 0.2       Blood Culture:   Recent Labs   Lab 03/30/23  0808 03/30/23  0826   LABBLOO No Growth to date No Growth to date       BMP:   Recent Labs   Lab 03/31/23 0419   *   *   K 4.3      CO2 19*   BUN 47*   CREATININE 0.7   CALCIUM 7.2*   MG 1.9       CBC:   Recent Labs   Lab 03/30/23 0316 03/31/23  0419   WBC 18.36* 11.16   HGB 9.3* 7.5*   HCT 28.0* 22.4*    209       CMP:   Recent Labs   Lab 03/30/23 0316 03/31/23  0419   * 130*   K 4.4 4.3    105   CO2 19* 19*   * 307*   BUN 58* 47*   CREATININE 0.9 0.7   CALCIUM 7.4* 7.2*   PROT 4.2* 3.6*   ALBUMIN 1.4* 1.1*   BILITOT 0.3 0.2   ALKPHOS 72 55   AST 14 14   ALT 13 13   ANIONGAP 5* 6*       Cardiac Markers: No results for input(s): CKMB, MYOGLOBIN, BNP, TROPISTAT in the last 48 hours.  Coagulation: No results for input(s): PT, INR, APTT in the last 48 hours.        Magnesium:   Recent Labs   Lab 03/30/23 0316 03/31/23  0419   MG 2.2 1.9       POCT Glucose:   Recent Labs   Lab 03/30/23  1947 03/31/23  0739 03/31/23  1109   POCTGLUCOSE 334* 284* 298*       Significant Imaging: CXR: mild patch bibasilar airspace disease or atelectasis     CTA chest:      1. No CT evidence of pulmonary thromboembolism.  2. Multifocal airspace disease, likely pneumonia.  3. Small bilateral pleural fluid collections.    ECHO: EF 70%       Assessment/Plan:      * Severe sepsis  This patient does not have evidence of infective focus  My overall impression is septic shock due to MAP < 65.  Source: Unknown   Antibiotics given-   Antibiotics (72h ago, onward)    Start     Stop Route Frequency Ordered    03/29/23  2245  piperacillin-tazobactam (ZOSYN) 4.5 g in dextrose 5 % in water (D5W) 5 % 100 mL IVPB (MB+)         -- IV Every 8 hours (non-standard times) 03/29/23 2139 03/29/23 2200  vancomycin (VANCOCIN) 1,000 mg in dextrose 5 % (D5W) 250 mL IVPB (Vial-Mate)         -- IV Every 24 hours (non-standard times) 03/29/23 0217    03/29/23 0900  mupirocin 2 % ointment         04/03 0859 Nasl 2 times daily 03/29/23 0213    03/29/23 0304  vancomycin - pharmacy to dose  (vancomycin IVPB)        See Hyperspace for full Linked Orders Report.    -- IV pharmacy to manage frequency 03/29/23 0204        Latest lactate reviewed-  Recent Labs   Lab 03/28/23 2117   LACTATE 1.6     Organ dysfunction indicated by Encephalopathy     Fluid challenge Patient received 3.2 L bolus and now receiving 100cc hour of IV fluids.      Post- resuscitation assessment Yes Perfusion exam was performed within 6 hours of septic shock presentation after bolus shows Inadequate tissue perfusion assessed by invasive monitoring       Will Start Pressors- Levophed for MAP of 65  Source control achieved by: IV fluids, IV vanc and IV zosyn       No lactic acidosis or elevated procal at presentation.   + PCR blood staph.  Blood cultures gram positive cocci.    + PNA on CTA.    Echo EF 70%    Embolic stroke involving right middle cerebral artery  Continue statin and Eliquis       Pneumonia  Seen on CTA chest  Continue IV zosyn         Bacteremia  Staph bacteremia seen on blood cultures   Currently on IV vanc   Repeat blood cultures.  NGTD       Anemia  Hg was in the 9's however today 7.5  Positive FOBT  Transfuse 1 unit of PRBC   Anemia workup ordered  Repeat cbc tomorrow       Acute DVT (deep venous thrombosis)  Patient with history of DVT  Not currently on appropriate dosage of Eliquis  CTA negative for PE   Eliquis on hold due to decrease in h/h     Hypoalbuminemia  Albumin 1.1  Likely contributing to hypotension  Dietician consulted and started on TPN   Started  lipids today       Aneurysm of heart  Echo with EF 70%  Hypotension     Type 2 diabetes mellitus with neurologic complication  Patient's FSGs are controlled on current medication regimen.  Last A1c reviewed-   Lab Results   Component Value Date    HGBA1C 4.9 03/29/2023     Most recent fingerstick glucose reviewed-   Recent Labs   Lab 03/30/23  1638 03/30/23  1947 03/31/23  0739 03/31/23  1109   POCTGLUCOSE 356* 334* 284* 298*     Current correctional scale  Medium  Maintain anti-hyperglycemic dose as follows-   Antihyperglycemics (From admission, onward)    Start     Stop Route Frequency Ordered    03/31/23 2100  insulin detemir U-100 (Levemir) pen 14 Units         -- SubQ Nightly 03/31/23 0738    03/29/23 0744  insulin aspart U-100 pen 0-10 Units         -- SubQ Before meals & nightly PRN 03/29/23 0745        Hold Oral hypoglycemics while patient is in the hospital.    3/30 Currently uncontrolled.  Increased scheduled determir qhs.  Currently on clinimix and dextrose in solution.      Essential hypertension  Currently hypotensive  Hold all home bp medications       S/P CABG x 4  Denies CP  Continue statin   D/c eliquis         VTE Risk Mitigation (From admission, onward)         Ordered     IP VTE HIGH RISK PATIENT  Once         03/29/23 0243     Place sequential compression device  Until discontinued         03/29/23 0243     Place KANWAL hose  Until discontinued         03/29/23 0243                Discharge Planning   CARLOS:      Code Status: DNR   Is the patient medically ready for discharge?:     Reason for patient still in hospital (select all that apply): Patient trending condition  Discharge Plan A: Return to nursing home            Critical care time spent on the evaluation and treatment of severe organ dysfunction, review of pertinent labs and imaging studies, discussions with consulting providers and discussions with patient/family:40 minutes.      Veronica Frank PA-C  Department of Hospital Medicine    [facility] - Intensive Care

## 2023-03-31 NOTE — ASSESSMENT & PLAN NOTE
Patient's FSGs are controlled on current medication regimen.  Last A1c reviewed-   Lab Results   Component Value Date    HGBA1C 4.9 03/29/2023     Most recent fingerstick glucose reviewed-   Recent Labs   Lab 03/30/23  1638 03/30/23  1947 03/31/23  0739 03/31/23  1109   POCTGLUCOSE 356* 334* 284* 298*     Current correctional scale  Medium  Maintain anti-hyperglycemic dose as follows-   Antihyperglycemics (From admission, onward)    Start     Stop Route Frequency Ordered    03/31/23 2100  insulin detemir U-100 (Levemir) pen 14 Units         -- SubQ Nightly 03/31/23 0738    03/29/23 0744  insulin aspart U-100 pen 0-10 Units         -- SubQ Before meals & nightly PRN 03/29/23 0745        Hold Oral hypoglycemics while patient is in the hospital.    3/30 Currently uncontrolled.  Increased scheduled determir qhs.  Currently on clinimix and dextrose in solution.

## 2023-03-31 NOTE — ASSESSMENT & PLAN NOTE
Hg was in the 9's however today 7.5  Positive FOBT  Transfuse 1 unit of PRBC   Anemia workup ordered  Repeat cbc tomorrow

## 2023-03-31 NOTE — PLAN OF CARE
Problem: Infection  Goal: Absence of Infection Signs and Symptoms  Outcome: Ongoing, Progressing     Problem: Adult Inpatient Plan of Care  Goal: Plan of Care Review  Outcome: Ongoing, Progressing  Goal: Patient-Specific Goal (Individualized)  Outcome: Ongoing, Progressing  Goal: Absence of Hospital-Acquired Illness or Injury  Outcome: Ongoing, Progressing  Goal: Optimal Comfort and Wellbeing  Outcome: Ongoing, Progressing  Goal: Readiness for Transition of Care  Outcome: Ongoing, Progressing     Problem: Diabetes Comorbidity  Goal: Blood Glucose Level Within Targeted Range  Outcome: Ongoing, Progressing     Problem: Skin Injury Risk Increased  Goal: Skin Health and Integrity  Outcome: Ongoing, Progressing     Problem: Adjustment to Illness (Sepsis/Septic Shock)  Goal: Optimal Coping  Outcome: Ongoing, Progressing     Problem: Bleeding (Sepsis/Septic Shock)  Goal: Absence of Bleeding  Outcome: Ongoing, Progressing     Problem: Glycemic Control Impaired (Sepsis/Septic Shock)  Goal: Blood Glucose Level Within Desired Range  Outcome: Ongoing, Progressing     Problem: Infection Progression (Sepsis/Septic Shock)  Goal: Absence of Infection Signs and Symptoms  Outcome: Ongoing, Progressing     Problem: Nutrition Impaired (Sepsis/Septic Shock)  Goal: Optimal Nutrition Intake  Outcome: Ongoing, Progressing     Problem: Fall Injury Risk  Goal: Absence of Fall and Fall-Related Injury  Outcome: Ongoing, Progressing     Problem: Oral Intake Inadequate  Goal: Improved Oral Intake  Outcome: Ongoing, Progressing   The patient remained stable throughout the night with weaning of levophed as tolerated.  Will continue to monitor.

## 2023-03-31 NOTE — PROGRESS NOTES
Pharmacokinetic Assessment Follow Up: IV Vancomycin    Vancomycin serum concentration assessment(s):    The trough level was drawn correctly and can be used to guide therapy at this time. The measurement is within the desired definitive target range of 15 to 20 mcg/mL.    Vancomycin Regimen Plan:    Continue regimen to Vancomycin 1000 mg IV every 24 hours    Drug levels (last 3 results):  Recent Labs   Lab Result Units 03/30/23  2044   Vancomycin-Trough ug/mL 15.6       Pharmacy will continue to follow and monitor vancomycin.    Please contact pharmacy at extension [phone] for questions regarding this assessment.    Thank you for the consult,   Reg Preciado       Patient brief summary:  Michelle Rowell is a 82 y.o. female initiated on antimicrobial therapy with IV Vancomycin for treatment of sepsis    The patient's current regimen is 1000 q 12    Drug Allergies:   Review of patient's allergies indicates:  No Known Allergies    Actual Body Weight:   71.6 kg    Renal Function:   Estimated Creatinine Clearance: 53.9 mL/min (based on SCr of 0.9 mg/dL).,     Dialysis Method (if applicable):  N/A    CBC (last 72 hours):  Recent Labs   Lab Result Units 03/28/23 2117 03/29/23 0430 03/30/23  0316   WBC K/uL 15.97* 19.46* 18.36*   Hemoglobin g/dL 9.8* 9.0* 9.3*   Hemoglobin A1C %  --  4.9  --    Hematocrit % 29.8* 26.9* 28.0*   Platelets K/uL 300 296 309   Gran % % 66.3 75.0* 63.0   Lymph % % 25.4 16.5* 23.9   Mono % % 7.5 7.0 11.2   Eosinophil % % 0.1 0.1 0.8   Basophil % % 0.1 0.2 0.2   Differential Method  Automated Automated Automated       Metabolic Panel (last 72 hours):  Recent Labs   Lab Result Units 03/28/23 2117 03/29/23  0055 03/29/23 0430 03/30/23  0316   Sodium mmol/L 137  --  134* 127*   Potassium mmol/L 5.4*  --  4.8 4.4   Chloride mmol/L 106  --  106 103   CO2 mmol/L 22*  --  16* 19*   Glucose mg/dL 113*  --  304* 323*   Glucose, UA   --  Negative  --   --    BUN mg/dL 70*  --  64* 58*   Creatinine  mg/dL 1.0  --  0.9 0.9   Albumin g/dL 1.9*  --  1.6* 1.4*   Total Bilirubin mg/dL 0.5  --  0.4 0.3   Alkaline Phosphatase U/L 79  --  68 72   AST U/L 18  --  19 14   ALT U/L 13  --  14 13   Magnesium mg/dL  --   --   --  2.2       Vancomycin Administrations:  vancomycin given in the last 96 hours                     vancomycin (VANCOCIN) 1,000 mg in dextrose 5 % (D5W) 250 mL IVPB (Vial-Mate) (mg) 1,000 mg New Bag 03/29/23 2203    vancomycin 1,500 mg in dextrose 5 % (D5W) 250 mL IVPB (Vial-Mate) (mg) 1,500 mg New Bag 03/28/23 2208                    Microbiologic Results:  Microbiology Results (last 7 days)       Procedure Component Value Units Date/Time    Clostridium difficile EIA [[id number]] Collected: 03/30/23 1830    Order Status: Sent Specimen: Stool Updated: 03/30/23 1844    Stool culture [[id number]] Collected: 03/30/23 1637    Order Status: Sent Specimen: Stool Updated: 03/30/23 1706    E. coli 0157 antigen [[id number]] Collected: 03/30/23 1637    Order Status: No result Specimen: Stool Updated: 03/30/23 1706    Blood culture [[id number]] Collected: 03/30/23 0808    Order Status: Sent Specimen: Blood Updated: 03/30/23 1511    Blood culture [[id number]] Collected: 03/30/23 0826    Order Status: Sent Specimen: Blood from Antecubital, Left Updated: 03/30/23 1511    Rapid Organism ID by PCR (from Blood culture) [[id number]]  (Abnormal) Collected: 03/28/23 2058    Order Status: Completed Updated: 03/29/23 2243     Enterococcus faecials Not Detected     Enterococcus faecium Not Detected     Listeria Monocytogenes Not Detected     Staphylococcus spp. See species for ID     Staphylococcus aureus Not Detected     Staphylococcus epidermidis Detected     Staphylococcus lugdunensis Not Detected     Streptococcus species Not Detected     Streptococcus agalactiae Not Detected     Streptococcus pneumoniae Not Detected     Streptococcus pyogenes Not Detected     Acinetobacter calcoaceticus/baumannii complex Not Detected      Bacteroides fragilis Not Detected     Enterobacerales Not Detected     Enterobacter cloacae complex Not Detected     Escherichia Not Detected     Klebsiella aerogenes Not Detected     Klebsiella oxytoca Not Detected     Klebsiella pneumoniae group Not Detected     Proteus Not Detected     Salmonella sp Not Detected     Serratia marcescens Not Detected     Haemophilus influenzae Not Detected     Neisseria meningtidis Not Detected     Pseudomonas aeruginosa Not Detected     Stenotrophomonas maltophilia Not Detected     Candida albicans Not Detected     Candida auris Not Detected     Candida glabrata Not Detected     Candida krusei Not Detected     Candida Parapsilosis Not Detected     Candida Tropicalis Not Detected     Cryptococcus neoformans/gattii Not Detected     CTX-M (ESBL ) Test Not Applicable     IMP (Carbapenem resistant) Test Not Applicable     KPC resistance gene (Carbapenem resistant) Test Not Applicable     mcr-1  Test Not Applicable     mec A/C  Detected     mec A/C and MREJ (MRSA) gene Test Not Applicable     NDM (Carbapenem resistant) Test Not Applicable     OXA-48-like (Carbapenem resistant) Test Not Applicable     van A/B (VRE gene) Test Not Applicable     VIM (Carbapenem resistant) Test Not Applicable    Narrative:      Aerobic and anaerobic    Blood culture x two cultures. Draw prior to antibiotics. [[id number]] Collected: 03/28/23 2117    Order Status: Completed Specimen: Blood Updated: 03/29/23 2124     Blood Culture, Routine Gram stain peds bottle: Gram positive cocci      Positive results previously called 03/29/2023  21:23    Narrative:      Aerobic and anaerobic    Blood culture x two cultures. Draw prior to antibiotics. [[id number]] Collected: 03/28/23 2117    Order Status: Completed Specimen: Blood Updated: 03/29/23 2123     Blood Culture, Routine Gram stain peds bottle: Gram positive cocci      Results called to and read back by:daphnie alvarez  03/29/2023  21:23    Narrative:       Aerobic and anaerobic

## 2023-03-31 NOTE — ASSESSMENT & PLAN NOTE
Patient with history of DVT  Not currently on appropriate dosage of Eliquis  CTA negative for PE   Eliquis on hold due to decrease in h/h

## 2023-03-31 NOTE — ASSESSMENT & PLAN NOTE
Albumin 1.1  Likely contributing to hypotension  Dietician consulted and started on TPN   Started lipids today

## 2023-03-31 NOTE — SUBJECTIVE & OBJECTIVE
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     Current Outpatient Medications on File Prior to Encounter   Medication Sig    apixaban (ELIQUIS) 2.5 mg Tab Take 2.5 mg by mouth 2 (two) times daily.    apixaban (ELIQUIS) 2.5 mg Tab Take 1 tablet (2.5 mg total) by mouth 2 (two) times a day.    carvedilol (COREG) 12.5 MG tablet Take 1 tablet (12.5 mg total) by mouth 2 (two) times daily.    carvediloL (COREG) 12.5 MG tablet Take 1 tablet (12.5 mg total) by mouth 2 (two) times a day. HOLD for pulse less than 60.    cholecalciferol, vitamin D3, (VITAMIN D3) 1,000 unit capsule Take 1,000 Units by mouth once daily.    clopidogreL (PLAVIX) 75 mg tablet Take 1 tablet (75 mg total) by mouth once daily.    insulin aspart U-100 (NOVOLOG) 100 unit/mL (3 mL) InPn pen Inject 0-5 Units into the skin every 6 (six) hours as needed (Hyperglycemia).    magnesium oxide (MAG-OX) 400 mg (241.3 mg magnesium) tablet Take 400 mg by mouth 2 (two) times daily.    metFORMIN (GLUCOPHAGE) 500 MG tablet Take 1 tablet (500 mg total) by mouth 2 (two) times a day.    mirtazapine (REMERON) 30 MG tablet TAKE 1 TABLET BY MOUTH ONCE DAILY IN THE EVENING    mirtazapine (REMERON) 30 MG tablet Take 1 tablet (30  mg total) by mouth nightly    pantoprazole (PROTONIX) 40 MG tablet Take 1 tablet (40 mg total) by mouth once daily.    ramipril (ALTACE) 2.5 MG capsule Take 2.5 mg by mouth once daily.    ramipriL (ALTACE) 5 MG capsule Take 1 capsule (5 mg total) by mouth once daily.    senna-docusate 8.6-50 mg (PERICOLACE) 8.6-50 mg per tablet Take 1 tablet by mouth once daily.    simvastatin (ZOCOR) 20 MG tablet Take 1 tablet (20 mg total) by mouth every evening.    simvastatin (ZOCOR) 40 MG tablet Take 40 mg by mouth every evening.     Family History       Problem Relation (Age of Onset)    Cancer Father    Heart disease Father    Thyroid disease Daughter          Tobacco Use    Smoking status: Former     Types: Cigarettes    Smokeless tobacco: Never    Tobacco comments:     quit in 1990s   Substance and Sexual Activity    Alcohol use: No    Drug use: No    Sexual activity: Not Currently     Review of Systems   Constitutional:  Positive for appetite change. Negative for chills and fever.   Respiratory:  Negative for cough and shortness of breath.    Cardiovascular:  Negative for chest pain and leg swelling.   Gastrointestinal:  Negative for nausea and vomiting.   Genitourinary:  Negative for difficulty urinating and dysuria.   Musculoskeletal:  Negative for arthralgias and gait problem.   Psychiatric/Behavioral:  Negative for agitation and confusion.    Objective:     Vital Signs (Most Recent):  Temp: 97 °F (36.1 °C) (03/31/23 1102)  Pulse: 82 (03/31/23 1102)  Resp: 19 (03/31/23 1102)  BP: (!) 108/57 (03/31/23 1102)  SpO2: 97 % (03/31/23 1102)   Vital Signs (24h Range):  Temp:  [96.3 °F (35.7 °C)-97.1 °F (36.2 °C)] 97 °F (36.1 °C)  Pulse:  [65-92] 82  Resp:  [13-28] 19  SpO2:  [94 %-99 %] 97 %  BP: ()/(44-71) 108/57     Weight: 71.6 kg (157 lb 13.6 oz)  Body mass index is 22.65 kg/m².    Physical Exam  Constitutional:       Appearance: Normal appearance.   Cardiovascular:      Rate and Rhythm: Normal rate and regular  rhythm.   Pulmonary:      Effort: Pulmonary effort is normal.      Breath sounds: Normal breath sounds.   Abdominal:      General: Abdomen is flat. Bowel sounds are normal.      Palpations: Abdomen is soft.   Musculoskeletal:      Right lower leg: Edema (+2) present.      Left lower leg: Edema (+2) present.   Skin:     General: Skin is warm and dry.   Neurological:      Mental Status: She is alert and oriented to person, place, and time. Mental status is at baseline.           Significant Labs: A1C:   Recent Labs   Lab 03/29/23 0430   HGBA1C 4.9       ABGs: No results for input(s): PH, PCO2, HCO3, POCSATURATED, BE, TOTALHB, COHB, METHB, O2HB, POCFIO2, PO2 in the last 48 hours.  Bilirubin:   Recent Labs   Lab 03/28/23  2117 03/29/23 0430 03/30/23 0316 03/31/23 0419   BILITOT 0.5 0.4 0.3 0.2       Blood Culture:   Recent Labs   Lab 03/30/23  0808 03/30/23  0826   LABBLOO No Growth to date No Growth to date       BMP:   Recent Labs   Lab 03/31/23 0419   *   *   K 4.3      CO2 19*   BUN 47*   CREATININE 0.7   CALCIUM 7.2*   MG 1.9       CBC:   Recent Labs   Lab 03/30/23 0316 03/31/23 0419   WBC 18.36* 11.16   HGB 9.3* 7.5*   HCT 28.0* 22.4*    209       CMP:   Recent Labs   Lab 03/30/23 0316 03/31/23 0419   * 130*   K 4.4 4.3    105   CO2 19* 19*   * 307*   BUN 58* 47*   CREATININE 0.9 0.7   CALCIUM 7.4* 7.2*   PROT 4.2* 3.6*   ALBUMIN 1.4* 1.1*   BILITOT 0.3 0.2   ALKPHOS 72 55   AST 14 14   ALT 13 13   ANIONGAP 5* 6*       Cardiac Markers: No results for input(s): CKMB, MYOGLOBIN, BNP, TROPISTAT in the last 48 hours.  Coagulation: No results for input(s): PT, INR, APTT in the last 48 hours.        Magnesium:   Recent Labs   Lab 03/30/23 0316 03/31/23  0419   MG 2.2 1.9       POCT Glucose:   Recent Labs   Lab 03/30/23  1947 03/31/23  0739 03/31/23  1109   POCTGLUCOSE 334* 284* 298*       Significant Imaging: CXR: mild patch bibasilar airspace disease or atelectasis      CTA chest:      1. No CT evidence of pulmonary thromboembolism.  2. Multifocal airspace disease, likely pneumonia.  3. Small bilateral pleural fluid collections.    ECHO: EF 70%

## 2023-03-31 NOTE — EICU
Video rounding completed.  Pt resting quiet, no distress noted.  Infusing NS at 50 cc/hr, TPN at 75 cc/hr & Levophed gtt at 0.14 mcg/kg/min. B/P 112/58, HR 68, RR 15, POx 98%.

## 2023-04-01 NOTE — PROGRESS NOTES
[facility]  Progress Note    Patient Name: Michelle Rowell  MRN: [id number]  Patient Class: IP- Inpatient   Admission Date: 3/28/2023  Length of Stay: 3 days  Attending Physician: Liv Paredes MD  Primary Care Provider: Reno Hilario MD        Subjective:     Principal Problem:Severe sepsis        HPI:  Patient is an 82 year old female with medical history of DVT, aneurysm of heart, HTN, HLD, DM type 2, CAD s/p CABG, R MCA stroke who was sent from nursing home for hypotension and confusion.  Patient has not been eating lately since her room mate was placed on hospice.  She denies fever, CP, SOB, nausea, vomiting and urinary complaints.      Admitted for concern of sepsis.  Currently on IV fluids, IV vanc/IV zosyn and levophed.          Overview/Hospital Course:  3/30 Admitted for sepsis.  Still on pressor support, clinimix and IV fluids.  Will wean levophed to maintain MAP>65.  Staph bacteremia on PCR.  Final blood cultures pending.  CTA chest negative for PE, but lower lobe pneumonia present.  Will continue IV vanc and IV zosyn.  Plan for echocardiogram today due to gram positive bacteremia and bp on lower side.       3/31 Patient slowly weaning off levophed.  Adding lipids since albumin 1.1.  Hg 7.5 with positive FOBT.  1 unit of pRBC ordered.  Will d/c Eliquis at this time for DVT.  Echo with EF 70%.  Continue IV vanc and IV zosyn for pneumonia and staph bacteremia.        No new subjective & objective note has been filed under this hospital service since the last note was generated.      Assessment/Plan:      * Severe sepsis  This patient does not have evidence of infective focus  My overall impression is septic shock due to MAP < 65.  Source: Unknown   Antibiotics given-   Antibiotics (72h ago, onward)    Start     Stop Route Frequency Ordered    03/29/23 8478  piperacillin-tazobactam (ZOSYN) 4.5 g in dextrose 5 % in water (D5W) 5 % 100 mL IVPB (MB+)         -- IV  Every 8 hours (non-standard times) 03/29/23 2139    03/29/23 2200  vancomycin (VANCOCIN) 1,000 mg in dextrose 5 % (D5W) 250 mL IVPB (Vial-Mate)         -- IV Every 24 hours (non-standard times) 03/29/23 0217    03/29/23 0900  mupirocin 2 % ointment         04/03 0859 Nasl 2 times daily 03/29/23 0213    03/29/23 0304  vancomycin - pharmacy to dose  (vancomycin IVPB)        See Hyperspace for full Linked Orders Report.    -- IV pharmacy to manage frequency 03/29/23 0204        Latest lactate reviewed-  No results for input(s): LACTATE in the last 72 hours.  Organ dysfunction indicated by Encephalopathy     Fluid challenge Patient received 3.2 L bolus and now receiving 100cc hour of IV fluids.      Post- resuscitation assessment Yes Perfusion exam was performed within 6 hours of septic shock presentation after bolus shows Inadequate tissue perfusion assessed by invasive monitoring       Will Start Pressors- Levophed for MAP of 65  Source control achieved by: IV fluids, IV vanc and IV zosyn       No lactic acidosis or elevated procal at presentation.   + PCR blood staph.  Blood cultures gram positive cocci.    + PNA on CTA.    Echo EF 70%    Pneumonia  Seen on CTA chest  Continue IV zosyn         Bacteremia  Staph bacteremia seen on blood cultures ---sens pending   Currently on IV vanc   Repeat blood cultures.  NGTD       Anemia  Positive FOBT  Transfuse 1 unit of PRBC   Anemia workup ordered  Repeat cbc with 2 point increase in hgb    Acute DVT (deep venous thrombosis)  Patient with history of DVT 2016, right LE. hypercoag work up negative at that time, but stayed on NOAC due to h/o PAF and CVA.   Not currently on appropriate dosage of Eliquis  CTA negative for PE    Eliquis held due to decrease in h/h   U/s now with likely DVT LLE, but difficult study technically.   Prophylactic lovenox dose started 4/1/23 and monitor h/h. Feel better with this option since has shorter half life than NOAC in case she bleeds again and  we have to stop it.    Would need to get her back on full dose NOAC as quickly as we safely can.           Hypoalbuminemia  Albumin 1.1  Likely contributing to hypotension  Dietician consulted (they recommend BOOST TID) and started on TPN   Started lipids today       Aneurysm of heart  Echo with EF 70%  Hypotension     Embolic stroke involving right middle cerebral artery  Continue statin   Eliquis on hold due to GI bleed       Type 2 diabetes mellitus with neurologic complication  Patient's FSGs are controlled on current medication regimen.  Last A1c reviewed-   Lab Results   Component Value Date    HGBA1C 4.9 03/29/2023     Most recent fingerstick glucose reviewed-   Recent Labs   Lab 03/31/23  1109 03/31/23  1631 03/31/23  2124   POCTGLUCOSE 298* 207* 175*     Current correctional scale  Medium  Maintain anti-hyperglycemic dose as follows-   Antihyperglycemics (From admission, onward)    Start     Stop Route Frequency Ordered    03/31/23 2100  insulin detemir U-100 (Levemir) pen 14 Units         -- SubQ Nightly 03/31/23 0738    03/29/23 0744  insulin aspart U-100 pen 0-10 Units         -- SubQ Before meals & nightly PRN 03/29/23 0745        Hold Oral hypoglycemics while patient is in the hospital.    3/30 Currently uncontrolled.  Increased scheduled determir qhs.  Currently on clinimix and dextrose in solution.      Essential hypertension  Currently hypotensive  Hold all home bp medications       S/P CABG x 4  Denies CP  Continue statin   D/c eliquis         VTE Risk Mitigation (From admission, onward)         Ordered     enoxaparin injection 40 mg  Daily         04/01/23 0935     IP VTE HIGH RISK PATIENT  Once         03/29/23 0243     Place sequential compression device  Until discontinued         03/29/23 0243     Place KANWAL hose  Until discontinued         03/29/23 0243                Discharge Planning   CARLOS:      Code Status: DNR   Is the patient medically ready for discharge?:     Reason for patient still  in hospital (select all that apply): Treatment  Discharge Plan A: Return to nursing home            Critical care time spent on the evaluation and treatment of severe organ dysfunction, review of pertinent labs and imaging studies, discussions with consulting providers and discussions with patient/family: 60 minutes.      Reno Hilario MD  Department of Hospital Medicine   [facility]

## 2023-04-01 NOTE — PLAN OF CARE
The patient had few complaints overnight. Patient remains sensitive to levophed weaning. Will continue to monitor.

## 2023-04-01 NOTE — ASSESSMENT & PLAN NOTE
Staph bacteremia seen on blood cultures ---sens pending   Currently on IV vanc   Repeat blood cultures.  NGTD

## 2023-04-01 NOTE — HOSPITAL COURSE
Afebrile.   She is still on pressors but down to .09mcg/kg/min levophed  Very good UOP   Hgb up 2 points after 1 unit PRBC yesterday   Glucose 121 this am  ST rec purred and thickened liquids   Dietary rec boost TID

## 2023-04-01 NOTE — SUBJECTIVE & OBJECTIVE
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     Current Outpatient Medications on File Prior to Encounter   Medication Sig    apixaban (ELIQUIS) 2.5 mg Tab Take 2.5 mg by mouth 2 (two) times daily.    apixaban (ELIQUIS) 2.5 mg Tab Take 1 tablet (2.5 mg total) by mouth 2 (two) times a day.    carvedilol (COREG) 12.5 MG tablet Take 1 tablet (12.5 mg total) by mouth 2 (two) times daily.    carvediloL (COREG) 12.5 MG tablet Take 1 tablet (12.5 mg total) by mouth 2 (two) times a day. HOLD for pulse less than 60.    cholecalciferol, vitamin D3, (VITAMIN D3) 1,000 unit capsule Take 1,000 Units by mouth once daily.    clopidogreL (PLAVIX) 75 mg tablet Take 1 tablet (75 mg total) by mouth once daily.    insulin aspart U-100 (NOVOLOG) 100 unit/mL (3 mL) InPn pen Inject 0-5 Units into the skin every 6 (six) hours as needed (Hyperglycemia).    magnesium oxide (MAG-OX) 400 mg (241.3 mg magnesium) tablet Take 400 mg by mouth 2 (two) times daily.    metFORMIN (GLUCOPHAGE) 500 MG tablet Take 1 tablet (500 mg total) by mouth 2 (two) times a day.    mirtazapine (REMERON) 30 MG tablet TAKE 1 TABLET BY MOUTH ONCE DAILY IN THE EVENING    mirtazapine (REMERON) 30 MG tablet Take 1 tablet (30  mg total) by mouth nightly    pantoprazole (PROTONIX) 40 MG tablet Take 1 tablet (40 mg total) by mouth once daily.    ramipril (ALTACE) 2.5 MG capsule Take 2.5 mg by mouth once daily.    ramipriL (ALTACE) 5 MG capsule Take 1 capsule (5 mg total) by mouth once daily.    senna-docusate 8.6-50 mg (PERICOLACE) 8.6-50 mg per tablet Take 1 tablet by mouth once daily.    simvastatin (ZOCOR) 20 MG tablet Take 1 tablet (20 mg total) by mouth every evening.    simvastatin (ZOCOR) 40 MG tablet Take 40 mg by mouth every evening.     Family History       Problem Relation (Age of Onset)    Cancer Father    Heart disease Father    Thyroid disease Daughter          Tobacco Use    Smoking status: Former     Types: Cigarettes    Smokeless tobacco: Never    Tobacco comments:     quit in 1990s   Substance and Sexual Activity    Alcohol use: No    Drug use: No    Sexual activity: Not Currently     Review of Systems   Constitutional:  Positive for appetite change. Negative for chills and fever.   Respiratory:  Negative for cough and shortness of breath.    Cardiovascular:  Negative for chest pain and leg swelling.   Gastrointestinal:  Negative for nausea and vomiting.   Genitourinary:  Negative for difficulty urinating and dysuria.   Musculoskeletal:  Negative for arthralgias and gait problem.   Psychiatric/Behavioral:  Negative for agitation and confusion.    Objective:     Vital Signs (Most Recent):  Temp: 97.3 °F (36.3 °C) (04/01/23 0705)  Pulse: 76 (04/01/23 0705)  Resp: 20 (04/01/23 0755)  BP: (!) 117/58 (04/01/23 0705)  SpO2: 96 % (04/01/23 0726)   Vital Signs (24h Range):  Temp:  [97 °F (36.1 °C)-97.5 °F (36.4 °C)] 97.3 °F (36.3 °C)  Pulse:  [68-88] 76  Resp:  [15-25] 20  SpO2:  [94 %-99 %] 96 %  BP: ()/(48-64) 117/58     Weight: 71.6 kg (157 lb 13.6 oz)  Body mass index is 22.65 kg/m².    Physical Exam  Constitutional:       Appearance: Normal appearance.   Cardiovascular:      Rate and Rhythm: Normal rate and regular  rhythm.   Pulmonary:      Effort: Pulmonary effort is normal.      Breath sounds: Normal breath sounds.   Abdominal:      General: Abdomen is flat. Bowel sounds are normal.      Palpations: Abdomen is soft.   Musculoskeletal:      Right lower leg: Edema (+2) present.      Left lower leg: Edema (+2) present.   Skin:     General: Skin is warm and dry.   Neurological:      Mental Status: She is alert and oriented to person, place, and time. Mental status is at baseline.           Significant Labs: A1C:   Recent Labs   Lab 03/29/23 0430   HGBA1C 4.9       ABGs: No results for input(s): PH, PCO2, HCO3, POCSATURATED, BE, TOTALHB, COHB, METHB, O2HB, POCFIO2, PO2 in the last 48 hours.  Bilirubin:   Recent Labs   Lab 03/28/23 2117 03/29/23 0430 03/30/23 0316 03/31/23 0419   BILITOT 0.5 0.4 0.3 0.2       Blood Culture:   No results for input(s): LABBLOO in the last 48 hours.    BMP:   Recent Labs   Lab 04/01/23 0423   *   *   K 4.0      CO2 21*   BUN 36*   CREATININE 0.5   CALCIUM 7.7*   MG 1.7       CBC:   Recent Labs   Lab 03/31/23 0419 04/01/23 0423   WBC 11.16 10.88   HGB 7.5* 9.8*   HCT 22.4* 28.3*    177       CMP:   Recent Labs   Lab 03/31/23 0419 04/01/23 0423   * 133*   K 4.3 4.0    107   CO2 19* 21*   * 121*   BUN 47* 36*   CREATININE 0.7 0.5   CALCIUM 7.2* 7.7*   PROT 3.6*  --    ALBUMIN 1.1*  --    BILITOT 0.2  --    ALKPHOS 55  --    AST 14  --    ALT 13  --    ANIONGAP 6* 5*       Cardiac Markers: No results for input(s): CKMB, MYOGLOBIN, BNP, TROPISTAT in the last 48 hours.  Coagulation: No results for input(s): PT, INR, APTT in the last 48 hours.        Magnesium:   Recent Labs   Lab 03/31/23 0419 04/01/23 0423   MG 1.9 1.7       POCT Glucose:   Recent Labs   Lab 03/31/23  1109 03/31/23  1631 03/31/23  2124   POCTGLUCOSE 298* 207* 175*       Significant Imaging: CXR: mild patch bibasilar airspace disease or atelectasis     CTA chest:      1. No CT  evidence of pulmonary thromboembolism.  2. Multifocal airspace disease, likely pneumonia.  3. Small bilateral pleural fluid collections.    ECHO: EF 70%     LE u/s   Impression:     Study technically difficult secondary to vascular calcifications and significant edema within the soft tissues.  There is lack of augmentation and compression of the mid left femoral vein concerning for possible thrombus.     Findings were discussed with Veronica Frank at 14:20 on3/31/2023.        Electronically signed by: Xochitl Chawla

## 2023-04-01 NOTE — ASSESSMENT & PLAN NOTE
Patient's FSGs are controlled on current medication regimen.  Last A1c reviewed-   Lab Results   Component Value Date    HGBA1C 4.9 03/29/2023     Most recent fingerstick glucose reviewed-   Recent Labs   Lab 03/31/23  1109 03/31/23  1631 03/31/23 2124   POCTGLUCOSE 298* 207* 175*     Current correctional scale  Medium  Maintain anti-hyperglycemic dose as follows-   Antihyperglycemics (From admission, onward)    Start     Stop Route Frequency Ordered    03/31/23 2100  insulin detemir U-100 (Levemir) pen 14 Units         -- SubQ Nightly 03/31/23 0738    03/29/23 0744  insulin aspart U-100 pen 0-10 Units         -- SubQ Before meals & nightly PRN 03/29/23 0745        Hold Oral hypoglycemics while patient is in the hospital.    3/30 Currently uncontrolled.  Increased scheduled determir qhs.  Currently on clinimix and dextrose in solution.

## 2023-04-01 NOTE — ASSESSMENT & PLAN NOTE
Positive FOBT  Transfuse 1 unit of PRBC   Anemia workup ordered  Repeat cbc with 2 point increase in hgb

## 2023-04-01 NOTE — ASSESSMENT & PLAN NOTE
This patient does not have evidence of infective focus  My overall impression is septic shock due to MAP < 65.  Source: Unknown   Antibiotics given-   Antibiotics (72h ago, onward)    Start     Stop Route Frequency Ordered    03/29/23 2245  piperacillin-tazobactam (ZOSYN) 4.5 g in dextrose 5 % in water (D5W) 5 % 100 mL IVPB (MB+)         -- IV Every 8 hours (non-standard times) 03/29/23 2139    03/29/23 2200  vancomycin (VANCOCIN) 1,000 mg in dextrose 5 % (D5W) 250 mL IVPB (Vial-Mate)         -- IV Every 24 hours (non-standard times) 03/29/23 0217 03/29/23 0900  mupirocin 2 % ointment         04/03 0859 Nasl 2 times daily 03/29/23 0213    03/29/23 0304  vancomycin - pharmacy to dose  (vancomycin IVPB)        See Hyperspace for full Linked Orders Report.    -- IV pharmacy to manage frequency 03/29/23 0204        Latest lactate reviewed-  No results for input(s): LACTATE in the last 72 hours.  Organ dysfunction indicated by Encephalopathy     Fluid challenge Patient received 3.2 L bolus and now receiving 100cc hour of IV fluids.      Post- resuscitation assessment Yes Perfusion exam was performed within 6 hours of septic shock presentation after bolus shows Inadequate tissue perfusion assessed by invasive monitoring       Will Start Pressors- Levophed for MAP of 65  Source control achieved by: IV fluids, IV vanc and IV zosyn       No lactic acidosis or elevated procal at presentation.   + PCR blood staph.  Blood cultures gram positive cocci.    + PNA on CTA.    Echo EF 70%

## 2023-04-01 NOTE — ASSESSMENT & PLAN NOTE
Patient with history of DVT 2016, right LE. hypercoag work up negative at that time, but stayed on NOAC due to h/o PAF and CVA.   Not currently on appropriate dosage of Eliquis  CTA negative for PE    Eliquis held due to decrease in h/h   U/s now with likely DVT LLE, but difficult study technically.   Prophylactic lovenox dose started 4/1/23 and monitor h/h. Feel better with this option since has shorter half life than NOAC in case she bleeds again and we have to stop it.    Would need to get her back on full dose NOAC as quickly as we safely can.

## 2023-04-01 NOTE — EICU
Intervention Initiated From:  [facility] / EICU    Jayleen intervened regarding:  Rounding (Video assessment)    Nurse Notified:  No    Doctor Notified:  No    Comments: Pt on RA sats in 90's.  VSS. Pt resting with eyes closed. Levophed infusing.  No family at bedside. NAD observed.

## 2023-04-01 NOTE — PT/OT/SLP PROGRESS
Speech Language Pathology Treatment    Patient Name:  Michelle Rowell   MRN:  [id number]  Admitting Diagnosis: Severe sepsis    Assessment:   Michelle Rowell is an 81 y/o F who presents w/ clinical signs of oropharyngeal dysphagia & aspiration, likely chronic r/t old CVAs and acutely exacerbated by severe sepsis. Confirmed hx of oropharyngeal dysphagia (per MBSS 2019) appreciated. Pt's swallow efficiency and safety appear impaired; diet modification is indicated. She presents w/ increased risk for malnutrition/dehydration and dysphagia-related pulmonary complication at this current time. Pt would benefit from repeat modified barium swallow study to define pt's current swallow physiology and determine tx plan. SLP to f/u on Monday.    Recommendations:   General Recommendations:  - SLP will f/u x5 for diagnostic swallow tx. Recommend repeat MBSS once medically stable w/ further recs pending MBSS results; however and of note, pt not amenable to procedure at this time, despite education provided to pt/family.  - Dietician f/u 2/2 risk for malnutrition/dehydration given swallow inefficiency and diet modification  Diet recommendations:  - Puree (IDDSI Level 4) / Mildly thick/Nectar thick liquids (IDDSI Level 2) diet. Meds crushed in puree/pudding.  Risk management:  - Aspiration precautions:   - Small bites of pureed foods   - Small single sips of mildly thick/nectar thick liquids, avoid thin liquids   - HOB upright w/ all PO intake   - Check for pocketing/oral residue  -Control risk factors for aspiration PNA via (a) oral hygiene q4h, (b) HOB upright as tolerated  General Precautions:  - Standard, aspiration, pureed diet, nectar thick  Communication strategies: none    Subjective     Pt seen at bedside, awake and alert. Following simple commands and breathing comfortably on RA. Pt adamantly verbalizing being against thickened liquids; family reported pt refused to follow diet modification recs post MBSS in 2019.  However, and of note pt denied recurrent PNAs in the time period since. Family present at bedside.  Patient goals: none stated     Pain/Comfort:  None verbalized    Objective:     Has the patient been evaluated by SLP for swallowing?   Yes  Keep patient NPO? No   Current Respiratory Status: 97% SpO2, RR --> 25    Pt administered the following oral trials:  -Thin liquids via tsp x2, pt-regulated straw sips, SLP cued small straw sips, fed w/ A  -Mildly Thick/Nectar thick liquids (administered at bedside 2/2 previous MBSS safe diet recs) via tsp x2, SLP cued small straw sips, fed w/ A  -Puree via 1/4 & 1/2 tsp x2, fed by SLP  -Regular solid via very small андрей cracker piece x1 - fed by SLP    Oral Phase:  -decreased labial seal  -impaired mastication  -impaired bolus manipulation  -impaired AP transit  -lingual residue    Pharyngeal Phase:  -coughing/choking, notable decrease in frequency w/ nectar-thick liquids  -intermittent wet vocal quality  -multiple spontaneous swallows  -globus sensation    Pt and family provided education on diet modification recs of puree diet w/ mildly-thick/nectar thick liquids and meds crushed in puree/pudding at this current time, as well as recommendation for repeat modified barium swallow study to define pt's current swallow physiology and determine tx plan. Pt refusing MBSS at this time. Pt adamantly refusing these diet consistencies despite education provided on aspiration risk and risk for dysphagia related pulmonary complication. Will defer pt's ultimate diet to physician w/ consideration to POC discussion between physician and pt/family. Should pt accept the risk of aspiration and potential risk of aspiration-related sequelae and not follow SLP diet recs, strongly advise controlling risk factors for aspiration PNA via (a) oral hygiene q4h & (b) HOB upright as tolerated. Pt/family provided full education on these topics. All verbalized understanding.    Nursing communication  completed regarding all impressions and recs. All in agreement and understanding verbalized.      Goals:   Multidisciplinary Problems       SLP Goals          Problem: SLP    Goal Priority Disciplines Outcome   SLP Goal     SLP    Description: GOALS:    (1) Pt will tolerate least-restrictive PO diet without acute dysphagia-related pulmonary complication  (2) Pt will participate in MBSS to define swallow physiology & determine therapy plan  (3) Pt will perform verbal teachback of diet modification recommendations and demonstrate thickening of liquid to IDDSI Level 2 (Mildly thick/Nectar Thick)  (4) Pt will perform verbal teachback of recommended feeding strategies and aspiration precautions to facilitate oral intake and & reduce aspiration risk                          Plan:     Patient to be seen:  5 x/week   Plan of Care expires:  04/13/23  Plan of Care reviewed with:  patient, family   SLP Follow-Up:  Yes       Discharge recommendations:  nursing facility, skilled   Barriers to Discharge:  Level of Skilled Assistance Needed      Time Tracking:     SLP Treatment Date:   03/31/23  Speech Start Time:  1602  Speech Stop Time:  1631     Speech Total Time (min):  29 min    Billable Minutes: Treatment Swallowing Dysfunction 29 minutes    03/31/23

## 2023-04-01 NOTE — ASSESSMENT & PLAN NOTE
Albumin 1.1  Likely contributing to hypotension  Dietician consulted (they recommend BOOST TID) and started on TPN   Started lipids today

## 2023-04-01 NOTE — EICU
Video rounding completed.  Pt resting quiet w/ eyes closed.  No distress noted.  Infusing NS at 50 cc/hr, TPN at 50 cc/hr & Levophed gtt at 0.1 mcg/kg/min.  B/P 117/58, HR 76, RR 20, POx 96%.

## 2023-04-02 NOTE — PLAN OF CARE
Goals to be met by: 23     Patient will increase functional independence with mobility by performin. Supine to sit with Maximum Assistance  2. Sit to supine with Maximum Assistance  3. Rolling to Left and Right with Moderate Assistance.

## 2023-04-02 NOTE — ASSESSMENT & PLAN NOTE
Positive FOBT  Transfuse 1 unit of PRBC   Anemia workup ordered---Fe studies c/w anemia of chronic disease. B12 ordered but not drawn. Will call lab  Repeat cbc with 2 point increase in hgb

## 2023-04-02 NOTE — EICU
Intervention Initiated From:  [facility] / EICU    Jayleen intervened regarding:  Rounding (Video assessment)  Vide rounds done.  Resting quietly in bed, VSS, no acute distress noted.

## 2023-04-02 NOTE — PROGRESS NOTES
[facility] Medicine  Progress Note    Patient Name: Michelle Rowell  MRN: [id number]  Patient Class: IP- Inpatient   Admission Date: 3/28/2023  Length of Stay: 4 days  Attending Physician: Liv Paredes MD  Primary Care Provider: Reno Hilario MD        Subjective:     Principal Problem:Severe sepsis        HPI:  Patient is an 82 year old female with medical history of DVT, aneurysm of heart, HTN, HLD, DM type 2, CAD s/p CABG, R MCA stroke who was sent from nursing home for hypotension and confusion.  Patient has not been eating lately since her room mate was placed on hospice.  She denies fever, CP, SOB, nausea, vomiting and urinary complaints.      Admitted for concern of sepsis.  Currently on IV fluids, IV vanc/IV zosyn and levophed.          Overview/Hospital Course:  3/30 Admitted for sepsis.  Still on pressor support, clinimix and IV fluids.  Will wean levophed to maintain MAP>65.  Staph bacteremia on PCR.  Final blood cultures pending.  CTA chest negative for PE, but lower lobe pneumonia present.  Will continue IV vanc and IV zosyn.  Plan for echocardiogram today due to gram positive bacteremia and bp on lower side.       3/31 Patient slowly weaning off levophed.  Adding lipids since albumin 1.1.  Hg 7.5 with positive FOBT.  1 unit of pRBC ordered.  Will d/c Eliquis at this time for DVT.  Echo with EF 70%.  Continue IV vanc and IV zosyn for pneumonia and staph bacteremia.        4/2  Off of pressors since 8pm last night.   Refuses anything purred (ST put her on purred diet), but does drink boost TID   Hgb did drop 1 point but she is still getting IVF and bun/creatinine has dropped as well. No bloody stools. VSS  Day 5 Vanc and Zosyn. Afebrile. 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     Current Outpatient Medications on File Prior to Encounter   Medication Sig    apixaban (ELIQUIS) 2.5 mg Tab Take 2.5 mg by mouth 2 (two) times daily.    apixaban (ELIQUIS) 2.5 mg Tab Take 1 tablet (2.5 mg total) by mouth 2 (two) times a day.    carvedilol (COREG) 12.5 MG tablet Take 1 tablet (12.5 mg total) by mouth 2 (two) times daily.    carvediloL (COREG) 12.5 MG tablet Take 1 tablet (12.5 mg total) by mouth 2 (two) times a day. HOLD for pulse less than 60.    cholecalciferol, vitamin D3, (VITAMIN D3) 1,000 unit capsule Take 1,000 Units by mouth once daily.    clopidogreL (PLAVIX) 75 mg tablet Take 1 tablet (75 mg total) by mouth once daily.    insulin aspart U-100 (NOVOLOG) 100 unit/mL (3 mL) InPn pen Inject 0-5 Units into the skin every 6 (six) hours as needed (Hyperglycemia).    magnesium oxide (MAG-OX) 400 mg (241.3 mg magnesium) tablet Take 400 mg by mouth 2 (two) times daily.    metFORMIN (GLUCOPHAGE) 500 MG tablet Take 1 tablet (500 mg total) by mouth 2 (two) times a day.    mirtazapine (REMERON) 30 MG tablet TAKE 1 TABLET BY MOUTH ONCE DAILY IN THE EVENING    mirtazapine (REMERON) 30 MG tablet Take 1 tablet (30 mg total) by mouth nightly    pantoprazole (PROTONIX) 40 MG tablet Take 1 tablet (40 mg total) by mouth once daily.    ramipril (ALTACE) 2.5 MG capsule Take 2.5 mg  by mouth once daily.    ramipriL (ALTACE) 5 MG capsule Take 1 capsule (5 mg total) by mouth once daily.    senna-docusate 8.6-50 mg (PERICOLACE) 8.6-50 mg per tablet Take 1 tablet by mouth once daily.    simvastatin (ZOCOR) 20 MG tablet Take 1 tablet (20 mg total) by mouth every evening.    simvastatin (ZOCOR) 40 MG tablet Take 40 mg by mouth every evening.     Family History       Problem Relation (Age of Onset)    Cancer Father    Heart disease Father    Thyroid disease Daughter          Tobacco Use    Smoking status: Former     Types: Cigarettes    Smokeless tobacco: Never    Tobacco comments:     quit in 1990s   Substance and Sexual Activity    Alcohol use: No    Drug use: No    Sexual activity: Not Currently     Review of Systems   Constitutional:  Positive for appetite change. Negative for chills and fever.   Respiratory:  Negative for cough and shortness of breath.    Cardiovascular:  Negative for chest pain and leg swelling.   Gastrointestinal:  Negative for nausea and vomiting.   Genitourinary:  Negative for difficulty urinating and dysuria.   Musculoskeletal:  Negative for arthralgias and gait problem.   Psychiatric/Behavioral:  Negative for agitation and confusion.    Objective:     Vital Signs (Most Recent):  Temp: 97.3 °F (36.3 °C) (04/02/23 0715)  Pulse: 80 (04/02/23 0904)  Resp: 20 (04/02/23 0904)  BP: (!) 125/59 (04/02/23 0904)  SpO2: (!) 91 % (04/02/23 0904)   Vital Signs (24h Range):  Temp:  [97.1 °F (36.2 °C)-98.1 °F (36.7 °C)] 97.3 °F (36.3 °C)  Pulse:  [9-99] 80  Resp:  [17-22] 20  SpO2:  [91 %-97 %] 91 %  BP: ()/(50-71) 125/59     Weight: 71.6 kg (157 lb 13.6 oz)  Body mass index is 22.65 kg/m².    Physical Exam  Constitutional:       Appearance: Normal appearance.   Cardiovascular:      Rate and Rhythm: Normal rate and regular rhythm.   Pulmonary:      Effort: Pulmonary effort is normal.      Breath sounds: Normal breath sounds.   Abdominal:      General: Abdomen is flat. Bowel  sounds are normal.      Palpations: Abdomen is soft.   Musculoskeletal:      Right lower leg: Edema (+2) present.      Left lower leg: Edema (+2) present.   Skin:     General: Skin is warm and dry.   Neurological:      Mental Status: She is alert and oriented to person, place, and time. Mental status is at baseline.           Significant Labs: A1C:   Recent Labs   Lab 03/29/23 0430   HGBA1C 4.9       ABGs: No results for input(s): PH, PCO2, HCO3, POCSATURATED, BE, TOTALHB, COHB, METHB, O2HB, POCFIO2, PO2 in the last 48 hours.  Bilirubin:   Recent Labs   Lab 03/28/23  2117 03/29/23 0430 03/30/23  0316 03/31/23 0419 04/02/23 0527   BILITOT 0.5 0.4 0.3 0.2 0.2       Blood Culture:   No results for input(s): LABBLOO in the last 48 hours.    BMP:   Recent Labs   Lab 04/01/23 0423 04/02/23 0527   * 73   * 132*   K 4.0 3.8    105   CO2 21* 22*   BUN 36* 26*   CREATININE 0.5 0.4*   CALCIUM 7.7* 7.6*   MG 1.7  --        CBC:   Recent Labs   Lab 04/01/23 0423 04/02/23 0527   WBC 10.88 7.67   HGB 9.8* 8.9*   HCT 28.3* 26.6*    139*       CMP:   Recent Labs   Lab 04/01/23 0423 04/02/23 0527   * 132*   K 4.0 3.8    105   CO2 21* 22*   * 73   BUN 36* 26*   CREATININE 0.5 0.4*   CALCIUM 7.7* 7.6*   PROT  --  3.8*   ALBUMIN  --  1.1*   BILITOT  --  0.2   ALKPHOS  --  42*   AST  --  37   ALT  --  27   ANIONGAP 5* 5*       Cardiac Markers: No results for input(s): CKMB, MYOGLOBIN, BNP, TROPISTAT in the last 48 hours.  Coagulation: No results for input(s): PT, INR, APTT in the last 48 hours.        Magnesium:   Recent Labs   Lab 04/01/23  0423   MG 1.7       POCT Glucose:   Recent Labs   Lab 04/01/23  1139 04/01/23  1603 04/01/23  2103   POCTGLUCOSE 115* 132* 139*       Significant Imaging: CXR: mild patch bibasilar airspace disease or atelectasis     CTA chest:      1. No CT evidence of pulmonary thromboembolism.  2. Multifocal airspace disease, likely pneumonia.  3. Small  bilateral pleural fluid collections.    ECHO: EF 70%     LE u/s   Impression:     Study technically difficult secondary to vascular calcifications and significant edema within the soft tissues.  There is lack of augmentation and compression of the mid left femoral vein concerning for possible thrombus.     Findings were discussed with Veronica Frank at 14:20 on3/31/2023.        Electronically signed by: Xochitl Chawla      Assessment/Plan:      * Severe sepsis  This patient does not have evidence of infective focus  My overall impression is septic shock due to MAP < 65.  Source: Unknown   Antibiotics given-   Antibiotics (72h ago, onward)    Start     Stop Route Frequency Ordered    03/29/23 2245  piperacillin-tazobactam (ZOSYN) 4.5 g in dextrose 5 % in water (D5W) 5 % 100 mL IVPB (MB+)         -- IV Every 8 hours (non-standard times) 03/29/23 2139    03/29/23 2200  vancomycin (VANCOCIN) 1,000 mg in dextrose 5 % (D5W) 250 mL IVPB (Vial-Mate)         -- IV Every 24 hours (non-standard times) 03/29/23 0217    03/29/23 0900  mupirocin 2 % ointment         04/03 0859 Nasl 2 times daily 03/29/23 0213    03/29/23 0304  vancomycin - pharmacy to dose  (vancomycin IVPB)        See Hyperspace for full Linked Orders Report.    -- IV pharmacy to manage frequency 03/29/23 0204        Latest lactate reviewed-  No results for input(s): LACTATE in the last 72 hours.  Organ dysfunction indicated by Encephalopathy     Fluid challenge Patient received 3.2 L bolus and now receiving 100cc hour of IV fluids.      Post- resuscitation assessment Yes Perfusion exam was performed within 6 hours of septic shock presentation after bolus shows Inadequate tissue perfusion assessed by invasive monitoring       Will Start Pressors- Levophed for MAP of 65  Source control achieved by: IV fluids, IV vanc and IV zosyn       No lactic acidosis or elevated procal at presentation.   + PCR blood staph.  Blood cultures gram positive cocci.    + PNA on  CTA.    Echo EF 70%    Pneumonia  Seen on CTA chest  Continue IV zosyn day 5          Bacteremia  Staph bacteremia seen on blood cultures ---sens pending   Currently on IV vanc/zosyn day 5    Repeat blood cultures.  NGTD       Anemia  Positive FOBT  Transfuse 1 unit of PRBC   Anemia workup ordered---Fe studies c/w anemia of chronic disease. B12 ordered but not drawn. Will call lab  Repeat cbc with 2 point increase in hgb    Acute DVT (deep venous thrombosis)  Patient with history of DVT 2016, right LE. hypercoag work up negative at that time, but stayed on NOAC due to h/o PAF and CVA.   Not currently on appropriate dosage of Eliquis  CTA negative for PE    Eliquis held due to decrease in h/h   U/s now with likely DVT LLE, but difficult study technically.   Prophylactic lovenox dose started 4/1/23 and monitor h/h. Feel better with this option since has shorter half life than NOAC in case she bleeds again and we have to stop it.    Would need to get her back on full dose NOAC as quickly as we safely can.           Hypoalbuminemia  Albumin 1.1  Likely contributing to hypotension  Dietician consulted (they recommend BOOST TID) and started on TPN   Started lipids today       Aneurysm of heart  Echo with EF 70%  Hypotension     Embolic stroke involving right middle cerebral artery  Continue statin   Eliquis on hold due to GI bleed       Type 2 diabetes mellitus with neurologic complication  Patient's FSGs are controlled on current medication regimen.  Last A1c reviewed-   Lab Results   Component Value Date    HGBA1C 4.9 03/29/2023     Most recent fingerstick glucose reviewed-   Recent Labs   Lab 04/01/23  1139 04/01/23  1603 04/01/23  2103   POCTGLUCOSE 115* 132* 139*     Current correctional scale  Medium  Maintain anti-hyperglycemic dose as follows-   Antihyperglycemics (From admission, onward)    Start     Stop Route Frequency Ordered    03/31/23 2100  insulin detemir U-100 (Levemir) pen 14 Units         -- SubQ  Nightly 03/31/23 0738    03/29/23 0744  insulin aspart U-100 pen 0-10 Units         -- SubQ Before meals & nightly PRN 03/29/23 0745        Hold Oral hypoglycemics while patient is in the hospital.    3/30 Currently uncontrolled.  Increased scheduled determir qhs.  Currently on clinimix and dextrose in solution.      Essential hypertension  Currently hypotensive  Hold all home bp medications       S/P CABG x 4  Denies CP  Continue statin   D/c eliquis         VTE Risk Mitigation (From admission, onward)         Ordered     enoxaparin injection 40 mg  Daily         04/01/23 0935     IP VTE HIGH RISK PATIENT  Once         03/29/23 0243     Place sequential compression device  Until discontinued         03/29/23 0243     Place KANWAL hose  Until discontinued         03/29/23 0243                Discharge Planning   CARLOS:      Code Status: DNR   Is the patient medically ready for discharge?:     Reason for patient still in hospital (select all that apply): Treatment  Discharge Plan A: Return to nursing home            Critical care time spent on the evaluation and treatment of severe organ dysfunction, review of pertinent labs and imaging studies, discussions with consulting providers and discussions with patient/family: 40 minutes.      Reno Hilario MD  Department of Hospital Medicine   [facility] - Intensive Care

## 2023-04-02 NOTE — ASSESSMENT & PLAN NOTE
Patient's FSGs are controlled on current medication regimen.  Last A1c reviewed-   Lab Results   Component Value Date    HGBA1C 4.9 03/29/2023     Most recent fingerstick glucose reviewed-   Recent Labs   Lab 04/01/23  1139 04/01/23  1603 04/01/23 2103   POCTGLUCOSE 115* 132* 139*     Current correctional scale  Medium  Maintain anti-hyperglycemic dose as follows-   Antihyperglycemics (From admission, onward)    Start     Stop Route Frequency Ordered    03/31/23 2100  insulin detemir U-100 (Levemir) pen 14 Units         -- SubQ Nightly 03/31/23 0738    03/29/23 0744  insulin aspart U-100 pen 0-10 Units         -- SubQ Before meals & nightly PRN 03/29/23 0745        Hold Oral hypoglycemics while patient is in the hospital.    3/30 Currently uncontrolled.  Increased scheduled determir qhs.  Currently on clinimix and dextrose in solution.

## 2023-04-02 NOTE — PLAN OF CARE
The patient remained complaint linda throughout the night with levophed weaned off. Patient is weeping in multiple places.

## 2023-04-02 NOTE — EICU
Intervention Initiated From:  [facility] / SIMA Clemens intervened regarding:  Rounding (Video assessment)    Nurse Notified:  No    Doctor Notified:  No    Comments:

## 2023-04-02 NOTE — PT/OT/SLP EVAL
"Physical Therapy Evaluation    Patient Name:  Michelle Rowell   MRN:  [id number]    Recommendations:     Discharge Recommendations: nursing facility, skilled, nursing facility, basic   Discharge Equipment Recommendations: none   Barriers to discharge: None    Assessment:     Michelle Rowell is a 82 y.o. female admitted with a medical diagnosis of Severe sepsis.  She presents with the following impairments/functional limitations: weakness, impaired endurance, impaired self care skills, impaired functional mobility, decreased upper extremity function, decreased lower extremity function, pain, decreased ROM, edema, impaired joint extensibility, impaired muscle length. Patient with Severe weakness of BLE and Severe stiffness of the bilateral ankles, knees, and Hips. RUE with little active motion. LUE with good active motion of the shoulder, elbow and wrist. Prior to admit, patient reports being generally bed bound, requiring lift device for Wheelchair. She reports that she is a NH resident and has been in this condition since CVA 2 years prior. Patient would benefit from PT intervention to improve stiffness on BLE and work with LUE to assist in bed mobility/ pressure relief techniques.    Rehab Prognosis: Poor; patient would benefit from acute skilled PT services to address these deficits and reach maximum level of function.    Recent Surgery: * No surgery found *      Plan:     During this hospitalization, patient to be seen 3 x/week to address the identified rehab impairments via therapeutic activities, therapeutic exercises and progress toward the following goals:    Plan of Care Expires:  04/07/23    Subjective     Chief Complaint: "My legs hurt"  Patient/Family Comments/goals: "rest"  Pain/Comfort:  Pain Rating 1: 8/10  Location - Side 1: Bilateral  Location - Orientation 1: generalized  Location 1: knee  Pain Addressed 1: Reposition  Pain Rating Post-Intervention 1: 6/10    Patients cultural, spiritual, " Episcopalian conflicts given the current situation: no    Living Environment:  Patient is a NH resident  Prior to admission, patients level of function was Bed-bound, use on lift device for transfers. Dependent with ADLS  Equipment used at home: hospital bed, lift device, wheelchair.  DME owned (not currently used): rolling walker.  Upon discharge, patient will have assistance from NH staff.    Objective:     Communicated with Nurse prior to session.  Patient found HOB elevated with central line, blood pressure cuff, diaz catheter, peripheral IV, pulse ox (continuous), telemetry  upon PT entry to room.    General Precautions: Standard,    Orthopedic Precautions:N/A   Braces: N/A  Respiratory Status: Room air    Exams:  Cognitive Exam:  Patient is oriented to Person, Place, Time, and Situation  Skin Integrity/Edema:      -       Skin integrity: Visible skin intact  RUE ROM: Deficits: Severe stiffness, contracted Elbow/Shld/Wrist  RUE Strength: Deficits: 1/5 MMT gross  LUE ROM: WFL  LUE Strength: Deficits: 3/5 MMT gross  RLE ROM: Deficits: severe/moderate stiffness ankle, knee and hip  RLE Strength: Deficits: 2-/5 MMT  LLE ROM: Deficits: Severe/moderate stiffness ankle/knee/hip  LLE Strength: Deficits: 2-/5 MMT    Functional Mobility:  Bed Mobility:     Supine to Sit: total assistance  Sit to Supine: total assistance      AM-PAC 6 CLICK MOBILITY  Total Score:6     Patient left HOB elevated with all lines intact and call button in reach.    GOALS:   Multidisciplinary Problems       Physical Therapy Goals          Problem: Physical Therapy    Goal Priority Disciplines Outcome Goal Variances Interventions   Physical Therapy Goal     PT, PT/OT      Description: Goals to be met by: 23     Patient will increase functional independence with mobility by performin. Supine to sit with Maximum Assistance  2. Sit to supine with Maximum Assistance  3. Rolling to Left and Right with Moderate Assistance.                              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

## 2023-04-02 NOTE — ASSESSMENT & PLAN NOTE
Staph bacteremia seen on blood cultures ---sens pending   Currently on IV vanc/zosyn day 5    Repeat blood cultures.  NGTD

## 2023-04-02 NOTE — PROGRESS NOTES
Pharmacokinetic Assessment Follow Up: IV Vancomycin    Vancomycin serum concentration assessment(s):    The trough level was drawn incorrectly and cannot be used to guide therapy at this time.    Vancomycin Regimen Plan:    Trough drawn ~4 hours early; based on kinetics, trough WNL. Will continue with dose for tonight and draw another trough on 4/2 @2100.     Drug levels (last 3 results):  Recent Labs   Lab Result Units 03/30/23 2044 04/01/23  1807   Vancomycin-Trough ug/mL 15.6 21.2       Pharmacy will continue to follow and monitor vancomycin.    Please contact pharmacy at extension [phone] for questions regarding this assessment.    Thank you for the consult,   Vin Sow, PharmD, BCCCP       Patient brief summary:  Michelle Rowell is a 82 y.o. female initiated on antimicrobial therapy with IV Vancomycin for treatment of sepsis    The patient's current regimen is vancomycin 1000 mg q24h.    Drug Allergies:   Review of patient's allergies indicates:  No Known Allergies    Actual Body Weight:   71.6 kg    Renal Function:   Estimated Creatinine Clearance: 97 mL/min (based on SCr of 0.5 mg/dL).    Dialysis Method (if applicable):  N/A    CBC (last 72 hours):  Recent Labs   Lab Result Units 03/30/23 0316 03/31/23 0419 04/01/23  0423   WBC K/uL 18.36* 11.16 10.88   Hemoglobin g/dL 9.3* 7.5* 9.8*   Hematocrit % 28.0* 22.4* 28.3*   Platelets K/uL 309 209 177   Gran % % 63.0 60.1 60.2   Lymph % % 23.9 25.3 27.1   Mono % % 11.2 11.0 8.6   Eosinophil % % 0.8 2.2 2.8   Basophil % % 0.2 0.3 0.6   Differential Method  Automated Automated Automated       Metabolic Panel (last 72 hours):  Recent Labs   Lab Result Units 03/30/23 0316 03/31/23 0419 04/01/23  0423   Sodium mmol/L 127* 130* 133*   Potassium mmol/L 4.4 4.3 4.0   Chloride mmol/L 103 105 107   CO2 mmol/L 19* 19* 21*   Glucose mg/dL 323* 307* 121*   BUN mg/dL 58* 47* 36*   Creatinine mg/dL 0.9 0.7 0.5   Albumin g/dL 1.4* 1.1*  --    Total Bilirubin mg/dL  0.3 0.2  --    Alkaline Phosphatase U/L 72 55  --    AST U/L 14 14  --    ALT U/L 13 13  --    Magnesium mg/dL 2.2 1.9 1.7       Vancomycin Administrations:  vancomycin given in the last 96 hours                     vancomycin (VANCOCIN) 1,000 mg in dextrose 5 % (D5W) 250 mL IVPB (Vial-Mate) (mg) 1,000 mg New Bag 03/31/23 2145     1,000 mg New Bag 03/30/23 2211     1,000 mg New Bag 03/29/23 2203    vancomycin 1,500 mg in dextrose 5 % (D5W) 250 mL IVPB (Vial-Mate) (mg) 1,500 mg New Bag 03/28/23 2208                    Microbiologic Results:  Microbiology Results (last 7 days)       Procedure Component Value Units Date/Time    Blood culture [[id number]] Collected: 03/30/23 0808    Order Status: Completed Specimen: Blood Updated: 04/01/23 1812     Blood Culture, Routine No Growth to date      No Growth to date      No Growth to date    Blood culture [[id number]] Collected: 03/30/23 0826    Order Status: Completed Specimen: Blood from Antecubital, Left Updated: 04/01/23 1812     Blood Culture, Routine No Growth to date      No Growth to date      No Growth to date    Blood culture x two cultures. Draw prior to antibiotics. [[id number]]  (Abnormal) Collected: 03/28/23 2117    Order Status: Completed Specimen: Blood Updated: 04/01/23 1415     Blood Culture, Routine Gram stain peds bottle: Gram positive cocci      Results called to and read back by:daphnie alvarez  03/29/2023  21:23      STAPHYLOCOCCUS CAPITIS  Susceptibility pending        STAPHYLOCOCCUS EPIDERMIDIS  Susceptibility pending      Narrative:      Aerobic and anaerobic    Blood culture x two cultures. Draw prior to antibiotics. [[id number]]  (Abnormal) Collected: 03/28/23 2117    Order Status: Completed Specimen: Blood Updated: 04/01/23 1407     Blood Culture, Routine Gram stain peds bottle: Gram positive cocci      Positive results previously called 03/29/2023  21:23      STAPHYLOCOCCUS CAPITIS  Susceptibility pending      Narrative:      Aerobic and anaerobic     Stool culture [[id number]] Collected: 03/30/23 1637    Order Status: Completed Specimen: Stool Updated: 04/01/23 1240     Stool Culture Nothing significant to date    Clostridium difficile EIA [[id number]] Collected: 03/30/23 1830    Order Status: Completed Specimen: Stool Updated: 03/31/23 1329     C. diff Antigen Negative     C difficile Toxins A+B, EIA Negative     Comment: Testing not recommended for children <24 months old.       E. coli 0157 antigen [[id number]] Collected: 03/30/23 1637    Order Status: No result Specimen: Stool Updated: 03/31/23 0316    Rapid Organism ID by PCR (from Blood culture) [[id number]]  (Abnormal) Collected: 03/28/23 2058    Order Status: Completed Updated: 03/29/23 2243     Enterococcus faecials Not Detected     Enterococcus faecium Not Detected     Listeria Monocytogenes Not Detected     Staphylococcus spp. See species for ID     Staphylococcus aureus Not Detected     Staphylococcus epidermidis Detected     Staphylococcus lugdunensis Not Detected     Streptococcus species Not Detected     Streptococcus agalactiae Not Detected     Streptococcus pneumoniae Not Detected     Streptococcus pyogenes Not Detected     Acinetobacter calcoaceticus/baumannii complex Not Detected     Bacteroides fragilis Not Detected     Enterobacerales Not Detected     Enterobacter cloacae complex Not Detected     Escherichia Not Detected     Klebsiella aerogenes Not Detected     Klebsiella oxytoca Not Detected     Klebsiella pneumoniae group Not Detected     Proteus Not Detected     Salmonella sp Not Detected     Serratia marcescens Not Detected     Haemophilus influenzae Not Detected     Neisseria meningtidis Not Detected     Pseudomonas aeruginosa Not Detected     Stenotrophomonas maltophilia Not Detected     Candida albicans Not Detected     Candida auris Not Detected     Candida glabrata Not Detected     Candida krusei Not Detected     Candida Parapsilosis Not Detected     Candida Tropicalis Not Detected      Cryptococcus neoformans/gattii Not Detected     CTX-M (ESBL ) Test Not Applicable     IMP (Carbapenem resistant) Test Not Applicable     KPC resistance gene (Carbapenem resistant) Test Not Applicable     mcr-1  Test Not Applicable     mec A/C  Detected     mec A/C and MREJ (MRSA) gene Test Not Applicable     NDM (Carbapenem resistant) Test Not Applicable     OXA-48-like (Carbapenem resistant) Test Not Applicable     van A/B (VRE gene) Test Not Applicable     VIM (Carbapenem resistant) Test Not Applicable    Narrative:      Aerobic and anaerobic

## 2023-04-03 NOTE — NURSING
Patient transferred via bed to [facility]. Clinimix, IV fluids, and lipids infusing. Daughter, Petra, called and notified of patient's transfer. Patient alert, VSS, and denies pain.

## 2023-04-03 NOTE — PROGRESS NOTES
Therapy with vancomycin complete and/or consult discontinued by provider.  Pharmacy will sign off, please re-consult as needed.    Thanks for the consult!     Cynthia Gallagher, PharmD  [phone]

## 2023-04-03 NOTE — PROGRESS NOTES
[place] - Intensive Care  Adult Nutrition  Progress Note    SUMMARY       Recommendations    Recommendation/Intervention:     1. Rec'd continue diabetic diet with texture modifications per SLP rec's.     2. Rec'd continue Boost Glucose Control with meals to provide additional kcals and protein.     3. Rec'd honroing pt's food preferences to encourage PO intake.     4. Rec'd cont 20% lipids to TPN rx to provide an additional 500 kcals.     5. If lipids are given, rec'd checking serum TG, and if > 400, hold lipids.     6. Rec'd frequent weight measurements to assess for any acute weight changes.     7. RD to follow and make rec's accordingly.    Goals: Pt will meet at least 75% EEN by RD follow up.  Nutrition Goal Status: goal met  Communication of RD Recs: reviewed with RN (MAGED Cochran; POC)    Assessment and Plan    Nutrition Problem  Inadequate energy intake     Related to (etiology):   Chewing/swallowing difficulty     Signs and Symptoms (as evidenced by):   H/o oropharyngeal dysphagia; meeting 25-50% of EEN at this time      Interventions/Recommendations (treatment strategy):  Recommendation/Intervention:   1. Rec'd continue diabetic diet with texture modifications per SLP rec's.     2. Rec'd continue Boost Glucose Control with meals to provide additional kcals and protein.     3. Rec'd honroing pt's food preferences to encourage PO intake.     4. Rec'd cont 20% lipids to TPN rx to provide an additional 500 kcals.     5. If lipids are given, rec'd checking serum TG, and if > 400, hold lipids.     6. Rec'd frequent weight measurements to assess for any acute weight changes.     7. RD to follow and make rec's accordingly.     Goals: Pt will meet at least 75% EEN by RD follow up.  Nutrition Goal Status: Resolved       Malnutrition Assessment                                       Reason for Assessment    Reason For Assessment: RD follow-up  Diagnosis: infection/sepsis (severe sepsis)  Relevant Medical History: HTN,  "HLD, T2DM, CAD, s/p CABG, stroke, DVT, oropharyngeal dysphagia, anemia, pneumonia, bacteremia  Interdisciplinary Rounds: did not attend  General Information Comments: Follow-up of 81 yo F pt on pureed diet. Pt now getting 250 mls of 20% fat emulsion with TPN. Noted PA progress note stating tapering TPN due to plans of d/c in next 48 hrs. Noted pt declining PEG and MBSS at this time.  Pt eating small bites of meals and Boost Glucose Control TID. Pt meeting 75 - 100% ENN at this time. No sig wt hx per chart review. Visited pt. Stated appetite is "good" and has not noticed any recent wt changes. Says UBW is typically in the 140s. RD to follow for any nutrition related changes.  Nutrition Discharge Planning: Rec'd d/c on carbohydrate consistent diet with texture modification per MD/SLP rec's.    Nutrition Risk Screen    Nutrition Risk Screen: no indicators present    Nutrition/Diet History    Spiritual, Cultural Beliefs, Restorationist Practices, Values that Affect Care: no  Vitamin/Mineral/Herbal Supplements: D3, Mg ox  Food Allergies: NKFA  Factors Affecting Nutritional Intake: difficulty/impaired swallowing, chewing difficulties/inability to chew food    Anthropometrics    Temp: 97.7 °F (36.5 °C)  Height Method: Estimated  Height: 5' 10" (177.8 cm)  Height (inches): 70 in  Weight Method: Bed Scale  Weight: 71.6 kg (157 lb 13.6 oz)  Weight (lb): 157.85 lb  Ideal Body Weight (IBW), Female: 150 lb  % Ideal Body Weight, Female (lb): 96.67 %  BMI (Calculated): 22.6  BMI Grade: 18.5-24.9 - normal       Lab/Procedures/Meds    Pertinent Labs Reviewed: reviewed  Pertinent Labs Comments: 4/3: RBC 2.89 (L), H/H 8.6/26.4 (L), Na 131 (L), CO2 (L), creatinine 0.4 (L), glucose 86 (WDL), calcium 7.6 (L), alk phos 37 (L), albumin 1.1 (L), POCT glucose 66 (L). 3/31: TIBC 90 (L), Transferrin 61 (L). 3/29: A1c 4.9 (WDL).  Pertinent Medications Reviewed: reviewed  Pertinent Medications Comments: statin, PPI, abx, insulin, NS, fat emulsion 20% " infusion, enoxaparin    Physical Findings/Assessment         Estimated/Assessed Needs    Weight Used For Calorie Calculations: 71.6 kg (157 lb 13.6 oz)  Energy Calorie Requirements (kcal): 1519.5  Energy Need Method: [place]-St Jeor (x AF 1.2)  Protein Requirements: 85.92 - 143.2 g (1.2 - 2 g/kg/day for critical care)  Weight Used For Protein Calculations: 71.6 kg (157 lb 13.6 oz)  Fluid Requirements (mL): 1790  Estimated Fluid Requirement Method: other (see comments) (age method- 25 mLs/kg)  RDA Method (mL): 1519.5  CHO Requirement: 171 - 209 g      Nutrition Prescription Ordered    Current Diet Order: Diabetic (2000 kcal; nectar thick)  Nutrition Order Comments: Please puree foods  Current Nutrition Support Formula Ordered: Clinimix 4.25/5, Other (Comment) (20% fat emulsion (250 mLs))  Current Nutrition Support Rate Ordered: 25 (ml) (mLs/hr)  Current Nutrition Support Frequency Ordered: continuous  Oral Nutrition Supplement: Boost Glucose Control with meals    Evaluation of Received Nutrient/Fluid Intake    Parenteral Calories (kcal): 582 (Clinimix + Fat emulsion)  Parenteral Protein (gm): 12.75 (Clinimix)  Parenteral Fluid (mL): 540  % Kcal Needs: 75 - 100% EEN  % Protein Needs: 75 - 100% EPN  I/O: -70 mLs (4/3/23)  Energy Calories Required: meeting needs  Protein Required: meeting needs  Fluid Required: meeting needs  Comments: LBM 4/2/23  Tolerance: tolerating  % Intake of Estimated Energy Needs: 75 - 100 %  % Meal Intake: 0 - 25 %    Nutrition Risk    Level of Risk/Frequency of Follow-up: moderate     Monitor and Evaluation    Food and Nutrient Intake: energy intake, food and beverage intake, parenteral nutrition intake  Food and Nutrient Adminstration: diet order  Knowledge/Beliefs/Attitudes: food and nutrition knowledge/skill, beliefs and attitudes  Physical Activity and Function: nutrition-related ADLs and IADLs, factors affecting access to physical activity  Anthropometric Measurements: height/length,  weight, weight change, body mass index  Biochemical Data, Medical Tests and Procedures: electrolyte and renal panel, gastrointestinal profile, glucose/endocrine profile, inflammatory profile, lipid profile  Nutrition-Focused Physical Findings: overall appearance     Nutrition Follow-Up    RD Follow-up?: Yes

## 2023-04-03 NOTE — ASSESSMENT & PLAN NOTE
Patient's FSGs are controlled on current medication regimen.  Last A1c reviewed-   Lab Results   Component Value Date    HGBA1C 4.9 03/29/2023     Most recent fingerstick glucose reviewed-   Recent Labs   Lab 04/02/23  1654 04/02/23  2020 04/03/23  0800 04/03/23  1145   POCTGLUCOSE 84 128* 66* 64*     Current correctional scale  Medium  Maintain anti-hyperglycemic dose as follows-   Antihyperglycemics (From admission, onward)    Start     Stop Route Frequency Ordered    04/03/23 2100  insulin detemir U-100 (Levemir) pen 10 Units         -- SubQ Nightly 04/03/23 1204    03/29/23 0744  insulin aspart U-100 pen 0-10 Units         -- SubQ Before meals & nightly PRN 03/29/23 0745        Hold Oral hypoglycemics while patient is in the hospital.    3/30 Decreasing clinimix.  Glucose on lower side.  Will adjust determir.

## 2023-04-03 NOTE — EICU
Intervention Initiated From:  COR / JOYCELYNU    Jayleen intervened regarding:  Rounding (Video assessment)  Comments: Vide rounds done.  Resting  in bed w/ eyes closed,  no acute distress noted.

## 2023-04-03 NOTE — SUBJECTIVE & OBJECTIVE
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     Current Outpatient Medications on File Prior to Encounter   Medication Sig    apixaban (ELIQUIS) 2.5 mg Tab Take 2.5 mg by mouth 2 (two) times daily.    apixaban (ELIQUIS) 2.5 mg Tab Take 1 tablet (2.5 mg total) by mouth 2 (two) times a day.    carvedilol (COREG) 12.5 MG tablet Take 1 tablet (12.5 mg total) by mouth 2 (two) times daily.    carvediloL (COREG) 12.5 MG tablet Take 1 tablet (12.5 mg total) by mouth 2 (two) times a day. HOLD for pulse less than 60.    cholecalciferol, vitamin D3, (VITAMIN D3) 1,000 unit capsule Take 1,000 Units by mouth once daily.    clopidogreL (PLAVIX) 75 mg tablet Take 1 tablet (75 mg total) by mouth once daily.    insulin aspart U-100 (NOVOLOG) 100 unit/mL (3 mL) InPn pen Inject 0-5 Units into the skin every 6 (six) hours as needed (Hyperglycemia).    magnesium oxide (MAG-OX) 400 mg (241.3 mg magnesium) tablet Take 400 mg by mouth 2 (two) times daily.    metFORMIN (GLUCOPHAGE) 500 MG tablet Take 1 tablet (500 mg total) by mouth 2 (two) times a day.    mirtazapine (REMERON) 30 MG tablet TAKE 1 TABLET BY MOUTH ONCE DAILY IN THE EVENING    mirtazapine (REMERON) 30 MG tablet Take 1 tablet (30  mg total) by mouth nightly    pantoprazole (PROTONIX) 40 MG tablet Take 1 tablet (40 mg total) by mouth once daily.    ramipril (ALTACE) 2.5 MG capsule Take 2.5 mg by mouth once daily.    ramipriL (ALTACE) 5 MG capsule Take 1 capsule (5 mg total) by mouth once daily.    senna-docusate 8.6-50 mg (PERICOLACE) 8.6-50 mg per tablet Take 1 tablet by mouth once daily.    simvastatin (ZOCOR) 20 MG tablet Take 1 tablet (20 mg total) by mouth every evening.    simvastatin (ZOCOR) 40 MG tablet Take 40 mg by mouth every evening.     Family History       Problem Relation (Age of Onset)    Cancer Father    Heart disease Father    Thyroid disease Daughter          Tobacco Use    Smoking status: Former     Types: Cigarettes    Smokeless tobacco: Never    Tobacco comments:     quit in 1990s   Substance and Sexual Activity    Alcohol use: No    Drug use: No    Sexual activity: Not Currently     Review of Systems   Constitutional:  Positive for appetite change. Negative for chills and fever.   Respiratory:  Negative for cough and shortness of breath.    Cardiovascular:  Negative for chest pain and leg swelling.   Gastrointestinal:  Negative for nausea and vomiting.   Genitourinary:  Negative for difficulty urinating and dysuria.   Musculoskeletal:  Negative for arthralgias and gait problem.   Psychiatric/Behavioral:  Negative for agitation and confusion.    Objective:     Vital Signs (Most Recent):  Temp: 97.7 °F (36.5 °C) (04/03/23 1146)  Pulse: 84 (04/03/23 1000)  Resp: 20 (04/03/23 1000)  BP: 135/82 (04/03/23 1000)  SpO2: 100 % (04/03/23 1000)   Vital Signs (24h Range):  Temp:  [97.4 °F (36.3 °C)-98 °F (36.7 °C)] 97.7 °F (36.5 °C)  Pulse:  [69-97] 84  Resp:  [10-22] 20  SpO2:  [92 %-100 %] 100 %  BP: ()/(55-82) 135/82     Weight: 71.6 kg (157 lb 13.6 oz)  Body mass index is 22.65 kg/m².    Physical Exam  Constitutional:       Appearance: Normal appearance.   Cardiovascular:      Rate and Rhythm: Normal rate and regular  rhythm.   Pulmonary:      Effort: Pulmonary effort is normal.      Breath sounds: Normal breath sounds.   Abdominal:      General: Abdomen is flat. Bowel sounds are normal.      Palpations: Abdomen is soft.   Musculoskeletal:      Right lower leg: Edema (+1) present.      Left lower leg: Edema (+1) present.   Skin:     General: Skin is warm and dry.   Neurological:      Mental Status: She is alert and oriented to person, place, and time. Mental status is at baseline.           Significant Labs: A1C:   Recent Labs   Lab 03/29/23  0430   HGBA1C 4.9       ABGs: No results for input(s): PH, PCO2, HCO3, POCSATURATED, BE, TOTALHB, COHB, METHB, O2HB, POCFIO2, PO2 in the last 48 hours.  Bilirubin:   Recent Labs   Lab 03/29/23  0430 03/30/23  0316 03/31/23  0419 04/02/23 0527 04/03/23  0355   BILITOT 0.4 0.3 0.2 0.2 0.2       Blood Culture:   No results for input(s): LABBLOO in the last 48 hours.    BMP:   Recent Labs   Lab 04/03/23  0355   GLU 86   *   K 3.9      CO2 20*   BUN 23   CREATININE 0.4*   CALCIUM 7.6*       CBC:   Recent Labs   Lab 04/02/23 0527 04/03/23  0355   WBC 7.67 6.57   HGB 8.9* 8.6*   HCT 26.6* 26.4*   * 136*       CMP:   Recent Labs   Lab 04/02/23 0527 04/03/23  0355   * 131*   K 3.8 3.9    106   CO2 22* 20*   GLU 73 86   BUN 26* 23   CREATININE 0.4* 0.4*   CALCIUM 7.6* 7.6*   PROT 3.8* 3.8*   ALBUMIN 1.1* 1.1*   BILITOT 0.2 0.2   ALKPHOS 42* 37*   AST 37 38   ALT 27 26   ANIONGAP 5* 5*       Cardiac Markers: No results for input(s): CKMB, MYOGLOBIN, BNP, TROPISTAT in the last 48 hours.  Coagulation: No results for input(s): PT, INR, APTT in the last 48 hours.        Magnesium: No results for input(s): MG in the last 48 hours.    POCT Glucose:   Recent Labs   Lab 04/02/23  2020 04/03/23  0800 04/03/23  1145   POCTGLUCOSE 128* 66* 64*       Significant Imaging: CXR: mild patch bibasilar airspace disease or atelectasis     CTA chest:      1. No CT evidence of pulmonary  thromboembolism.  2. Multifocal airspace disease, likely pneumonia.  3. Small bilateral pleural fluid collections.    ECHO: EF 70%     LE u/s   Impression:     Study technically difficult secondary to vascular calcifications and significant edema within the soft tissues.  There is lack of augmentation and compression of the mid left femoral vein concerning for possible thrombus.     Findings were discussed with Veronica Frank at 14:20 on3/31/2023.        Electronically signed by: Xochitl Chawla

## 2023-04-03 NOTE — PROGRESS NOTES
[facility] Medicine  Progress Note    Patient Name: Michelle Rowell  MRN: [id number]  Patient Class: IP- Inpatient   Admission Date: 3/28/2023  Length of Stay: 5 days  Attending Physician: Liv Paredes MD  Primary Care Provider: Reno Hilario MD        Subjective:     Principal Problem:Severe sepsis        HPI:  Patient is an 82 year old female with medical history of DVT, aneurysm of heart, HTN, HLD, DM type 2, CAD s/p CABG, R MCA stroke who was sent from nursing home for hypotension and confusion.  Patient has not been eating lately since her room mate was placed on hospice.  She denies fever, CP, SOB, nausea, vomiting and urinary complaints.      Admitted for concern of sepsis.  Currently on IV fluids, IV vanc/IV zosyn and levophed.          Overview/Hospital Course:  3/30 Admitted for sepsis.  Still on pressor support, clinimix and IV fluids.  Will wean levophed to maintain MAP>65.  Staph bacteremia on PCR.  Final blood cultures pending.  CTA chest negative for PE, but lower lobe pneumonia present.  Will continue IV vanc and IV zosyn.  Plan for echocardiogram today due to gram positive bacteremia and bp on lower side.       3/31 Patient slowly weaning off levophed.  Adding lipids since albumin 1.1.  Hg 7.5 with positive FOBT.  1 unit of pRBC ordered.  Will d/c Eliquis at this time for DVT.  Echo with EF 70%.  Continue IV vanc and IV zosyn for pneumonia and staph bacteremia.        4/2  Off of pressors since 8pm last night.   Refuses anything purred (ST put her on purred diet), but does drink boost TID   Hgb did drop 1 point but she is still getting IVF and bun/creatinine has dropped as well. No bloody stools. VSS  Day 5 Vanc and Zosyn. Afebrile. Repeat blood CX 3/30 NGTD    4/3  Off levophed and remains HD stable.  Will start to wean clinimix and fluids.  SLP with concern for aspiration but patient doesn't want to do MBSS.  She will continue pureed diet and not interested in  PEG.  Discussed resuming Eliquis at home dose 2.5mg BID and monitor h/h.  Staph epidermis/capitis bacteremia sensitive to doxycyline.  Will plan for 7-9 more days since she received IV vanc 6 days and IV zosyn5 days.  Plan for discharge in the next 48hours.               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     Current Outpatient Medications on File Prior to Encounter   Medication Sig    apixaban (ELIQUIS) 2.5 mg Tab Take 2.5 mg by mouth 2 (two) times daily.    apixaban (ELIQUIS) 2.5 mg Tab Take 1 tablet (2.5 mg total) by mouth 2 (two) times a day.    carvedilol (COREG) 12.5 MG tablet Take 1 tablet (12.5 mg total) by mouth 2 (two) times daily.    carvediloL (COREG) 12.5 MG tablet Take 1 tablet (12.5 mg total) by mouth 2 (two) times a day. HOLD for pulse less than 60.    cholecalciferol, vitamin D3, (VITAMIN D3) 1,000 unit capsule Take 1,000 Units by mouth once daily.    clopidogreL (PLAVIX) 75 mg tablet Take 1 tablet (75 mg total) by mouth once daily.    insulin aspart U-100 (NOVOLOG) 100 unit/mL (3 mL) InPn pen Inject 0-5 Units into the skin every 6 (six) hours as needed (Hyperglycemia).    magnesium oxide (MAG-OX) 400 mg (241.3 mg  magnesium) tablet Take 400 mg by mouth 2 (two) times daily.    metFORMIN (GLUCOPHAGE) 500 MG tablet Take 1 tablet (500 mg total) by mouth 2 (two) times a day.    mirtazapine (REMERON) 30 MG tablet TAKE 1 TABLET BY MOUTH ONCE DAILY IN THE EVENING    mirtazapine (REMERON) 30 MG tablet Take 1 tablet (30 mg total) by mouth nightly    pantoprazole (PROTONIX) 40 MG tablet Take 1 tablet (40 mg total) by mouth once daily.    ramipril (ALTACE) 2.5 MG capsule Take 2.5 mg by mouth once daily.    ramipriL (ALTACE) 5 MG capsule Take 1 capsule (5 mg total) by mouth once daily.    senna-docusate 8.6-50 mg (PERICOLACE) 8.6-50 mg per tablet Take 1 tablet by mouth once daily.    simvastatin (ZOCOR) 20 MG tablet Take 1 tablet (20 mg total) by mouth every evening.    simvastatin (ZOCOR) 40 MG tablet Take 40 mg by mouth every evening.     Family History       Problem Relation (Age of Onset)    Cancer Father    Heart disease Father    Thyroid disease Daughter          Tobacco Use    Smoking status: Former     Types: Cigarettes    Smokeless tobacco: Never    Tobacco comments:     quit in 1990s   Substance and Sexual Activity    Alcohol use: No    Drug use: No    Sexual activity: Not Currently     Review of Systems   Constitutional:  Positive for appetite change. Negative for chills and fever.   Respiratory:  Negative for cough and shortness of breath.    Cardiovascular:  Negative for chest pain and leg swelling.   Gastrointestinal:  Negative for nausea and vomiting.   Genitourinary:  Negative for difficulty urinating and dysuria.   Musculoskeletal:  Negative for arthralgias and gait problem.   Psychiatric/Behavioral:  Negative for agitation and confusion.    Objective:     Vital Signs (Most Recent):  Temp: 97.7 °F (36.5 °C) (04/03/23 1146)  Pulse: 84 (04/03/23 1000)  Resp: 20 (04/03/23 1000)  BP: 135/82 (04/03/23 1000)  SpO2: 100 % (04/03/23 1000)   Vital Signs (24h Range):  Temp:  [97.4 °F (36.3 °C)-98 °F (36.7 °C)] 97.7  °F (36.5 °C)  Pulse:  [69-97] 84  Resp:  [10-22] 20  SpO2:  [92 %-100 %] 100 %  BP: ()/(55-82) 135/82     Weight: 71.6 kg (157 lb 13.6 oz)  Body mass index is 22.65 kg/m².    Physical Exam  Constitutional:       Appearance: Normal appearance.   Cardiovascular:      Rate and Rhythm: Normal rate and regular rhythm.   Pulmonary:      Effort: Pulmonary effort is normal.      Breath sounds: Normal breath sounds.   Abdominal:      General: Abdomen is flat. Bowel sounds are normal.      Palpations: Abdomen is soft.   Musculoskeletal:      Right lower leg: Edema (+1) present.      Left lower leg: Edema (+1) present.   Skin:     General: Skin is warm and dry.   Neurological:      Mental Status: She is alert and oriented to person, place, and time. Mental status is at baseline.           Significant Labs: A1C:   Recent Labs   Lab 03/29/23  0430   HGBA1C 4.9       ABGs: No results for input(s): PH, PCO2, HCO3, POCSATURATED, BE, TOTALHB, COHB, METHB, O2HB, POCFIO2, PO2 in the last 48 hours.  Bilirubin:   Recent Labs   Lab 03/29/23  0430 03/30/23  0316 03/31/23  0419 04/02/23  0527 04/03/23  0355   BILITOT 0.4 0.3 0.2 0.2 0.2       Blood Culture:   No results for input(s): LABBLOO in the last 48 hours.    BMP:   Recent Labs   Lab 04/03/23  0355   GLU 86   *   K 3.9      CO2 20*   BUN 23   CREATININE 0.4*   CALCIUM 7.6*       CBC:   Recent Labs   Lab 04/02/23 0527 04/03/23  0355   WBC 7.67 6.57   HGB 8.9* 8.6*   HCT 26.6* 26.4*   * 136*       CMP:   Recent Labs   Lab 04/02/23  0527 04/03/23  0355   * 131*   K 3.8 3.9    106   CO2 22* 20*   GLU 73 86   BUN 26* 23   CREATININE 0.4* 0.4*   CALCIUM 7.6* 7.6*   PROT 3.8* 3.8*   ALBUMIN 1.1* 1.1*   BILITOT 0.2 0.2   ALKPHOS 42* 37*   AST 37 38   ALT 27 26   ANIONGAP 5* 5*       Cardiac Markers: No results for input(s): CKMB, MYOGLOBIN, BNP, TROPISTAT in the last 48 hours.  Coagulation: No results for input(s): PT, INR, APTT in the last 48  hours.        Magnesium: No results for input(s): MG in the last 48 hours.    POCT Glucose:   Recent Labs   Lab 04/02/23 2020 04/03/23  0800 04/03/23  1145   POCTGLUCOSE 128* 66* 64*       Significant Imaging: CXR: mild patch bibasilar airspace disease or atelectasis     CTA chest:      1. No CT evidence of pulmonary thromboembolism.  2. Multifocal airspace disease, likely pneumonia.  3. Small bilateral pleural fluid collections.    ECHO: EF 70%     LE u/s   Impression:     Study technically difficult secondary to vascular calcifications and significant edema within the soft tissues.  There is lack of augmentation and compression of the mid left femoral vein concerning for possible thrombus.     Findings were discussed with Veronica Frank at 14:20 on3/31/2023.        Electronically signed by: Xochitl Chawla      Assessment/Plan:      * Severe sepsis  This patient does not have evidence of infective focus  My overall impression is septic shock due to MAP < 65.  Source: Unknown   Antibiotics given-   Antibiotics (72h ago, onward)    Start     Stop Route Frequency Ordered    04/03/23 0930  doxycycline tablet 100 mg         -- Oral Every 12 hours 04/03/23 0821        Latest lactate reviewed-  No results for input(s): LACTATE in the last 72 hours.  Organ dysfunction indicated by Encephalopathy     Fluid challenge Patient received 3.2 L bolus and now receiving 100cc hour of IV fluids.      Post- resuscitation assessment Yes Perfusion exam was performed within 6 hours of septic shock presentation after bolus shows Inadequate tissue perfusion assessed by invasive monitoring       Will Start Pressors- Levophed for MAP of 65  Source control achieved by: IV fluids, IV vanc and IV zosyn       No lactic acidosis or elevated procal at presentation.   + PCR blood staph.  Blood cultures gram positive cocci.    + PNA on CTA.    Echo EF 70%      4/3 Blood cultures positive for staph capitis/ staph epidermidis.  Will plan for 7-9  days of doxy po.      Embolic stroke involving right middle cerebral artery  Continue statin   Resume low dose eliquis to monitor H/h       Pneumonia  Seen on CTA chest  Received 6 days of zosyn.    Continue doxy        Bacteremia  Staph bacteremia seen on blood cultures ---sens pending   Currently on IV vanc/zosyn day   Repeat blood cultures.  NGTD   Doxycyline sensitive.  Will need 7-9 day course.      Anemia  Positive FOBT  Transfuse 1 unit of PRBC   Anemia workup ordered---Fe studies c/w anemia of chronic disease. B12 ordered but not drawn. Will call lab  Repeat cbc with 2 point increase in hgb    4/3 Hg  Stable at 8.6   Start iron 325mg qd.      Acute DVT (deep venous thrombosis)  Patient with history of DVT 2016, right LE. hypercoag work up negative at that time, but stayed on NOAC due to h/o PAF and CVA.   Not currently on appropriate dosage of Eliquis  CTA negative for PE    Eliquis held due to decrease in h/h   U/s now with likely DVT LLE, but difficult study technically.   Prophylactic lovenox dose started 4/1/23 and monitor h/h. Feel better with this option since has shorter half life than NOAC in case she bleeds again and we have to stop it.    Would need to get her back on full dose NOAC as quickly as we safely can.     4/3 d/c lovenox and will try to start low dose eliquis which is patients home dose.  Will monitor h/h.            Hypoalbuminemia  Albumin 1.1  Likely contributing to hypotension  Dietician consulted (they recommend BOOST TID) and started on TPN   Continue lipids   PT does not want PEG tube or MBSS (concern for aspiration)  Continue on pureed diet       Aneurysm of heart  Echo with EF 70%  Hypotension     Type 2 diabetes mellitus with neurologic complication  Patient's FSGs are controlled on current medication regimen.  Last A1c reviewed-   Lab Results   Component Value Date    HGBA1C 4.9 03/29/2023     Most recent fingerstick glucose reviewed-   Recent Labs   Lab 04/02/23  7986  04/02/23  2020 04/03/23  0800 04/03/23  1145   POCTGLUCOSE 84 128* 66* 64*     Current correctional scale  Medium  Maintain anti-hyperglycemic dose as follows-   Antihyperglycemics (From admission, onward)    Start     Stop Route Frequency Ordered    04/03/23 2100  insulin detemir U-100 (Levemir) pen 10 Units         -- SubQ Nightly 04/03/23 1204    03/29/23 0744  insulin aspart U-100 pen 0-10 Units         -- SubQ Before meals & nightly PRN 03/29/23 0745        Hold Oral hypoglycemics while patient is in the hospital.    3/30 Decreasing clinimix.  Glucose on lower side.  Will adjust determir.      Essential hypertension  BP on lower side   Will continue to hold all home bp medications       S/P CABG x 4  Denies CP  Continue statin   Resume Eliquis         VTE Risk Mitigation (From admission, onward)         Ordered     apixaban tablet 2.5 mg  2 times daily         04/03/23 1147     IP VTE HIGH RISK PATIENT  Once         03/29/23 0243     Place sequential compression device  Until discontinued         03/29/23 0243     Place KANWAL hose  Until discontinued         03/29/23 0243                Discharge Planning   CARLOS:      Code Status: DNR   Is the patient medically ready for discharge?:     Reason for patient still in hospital (select all that apply): Patient trending condition  Discharge Plan A: Return to nursing home            Critical care time spent on the evaluation and treatment of severe organ dysfunction, review of pertinent labs and imaging studies, discussions with consulting providers and discussions with patient/family: 30 minutes.      Veronica Frank PA-C  Department of Hospital Medicine   [facility] - Intensive Care

## 2023-04-03 NOTE — ASSESSMENT & PLAN NOTE
Positive FOBT  Transfuse 1 unit of PRBC   Anemia workup ordered---Fe studies c/w anemia of chronic disease. B12 ordered but not drawn. Will call lab  Repeat cbc with 2 point increase in hgb    4/3 Hg  Stable at 8.6   Start iron 325mg qd.

## 2023-04-03 NOTE — ASSESSMENT & PLAN NOTE
This patient does not have evidence of infective focus  My overall impression is septic shock due to MAP < 65.  Source: Unknown   Antibiotics given-   Antibiotics (72h ago, onward)    Start     Stop Route Frequency Ordered    04/03/23 0930  doxycycline tablet 100 mg         -- Oral Every 12 hours 04/03/23 0821        Latest lactate reviewed-  No results for input(s): LACTATE in the last 72 hours.  Organ dysfunction indicated by Encephalopathy     Fluid challenge Patient received 3.2 L bolus and now receiving 100cc hour of IV fluids.      Post- resuscitation assessment Yes Perfusion exam was performed within 6 hours of septic shock presentation after bolus shows Inadequate tissue perfusion assessed by invasive monitoring       Will Start Pressors- Levophed for MAP of 65  Source control achieved by: IV fluids, IV vanc and IV zosyn       No lactic acidosis or elevated procal at presentation.   + PCR blood staph.  Blood cultures gram positive cocci.    + PNA on CTA.    Echo EF 70%      4/3 Blood cultures positive for staph capitis/ staph epidermidis.  Will plan for 7-9 days of doxy po.

## 2023-04-03 NOTE — ASSESSMENT & PLAN NOTE
Staph bacteremia seen on blood cultures ---sens pending   Currently on IV vanc/zosyn day   Repeat blood cultures.  NGTD   Doxycyline sensitive.  Will need 7-9 day course.

## 2023-04-03 NOTE — ASSESSMENT & PLAN NOTE
Albumin 1.1  Likely contributing to hypotension  Dietician consulted (they recommend BOOST TID) and started on TPN   Continue lipids   PT does not want PEG tube or MBSS (concern for aspiration)  Continue on pureed diet

## 2023-04-03 NOTE — PLAN OF CARE
The patient remained stable throughout the night without any complaints. Adequate urine output observed.  Will continue to monitor.

## 2023-04-03 NOTE — ASSESSMENT & PLAN NOTE
Patient with history of DVT 2016, right LE. hypercoag work up negative at that time, but stayed on NOAC due to h/o PAF and CVA.   Not currently on appropriate dosage of Eliquis  CTA negative for PE    Eliquis held due to decrease in h/h   U/s now with likely DVT LLE, but difficult study technically.   Prophylactic lovenox dose started 4/1/23 and monitor h/h. Feel better with this option since has shorter half life than NOAC in case she bleeds again and we have to stop it.    Would need to get her back on full dose NOAC as quickly as we safely can.     4/3 d/c lovenox and will try to start low dose eliquis which is patients home dose.  Will monitor h/h.

## 2023-04-03 NOTE — PROGRESS NOTES
Pharmacokinetic Assessment Follow Up: IV Vancomycin    Vancomycin serum concentration assessment(s):    The trough level was drawn correctly and can be used to guide therapy at this time. The measurement is within the desired definitive target range of 15 to 20 mcg/mL.    Vancomycin Regimen Plan:    Continue regimen to Vancomycin 1000 mg IV every 24 hours with next serum trough concentration measured at 2100 prior to next dose on 4/5  if remains on therapy      Drug levels (last 3 results):  Recent Labs   Lab Result Units 04/01/23  1807 04/02/23  2048   Vancomycin-Trough ug/mL 21.2 19.1       Pharmacy will continue to follow and monitor vancomycin.    Please contact pharmacy at extension [phone] for questions regarding this assessment.    Thank you for the consult,   Vin Sow, PharmD, BCCCP       Patient brief summary:  Michelle Rowell is a 82 y.o. female initiated on antimicrobial therapy with IV Vancomycin for treatment of bacteremia    The patient's current regimen is vancomycin 1000 mg q24h    Drug Allergies:   Review of patient's allergies indicates:  No Known Allergies    Actual Body Weight:   71.6 kg    Renal Function:   Estimated Creatinine Clearance: 121.3 mL/min (A) (based on SCr of 0.4 mg/dL (L)).    Dialysis Method (if applicable):  N/A    CBC (last 72 hours):  Recent Labs   Lab Result Units 03/31/23 0419 04/01/23 0423 04/02/23  0527   WBC K/uL 11.16 10.88 7.67   Hemoglobin g/dL 7.5* 9.8* 8.9*   Hematocrit % 22.4* 28.3* 26.6*   Platelets K/uL 209 177 139*   Gran % % 60.1 60.2 55.3   Lymph % % 25.3 27.1 28.9   Mono % % 11.0 8.6 11.7   Eosinophil % % 2.2 2.8 3.4   Basophil % % 0.3 0.6 0.4   Differential Method  Automated Automated Automated       Metabolic Panel (last 72 hours):  Recent Labs   Lab Result Units 03/31/23 0419 04/01/23 0423 04/02/23 0527   Sodium mmol/L 130* 133* 132*   Potassium mmol/L 4.3 4.0 3.8   Chloride mmol/L 105 107 105   CO2 mmol/L 19* 21* 22*   Glucose mg/dL 307*  121* 73   BUN mg/dL 47* 36* 26*   Creatinine mg/dL 0.7 0.5 0.4*   Albumin g/dL 1.1*  --  1.1*   Total Bilirubin mg/dL 0.2  --  0.2   Alkaline Phosphatase U/L 55  --  42*   AST U/L 14  --  37   ALT U/L 13  --  27   Magnesium mg/dL 1.9 1.7  --        Vancomycin Administrations:  vancomycin given in the last 96 hours                     vancomycin (VANCOCIN) 1,000 mg in dextrose 5 % (D5W) 250 mL IVPB (Vial-Mate) (mg) 1,000 mg New Bag 04/01/23 2221     1,000 mg New Bag 03/31/23 2145     1,000 mg New Bag 03/30/23 2211     1,000 mg New Bag 03/29/23 2203                    Microbiologic Results:  Microbiology Results (last 7 days)       Procedure Component Value Units Date/Time    Blood culture [[id number]] Collected: 03/30/23 0808    Order Status: Completed Specimen: Blood Updated: 04/02/23 1812     Blood Culture, Routine No Growth to date      No Growth to date      No Growth to date      No Growth to date    Blood culture [[id number]] Collected: 03/30/23 0826    Order Status: Completed Specimen: Blood from Antecubital, Left Updated: 04/02/23 1812     Blood Culture, Routine No Growth to date      No Growth to date      No Growth to date      No Growth to date    Blood culture x two cultures. Draw prior to antibiotics. [[id number]]  (Abnormal)  (Susceptibility) Collected: 03/28/23 2117    Order Status: Completed Specimen: Blood Updated: 04/02/23 1205     Blood Culture, Routine Gram stain peds bottle: Gram positive cocci      Positive results previously called 03/29/2023  21:23      STAPHYLOCOCCUS CAPITIS    Narrative:      Aerobic and anaerobic    Blood culture x two cultures. Draw prior to antibiotics. [[id number]]  (Abnormal)  (Susceptibility) Collected: 03/28/23 2117    Order Status: Completed Specimen: Blood Updated: 04/02/23 1205     Blood Culture, Routine Gram stain peds bottle: Gram positive cocci      Results called to and read back by:daphnie alvarez  03/29/2023  21:23      STAPHYLOCOCCUS CAPITIS      STAPHYLOCOCCUS  EPIDERMIDIS  Susceptibility pending      Narrative:      Aerobic and anaerobic    Stool culture [[id number]] Collected: 03/30/23 1637    Order Status: Completed Specimen: Stool Updated: 04/01/23 1240     Stool Culture Nothing significant to date    Clostridium difficile EIA [[id number]] Collected: 03/30/23 1830    Order Status: Completed Specimen: Stool Updated: 03/31/23 1329     C. diff Antigen Negative     C difficile Toxins A+B, EIA Negative     Comment: Testing not recommended for children <24 months old.       E. coli 0157 antigen [[id number]] Collected: 03/30/23 1637    Order Status: No result Specimen: Stool Updated: 03/31/23 0316    Rapid Organism ID by PCR (from Blood culture) [[id number]]  (Abnormal) Collected: 03/28/23 2058    Order Status: Completed Updated: 03/29/23 2243     Enterococcus faecials Not Detected     Enterococcus faecium Not Detected     Listeria Monocytogenes Not Detected     Staphylococcus spp. See species for ID     Staphylococcus aureus Not Detected     Staphylococcus epidermidis Detected     Staphylococcus lugdunensis Not Detected     Streptococcus species Not Detected     Streptococcus agalactiae Not Detected     Streptococcus pneumoniae Not Detected     Streptococcus pyogenes Not Detected     Acinetobacter calcoaceticus/baumannii complex Not Detected     Bacteroides fragilis Not Detected     Enterobacerales Not Detected     Enterobacter cloacae complex Not Detected     Escherichia Not Detected     Klebsiella aerogenes Not Detected     Klebsiella oxytoca Not Detected     Klebsiella pneumoniae group Not Detected     Proteus Not Detected     Salmonella sp Not Detected     Serratia marcescens Not Detected     Haemophilus influenzae Not Detected     Neisseria meningtidis Not Detected     Pseudomonas aeruginosa Not Detected     Stenotrophomonas maltophilia Not Detected     Candida albicans Not Detected     Candida auris Not Detected     Candida glabrata Not Detected     Maureen krusei Not  Detected     Candida Parapsilosis Not Detected     Candida Tropicalis Not Detected     Cryptococcus neoformans/gattii Not Detected     CTX-M (ESBL ) Test Not Applicable     IMP (Carbapenem resistant) Test Not Applicable     KPC resistance gene (Carbapenem resistant) Test Not Applicable     mcr-1  Test Not Applicable     mec A/C  Detected     mec A/C and MREJ (MRSA) gene Test Not Applicable     NDM (Carbapenem resistant) Test Not Applicable     OXA-48-like (Carbapenem resistant) Test Not Applicable     van A/B (VRE gene) Test Not Applicable     VIM (Carbapenem resistant) Test Not Applicable    Narrative:      Aerobic and anaerobic

## 2023-04-03 NOTE — PLAN OF CARE
Recommendation/Intervention:     1. Rec'd continue diabetic diet with texture modifications per SLP rec's.     2. Rec'd continue Boost Glucose Control with meals to provide additional kcals and protein.     3. Rec'd honroing pt's food preferences to encourage PO intake.     4. Rec'd cont 20% lipids to TPN rc to provide an additional 500 kcals.     5. If lipids are given, rec'd checking serum TG, and if > 400, hold lipids.     6. Rec'd frequent weight measurements to assess for any acute weight changes.     7. RD to follow and make rec's accordingly.    Goals: Pt will meet at least 75% EEN by RD follow up.  Nutrition Goal Status: goal met

## 2023-04-03 NOTE — PLAN OF CARE
Problem: Infection  Goal: Absence of Infection Signs and Symptoms  Outcome: Ongoing, Progressing     Problem: Adult Inpatient Plan of Care  Goal: Plan of Care Review  Outcome: Ongoing, Progressing  Goal: Patient-Specific Goal (Individualized)  Outcome: Ongoing, Progressing  Goal: Absence of Hospital-Acquired Illness or Injury  Outcome: Ongoing, Progressing  Goal: Optimal Comfort and Wellbeing  Outcome: Ongoing, Progressing  Goal: Readiness for Transition of Care  Outcome: Ongoing, Progressing     Problem: Diabetes Comorbidity  Goal: Blood Glucose Level Within Targeted Range  Outcome: Ongoing, Progressing     Problem: Skin Injury Risk Increased  Goal: Skin Health and Integrity  Outcome: Ongoing, Progressing     Problem: Adjustment to Illness (Sepsis/Septic Shock)  Goal: Optimal Coping  Outcome: Ongoing, Progressing     Problem: Bleeding (Sepsis/Septic Shock)  Goal: Absence of Bleeding  Outcome: Ongoing, Progressing     Problem: Glycemic Control Impaired (Sepsis/Septic Shock)  Goal: Blood Glucose Level Within Desired Range  Outcome: Ongoing, Progressing     Problem: Infection Progression (Sepsis/Septic Shock)  Goal: Absence of Infection Signs and Symptoms  Outcome: Ongoing, Progressing     Problem: Nutrition Impaired (Sepsis/Septic Shock)  Goal: Optimal Nutrition Intake  Outcome: Ongoing, Progressing     Problem: Fall Injury Risk  Goal: Absence of Fall and Fall-Related Injury  Outcome: Ongoing, Progressing     Problem: Oral Intake Inadequate  Goal: Improved Oral Intake  Outcome: Ongoing, Progressing     Problem: Fluid Imbalance (Pneumonia)  Goal: Fluid Balance  Outcome: Ongoing, Progressing     Problem: Infection (Pneumonia)  Goal: Resolution of Infection Signs and Symptoms  Outcome: Ongoing, Progressing     Problem: Respiratory Compromise (Pneumonia)  Goal: Effective Oxygenation and Ventilation  Outcome: Ongoing, Progressing    To be transferred to [facility] this evening

## 2023-04-04 NOTE — PLAN OF CARE
Problem: Infection  Goal: Absence of Infection Signs and Symptoms  Outcome: Ongoing, Progressing     Problem: Adult Inpatient Plan of Care  Goal: Plan of Care Review  Outcome: Ongoing, Progressing  Goal: Optimal Comfort and Wellbeing  Outcome: Ongoing, Progressing  Goal: Readiness for Transition of Care  Outcome: Ongoing, Progressing     Problem: Diabetes Comorbidity  Goal: Blood Glucose Level Within Targeted Range  Outcome: Ongoing, Progressing     Problem: Skin Injury Risk Increased  Goal: Skin Health and Integrity  Outcome: Ongoing, Progressing     Problem: Adjustment to Illness (Sepsis/Septic Shock)  Goal: Optimal Coping  Outcome: Ongoing, Progressing     Problem: Bleeding (Sepsis/Septic Shock)  Goal: Absence of Bleeding  Outcome: Ongoing, Progressing     Problem: Glycemic Control Impaired (Sepsis/Septic Shock)  Goal: Blood Glucose Level Within Desired Range  Outcome: Ongoing, Progressing     Problem: Infection Progression (Sepsis/Septic Shock)  Goal: Absence of Infection Signs and Symptoms  Outcome: Ongoing, Progressing     Problem: Nutrition Impaired (Sepsis/Septic Shock)  Goal: Optimal Nutrition Intake  Outcome: Ongoing, Progressing     Problem: Fall Injury Risk  Goal: Absence of Fall and Fall-Related Injury  Outcome: Ongoing, Progressing     Problem: Fluid Imbalance (Pneumonia)  Goal: Fluid Balance  Outcome: Ongoing, Progressing     Problem: Infection (Pneumonia)  Goal: Resolution of Infection Signs and Symptoms  Outcome: Ongoing, Progressing     Problem: Respiratory Compromise (Pneumonia)  Goal: Effective Oxygenation and Ventilation  Outcome: Ongoing, Progressing

## 2023-04-04 NOTE — PT/OT/SLP PROGRESS
Speech Language Pathology Treatment    Patient Name:  Michelle Rowell   MRN:  [id number]  Admitting Diagnosis: Severe sepsis    Assessment:   Michelle Rowell is an 83 y/o F who continues to present w/ clinical signs of oropharyngeal dysphagia & aspiration, likely chronic r/t old CVAs and acutely exacerbated by severe sepsis. Confirmed hx of oropharyngeal dysphagia (per MBSS 2019) appreciated. Pt's swallow efficiency and safety appear impaired; diet modification is indicated. She presents w/ increased risk for malnutrition/dehydration and dysphagia-related pulmonary complication at this current time. Pt would benefit from repeat modified barium swallow study to define pt's current swallow physiology and determine tx plan; however, pt does not want to proceed w/ MBSS. SLP to f/u w/ pt tomorrow.    Recommendations:   General Recommendations:  - SLP will f/u x5 for diagnostic swallow tx. Recommend repeat MBSS once medically stable w/ further recs pending MBSS results; however and of note, pt not amenable to procedure at this time, despite education provided to pt/family.  - Dietician f/u 2/2 risk for malnutrition/dehydration given swallow inefficiency and diet modification  Diet recommendations:  - Puree (IDDSI Level 4) / Mildly thick/Nectar thick liquids (IDDSI Level 2) diet. Meds crushed in puree/pudding.  Risk management:  - Aspiration precautions:   - Small bites of pureed foods   - Small single sips of mildly thick/nectar thick liquids, avoid thin liquids   - HOB upright w/ all PO intake   - Check for pocketing/oral residue  -Control risk factors for aspiration PNA via (a) oral hygiene q4h, (b) HOB upright as tolerated  General Precautions:  - Standard, aspiration, pureed diet, nectar thick  Communication strategies: none    Subjective     Pt seen at bedside, awake and alert. Audible breathing noted. Pt continues to refuse to participate in MBSS. Per nursing, pt w/ coughing with large bolus presentations of  nectar thick liquids; no coughing w/ smaller bolus presentations of this same consistency. Family present at bedside.  Patient goals: none stated     Pain/Comfort:  None verbalized    Objective:     Has the patient been evaluated by SLP for swallowing?   Yes  Keep patient NPO? No   Current Respiratory Status: 93% SpO2, RR --> 20    Therapeutic swallows utilizing thin liquid straw sip presentations targeted today. Pt completed x10 swallows w/ coughing/choking demonstrated 60% of time; inconsistent wet vocal quality demonstrated. Cued throat clear effective in clearing vocal quality. 40% of the time, pt presented w/ no coughing/choking s/p swallows. (Decrease in frequency of clinical signs of aspiration across thin liquids when compared to when last seen by SLP). Pt continues to demonstrated multiple spontaneous swallows.0% of swallow. Pt refusing nectar thick liquid presentations during therapy session at this time. Pt participated in effortful swallows x4 with direct swallow trials of puree consistency. Pt participated in effortful swallows at fair level given max cues in 50% of trials. Pt continues to demonstrated impaired bolus manipulation, impaired AP transit, and lingual residue. Pt refusing any further direct swallow trials and therapeutic swallows at this time.    Pt and family provided education on continued diet modification recs of puree diet w/ mildly-thick/nectar thick liquids and meds crushed in puree/pudding at this current time, as well as recommendation for repeat modified barium swallow study to define pt's current swallow physiology and determine tx plan. Pt rcontinues to refuse MBSS at this time. Pt/family provided full education on these topics. All verbalized understanding.    Nursing communication completed regarding all impressions and recs. All in agreement and understanding verbalized.    Goals:   Multidisciplinary Problems       SLP Goals          Problem: SLP    Goal Priority Disciplines  Outcome   SLP Goal     SLP Ongoing, Progressing   Description: GOALS:    (1) Pt will tolerate least-restrictive PO diet without acute dysphagia-related pulmonary complication  (2) Pt will participate in MBSS to define swallow physiology & determine therapy plan  (3) Pt will perform verbal teachback of diet modification recommendations and demonstrate thickening of liquid to IDDSI Level 2 (Mildly thick/Nectar Thick)  (4) Pt will perform verbal teachback of recommended feeding strategies and aspiration precautions to facilitate oral intake and & reduce aspiration risk                          Plan:     Patient to be seen:  5 x/week   Plan of Care expires:  04/13/23  Plan of Care reviewed with:  patient, family   SLP Follow-Up:  Yes       Discharge recommendations:  nursing facility, skilled, nursing facility, basic   Barriers to Discharge:  Level of Skilled Assistance Needed      Time Tracking:     SLP Treatment Date:   04/03/23  Speech Start Time:  1507  Speech Stop Time:  1524     Speech Total Time (min):  17 min    Billable Minutes: Treatment Swallowing Dysfunction 17 minutes    04/03/23

## 2023-04-04 NOTE — PT/OT/SLP PROGRESS
Physical Therapy      Patient Name:  Michelle Rowell   MRN:  [id number]    Patient not seen today secondary to Nursing care. Will follow-up when appropriate/allows time/date.

## 2023-04-04 NOTE — SUBJECTIVE & OBJECTIVE
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     Current Outpatient Medications on File Prior to Encounter   Medication Sig    apixaban (ELIQUIS) 2.5 mg Tab Take 2.5 mg by mouth 2 (two) times daily.    apixaban (ELIQUIS) 2.5 mg Tab Take 1 tablet (2.5 mg total) by mouth 2 (two) times a day.    carvedilol (COREG) 12.5 MG tablet Take 1 tablet (12.5 mg total) by mouth 2 (two) times daily.    carvediloL (COREG) 12.5 MG tablet Take 1 tablet (12.5 mg total) by mouth 2 (two) times a day. HOLD for pulse less than 60.    cholecalciferol, vitamin D3, (VITAMIN D3) 1,000 unit capsule Take 1,000 Units by mouth once daily.    clopidogreL (PLAVIX) 75 mg tablet Take 1 tablet (75 mg total) by mouth once daily.    insulin aspart U-100 (NOVOLOG) 100 unit/mL (3 mL) InPn pen Inject 0-5 Units into the skin every 6 (six) hours as needed (Hyperglycemia).    magnesium oxide (MAG-OX) 400 mg (241.3 mg magnesium) tablet Take 400 mg by mouth 2 (two) times daily.    metFORMIN (GLUCOPHAGE) 500 MG tablet Take 1 tablet (500 mg total) by mouth 2 (two) times a day.    mirtazapine (REMERON) 30 MG tablet TAKE 1 TABLET BY MOUTH ONCE DAILY IN THE EVENING    mirtazapine (REMERON) 30 MG tablet Take 1 tablet (30  mg total) by mouth nightly    pantoprazole (PROTONIX) 40 MG tablet Take 1 tablet (40 mg total) by mouth once daily.    ramipril (ALTACE) 2.5 MG capsule Take 2.5 mg by mouth once daily.    ramipriL (ALTACE) 5 MG capsule Take 1 capsule (5 mg total) by mouth once daily.    senna-docusate 8.6-50 mg (PERICOLACE) 8.6-50 mg per tablet Take 1 tablet by mouth once daily.    simvastatin (ZOCOR) 20 MG tablet Take 1 tablet (20 mg total) by mouth every evening.    simvastatin (ZOCOR) 40 MG tablet Take 40 mg by mouth every evening.     Family History       Problem Relation (Age of Onset)    Cancer Father    Heart disease Father    Thyroid disease Daughter          Tobacco Use    Smoking status: Former     Types: Cigarettes    Smokeless tobacco: Never    Tobacco comments:     quit in 1990s   Substance and Sexual Activity    Alcohol use: No    Drug use: No    Sexual activity: Not Currently     Review of Systems   Constitutional:  Positive for appetite change. Negative for chills and fever.   Respiratory:  Negative for cough and shortness of breath.    Cardiovascular:  Negative for chest pain and leg swelling.   Gastrointestinal:  Negative for nausea and vomiting.   Genitourinary:  Negative for difficulty urinating and dysuria.   Musculoskeletal:  Negative for arthralgias and gait problem.   Psychiatric/Behavioral:  Negative for agitation and confusion.    Objective:     Vital Signs (Most Recent):  Temp: 97.1 °F (36.2 °C) (04/04/23 0800)  Pulse: 84 (04/04/23 1000)  Resp: 18 (04/04/23 0800)  BP: 110/61 (04/04/23 0800)  SpO2: (!) 93 % (04/04/23 0800)   Vital Signs (24h Range):  Temp:  [97.1 °F (36.2 °C)-98.7 °F (37.1 °C)] 97.1 °F (36.2 °C)  Pulse:  [] 84  Resp:  [16-22] 18  SpO2:  [92 %-96 %] 93 %  BP: (110-160)/(60-82) 110/61     Weight: 84.4 kg (186 lb 1.1 oz)  Body mass index is 26.7 kg/m².    Physical Exam  Constitutional:       Appearance: Normal appearance.   Cardiovascular:      Rate and Rhythm: Normal rate and regular  rhythm.   Pulmonary:      Effort: Pulmonary effort is normal.      Breath sounds: Normal breath sounds.   Abdominal:      General: Abdomen is flat. Bowel sounds are normal.      Palpations: Abdomen is soft.   Musculoskeletal:      Right lower leg: Edema (+1) present.      Left lower leg: Edema (+1) present.   Skin:     General: Skin is warm and dry.   Neurological:      Mental Status: She is alert and oriented to person, place, and time. Mental status is at baseline.           Significant Labs: A1C:   Recent Labs   Lab 03/29/23  0430   HGBA1C 4.9       ABGs: No results for input(s): PH, PCO2, HCO3, POCSATURATED, BE, TOTALHB, COHB, METHB, O2HB, POCFIO2, PO2 in the last 48 hours.  Bilirubin:   Recent Labs   Lab 03/30/23  0316 03/31/23  0419 04/02/23  0527 04/03/23  0355 04/04/23  0553   BILITOT 0.3 0.2 0.2 0.2 0.2       Blood Culture:   No results for input(s): LABBLOO in the last 48 hours.    BMP:   Recent Labs   Lab 04/04/23  0553   *   *   K 4.0      CO2 20*   BUN 22   CREATININE 0.5   CALCIUM 7.5*       CBC:   Recent Labs   Lab 04/03/23  0355 04/04/23  0553   WBC 6.57 5.70   HGB 8.6* 8.4*   HCT 26.4* 24.6*   * 149*       CMP:   Recent Labs   Lab 04/03/23  0355 04/04/23  0553   * 133*   K 3.9 4.0    107   CO2 20* 20*   GLU 86 130*   BUN 23 22   CREATININE 0.4* 0.5   CALCIUM 7.6* 7.5*   PROT 3.8* 3.9*   ALBUMIN 1.1* 1.1*   BILITOT 0.2 0.2   ALKPHOS 37* 35*   AST 38 26   ALT 26 21   ANIONGAP 5* 6*       Cardiac Markers: No results for input(s): CKMB, MYOGLOBIN, BNP, TROPISTAT in the last 48 hours.  Coagulation: No results for input(s): PT, INR, APTT in the last 48 hours.        Magnesium: No results for input(s): MG in the last 48 hours.    POCT Glucose:   Recent Labs   Lab 04/03/23  1612 04/03/23  1940 04/04/23  0719   POCTGLUCOSE 102 168* 129*       Significant Imaging: CXR: mild patch bibasilar airspace disease or atelectasis     CTA chest:      1. No CT evidence of pulmonary  thromboembolism.  2. Multifocal airspace disease, likely pneumonia.  3. Small bilateral pleural fluid collections.    ECHO: EF 70%     LE u/s   Impression:     Study technically difficult secondary to vascular calcifications and significant edema within the soft tissues.  There is lack of augmentation and compression of the mid left femoral vein concerning for possible thrombus.     Findings were discussed with Veronica Frank at 14:20 on3/31/2023.        Electronically signed by: Xochitl Chawla

## 2023-04-04 NOTE — ASSESSMENT & PLAN NOTE
This patient does not have evidence of infective focus  My overall impression is septic shock due to MAP < 65.  Source: Unknown   Antibiotics given-   Antibiotics (72h ago, onward)    Start     Stop Route Frequency Ordered    04/03/23 0930  doxycycline tablet 100 mg         -- Oral Every 12 hours 04/03/23 0821        Latest lactate reviewed-  No results for input(s): LACTATE in the last 72 hours.  Organ dysfunction indicated by Encephalopathy     Fluid challenge Patient received 3.2 L bolus and now receiving 100cc hour of IV fluids.      Post- resuscitation assessment Yes Perfusion exam was performed within 6 hours of septic shock presentation after bolus shows Inadequate tissue perfusion assessed by invasive monitoring       Will Start Pressors- Levophed for MAP of 65  Source control achieved by: IV fluids, IV vanc and IV zosyn       No lactic acidosis or elevated procal at presentation.   + PCR blood staph.  Blood cultures gram positive cocci.    + PNA on CTA.    Echo EF 70%      4/3 Blood cultures positive for staph capitis/ staph epidermidis.  Will plan for 7-9 days of doxy po.      Resolved

## 2023-04-04 NOTE — ASSESSMENT & PLAN NOTE
Positive FOBT  Transfuse 1 unit of PRBC   Anemia workup ordered---Fe studies c/w anemia of chronic disease. B12 ordered but not drawn. Will call lab  Repeat cbc with 2 point increase in hgb    4/3 Hg  Stable at 8.6   Start iron 325mg qd.      4/4 stable

## 2023-04-04 NOTE — ASSESSMENT & PLAN NOTE
Patient with history of DVT 2016, right LE. hypercoag work up negative at that time, but stayed on NOAC due to h/o PAF and CVA.   Not currently on appropriate dosage of Eliquis  CTA negative for PE    Eliquis held due to decrease in h/h   U/s now with likely DVT LLE, but difficult study technically.   Prophylactic lovenox dose started 4/1/23 and monitor h/h. Feel better with this option since has shorter half life than NOAC in case she bleeds again and we have to stop it.    Would need to get her back on full dose NOAC as quickly as we safely can.     4/3 d/c lovenox and will try to start low dose eliquis which is patients home dose.  Will monitor h/h.      4/4 Attempt to give therapeutic dose of Eliquis today.  CBC tomorrow

## 2023-04-04 NOTE — PROGRESS NOTES
[facility] Surg ([facility])  [facility] Medicine  Progress Note    Patient Name: Michelle Rowell  MRN: [id number]  Patient Class: IP- Inpatient   Admission Date: 3/28/2023  Length of Stay: 6 days  Attending Physician: Liv Paredes MD  Primary Care Provider: Reno Hilario MD        Subjective:     Principal Problem:Severe sepsis        HPI:  Patient is an 82 year old female with medical history of DVT, aneurysm of heart, HTN, HLD, DM type 2, CAD s/p CABG, R MCA stroke who was sent from nursing home for hypotension and confusion.  Patient has not been eating lately since her room mate was placed on hospice.  She denies fever, CP, SOB, nausea, vomiting and urinary complaints.      Admitted for concern of sepsis.  Currently on IV fluids, IV vanc/IV zosyn and levophed.          Overview/Hospital Course:  3/30 Admitted for sepsis.  Still on pressor support, clinimix and IV fluids.  Will wean levophed to maintain MAP>65.  Staph bacteremia on PCR.  Final blood cultures pending.  CTA chest negative for PE, but lower lobe pneumonia present.  Will continue IV vanc and IV zosyn.  Plan for echocardiogram today due to gram positive bacteremia and bp on lower side.       3/31 Patient slowly weaning off levophed.  Adding lipids since albumin 1.1.  Hg 7.5 with positive FOBT.  1 unit of pRBC ordered.  Will d/c Eliquis at this time for DVT.  Echo with EF 70%.  Continue IV vanc and IV zosyn for pneumonia and staph bacteremia.        4/2  Off of pressors since 8pm last night.   Refuses anything purred (ST put her on purred diet), but does drink boost TID   Hgb did drop 1 point but she is still getting IVF and bun/creatinine has dropped as well. No bloody stools. VSS  Day 5 Vanc and Zosyn. Afebrile. Repeat blood CX 3/30 NGTD    4/3  Off levophed and remains HD stable.  Will start to wean clinimix and fluids.  SLP with concern for aspiration but patient doesn't want to do MBSS.  She will continue pureed diet and not interested in  PEG.  Discussed resuming Eliquis at home dose 2.5mg BID and monitor h/h.  Staph epidermis/capitis bacteremia sensitive to doxycyline.  Will plan for 7-9 more days since she received IV vanc 6 days and IV zosyn5 days.  Plan for discharge in the next 48hours.      4/4 Transitioned from ICU to the floor overnight.  HD stable slowly discontinuing IV fluids.  Will increase Eliquis one more time in attempts for patient to have therapeutic level.  Repeat CBC tomorrow.  Likely d/c back to nursing home within the next 24 hours.                 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     Current Outpatient Medications on File Prior to Encounter   Medication Sig    apixaban (ELIQUIS) 2.5 mg Tab Take 2.5 mg by mouth 2 (two) times daily.    apixaban (ELIQUIS) 2.5 mg Tab Take 1 tablet (2.5 mg total) by mouth 2 (two) times a day.    carvedilol (COREG) 12.5 MG tablet Take 1 tablet (12.5 mg total) by mouth 2 (two) times daily.    carvediloL (COREG) 12.5 MG tablet Take 1 tablet (12.5 mg total) by mouth 2 (two) times a day. HOLD for pulse less than 60.    cholecalciferol, vitamin D3, (VITAMIN D3) 1,000 unit capsule Take 1,000 Units by mouth once daily.     clopidogreL (PLAVIX) 75 mg tablet Take 1 tablet (75 mg total) by mouth once daily.    insulin aspart U-100 (NOVOLOG) 100 unit/mL (3 mL) InPn pen Inject 0-5 Units into the skin every 6 (six) hours as needed (Hyperglycemia).    magnesium oxide (MAG-OX) 400 mg (241.3 mg magnesium) tablet Take 400 mg by mouth 2 (two) times daily.    metFORMIN (GLUCOPHAGE) 500 MG tablet Take 1 tablet (500 mg total) by mouth 2 (two) times a day.    mirtazapine (REMERON) 30 MG tablet TAKE 1 TABLET BY MOUTH ONCE DAILY IN THE EVENING    mirtazapine (REMERON) 30 MG tablet Take 1 tablet (30 mg total) by mouth nightly    pantoprazole (PROTONIX) 40 MG tablet Take 1 tablet (40 mg total) by mouth once daily.    ramipril (ALTACE) 2.5 MG capsule Take 2.5 mg by mouth once daily.    ramipriL (ALTACE) 5 MG capsule Take 1 capsule (5 mg total) by mouth once daily.    senna-docusate 8.6-50 mg (PERICOLACE) 8.6-50 mg per tablet Take 1 tablet by mouth once daily.    simvastatin (ZOCOR) 20 MG tablet Take 1 tablet (20 mg total) by mouth every evening.    simvastatin (ZOCOR) 40 MG tablet Take 40 mg by mouth every evening.     Family History       Problem Relation (Age of Onset)    Cancer Father    Heart disease Father    Thyroid disease Daughter          Tobacco Use    Smoking status: Former     Types: Cigarettes    Smokeless tobacco: Never    Tobacco comments:     quit in 1990s   Substance and Sexual Activity    Alcohol use: No    Drug use: No    Sexual activity: Not Currently     Review of Systems   Constitutional:  Positive for appetite change. Negative for chills and fever.   Respiratory:  Negative for cough and shortness of breath.    Cardiovascular:  Negative for chest pain and leg swelling.   Gastrointestinal:  Negative for nausea and vomiting.   Genitourinary:  Negative for difficulty urinating and dysuria.   Musculoskeletal:  Negative for arthralgias and gait problem.   Psychiatric/Behavioral:  Negative for agitation and confusion.     Objective:     Vital Signs (Most Recent):  Temp: 97.1 °F (36.2 °C) (04/04/23 0800)  Pulse: 84 (04/04/23 1000)  Resp: 18 (04/04/23 0800)  BP: 110/61 (04/04/23 0800)  SpO2: (!) 93 % (04/04/23 0800)   Vital Signs (24h Range):  Temp:  [97.1 °F (36.2 °C)-98.7 °F (37.1 °C)] 97.1 °F (36.2 °C)  Pulse:  [] 84  Resp:  [16-22] 18  SpO2:  [92 %-96 %] 93 %  BP: (110-160)/(60-82) 110/61     Weight: 84.4 kg (186 lb 1.1 oz)  Body mass index is 26.7 kg/m².    Physical Exam  Constitutional:       Appearance: Normal appearance.   Cardiovascular:      Rate and Rhythm: Normal rate and regular rhythm.   Pulmonary:      Effort: Pulmonary effort is normal.      Breath sounds: Normal breath sounds.   Abdominal:      General: Abdomen is flat. Bowel sounds are normal.      Palpations: Abdomen is soft.   Musculoskeletal:      Right lower leg: Edema (+1) present.      Left lower leg: Edema (+1) present.   Skin:     General: Skin is warm and dry.   Neurological:      Mental Status: She is alert and oriented to person, place, and time. Mental status is at baseline.           Significant Labs: A1C:   Recent Labs   Lab 03/29/23  0430   HGBA1C 4.9       ABGs: No results for input(s): PH, PCO2, HCO3, POCSATURATED, BE, TOTALHB, COHB, METHB, O2HB, POCFIO2, PO2 in the last 48 hours.  Bilirubin:   Recent Labs   Lab 03/30/23  0316 03/31/23  0419 04/02/23  0527 04/03/23  0355 04/04/23  0553   BILITOT 0.3 0.2 0.2 0.2 0.2       Blood Culture:   No results for input(s): LABBLOO in the last 48 hours.    BMP:   Recent Labs   Lab 04/04/23  0553   *   *   K 4.0      CO2 20*   BUN 22   CREATININE 0.5   CALCIUM 7.5*       CBC:   Recent Labs   Lab 04/03/23  0355 04/04/23  0553   WBC 6.57 5.70   HGB 8.6* 8.4*   HCT 26.4* 24.6*   * 149*       CMP:   Recent Labs   Lab 04/03/23  0355 04/04/23  0553   * 133*   K 3.9 4.0    107   CO2 20* 20*   GLU 86 130*   BUN 23 22   CREATININE 0.4* 0.5   CALCIUM 7.6* 7.5*   PROT 3.8* 3.9*    ALBUMIN 1.1* 1.1*   BILITOT 0.2 0.2   ALKPHOS 37* 35*   AST 38 26   ALT 26 21   ANIONGAP 5* 6*       Cardiac Markers: No results for input(s): CKMB, MYOGLOBIN, BNP, TROPISTAT in the last 48 hours.  Coagulation: No results for input(s): PT, INR, APTT in the last 48 hours.        Magnesium: No results for input(s): MG in the last 48 hours.    POCT Glucose:   Recent Labs   Lab 04/03/23  1612 04/03/23  1940 04/04/23  0719   POCTGLUCOSE 102 168* 129*       Significant Imaging: CXR: mild patch bibasilar airspace disease or atelectasis     CTA chest:      1. No CT evidence of pulmonary thromboembolism.  2. Multifocal airspace disease, likely pneumonia.  3. Small bilateral pleural fluid collections.    ECHO: EF 70%     LE u/s   Impression:     Study technically difficult secondary to vascular calcifications and significant edema within the soft tissues.  There is lack of augmentation and compression of the mid left femoral vein concerning for possible thrombus.     Findings were discussed with Veronica Frank at 14:20 on3/31/2023.        Electronically signed by: Xochitl Chawla      Assessment/Plan:      * Severe sepsis  This patient does not have evidence of infective focus  My overall impression is septic shock due to MAP < 65.  Source: Unknown   Antibiotics given-   Antibiotics (72h ago, onward)    Start     Stop Route Frequency Ordered    04/03/23 0930  doxycycline tablet 100 mg         -- Oral Every 12 hours 04/03/23 0821        Latest lactate reviewed-  No results for input(s): LACTATE in the last 72 hours.  Organ dysfunction indicated by Encephalopathy     Fluid challenge Patient received 3.2 L bolus and now receiving 100cc hour of IV fluids.      Post- resuscitation assessment Yes Perfusion exam was performed within 6 hours of septic shock presentation after bolus shows Inadequate tissue perfusion assessed by invasive monitoring       Will Start Pressors- Levophed for MAP of 65  Source control achieved by: IV  fluids, IV vanc and IV zosyn       No lactic acidosis or elevated procal at presentation.   + PCR blood staph.  Blood cultures gram positive cocci.    + PNA on CTA.    Echo EF 70%      4/3 Blood cultures positive for staph capitis/ staph epidermidis.  Will plan for 7-9 days of doxy po.      Resolved     Embolic stroke involving right middle cerebral artery  Continue statin   Attempt to increase Eliquis to therapeutic dosage.        Pneumonia  Seen on CTA chest  Received 6 days of zosyn.    Continue doxy        Bacteremia  Staph bacteremia seen on blood cultures ---sens pending   Currently on IV vanc/zosyn day   Repeat blood cultures.  NGTD   Doxycyline sensitive.  Will need 7-9 day course.      Anemia  Positive FOBT  Transfuse 1 unit of PRBC   Anemia workup ordered---Fe studies c/w anemia of chronic disease. B12 ordered but not drawn. Will call lab  Repeat cbc with 2 point increase in hgb    4/3 Hg  Stable at 8.6   Start iron 325mg qd.      4/4 stable     Acute DVT (deep venous thrombosis)  Patient with history of DVT 2016, right LE. hypercoag work up negative at that time, but stayed on NOAC due to h/o PAF and CVA.   Not currently on appropriate dosage of Eliquis  CTA negative for PE    Eliquis held due to decrease in h/h   U/s now with likely DVT LLE, but difficult study technically.   Prophylactic lovenox dose started 4/1/23 and monitor h/h. Feel better with this option since has shorter half life than NOAC in case she bleeds again and we have to stop it.    Would need to get her back on full dose NOAC as quickly as we safely can.     4/3 d/c lovenox and will try to start low dose eliquis which is patients home dose.  Will monitor h/h.      4/4 Attempt to give therapeutic dose of Eliquis today.  CBC tomorrow           Hypoalbuminemia  Albumin 1.1  Likely contributing to hypotension  Dietician consulted (they recommend BOOST TID) and started on TPN   D/c lipids   PT does not want PEG tube or MBSS (concern for  aspiration)  Continue on pureed diet       Aneurysm of heart  Echo with EF 70%  Hypotension     Type 2 diabetes mellitus with neurologic complication  Patient's FSGs are controlled on current medication regimen.  Last A1c reviewed-   Lab Results   Component Value Date    HGBA1C 4.9 03/29/2023     Most recent fingerstick glucose reviewed-   Recent Labs   Lab 04/03/23  1330 04/03/23  1612 04/03/23  1940 04/04/23  0719   POCTGLUCOSE 85 102 168* 129*     Current correctional scale  Medium  Maintain anti-hyperglycemic dose as follows-   Antihyperglycemics (From admission, onward)    Start     Stop Route Frequency Ordered    04/03/23 2100  insulin detemir U-100 (Levemir) pen 9 Units         -- SubQ Nightly 04/03/23 1225    03/29/23 0744  insulin aspart U-100 pen 0-10 Units         -- SubQ Before meals & nightly PRN 03/29/23 0745        Hold Oral hypoglycemics while patient is in the hospital.    3/30 Decreasing clinimix.  Glucose on lower side.  Will adjust determir.      Essential hypertension  BP on lower side   Will continue to hold all home bp medications       S/P CABG x 4  Denies CP  Continue statin   Resume Eliquis         VTE Risk Mitigation (From admission, onward)         Ordered     apixaban tablet 5 mg  2 times daily         04/04/23 1009     IP VTE HIGH RISK PATIENT  Once         03/29/23 0243     Place sequential compression device  Until discontinued         03/29/23 0243     Place KANWAL hose  Until discontinued         03/29/23 0243                Discharge Planning   CARLOS:      Code Status: DNR   Is the patient medically ready for discharge?:     Reason for patient still in hospital (select all that apply): Patient trending condition  Discharge Plan A: Return to nursing home                  Veronica Frank PA-C  Department of Hospital Medicine   [place] - Med Surg (3rd Fl)

## 2023-04-04 NOTE — ASSESSMENT & PLAN NOTE
Patient's FSGs are controlled on current medication regimen.  Last A1c reviewed-   Lab Results   Component Value Date    HGBA1C 4.9 03/29/2023     Most recent fingerstick glucose reviewed-   Recent Labs   Lab 04/03/23  1330 04/03/23  1612 04/03/23  1940 04/04/23  0719   POCTGLUCOSE 85 102 168* 129*     Current correctional scale  Medium  Maintain anti-hyperglycemic dose as follows-   Antihyperglycemics (From admission, onward)    Start     Stop Route Frequency Ordered    04/03/23 2100  insulin detemir U-100 (Levemir) pen 9 Units         -- SubQ Nightly 04/03/23 1225    03/29/23 0744  insulin aspart U-100 pen 0-10 Units         -- SubQ Before meals & nightly PRN 03/29/23 0745        Hold Oral hypoglycemics while patient is in the hospital.    3/30 Decreasing clinimix.  Glucose on lower side.  Will adjust determir.

## 2023-04-04 NOTE — ASSESSMENT & PLAN NOTE
Albumin 1.1  Likely contributing to hypotension  Dietician consulted (they recommend BOOST TID) and started on TPN   D/c lipids   PT does not want PEG tube or MBSS (concern for aspiration)  Continue on pureed diet

## 2023-04-04 NOTE — PT/OT/SLP PROGRESS
Physical Therapy      Patient Name:  Michelle Rowell   MRN:  [id number]    Patient not seen today secondary to Patient unwilling to participate. Patient refused PT tx today. Will follow-up tomorrow.

## 2023-04-04 NOTE — PHYSICIAN QUERY
PT Name: Michelle Rowell  MR #: [id number]     DOCUMENTATION CLARIFICATION     CDS/: Genesis Paredes RN CDI         Contact information: [email or web address]   This form is a permanent document in the medical record.     Query Date: April 4, 2023    By submitting this query, we are merely seeking further clarification of documentation.  Please utilize your independent clinical judgment when addressing the question(s) below.  The Medical Record contains the following:  Indicators Supporting Clinical Findings Location in Medical Record   X HR         RR          BP        Temp Vital Signs (24h Range):  Temp:  [94.1 °F (34.5 °C)-98.2 °F (36.8 °C)] 98.2 °F (36.8 °C)  Pulse:  [61-93] 89  Resp:  [11-24] 18  SpO2:  [94 %-100 %] 97 %  BP: ()/(36-70) 97/54   H&P 3/29 M Monika TROTTER/ CAMILLA Campoverde MD   X Lactic Acid          Procalcitonin Lactic 3/28  1.6  Procal .3/28   0.15   Lab     X WBC           Bands          CRP            WBC   3/28  15.97   3/29  19.46   3/30  18.36   4/4    5.70   Lab   X Culture(s) Blood c/s 3/28  Staphylococcus Capitis  Staphylococcus Epidermidis   Lab       AMS, Confusion, LOC, etc.      Organ Dysfunction/Failure     X Bacteremia or Sepsis / Septic Severe sepsis unknown source   [facility] 4/3 ALEXX Frank PA-C/ ARABELLA Paredes MD     X Known or Suspected Source of Infection documented Coag negative bacteremia 2/2 bottles so treating as real.  5 days IV Vanco already completed. Transitioned to PO doxy which would also cover and to cover for pneumonia today. Appropriate.   Repeat blood cx negative.   Off pressors and hemodynamically stable today.    Pneumonia  Seen on CTA chest  Received 6 days of zosyn.  Continue doxy    Bacteremia        Staph bacteremia seen on blood cultures ---sens pending         Currently on IV vanc/zosyn day         Repeat blood cultures.  NGTD         Doxycyline sensitive.  Will need 7-9 day course.     [facility] 4/3 ALEXX Frank PA-C/ ARABELLA Paredes MD    (Failed) Outpatient  Treatment     X Medication Vancomycin IV  Zosyn IV  Levophed IV d/c'd 4/2   [facility] 4/3 M Monika TROTTER/ S Lena CLEMENTE    Treatment      Other          Provider, please specify diagnosis or diagnoses associated with above clinical findings.  [ x  ] Sepsis due to Staphylococcus Capitis and Staphylococcus Epidermidis bacteremia and unspecified Pneumonia.     [   ] Sepsis due to Staphylococcus Capitis and Staphylococcus Epidermidis bacteremia.     [   ] Sepsis due to unspecified Pneumonia.     [   ] Sepsis due to (suspected) organism (please specify): __________     [   ] Other Infectious Disease (please specify): __________     [  ] Clinically Undetermined         Please document in your progress notes daily for the duration of treatment until resolved and include in your discharge summary.

## 2023-04-05 PROBLEM — R00.1 SINUS BRADYCARDIA: Status: ACTIVE | Noted: 2023-01-01

## 2023-04-05 NOTE — ASSESSMENT & PLAN NOTE
Bradycardia in the 40's   Asymptomatic  [facility] consulted  Will monitor for 24 hours and plan to d/c back to NH tomorrow.

## 2023-04-05 NOTE — PT/OT/SLP PROGRESS
"Physical Therapy      Patient Name:  Michelle Rowell   MRN:  [id number]    Patient not seen today secondary to Patient unwilling to participate. Patient with daughter present refused P Therapy tx. Daughter stated, "She never wants Therapy even in [facility]".    "

## 2023-04-05 NOTE — PT/OT/SLP PROGRESS
Speech Language Pathology Treatment    Patient Name:  Michelle Rowell   MRN:  [id number]  Admitting Diagnosis: Severe sepsis    Assessment:   Michelle Rowell is an 83 y/o F who continues to present w/ clinical signs of oropharyngeal dysphagia & aspiration, likely chronic r/t old CVAs and acutely exacerbated by severe sepsis. Confirmed hx of oropharyngeal dysphagia (per MBSS 2019) appreciated. Pt's swallow efficiency and safety continue to appear impaired; diet modification is indicated. She presents w/ increased risk for malnutrition/dehydration and dysphagia-related pulmonary complication at this current time. Pt would benefit from repeat modified barium swallow study to define pt's current swallow physiology and determine tx plan; however, pt repeatedly has voiced that she does not want to proceed w/ MBSS. SLP to f/u w/ pt tomorrow.    Recommendations:   General Recommendations:  - SLP will f/u x5 for diagnostic swallow tx. Recommend repeat MBSS once medically stable w/ further recs pending MBSS results; however and of note, pt not amenable to procedure at this time, despite continued education provided to pt/family.  - Dietician f/u 2/2 risk for malnutrition/dehydration given swallow inefficiency and diet modification  -Recommend SLP services for dysphagia continue at next level of care post hospital d/c.  Diet recommendations:  - Puree (IDDSI Level 4) / Mildly thick/Nectar thick liquids (IDDSI Level 2) diet. Meds crushed in puree/pudding.  Risk management:  - Aspiration precautions:   - Small bites of pureed foods   - Small single sips of mildly thick/nectar thick liquids, avoid sequential drinking and avoid thin liquids   - HOB upright w/ all PO intake   - Check for pocketing/oral residue  -Control risk factors for aspiration PNA via (a) oral hygiene q4h, (b) HOB upright as tolerated  General Precautions:  - Standard, aspiration, pureed diet, nectar thick  Communication strategies: none    Subjective      Pt seen at bedside, awake and alert. Pt received sitting up in bed, appeared to be breathing comfortably on RA. No family present at bedside.  Patient goals: none stated     Pain/Comfort:  None verbalized    Objective:     Has the patient been evaluated by SLP for swallowing?   Yes  Keep patient NPO? No   Current Respiratory Status: 95% SpO2 on RA, RR --> 18    Oral trials utilizing nectar-thick liquids completed. Pt demonstrated immediate coughing/choking s/p unregulated straw drinking in 1 of 1 trials. When cued for one straw sip at a time, pt presented w/ no coughing/choking and w/ no overt clinicals signs of aspiration. Therapeutic swallows utilizing thin liquids also completed. Given cued straw sips 1 at a time, pt presented w/ coughing/choking s/p swallowing w/ inconsistent vocal quality change (wet quality) across 4 of 4 trials. Pt continues to demonstrated multiple spontaneous swallows and suspected delayed initiation of swallow. Pt continues to report increased comfort w/ swallowing nectar thick liquids vs thin liquids today.    Pt provided education on continued diet modification recs of puree diet w/ mildly-thick/nectar thick liquids and meds crushed in puree/pudding at this current time, as well as recommendation for repeat modified barium swallow study to define pt's current swallow physiology and determine tx plan. Pt rcontinues to refuse MBSS at this time. Pt provided full education on these topics. She verbalized understanding.    Goals:   Multidisciplinary Problems       SLP Goals          Problem: SLP    Goal Priority Disciplines Outcome   SLP Goal     SLP Ongoing, Progressing   Description: GOALS:    (1) Pt will tolerate least-restrictive PO diet without acute dysphagia-related pulmonary complication  (2) Pt will participate in MBSS to define swallow physiology & determine therapy plan  (3) Pt will perform verbal teachback of diet modification recommendations and demonstrate thickening of  liquid to IDDSI Level 2 (Mildly thick/Nectar Thick)  (4) Pt will perform verbal teachback of recommended feeding strategies and aspiration precautions to facilitate oral intake and & reduce aspiration risk                          Plan:     Patient to be seen:  5 x/week   Plan of Care expires:  04/13/23  Plan of Care reviewed with:  patient   SLP Follow-Up:  Yes       Discharge recommendations:  nursing facility, skilled, nursing facility, basic   Barriers to Discharge:  Level of Skilled Assistance Needed      Time Tracking:     SLP Treatment Date:   04/05/23  Speech Start Time:  1443  Speech Stop Time:  1459     Speech Total Time (min):  16 min    Billable Minutes: Treatment Swallowing Dysfunction 16 minutes    04/05/23

## 2023-04-05 NOTE — NURSING
HTN with CKD 2 / HL  Well controlled  DASH diet  BP goal <140/90  Discontinue  HCTZ (causeing hypokalemia) and start Lisinopril 10mg daily and continue Nifedipine ER 30mg daily  Increased Atorvastatin to 80mg at bedtime  Monitor   Patient with rhythm change of sinus to ventricular bradycardia. Dr. Hilario notified, orders noted.

## 2023-04-05 NOTE — SUBJECTIVE & OBJECTIVE
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     Current Outpatient Medications on File Prior to Encounter   Medication Sig    apixaban (ELIQUIS) 2.5 mg Tab Take 2.5 mg by mouth 2 (two) times daily.    apixaban (ELIQUIS) 2.5 mg Tab Take 1 tablet (2.5 mg total) by mouth 2 (two) times a day.    carvedilol (COREG) 12.5 MG tablet Take 1 tablet (12.5 mg total) by mouth 2 (two) times daily.    carvediloL (COREG) 12.5 MG tablet Take 1 tablet (12.5 mg total) by mouth 2 (two) times a day. HOLD for pulse less than 60.    cholecalciferol, vitamin D3, (VITAMIN D3) 1,000 unit capsule Take 1,000 Units by mouth once daily.    clopidogreL (PLAVIX) 75 mg tablet Take 1 tablet (75 mg total) by mouth once daily.    insulin aspart U-100 (NOVOLOG) 100 unit/mL (3 mL) InPn pen Inject 0-5 Units into the skin every 6 (six) hours as needed (Hyperglycemia).    magnesium oxide (MAG-OX) 400 mg (241.3 mg magnesium) tablet Take 400 mg by mouth 2 (two) times daily.    metFORMIN (GLUCOPHAGE) 500 MG tablet Take 1 tablet (500 mg total) by mouth 2 (two) times a day.    mirtazapine (REMERON) 30 MG tablet TAKE 1 TABLET BY MOUTH ONCE DAILY IN THE EVENING    mirtazapine (REMERON) 30 MG tablet Take 1 tablet (30  mg total) by mouth nightly    pantoprazole (PROTONIX) 40 MG tablet Take 1 tablet (40 mg total) by mouth once daily.    ramipril (ALTACE) 2.5 MG capsule Take 2.5 mg by mouth once daily.    ramipriL (ALTACE) 5 MG capsule Take 1 capsule (5 mg total) by mouth once daily.    senna-docusate 8.6-50 mg (PERICOLACE) 8.6-50 mg per tablet Take 1 tablet by mouth once daily.    simvastatin (ZOCOR) 20 MG tablet Take 1 tablet (20 mg total) by mouth every evening.    simvastatin (ZOCOR) 40 MG tablet Take 40 mg by mouth every evening.     Family History       Problem Relation (Age of Onset)    Cancer Father    Heart disease Father    Thyroid disease Daughter          Tobacco Use    Smoking status: Former     Types: Cigarettes    Smokeless tobacco: Never    Tobacco comments:     quit in 1990s   Substance and Sexual Activity    Alcohol use: No    Drug use: No    Sexual activity: Not Currently     Review of Systems   Constitutional:  Positive for appetite change. Negative for chills and fever.   Respiratory:  Negative for cough and shortness of breath.    Cardiovascular:  Negative for chest pain and leg swelling.   Gastrointestinal:  Negative for nausea and vomiting.   Genitourinary:  Negative for difficulty urinating and dysuria.   Musculoskeletal:  Negative for arthralgias and gait problem.   Psychiatric/Behavioral:  Negative for agitation and confusion.    Objective:     Vital Signs (Most Recent):  Temp: 98.1 °F (36.7 °C) (04/05/23 1130)  Pulse: (!) 49 (04/05/23 1200)  Resp: 20 (04/05/23 1130)  BP: 112/69 (04/05/23 1130)  SpO2: 95 % (04/05/23 1130)   Vital Signs (24h Range):  Temp:  [96.7 °F (35.9 °C)-98.1 °F (36.7 °C)] 98.1 °F (36.7 °C)  Pulse:  [41-96] 49  Resp:  [16-20] 20  SpO2:  [92 %-95 %] 95 %  BP: (102-133)/(52-74) 112/69     Weight: 84.4 kg (186 lb 1.1 oz)  Body mass index is 26.7 kg/m².    Physical Exam  Constitutional:       Appearance: Normal appearance.   Cardiovascular:      Rate and Rhythm: Regular rhythm. Bradycardia  present.   Pulmonary:      Effort: Pulmonary effort is normal.      Breath sounds: Normal breath sounds.   Abdominal:      General: Abdomen is flat. Bowel sounds are normal.      Palpations: Abdomen is soft.   Musculoskeletal:      Right lower leg: Edema (+1) present.      Left lower leg: Edema (+1) present.   Skin:     General: Skin is warm and dry.   Neurological:      Mental Status: She is alert and oriented to person, place, and time. Mental status is at baseline.           Significant Labs: A1C:   Recent Labs   Lab 03/29/23  0430   HGBA1C 4.9       ABGs: No results for input(s): PH, PCO2, HCO3, POCSATURATED, BE, TOTALHB, COHB, METHB, O2HB, POCFIO2, PO2 in the last 48 hours.  Bilirubin:   Recent Labs   Lab 03/30/23  0316 03/31/23  0419 04/02/23  0527 04/03/23  0355 04/04/23  0553   BILITOT 0.3 0.2 0.2 0.2 0.2       Blood Culture:   No results for input(s): LABBLOO in the last 48 hours.    BMP:   Recent Labs   Lab 04/04/23  0553   *   *   K 4.0      CO2 20*   BUN 22   CREATININE 0.5   CALCIUM 7.5*       CBC:   Recent Labs   Lab 04/04/23  0553 04/05/23  0558   WBC 5.70 5.55   HGB 8.4* 8.3*   HCT 24.6* 25.0*   * 166       CMP:   Recent Labs   Lab 04/04/23  0553   *   K 4.0      CO2 20*   *   BUN 22   CREATININE 0.5   CALCIUM 7.5*   PROT 3.9*   ALBUMIN 1.1*   BILITOT 0.2   ALKPHOS 35*   AST 26   ALT 21   ANIONGAP 6*       Cardiac Markers: No results for input(s): CKMB, MYOGLOBIN, BNP, TROPISTAT in the last 48 hours.  Coagulation: No results for input(s): PT, INR, APTT in the last 48 hours.        Magnesium: No results for input(s): MG in the last 48 hours.    POCT Glucose:   Recent Labs   Lab 04/04/23  2004 04/05/23  0748 04/05/23  1202   POCTGLUCOSE 108 44* 116*       Significant Imaging: CXR: mild patch bibasilar airspace disease or atelectasis     CTA chest:      1. No CT evidence of pulmonary thromboembolism.  2. Multifocal airspace disease, likely pneumonia.  3.  Small bilateral pleural fluid collections.    ECHO: EF 70%     LE u/s   Impression:     Study technically difficult secondary to vascular calcifications and significant edema within the soft tissues.  There is lack of augmentation and compression of the mid left femoral vein concerning for possible thrombus.     Findings were discussed with Veronica Frank at 14:20 on3/31/2023.        Electronically signed by: Xochitl Chawla

## 2023-04-05 NOTE — PROGRESS NOTES
[facility] ([facility])  [facility] Medicine  Progress Note    Patient Name: Michelle Rowell  MRN: [id number]  Patient Class: IP- Inpatient   Admission Date: 3/28/2023  Length of Stay: 7 days  Attending Physician: Liv Paredes MD  Primary Care Provider: Reno Hilario MD        Subjective:     Principal Problem:Severe sepsis        HPI:  Patient is an 82 year old female with medical history of DVT, aneurysm of heart, HTN, HLD, DM type 2, CAD s/p CABG, R MCA stroke who was sent from nursing home for hypotension and confusion.  Patient has not been eating lately since her room mate was placed on hospice.  She denies fever, CP, SOB, nausea, vomiting and urinary complaints.      Admitted for concern of sepsis.  Currently on IV fluids, IV vanc/IV zosyn and levophed.          Overview/Hospital Course:  3/30 Admitted for sepsis.  Still on pressor support, clinimix and IV fluids.  Will wean levophed to maintain MAP>65.  Staph bacteremia on PCR.  Final blood cultures pending.  CTA chest negative for PE, but lower lobe pneumonia present.  Will continue IV vanc and IV zosyn.  Plan for echocardiogram today due to gram positive bacteremia and bp on lower side.       3/31 Patient slowly weaning off levophed.  Adding lipids since albumin 1.1.  Hg 7.5 with positive FOBT.  1 unit of pRBC ordered.  Will d/c Eliquis at this time for DVT.  Echo with EF 70%.  Continue IV vanc and IV zosyn for pneumonia and staph bacteremia.        4/2  Off of pressors since 8pm last night.   Refuses anything purred (ST put her on purred diet), but does drink boost TID   Hgb did drop 1 point but she is still getting IVF and bun/creatinine has dropped as well. No bloody stools. VSS  Day 5 Vanc and Zosyn. Afebrile. Repeat blood CX 3/30 NGTD    4/3  Off levophed and remains HD stable.  Will start to wean clinimix and fluids.  SLP with concern for aspiration but patient doesn't want to do MBSS.  She will continue pureed diet and not interested in  PEG.  Discussed resuming Eliquis at home dose 2.5mg BID and monitor h/h.  Staph epidermis/capitis bacteremia sensitive to doxycyline.  Will plan for 7-9 more days since she received IV vanc 6 days and IV zosyn5 days.  Plan for discharge in the next 48hours.      4/4 Transitioned from ICU to the floor overnight.  HD stable slowly discontinuing IV fluids.  Will increase Eliquis one more time in attempts for patient to have therapeutic level.  Repeat CBC tomorrow.  Likely d/c back to nursing home within the next 24 hours.        4/5 Sinus bradycardia.  Cardiology consulted and no further recommendations at this time.  Glucose 44 this morning.  D/C long acting insulin.  Switched diet from diabetic to cardiac.  Will monitor HR and glucose.  Possible d/c within the next 24 hours.               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     Current Outpatient Medications on File Prior to Encounter   Medication Sig    apixaban (ELIQUIS) 2.5 mg Tab Take 2.5 mg by mouth 2 (two) times daily.    apixaban (ELIQUIS) 2.5 mg Tab Take 1 tablet (2.5 mg total) by mouth 2 (two) times a day.    carvedilol (COREG) 12.5 MG tablet Take 1 tablet (12.5 mg total) by mouth 2 (two) times daily.     carvediloL (COREG) 12.5 MG tablet Take 1 tablet (12.5 mg total) by mouth 2 (two) times a day. HOLD for pulse less than 60.    cholecalciferol, vitamin D3, (VITAMIN D3) 1,000 unit capsule Take 1,000 Units by mouth once daily.    clopidogreL (PLAVIX) 75 mg tablet Take 1 tablet (75 mg total) by mouth once daily.    insulin aspart U-100 (NOVOLOG) 100 unit/mL (3 mL) InPn pen Inject 0-5 Units into the skin every 6 (six) hours as needed (Hyperglycemia).    magnesium oxide (MAG-OX) 400 mg (241.3 mg magnesium) tablet Take 400 mg by mouth 2 (two) times daily.    metFORMIN (GLUCOPHAGE) 500 MG tablet Take 1 tablet (500 mg total) by mouth 2 (two) times a day.    mirtazapine (REMERON) 30 MG tablet TAKE 1 TABLET BY MOUTH ONCE DAILY IN THE EVENING    mirtazapine (REMERON) 30 MG tablet Take 1 tablet (30 mg total) by mouth nightly    pantoprazole (PROTONIX) 40 MG tablet Take 1 tablet (40 mg total) by mouth once daily.    ramipril (ALTACE) 2.5 MG capsule Take 2.5 mg by mouth once daily.    ramipriL (ALTACE) 5 MG capsule Take 1 capsule (5 mg total) by mouth once daily.    senna-docusate 8.6-50 mg (PERICOLACE) 8.6-50 mg per tablet Take 1 tablet by mouth once daily.    simvastatin (ZOCOR) 20 MG tablet Take 1 tablet (20 mg total) by mouth every evening.    simvastatin (ZOCOR) 40 MG tablet Take 40 mg by mouth every evening.     Family History       Problem Relation (Age of Onset)    Cancer Father    Heart disease Father    Thyroid disease Daughter          Tobacco Use    Smoking status: Former     Types: Cigarettes    Smokeless tobacco: Never    Tobacco comments:     quit in 1990s   Substance and Sexual Activity    Alcohol use: No    Drug use: No    Sexual activity: Not Currently     Review of Systems   Constitutional:  Positive for appetite change. Negative for chills and fever.   Respiratory:  Negative for cough and shortness of breath.    Cardiovascular:  Negative for chest pain and leg swelling.   Gastrointestinal:  Negative for nausea  and vomiting.   Genitourinary:  Negative for difficulty urinating and dysuria.   Musculoskeletal:  Negative for arthralgias and gait problem.   Psychiatric/Behavioral:  Negative for agitation and confusion.    Objective:     Vital Signs (Most Recent):  Temp: 98.1 °F (36.7 °C) (04/05/23 1130)  Pulse: (!) 49 (04/05/23 1200)  Resp: 20 (04/05/23 1130)  BP: 112/69 (04/05/23 1130)  SpO2: 95 % (04/05/23 1130)   Vital Signs (24h Range):  Temp:  [96.7 °F (35.9 °C)-98.1 °F (36.7 °C)] 98.1 °F (36.7 °C)  Pulse:  [41-96] 49  Resp:  [16-20] 20  SpO2:  [92 %-95 %] 95 %  BP: (102-133)/(52-74) 112/69     Weight: 84.4 kg (186 lb 1.1 oz)  Body mass index is 26.7 kg/m².    Physical Exam  Constitutional:       Appearance: Normal appearance.   Cardiovascular:      Rate and Rhythm: Regular rhythm. Bradycardia present.   Pulmonary:      Effort: Pulmonary effort is normal.      Breath sounds: Normal breath sounds.   Abdominal:      General: Abdomen is flat. Bowel sounds are normal.      Palpations: Abdomen is soft.   Musculoskeletal:      Right lower leg: Edema (+1) present.      Left lower leg: Edema (+1) present.   Skin:     General: Skin is warm and dry.   Neurological:      Mental Status: She is alert and oriented to person, place, and time. Mental status is at baseline.           Significant Labs: A1C:   Recent Labs   Lab 03/29/23  0430   HGBA1C 4.9       ABGs: No results for input(s): PH, PCO2, HCO3, POCSATURATED, BE, TOTALHB, COHB, METHB, O2HB, POCFIO2, PO2 in the last 48 hours.  Bilirubin:   Recent Labs   Lab 03/30/23  0316 03/31/23  0419 04/02/23  0527 04/03/23  0355 04/04/23  0553   BILITOT 0.3 0.2 0.2 0.2 0.2       Blood Culture:   No results for input(s): LABBLOO in the last 48 hours.    BMP:   Recent Labs   Lab 04/04/23  0553   *   *   K 4.0      CO2 20*   BUN 22   CREATININE 0.5   CALCIUM 7.5*       CBC:   Recent Labs   Lab 04/04/23  0553 04/05/23  0558   WBC 5.70 5.55   HGB 8.4* 8.3*   HCT 24.6* 25.0*   PLT  149* 166       CMP:   Recent Labs   Lab 04/04/23  0553   *   K 4.0      CO2 20*   *   BUN 22   CREATININE 0.5   CALCIUM 7.5*   PROT 3.9*   ALBUMIN 1.1*   BILITOT 0.2   ALKPHOS 35*   AST 26   ALT 21   ANIONGAP 6*       Cardiac Markers: No results for input(s): CKMB, MYOGLOBIN, BNP, TROPISTAT in the last 48 hours.  Coagulation: No results for input(s): PT, INR, APTT in the last 48 hours.        Magnesium: No results for input(s): MG in the last 48 hours.    POCT Glucose:   Recent Labs   Lab 04/04/23 2004 04/05/23  0748 04/05/23  1202   POCTGLUCOSE 108 44* 116*       Significant Imaging: CXR: mild patch bibasilar airspace disease or atelectasis     CTA chest:      1. No CT evidence of pulmonary thromboembolism.  2. Multifocal airspace disease, likely pneumonia.  3. Small bilateral pleural fluid collections.    ECHO: EF 70%     LE u/s   Impression:     Study technically difficult secondary to vascular calcifications and significant edema within the soft tissues.  There is lack of augmentation and compression of the mid left femoral vein concerning for possible thrombus.     Findings were discussed with Veronica Frank at 14:20 on3/31/2023.        Electronically signed by: Xochitl Chawla      Assessment/Plan:      * Severe sepsis  This patient does not have evidence of infective focus  My overall impression is septic shock due to MAP < 65.  Source: Unknown   Antibiotics given-   Antibiotics (72h ago, onward)      Start     Stop Route Frequency Ordered    04/03/23 0930  doxycycline tablet 100 mg         -- Oral Every 12 hours 04/03/23 0821          Latest lactate reviewed-  No results for input(s): LACTATE in the last 72 hours.  Organ dysfunction indicated by Encephalopathy     Fluid challenge Patient received 3.2 L bolus and now receiving 100cc hour of IV fluids.      Post- resuscitation assessment Yes Perfusion exam was performed within 6 hours of septic shock presentation after bolus shows  Inadequate tissue perfusion assessed by invasive monitoring       Will Start Pressors- Levophed for MAP of 65  Source control achieved by: IV fluids, IV vanc and IV zosyn       No lactic acidosis or elevated procal at presentation.   + PCR blood staph.  Blood cultures gram positive cocci.    + PNA on CTA.    Echo EF 70%      4/3 Blood cultures positive for staph capitis/ staph epidermidis.  Will plan for 7-9 days of doxy po.      Resolved     Embolic stroke involving right middle cerebral artery  Continue statin   Continue Eliquis        Sinus bradycardia  Bradycardia in the 40's   EKG low voltage  Asymptomatic  [facility] consulted  Will monitor for 24 hours and plan to d/c back to NH tomorrow.    Serial troponins and TSH ordered.        Pneumonia  Seen on CTA chest  Received 6 days of zosyn.    Continue doxy        Bacteremia  Staph bacteremia seen on blood cultures ---sens pending   Currently on IV vanc/zosyn day   Repeat blood cultures.  NGTD   Doxycyline sensitive.  Will need 7-9 day course.      Anemia  Positive FOBT  Transfuse 1 unit of PRBC   Anemia workup ordered---Fe studies c/w anemia of chronic disease. B12 ordered but not drawn. Will call lab  Repeat cbc with 2 point increase in hgb    4/3 Hg  Stable at 8.6   Start iron 325mg qd.      4/4 stable     Acute DVT (deep venous thrombosis)  Patient with history of DVT 2016, right LE. hypercoag work up negative at that time, but stayed on NOAC due to h/o PAF and CVA.   Not currently on appropriate dosage of Eliquis  CTA negative for PE    Eliquis held due to decrease in h/h   U/s now with likely DVT LLE, but difficult study technically.   Prophylactic lovenox dose started 4/1/23 and monitor h/h. Feel better with this option since has shorter half life than NOAC in case she bleeds again and we have to stop it.    Would need to get her back on full dose NOAC as quickly as we safely can.     4/3 d/c lovenox and will try to start low dose eliquis which is patients home  dose.  Will monitor h/h.      4/4 Attempt to give therapeutic dose of Eliquis today.  CBC tomorrow           Hypoalbuminemia  Albumin 1.1  Likely contributing to hypotension  Dietician consulted (they recommend BOOST TID) and started on TPN   D/c lipids   PT does not want PEG tube or MBSS (concern for aspiration)  Continue on pureed diet       Aneurysm of heart  Echo with EF 70%  Hypotension     Type 2 diabetes mellitus with neurologic complication  Patient's FSGs are controlled on current medication regimen.  Last A1c reviewed-   Lab Results   Component Value Date    HGBA1C 4.9 03/29/2023     Most recent fingerstick glucose reviewed-   Recent Labs   Lab 04/04/23  1614 04/04/23  2004 04/05/23  0748 04/05/23  1202   POCTGLUCOSE 120* 108 44* 116*     Current correctional scale  Medium  Maintain anti-hyperglycemic dose as follows-   Antihyperglycemics (From admission, onward)      Start     Stop Route Frequency Ordered    04/05/23 0806  insulin aspart U-100 pen 0-5 Units         -- SubQ Before meals & nightly PRN 04/05/23 0806          Hold Oral hypoglycemics while patient is in the hospital.    4/4 Decreasing clinimix.  Glucose on lower side.  Will adjust determir.      4/5 Hypoglycemic this morning at 44.  D/c determir and change diabetic diet to [facility].  Monitor glucose       Essential hypertension  HD stable   Will continue to hold all home bp medications       S/P CABG x 4  Denies CP  Continue statin   Continue Eliquis         VTE Risk Mitigation (From admission, onward)           Ordered     apixaban tablet 5 mg  2 times daily         04/04/23 1009     IP VTE HIGH RISK PATIENT  Once         03/29/23 0243     Place sequential compression device  Until discontinued         03/29/23 0243     Place KANWAL hose  Until discontinued         03/29/23 0243                    Discharge Planning   CARLOS:      Code Status: DNR   Is the patient medically ready for discharge?:     Reason for patient still in hospital (select all  that apply): Patient trending condition  Discharge Plan A: Return to nursing home                  Veronica Frank PA-C  Department of Hospital Medicine   [place] [facility] (3rd Fl)

## 2023-04-05 NOTE — ASSESSMENT & PLAN NOTE
Patient's FSGs are controlled on current medication regimen.  Last A1c reviewed-   Lab Results   Component Value Date    HGBA1C 4.9 03/29/2023     Most recent fingerstick glucose reviewed-   Recent Labs   Lab 04/04/23  1614 04/04/23 2004 04/05/23  0748 04/05/23  1202   POCTGLUCOSE 120* 108 44* 116*     Current correctional scale  Medium  Maintain anti-hyperglycemic dose as follows-   Antihyperglycemics (From admission, onward)    Start     Stop Route Frequency Ordered    04/05/23 0806  insulin aspart U-100 pen 0-5 Units         -- SubQ Before meals & nightly PRN 04/05/23 0806        Hold Oral hypoglycemics while patient is in the hospital.    4/4 Decreasing clinimix.  Glucose on lower side.  Will adjust determir.      4/5 Hypoglycemic this morning at 44.  D/c determir and change diabetic diet to [facility].  Monitor glucose

## 2023-04-05 NOTE — CONSULTS
[place] - [facility] Surg ([facility])  Cardiology  Consult Note    Patient Name: Michelle Rowell  MRN: [id number]  Admission Date: 3/28/2023  Hospital Length of Stay: 7 days  Code Status: DNR   Attending Provider: Liv Paredes MD   Consulting Provider: Gaby Brown NP  Primary Care Physician: Reno Hilario MD  Principal Problem:Severe sepsis    Patient information was obtained from patient, past medical records, ER records, and primary team.     Consults  Subjective:     Chief Complaint:  hypotension and confusion     HPI: Michelle Rowell is a 82 y.o. female resident of nursing home with a PMHx of CVA, HHD, CAD, hyperlipidemia, DM, DVT and emphysema was sent to the ER for hypotension and confusion. She was admitted for sepsis. CTA chest negative for PE, but revealed lower lobe pneumonia. She was started on IV fluids, IV antibiotic and levophed for blood pressure support. Echo showed EF of 70%. She continued to clinically improve and pressors and IV fluids were discontinued. Today on telemetry, patient with episode of sinus bradycaria, HR 40's. CIS asked to evaluate.      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     Current Outpatient Medications on File Prior  to Encounter   Medication Sig    apixaban (ELIQUIS) 2.5 mg Tab Take 2.5 mg by mouth 2 (two) times daily.    apixaban (ELIQUIS) 2.5 mg Tab Take 1 tablet (2.5 mg total) by mouth 2 (two) times a day.    carvedilol (COREG) 12.5 MG tablet Take 1 tablet (12.5 mg total) by mouth 2 (two) times daily.    carvediloL (COREG) 12.5 MG tablet Take 1 tablet (12.5 mg total) by mouth 2 (two) times a day. HOLD for pulse less than 60.    cholecalciferol, vitamin D3, (VITAMIN D3) 1,000 unit capsule Take 1,000 Units by mouth once daily.    clopidogreL (PLAVIX) 75 mg tablet Take 1 tablet (75 mg total) by mouth once daily.    insulin aspart U-100 (NOVOLOG) 100 unit/mL (3 mL) InPn pen Inject 0-5 Units into the skin every 6 (six) hours as needed (Hyperglycemia).    magnesium oxide (MAG-OX) 400 mg (241.3 mg magnesium) tablet Take 400 mg by mouth 2 (two) times daily.    metFORMIN (GLUCOPHAGE) 500 MG tablet Take 1 tablet (500 mg total) by mouth 2 (two) times a day.    mirtazapine (REMERON) 30 MG tablet TAKE 1 TABLET BY MOUTH ONCE DAILY IN THE EVENING    mirtazapine (REMERON) 30 MG tablet Take 1 tablet (30 mg total) by mouth nightly    pantoprazole (PROTONIX) 40 MG tablet Take 1 tablet (40 mg total) by mouth once daily.    ramipril (ALTACE) 2.5 MG capsule Take 2.5 mg by mouth once daily.    ramipriL (ALTACE) 5 MG capsule Take 1 capsule (5 mg total) by mouth once daily.    senna-docusate 8.6-50 mg (PERICOLACE) 8.6-50 mg per tablet Take 1 tablet by mouth once daily.    simvastatin (ZOCOR) 20 MG tablet Take 1 tablet (20 mg total) by mouth every evening.    simvastatin (ZOCOR) 40 MG tablet Take 40 mg by mouth every evening.     Family History       Problem Relation (Age of Onset)    Cancer Father    Heart disease Father    Thyroid disease Daughter          Tobacco Use    Smoking status: Former     Types: Cigarettes    Smokeless tobacco: Never    Tobacco comments:     quit in 1990s   Substance and Sexual Activity    Alcohol use: No    Drug use: No     Sexual activity: Not Currently     Review of Systems   Constitutional: Positive for decreased appetite.   HENT: Negative.     Cardiovascular: Negative.    Respiratory: Negative.     Musculoskeletal: Negative.    Neurological: Negative.    Objective:     Vital Signs (Most Recent):  Temp: 98.1 °F (36.7 °C) (04/05/23 1130)  Pulse: (!) 49 (04/05/23 1200)  Resp: 20 (04/05/23 1130)  BP: 112/69 (04/05/23 1130)  SpO2: 95 % (04/05/23 1130)   Vital Signs (24h Range):  Temp:  [96.7 °F (35.9 °C)-98.1 °F (36.7 °C)] 98.1 °F (36.7 °C)  Pulse:  [41-96] 49  Resp:  [16-20] 20  SpO2:  [92 %-95 %] 95 %  BP: (102-133)/(52-74) 112/69     Weight: 84.4 kg (186 lb 1.1 oz)  Body mass index is 26.7 kg/m².    SpO2: 95 %         Intake/Output Summary (Last 24 hours) at 4/5/2023 1228  Last data filed at 4/4/2023 1708  Gross per 24 hour   Intake 420 ml   Output --   Net 420 ml       Lines/Drains/Airways       Central Venous Catheter Line  Duration             Percutaneous Central Line Insertion/Assessment - Triple Lumen  03/28/23 2216 7 days                    Physical Exam  Constitutional:       Appearance: Normal appearance.   HENT:      Head: Normocephalic and atraumatic.   Cardiovascular:      Rate and Rhythm: Normal rate and regular rhythm.      Pulses: Normal pulses.      Heart sounds: Normal heart sounds.   Pulmonary:      Effort: Pulmonary effort is normal.      Breath sounds: Rales present.   Musculoskeletal:      Cervical back: Normal range of motion and neck supple.      Right lower leg: Edema present.      Left lower leg: Edema present.   Skin:     General: Skin is warm and dry.   Neurological:      Mental Status: She is alert. Mental status is at baseline.       Significant Labs: CMP   Recent Labs   Lab 04/04/23  0553   *   K 4.0      CO2 20*   *   BUN 22   CREATININE 0.5   CALCIUM 7.5*   PROT 3.9*   ALBUMIN 1.1*   BILITOT 0.2   ALKPHOS 35*   AST 26   ALT 21   ANIONGAP 6*   , CBC   Recent Labs   Lab  04/04/23  0553 04/05/23  0558   WBC 5.70 5.55   HGB 8.4* 8.3*   HCT 24.6* 25.0*   * 166   , Troponin No results for input(s): TROPONINI in the last 48 hours., and All pertinent lab results from the last 24 hours have been reviewed.    Significant Imaging: Echocardiogram: Transthoracic echo (TTE) complete (Cupid Only):   Results for orders placed or performed during the hospital encounter of 03/28/23   Echo Saline Bubble? No   Result Value Ref Range    BSA 1.8 m2    TDI SEPTAL 0.08 m/s    Left Ventricular Outflow Tract Mean Velocity 0.91 cm/s    Left Ventricular Outflow Tract Mean Gradient 3.47 mmHg    Pulmonary Valve Mean Velocity 0.97 m/s    TDI LATERAL 0.08 m/s    PV PEAK VELOCITY 1.22 cm/s    LVIDd 3.19 (A) 3.5 - 6.0 cm    IVS 1.13 (A) 0.6 - 1.1 cm    Posterior Wall 0.92 0.6 - 1.1 cm    LVIDs 2.73 2.1 - 4.0 cm    FS 14 28 - 44 %    Sinus 2.60 cm    STJ 2.52 cm    Ascending aorta 2.66 cm    LV mass 93.30 g    LA size 2.86 cm    RVDD 2.05 cm    Left Ventricle Relative Wall Thickness 0.58 cm    AV mean gradient 3 mmHg    AV valve area 2.96 cm2    AV Velocity Ratio 1.02     AV index (prosthetic) 0.94     Mean e' 0.08 m/s    IVRT 205.52 msec    LVOT diameter 2.00 cm    LVOT area 3.1 cm2    LVOT peak hector 1.09 m/s    LVOT peak VTI 22.70 cm    Ao peak hector 1.07 m/s    Ao VTI 24.1 cm    RVOT peak hector 0.97 m/s    RVOT peak VTI 26.8 cm    LVOT stroke volume 71.28 cm3    AV peak gradient 5 mmHg    PV mean gradient 2.56 mmHg    TR Max Hector 2.82 m/s    LV Systolic Volume 27.83 mL    LV Systolic Volume Index 14.7 mL/m2    LV Diastolic Volume 40.75 mL    LV Diastolic Volume Index 21.56 mL/m2    LV Mass Index 49 g/m2    RA Major Axis 4.20 cm    Left Atrium Minor Axis 4.75 cm    Left Atrium Major Axis 5.10 cm    Triscuspid Valve Regurgitation Peak Gradient 32 mmHg    LA Volume Index (Mod) 21.7 mL/m2    LA volume (mod) 41.08 cm3    RA Width 3.47 cm    EF 70 %    Narrative    · Concentric remodeling and hyperdynamic systolic  function.  · The estimated LV ejection fraction is probably around 70%. Technically   very limited study. LV cavity appears to be small.  · Indeterminate left ventricular diastolic function with inconclusive left   atrial pressure.  · Normal right ventricular size with normal right ventricular systolic   function.  · This is a poor study with limited views of the endocardium . Consider   repeat study with contrast.       and X-Ray: CXR: X-Ray Chest 1 View (CXR):   Results for orders placed or performed during the hospital encounter of 03/28/23   X-Ray Chest 1 View    Narrative    EXAMINATION:  XR CHEST 1 VIEW    CLINICAL HISTORY:  Wheezing    COMPARISON:  03/28/2023    FINDINGS:  Cardiac silhouette is unchanged.  Right-sided central line remains.  There are low lung volumes.  Hazy opacities at the perihilar aspects of the lungs, left greater than right, may reflect edema.  Small left pleural effusion questioned.      Impression    As above.      Electronically signed by: Miguel Price MD  Date:    04/03/2023  Time:    10:45     Assessment and Plan:     Active Diagnoses:    Diagnosis Date Noted POA    PRINCIPAL PROBLEM:  Severe sepsis [A41.9, R65.20] 03/29/2023 Yes    Anemia [D64.9] 03/31/2023 Yes    Bacteremia [R78.81] 03/31/2023 Yes    Pneumonia [J18.9] 03/31/2023 Yes    Hypoalbuminemia [E88.09] 03/29/2023 Yes    Acute DVT (deep venous thrombosis) [I82.409] 03/29/2023 Clinically Undetermined    Aneurysm of heart [I25.3] 06/07/2019 Yes    Embolic stroke involving right middle cerebral artery [I63.411] 06/06/2019 Yes    S/P CABG x 4 [Z95.1] 12/01/2015 Not Applicable    Essential hypertension [I10] 12/01/2015 Yes    Type 2 diabetes mellitus with neurologic complication [E11.49] 12/01/2015 Yes      Problems Resolved During this Admission:       VTE Risk Mitigation (From admission, onward)           Ordered     apixaban tablet 5 mg  2 times daily         04/04/23 1009     IP VTE HIGH RISK PATIENT  Once         03/29/23  0243     Place sequential compression device  Until discontinued         03/29/23 0243     Place KANWAL hose  Until discontinued         03/29/23 0243                  Current Facility-Administered Medications   Medication    0.9%  NaCl infusion (for blood administration)    acetaminophen tablet 650 mg    apixaban tablet 5 mg    atorvastatin tablet 20 mg    dextrose 10% bolus 125 mL 125 mL    dextrose 10% bolus 250 mL 250 mL    dextrose 40 % gel 15,000 mg    doxycycline tablet 100 mg    ferrous sulfate tablet 1 each    glucagon (human recombinant) injection 1 mg    glucose chewable tablet 16 g    glucose chewable tablet 24 g    HYDROcodone-acetaminophen 5-325 mg per tablet 1 tablet    insulin aspart U-100 pen 0-5 Units    melatonin tablet 6 mg    ondansetron injection 4 mg    pantoprazole EC tablet 40 mg    sodium chloride 0.9% flush 10 mL     Admitted with sepsis now resolved. Noted with some asymptomatic bradycadia  CAD status post four-vessel CABG 2007 Mild ischemic cardiomyopathy left ventricular ejection fraction 40% June 2019.  No stress-induced ischemia perfusion study September 2019.  Hypertensive heart disease - on carvediol 12.5 and lisiniopril 20mg at home  History of CVA June 2019.  Nonhemorrhagic CVA complicated by DVT PE July 2016.  IVC filter was removed June 2016.  DVT cleared on ultrasound 2019 - Eliquis 2.5mg at home  Extensive kyphoscoliosis.  Mild bilateral carotid disease on ultrasound September 2011.  Lipids well controlled November 2022 - simvastatin 40mg  Bradycardia  Sepsis    Plan: hold home Carvedilol  Agree with Eliquis 5 mg bid for probable DVT  Pt is DNR, homebound from CVA. No indication for PM.        Thank you for your consult. I will follow-up with patient. Please contact us if you have any additional questions.    Gaby Brown NP scribed for Dr. Michelle  Cardiology   [place] - [facility] Surg (3rd Fl)    I have personally interviewed and examined this patient face-to-face, and as the  physician. Documented the above plan and rendered all medical decision making for this encounter. I have read and agree with the above documentation unless otherwise noted.

## 2023-04-05 NOTE — PT/OT/SLP PROGRESS
Speech Language Pathology Treatment    Patient Name:  Michelle Rowell   MRN:  [id number]  Admitting Diagnosis: Severe sepsis    Assessment:   Michelle Rowell is an 81 y/o F who continues to present w/ clinical signs of oropharyngeal dysphagia & aspiration, likely chronic r/t old CVAs and acutely exacerbated by severe sepsis. Confirmed hx of oropharyngeal dysphagia (per MBSS 2019) appreciated. Pt's swallow efficiency and safety continue to appear impaired; diet modification is indicated. She presents w/ increased risk for malnutrition/dehydration and dysphagia-related pulmonary complication at this current time. Pt would benefit from repeat modified barium swallow study to define pt's current swallow physiology and determine tx plan; however, pt repeatedly has voiced that she does not want to proceed w/ MBSS. SLP to f/u w/ pt tomorrow.    Recommendations:   General Recommendations:  - SLP will f/u x5 for diagnostic swallow tx. Recommend repeat MBSS once medically stable w/ further recs pending MBSS results; however and of note, pt not amenable to procedure at this time, despite continued education provided to pt/family.  - Dietician f/u 2/2 risk for malnutrition/dehydration given swallow inefficiency and diet modification  -Recommend SLP services for dysphagia continue at next level of care post hospital d/c.  Diet recommendations:  - Puree (IDDSI Level 4) / Mildly thick/Nectar thick liquids (IDDSI Level 2) diet. Meds crushed in puree/pudding.  Risk management:  - Aspiration precautions:   - Small bites of pureed foods   - Small single sips of mildly thick/nectar thick liquids, avoid thin liquids   - HOB upright w/ all PO intake   - Check for pocketing/oral residue  -Control risk factors for aspiration PNA via (a) oral hygiene q4h, (b) HOB upright as tolerated  General Precautions:  - Standard, aspiration, pureed diet, nectar thick  Communication strategies: none    Subjective     Pt seen at bedside, awake and  alert. Pt received sitting up in bed, appeared to be breathing comfortably on RA. No family present at bedside.  Patient goals: none stated     Pain/Comfort:  None verbalized    Objective:     Has the patient been evaluated by SLP for swallowing?   Yes  Keep patient NPO? No   Current Respiratory Status: 95% SpO2, RR --> 18    Therapeutic swallows utilizing thin liquid small straw sip presentations targeted today. Pt completed x5 swallows w/ immediate or mildly delayed coughing/choking (prolonged) demonstrated across 100% of opportunities. Inconsistent wet vocal quality demonstrated. Pt continues to demonstrated multiple spontaneous swallows and suspected delayed initiation of swallow. Pt completed x4 swallows of nectar thick liquids via cued small straw sips w/ no coughing/choking and w/ no overt clinical signs of aspiration across 100% of opportunities. Pt reported increased comfort w/ swallowing nectar thick liquids vs thin liquids today.    Pt provided education on continued diet modification recs of puree diet w/ mildly-thick/nectar thick liquids and meds crushed in puree/pudding at this current time, as well as recommendation for repeat modified barium swallow study to define pt's current swallow physiology and determine tx plan. Pt rcontinues to refuse MBSS at this time. Pt provided full education on these topics. She verbalized understanding.    Goals:   Multidisciplinary Problems       SLP Goals          Problem: SLP    Goal Priority Disciplines Outcome   SLP Goal     SLP Ongoing, Progressing   Description: GOALS:    (1) Pt will tolerate least-restrictive PO diet without acute dysphagia-related pulmonary complication  (2) Pt will participate in MBSS to define swallow physiology & determine therapy plan  (3) Pt will perform verbal teachback of diet modification recommendations and demonstrate thickening of liquid to IDDSI Level 2 (Mildly thick/Nectar Thick)  (4) Pt will perform verbal teachback of recommended  feeding strategies and aspiration precautions to facilitate oral intake and & reduce aspiration risk                          Plan:     Patient to be seen:  5 x/week   Plan of Care expires:  04/13/23  Plan of Care reviewed with:  patient, family   SLP Follow-Up:  Yes       Discharge recommendations:  nursing facility, skilled, nursing facility, basic   Barriers to Discharge:  Level of Skilled Assistance Needed      Time Tracking:     SLP Treatment Date:   04/04/23  Speech Start Time:  1754  Speech Stop Time:  1807     Speech Total Time (min):  13 min    Billable Minutes: Treatment Swallowing Dysfunction 13 minutes    04/04/23

## 2023-04-05 NOTE — PLAN OF CARE
Problem: Adult Inpatient Plan of Care  Goal: Optimal Comfort and Wellbeing  Outcome: Ongoing, Progressing     Problem: Diabetes Comorbidity  Goal: Blood Glucose Level Within Targeted Range  Outcome: Ongoing, Progressing     Problem: Adjustment to Illness (Sepsis/Septic Shock)  Goal: Optimal Coping  Outcome: Ongoing, Progressing     Problem: Fall Injury Risk  Goal: Absence of Fall and Fall-Related Injury  Outcome: Ongoing, Progressing

## 2023-04-05 NOTE — PLAN OF CARE
Plan of care reviewed with pt and family. Pt and family voiced understanding. No SOB or acute distress noted. Patient resting comfortably in bed. Fall/Safety maintained, bed low, locked, side rails up x 2, call bell within reach. All needs met at this time. Pt and family instructed to call with any new needs. Pt with slip resistant socks on; remains free of fall or injury.     Problem: Infection  Goal: Absence of Infection Signs and Symptoms  Outcome: Ongoing, Progressing     Problem: Adult Inpatient Plan of Care  Goal: Plan of Care Review  Outcome: Ongoing, Progressing  Goal: Patient-Specific Goal (Individualized)  Outcome: Ongoing, Progressing  Goal: Absence of Hospital-Acquired Illness or Injury  Outcome: Ongoing, Progressing  Goal: Optimal Comfort and Wellbeing  Outcome: Ongoing, Progressing  Goal: Readiness for Transition of Care  Outcome: Ongoing, Progressing     Problem: Diabetes Comorbidity  Goal: Blood Glucose Level Within Targeted Range  Outcome: Ongoing, Progressing     Problem: Skin Injury Risk Increased  Goal: Skin Health and Integrity  Outcome: Ongoing, Progressing     Problem: Adjustment to Illness (Sepsis/Septic Shock)  Goal: Optimal Coping  Outcome: Ongoing, Progressing     Problem: Bleeding (Sepsis/Septic Shock)  Goal: Absence of Bleeding  Outcome: Ongoing, Progressing     Problem: Glycemic Control Impaired (Sepsis/Septic Shock)  Goal: Blood Glucose Level Within Desired Range  Outcome: Ongoing, Progressing     Problem: Infection Progression (Sepsis/Septic Shock)  Goal: Absence of Infection Signs and Symptoms  Outcome: Ongoing, Progressing     Problem: Nutrition Impaired (Sepsis/Septic Shock)  Goal: Optimal Nutrition Intake  Outcome: Ongoing, Progressing     Problem: Fall Injury Risk  Goal: Absence of Fall and Fall-Related Injury  Outcome: Ongoing, Progressing     Problem: Oral Intake Inadequate  Goal: Improved Oral Intake  Outcome: Ongoing, Progressing     Problem: Fluid Imbalance  (Pneumonia)  Goal: Fluid Balance  Outcome: Ongoing, Progressing     Problem: Infection (Pneumonia)  Goal: Resolution of Infection Signs and Symptoms  Outcome: Ongoing, Progressing     Problem: Respiratory Compromise (Pneumonia)  Goal: Effective Oxygenation and Ventilation  Outcome: Ongoing, Progressing

## 2023-04-06 NOTE — DISCHARGE SUMMARY
[facility] Surg ([facility])  [facility] Medicine  Discharge Summary      Patient Name: Michelle Rowell  MRN: [id number]  [facility]: [id number]  Patient Class: IP- Inpatient  Admission Date: 3/28/2023  Hospital Length of Stay: 8 days  Discharge Date and Time:  04/06/2023 11:20 AM  Attending Physician: Liv Paredes MD   Discharging Provider: Veronica Frank PA-C  Primary Care Provider: Reno Hilario MD    Primary Care Team: Networked reference to record PCT     HPI:   Patient is an 82 year old female with medical history of DVT, aneurysm of heart, HTN, HLD, DM type 2, CAD s/p CABG, R MCA stroke who was sent from nursing home for hypotension and confusion.  Patient has not been eating lately since her room mate was placed on hospice.  She denies fever, CP, SOB, nausea, vomiting and urinary complaints.      Admitted for concern of sepsis.  Currently on IV fluids, IV vanc/IV zosyn and levophed.          * No surgery found *      Hospital Course:   3/30 Admitted for sepsis.  Still on pressor support, clinimix and IV fluids.  Will wean levophed to maintain MAP>65.  Staph bacteremia on PCR.  Final blood cultures pending.  CTA chest negative for PE, but lower lobe pneumonia present.  Will continue IV vanc and IV zosyn.  Plan for echocardiogram today due to gram positive bacteremia and bp on lower side.       3/31 Patient slowly weaning off levophed.  Adding lipids since albumin 1.1.  Hg 7.5 with positive FOBT.  1 unit of pRBC ordered.  Will d/c Eliquis at this time for DVT.  Echo with EF 70%.  Continue IV vanc and IV zosyn for pneumonia and staph bacteremia.        4/2  Off of pressors since 8pm last night.   Refuses anything purred (ST put her on purred diet), but does drink boost TID   Hgb did drop 1 point but she is still getting IVF and bun/creatinine has dropped as well. No bloody stools. VSS  Day 5 Vanc and Zosyn. Afebrile. Repeat blood CX 3/30 NGTD    4/3  Off levophed and remains HD stable.  Will start to wean  clinimix and fluids.  SLP with concern for aspiration but patient doesn't want to do MBSS.  She will continue pureed diet and not interested in PEG.  Discussed resuming Eliquis at home dose 2.5mg BID and monitor h/h.  Staph epidermis/capitis bacteremia sensitive to doxycyline.  Will plan for 7-9 more days since she received IV vanc 6 days and IV zosyn5 days.  Plan for discharge in the next 48hours.      4/4 Transitioned from ICU to the floor overnight.  HD stable slowly discontinuing IV fluids.  Will increase Eliquis one more time in attempts for patient to have therapeutic level.  Repeat CBC tomorrow.  Likely d/c back to nursing home within the next 24 hours.        4/5 Sinus bradycardia.  Cardiology consulted and no further recommendations at this time.  Glucose 44 this morning.  D/C long acting insulin.  Switched diet from diabetic to cardiac.  Will monitor HR and glucose.  Possible d/c within the next 24 hours.         4/6 PT HD stable on room air.  Repeat CBC remains stable.  Patient has intermittent asymptomatic sinus bradycardia. Patient not interested in any further workup/procedures. Cardiology recommend holding home carvedilol.  Glucose wnl today.  Discharge back to nursing home today.           Goals of Care Treatment Preferences:  Code Status: DNR       LaPOST: Yes           Consults:   Consults (From admission, onward)        Status Ordering Provider     Inpatient consult to Cardiology-CIS  Once        Provider:  Mahin Michelle MD    Acknowledged LATISHA MONCADA     Inpatient consult to Registered Dietitian/Nutritionist  Once        Provider:  (Not yet assigned)    Completed LATISHA MONCADA          Neuro  Embolic stroke involving right middle cerebral artery  Continue statin   Continue Eliquis        Pulmonary  Pneumonia  Seen on CTA chest  Received 6 days of zosyn.    Continue doxy        Cardiac/Vascular  Sinus bradycardia  Bradycardia in the 40's   Asymptomatic  CIS consulted  Stable.     Discontinue home bb       Aneurysm of heart  Echo with EF 70%      Essential hypertension  HD stable   Will continue to hold all home bp medications       S/P CABG x 4  Denies CP  Continue statin   Continue Eliquis       ID  * Severe sepsis  This patient does not have evidence of infective focus  My overall impression is septic shock due to MAP < 65.  Source: Unknown   Antibiotics given-   Antibiotics (72h ago, onward)    Start     Stop Route Frequency Ordered    04/03/23 0930  doxycycline tablet 100 mg         -- Oral Every 12 hours 04/03/23 0821        Latest lactate reviewed-  No results for input(s): LACTATE in the last 72 hours.  Organ dysfunction indicated by Encephalopathy     Fluid challenge Patient received 3.2 L bolus and now receiving 100cc hour of IV fluids.      Post- resuscitation assessment Yes Perfusion exam was performed within 6 hours of septic shock presentation after bolus shows Inadequate tissue perfusion assessed by invasive monitoring       Will Start Pressors- Levophed for MAP of 65  Source control achieved by: IV fluids, IV vanc and IV zosyn       No lactic acidosis or elevated procal at presentation.   + PCR blood staph.  Blood cultures gram positive cocci.    + PNA on CTA.    Echo EF 70%      4/3 Blood cultures positive for staph capitis/ staph epidermidis.  Will plan for 7-9 days of doxy po.      Resolved     Bacteremia  Staph bacteremia seen on blood cultures   Likely contaminate due to two types of staph detected.    Currently on IV vanc/zosyn day   Repeat blood cultures.  NGTD   Doxycyline sensitive.  Will need additional 5 days.    Oncology  Anemia  Positive FOBT  Transfuse 1 unit of PRBC   Anemia workup ordered---Fe studies c/w anemia of chronic disease. B12 ordered but not drawn. Will call lab  Repeat cbc with 2 point increase in hgb    4/3 Hg  Stable at 8.6   Start iron 325mg qd.      4/4 stable     Hypoalbuminemia  Albumin 1.1  Likely contributing to hypotension  Dietician  consulted (they recommend BOOST TID) and started on TPN   D/c lipids   PT does not want PEG tube or MBSS (concern for aspiration)  Continue on pureed diet       Endocrine  Type 2 diabetes mellitus with neurologic complication  Patient's FSGs are controlled on current medication regimen.  Last A1c reviewed-   Lab Results   Component Value Date    HGBA1C 4.9 03/29/2023     Most recent fingerstick glucose reviewed-   Recent Labs   Lab 04/05/23  1202 04/05/23  1633 04/05/23  2002 04/06/23  0706   POCTGLUCOSE 116* 94 94 91     Current correctional scale  Medium  Maintain anti-hyperglycemic dose as follows-   Antihyperglycemics (From admission, onward)    Start     Stop Route Frequency Ordered    04/05/23 0806  insulin aspart U-100 pen 0-5 Units         -- SubQ Before meals & nightly PRN 04/05/23 0806        Hold Oral hypoglycemics while patient is in the hospital.    4/4 Decreasing clinimix.  Glucose on lower side.  Will adjust determir.      4/5 Hypoglycemic this morning at 44.  D/c determir and change diabetic diet to [facility].  Monitor glucose       4/6 Recommend holding all diabetic medications at discharge.  Can continue low dose s/s insulin.  Cardiac diet due to glucose on lower side and decreased po intake.     Other  Acute DVT (deep venous thrombosis)  Patient with history of DVT 2016, right LE. hypercoag work up negative at that time, but stayed on NOAC due to h/o PAF and CVA.   Not currently on appropriate dosage of Eliquis  CTA negative for PE    Eliquis held due to decrease in h/h   U/s now with likely DVT LLE, but difficult study technically.   Prophylactic lovenox dose started 4/1/23 and monitor h/h. Feel better with this option since has shorter half life than NOAC in case she bleeds again and we have to stop it.    Would need to get her back on full dose NOAC as quickly as we safely can.     4/3 d/c lovenox and will try to start low dose eliquis which is patients home dose.  Will monitor h/h.      4/4  Attempt to give therapeutic dose of Eliquis today.  CBC tomorrow     4/6 H/H remains stable on Eliquis 5mg BID.              Final Active Diagnoses:    Diagnosis Date Noted POA    PRINCIPAL PROBLEM:  Severe sepsis [A41.9, R65.20] 03/29/2023 Yes    Embolic stroke involving right middle cerebral artery [I63.411] 06/06/2019 Yes    Sinus bradycardia [R00.1] 04/05/2023 Yes    Anemia [D64.9] 03/31/2023 Yes    Bacteremia [R78.81] 03/31/2023 Yes    Pneumonia [J18.9] 03/31/2023 Yes    Hypoalbuminemia [E88.09] 03/29/2023 Yes    Acute DVT (deep venous thrombosis) [I82.409] 03/29/2023 Clinically Undetermined    Aneurysm of heart [I25.3] 06/07/2019 Yes    S/P CABG x 4 [Z95.1] 12/01/2015 Not Applicable    Essential hypertension [I10] 12/01/2015 Yes    Type 2 diabetes mellitus with neurologic complication [E11.49] 12/01/2015 Yes      Problems Resolved During this Admission:       Discharged Condition: stable    Disposition: Home or Self Care    Follow Up:   Follow-up Information     Mahin Michelle MD Follow up on 4/18/2023.    Specialty: Cardiology  Why: at 8:30 AM  Contact information:  [address]  [place] [address]  [phone]             Reno Hilario MD Follow up in 1 week(s).    Specialty: Family Medicine  Contact information:  [address]  [facility] [id number]  [phone]                       Patient Instructions:   No discharge procedures on file.    Significant Diagnostic Studies:  Recent Labs   Lab 03/29/23  0430   HGBA1C 4.9         ABGs: No results for input(s): PH, PCO2, HCO3, POCSATURATED, BE, TOTALHB, COHB, METHB, O2HB, POCFIO2, PO2 in the last 48 hours.  Bilirubin:           Recent Labs   Lab 03/30/23  0316 03/31/23  0419 04/02/23  0527 04/03/23  0355 04/04/23  0553   BILITOT 0.3 0.2 0.2 0.2 0.2         Blood Culture:   No results for input(s): LABBLOO in the last 48 hours.     BMP:       Recent Labs   Lab 04/06/23  0711   GLU 80      K 3.5       CO2 20*   BUN 15   CREATININE 0.4*   CALCIUM 7.7*   MG 1.9         CBC:        Recent Labs   Lab 04/05/23  0558 04/06/23  0709   WBC 5.55 6.19   HGB 8.3* 8.8*   HCT 25.0* 27.2*    182         CMP:        Recent Labs   Lab 04/05/23 1952 04/06/23  0711    136   K 3.6 3.5    110   CO2 22* 20*   GLU 94 80   BUN 17 15   CREATININE 0.5 0.4*   CALCIUM 7.6* 7.7*   ANIONGAP 4* 6*         Cardiac Markers: No results for input(s): CKMB, MYOGLOBIN, BNP, TROPISTAT in the last 48 hours.  Coagulation: No results for input(s): PT, INR, APTT in the last 48 hours.           Magnesium:        Recent Labs   Lab 04/05/23 1952 04/06/23  0711   MG 1.4* 1.9         POCT Glucose:         Recent Labs   Lab 04/05/23  1633 04/05/23  2002 04/06/23  0706   POCTGLUCOSE 94 94 91         Significant Imaging: CXR: mild patch bibasilar airspace disease or atelectasis      CTA chest:      1. No CT evidence of pulmonary thromboembolism.  2. Multifocal airspace disease, likely pneumonia.  3. Small bilateral pleural fluid collections.     ECHO: EF 70%      LE u/s   Impression:     Study technically difficult secondary to vascular calcifications and significant edema within the soft tissues.  There is lack of augmentation and compression of the mid left femoral vein concerning for possible thrombus.     Findings were discussed with Veronica Frank at 14:20 on3/31/2023.        Electronically signed by: Xochitl Chawla         Pending Diagnostic Studies:     Procedure Component Value Units Date/Time    Stool Exam-Ova,Cysts,Parasites [[id number]] Collected: 03/30/23 1830    Order Status: Sent Lab Status: In process Updated: 03/31/23 0316    Specimen: Stool          Medications:  Reconciled Home Medications:      Medication List      START taking these medications    doxycycline 100 MG tablet  Commonly known as: VIBRA-TABS  Take 1 tablet (100 mg total) by mouth every 12 (twelve) hours. for 5 days     ferrous sulfate 325 (65  FE) MG EC tablet  Take 1 tablet (325 mg total) by mouth once daily.        CHANGE how you take these medications    apixaban 5 mg Tab  Commonly known as: ELIQUIS  Take 1 tablet (5 mg total) by mouth 2 (two) times daily.  What changed:   · medication strength  · how much to take  · Another medication with the same name was removed. Continue taking this medication, and follow the directions you see here.     simvastatin 20 MG tablet  Commonly known as: ZOCOR  Take 1 tablet (20 mg total) by mouth every evening.  What changed: Another medication with the same name was removed. Continue taking this medication, and follow the directions you see here.        CONTINUE taking these medications    insulin aspart U-100 100 unit/mL (3 mL) Inpn pen  Commonly known as: NovoLOG  Inject 0-5 Units into the skin every 6 (six) hours as needed (Hyperglycemia).     pantoprazole 40 MG tablet  Commonly known as: PROTONIX  Take 1 tablet (40 mg total) by mouth once daily.     senna-docusate 8.6-50 mg 8.6-50 mg per tablet  Commonly known as: PERICOLACE  Take 1 tablet by mouth once daily.     VITAMIN D3 25 mcg (1,000 unit) capsule  Generic drug: cholecalciferol (vitamin D3)  Take 1,000 Units by mouth once daily.        STOP taking these medications    carvediloL 12.5 MG tablet  Commonly known as: COREG     clopidogreL 75 mg tablet  Commonly known as: PLAVIX     magnesium oxide 400 mg (241.3 mg magnesium) tablet  Commonly known as: MAG-OX     metFORMIN 500 MG tablet  Commonly known as: GLUCOPHAGE     mirtazapine 30 MG tablet  Commonly known as: REMERON     ramipriL 2.5 MG capsule  Commonly known as: ALTACE     ramipriL 5 MG capsule  Commonly known as: ALTACE            Indwelling Lines/Drains at time of discharge:   Lines/Drains/Airways     Central Venous Catheter Line  Duration           Percutaneous Central Line Insertion/Assessment - Triple Lumen  03/28/23 2216 8 days                Time spent on the discharge of patient: 28 minutes          Veronica Frank PA-C  Department of Hospital Medicine  [place] [facility] Surg (3rd Fl)

## 2023-04-06 NOTE — ASSESSMENT & PLAN NOTE
Patient with history of DVT 2016, right LE. hypercoag work up negative at that time, but stayed on NOAC due to h/o PAF and CVA.   Not currently on appropriate dosage of Eliquis  CTA negative for PE    Eliquis held due to decrease in h/h   U/s now with likely DVT LLE, but difficult study technically.   Prophylactic lovenox dose started 4/1/23 and monitor h/h. Feel better with this option since has shorter half life than NOAC in case she bleeds again and we have to stop it.    Would need to get her back on full dose NOAC as quickly as we safely can.     4/3 d/c lovenox and will try to start low dose eliquis which is patients home dose.  Will monitor h/h.      4/4 Attempt to give therapeutic dose of Eliquis today.  CBC tomorrow     4/6 H/H remains stable on Eliquis 5mg BID.

## 2023-04-06 NOTE — SUBJECTIVE & OBJECTIVE
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     Current Outpatient Medications on File Prior to Encounter   Medication Sig    apixaban (ELIQUIS) 2.5 mg Tab Take 2.5 mg by mouth 2 (two) times daily.    apixaban (ELIQUIS) 2.5 mg Tab Take 1 tablet (2.5 mg total) by mouth 2 (two) times a day.    carvedilol (COREG) 12.5 MG tablet Take 1 tablet (12.5 mg total) by mouth 2 (two) times daily.    carvediloL (COREG) 12.5 MG tablet Take 1 tablet (12.5 mg total) by mouth 2 (two) times a day. HOLD for pulse less than 60.    cholecalciferol, vitamin D3, (VITAMIN D3) 1,000 unit capsule Take 1,000 Units by mouth once daily.    clopidogreL (PLAVIX) 75 mg tablet Take 1 tablet (75 mg total) by mouth once daily.    insulin aspart U-100 (NOVOLOG) 100 unit/mL (3 mL) InPn pen Inject 0-5 Units into the skin every 6 (six) hours as needed (Hyperglycemia).    magnesium oxide (MAG-OX) 400 mg (241.3 mg magnesium) tablet Take 400 mg by mouth 2 (two) times daily.    metFORMIN (GLUCOPHAGE) 500 MG tablet Take 1 tablet (500 mg total) by mouth 2 (two) times a day.    mirtazapine (REMERON) 30 MG tablet TAKE 1 TABLET BY MOUTH ONCE DAILY IN THE EVENING    mirtazapine (REMERON) 30 MG tablet Take 1 tablet (30  mg total) by mouth nightly    pantoprazole (PROTONIX) 40 MG tablet Take 1 tablet (40 mg total) by mouth once daily.    ramipril (ALTACE) 2.5 MG capsule Take 2.5 mg by mouth once daily.    ramipriL (ALTACE) 5 MG capsule Take 1 capsule (5 mg total) by mouth once daily.    senna-docusate 8.6-50 mg (PERICOLACE) 8.6-50 mg per tablet Take 1 tablet by mouth once daily.    simvastatin (ZOCOR) 20 MG tablet Take 1 tablet (20 mg total) by mouth every evening.    simvastatin (ZOCOR) 40 MG tablet Take 40 mg by mouth every evening.     Family History       Problem Relation (Age of Onset)    Cancer Father    Heart disease Father    Thyroid disease Daughter          Tobacco Use    Smoking status: Former     Types: Cigarettes    Smokeless tobacco: Never    Tobacco comments:     quit in 1990s   Substance and Sexual Activity    Alcohol use: No    Drug use: No    Sexual activity: Not Currently     Review of Systems   Constitutional:  Positive for appetite change. Negative for chills and fever.   Respiratory:  Negative for cough and shortness of breath.    Cardiovascular:  Negative for chest pain and leg swelling.   Gastrointestinal:  Negative for nausea and vomiting.   Genitourinary:  Negative for difficulty urinating and dysuria.   Musculoskeletal:  Negative for arthralgias and gait problem.   Psychiatric/Behavioral:  Negative for agitation and confusion.    Objective:     Vital Signs (Most Recent):  Temp: 98.6 °F (37 °C) (04/06/23 0725)  Pulse: (!) 59 (04/06/23 0957)  Resp: 19 (04/06/23 0725)  BP: 129/61 (04/06/23 0725)  SpO2: (!) 92 % (04/06/23 0725)   Vital Signs (24h Range):  Temp:  [97.7 °F (36.5 °C)-98.6 °F (37 °C)] 98.6 °F (37 °C)  Pulse:  [] 59  Resp:  [18-20] 19  SpO2:  [91 %-95 %] 92 %  BP: (111-132)/(56-69) 129/61     Weight: 84.4 kg (186 lb 1.1 oz)  Body mass index is 26.7 kg/m².    Physical Exam  Constitutional:       Appearance: Normal appearance.   Cardiovascular:      Rate and Rhythm: Regular rhythm. Bradycardia  present.   Pulmonary:      Effort: Pulmonary effort is normal.      Breath sounds: Normal breath sounds.   Abdominal:      General: Abdomen is flat. Bowel sounds are normal.      Palpations: Abdomen is soft.   Musculoskeletal:      Right lower leg: Edema (+1) present.      Left lower leg: Edema (+1) present.   Skin:     General: Skin is warm and dry.   Neurological:      Mental Status: She is alert and oriented to person, place, and time. Mental status is at baseline.           Significant Labs: A1C:   Recent Labs   Lab 03/29/23  0430   HGBA1C 4.9       ABGs: No results for input(s): PH, PCO2, HCO3, POCSATURATED, BE, TOTALHB, COHB, METHB, O2HB, POCFIO2, PO2 in the last 48 hours.  Bilirubin:   Recent Labs   Lab 03/30/23  0316 03/31/23  0419 04/02/23  0527 04/03/23  0355 04/04/23  0553   BILITOT 0.3 0.2 0.2 0.2 0.2       Blood Culture:   No results for input(s): LABBLOO in the last 48 hours.    BMP:   Recent Labs   Lab 04/06/23  0711   GLU 80      K 3.5      CO2 20*   BUN 15   CREATININE 0.4*   CALCIUM 7.7*   MG 1.9       CBC:   Recent Labs   Lab 04/05/23  0558 04/06/23  0709   WBC 5.55 6.19   HGB 8.3* 8.8*   HCT 25.0* 27.2*    182       CMP:   Recent Labs   Lab 04/05/23 1952 04/06/23  0711    136   K 3.6 3.5    110   CO2 22* 20*   GLU 94 80   BUN 17 15   CREATININE 0.5 0.4*   CALCIUM 7.6* 7.7*   ANIONGAP 4* 6*       Cardiac Markers: No results for input(s): CKMB, MYOGLOBIN, BNP, TROPISTAT in the last 48 hours.  Coagulation: No results for input(s): PT, INR, APTT in the last 48 hours.        Magnesium:   Recent Labs   Lab 04/05/23 1952 04/06/23  0711   MG 1.4* 1.9       POCT Glucose:   Recent Labs   Lab 04/05/23  1633 04/05/23 2002 04/06/23  0706   POCTGLUCOSE 94 94 91       Significant Imaging: CXR: mild patch bibasilar airspace disease or atelectasis     CTA chest:      1. No CT evidence of pulmonary thromboembolism.  2. Multifocal airspace disease, likely pneumonia.  3. Small  bilateral pleural fluid collections.    ECHO: EF 70%     LE u/s   Impression:     Study technically difficult secondary to vascular calcifications and significant edema within the soft tissues.  There is lack of augmentation and compression of the mid left femoral vein concerning for possible thrombus.     Findings were discussed with Veronica Frank at 14:20 on3/31/2023.        Electronically signed by: Xochitl Chawla

## 2023-04-06 NOTE — PLAN OF CARE
IJ triple lumen removed, patient tolerated well. Waiting for [facility] to transport patient back to nursing home.  Pt with No SOB or acute distress noted. Patient resting comfortably in bed. Fall/Safety maintained, bed low, locked, side rails up x 2, call bell within reach. All needs met at this time. Pt instructed to call with any new needs. Pt with slip resistant socks on; remains free of fall or injury.       Problem: Infection  Goal: Absence of Infection Signs and Symptoms  Outcome: Ongoing, Progressing     Problem: Adult Inpatient Plan of Care  Goal: Plan of Care Review  Outcome: Ongoing, Progressing  Goal: Patient-Specific Goal (Individualized)  Outcome: Ongoing, Progressing  Goal: Absence of Hospital-Acquired Illness or Injury  Outcome: Ongoing, Progressing  Goal: Optimal Comfort and Wellbeing  Outcome: Ongoing, Progressing  Goal: Readiness for Transition of Care  Outcome: Ongoing, Progressing     Problem: Diabetes Comorbidity  Goal: Blood Glucose Level Within Targeted Range  Outcome: Ongoing, Progressing     Problem: Skin Injury Risk Increased  Goal: Skin Health and Integrity  Outcome: Ongoing, Progressing     Problem: Adjustment to Illness (Sepsis/Septic Shock)  Goal: Optimal Coping  Outcome: Ongoing, Progressing     Problem: Bleeding (Sepsis/Septic Shock)  Goal: Absence of Bleeding  Outcome: Ongoing, Progressing     Problem: Glycemic Control Impaired (Sepsis/Septic Shock)  Goal: Blood Glucose Level Within Desired Range  Outcome: Ongoing, Progressing     Problem: Infection Progression (Sepsis/Septic Shock)  Goal: Absence of Infection Signs and Symptoms  Outcome: Ongoing, Progressing     Problem: Nutrition Impaired (Sepsis/Septic Shock)  Goal: Optimal Nutrition Intake  Outcome: Ongoing, Progressing     Problem: Fall Injury Risk  Goal: Absence of Fall and Fall-Related Injury  Outcome: Ongoing, Progressing     Problem: Oral Intake Inadequate  Goal: Improved Oral Intake  Outcome: Ongoing, Progressing     Problem:  Fluid Imbalance (Pneumonia)  Goal: Fluid Balance  Outcome: Ongoing, Progressing     Problem: Infection (Pneumonia)  Goal: Resolution of Infection Signs and Symptoms  Outcome: Ongoing, Progressing     Problem: Respiratory Compromise (Pneumonia)  Goal: Effective Oxygenation and Ventilation  Outcome: Ongoing, Progressing

## 2023-04-06 NOTE — NURSING
Attempted to contact [facility] to report Bedside monitoring rthym as requested by Dr. Hilario.    Called [facility] and was transferred to the Nurse on call who informed me that this should be handled by the new virtual care. Nurse on call instructed me to await a call from the Virtual care team, she would inform them to call me.    Virtual care team returned the call and explained that we were needing to send a picture of the bedside monitoring EKG. Virtual care team stated that it was actually the Nurse on call who needed to help me. The virtual care instructed me to await call from nurse on call and they would have her call me.    Nurse on call returned call and informed her that picture of printout of bedside EKG monitoring was taken a picture of and should be in the media tab. Nurse on call instructed me to place an order for a BMP.    Notified Night nurse of the above.

## 2023-04-06 NOTE — PROGRESS NOTES
[place] - [facility] ([facility])  Cardiology  Progress Note    Patient Name: Michelle Rowell  MRN: [id number]  Admission Date: 3/28/2023  Hospital Length of Stay: 8 days  Code Status: DNR   Attending Physician: Liv Paredes MD   Primary Care Physician: Reno Hilario MD  Expected Discharge Date:   Principal Problem:Severe sepsis    Subjective:     Hospital Course: Michelle Rowell is a 82 y.o. female resident of nursing home with a PMHx of CVA, HHD, CAD, hyperlipidemia, DM, DVT and emphysema was sent to the ER for hypotension and confusion. She was admitted for sepsis. CTA chest negative for PE, but revealed lower lobe pneumonia. She was started on IV fluids, IV antibiotic and levophed for blood pressure support. Echo showed EF of 70%. She continued to clinically improve and pressors and IV fluids were discontinued. Today on telemetry, patient with episode of sinus bradycaria, HR 40's. [facility] asked to evaluate.    Interval History: Patient with HR in the 40's last night. EKG 2nd degree type I.  BMP revealed mag of 1.4. Electrolytes stable this am.  Review of Systems   Constitutional: Positive for decreased appetite.   Cardiovascular: Negative.    Respiratory: Negative.     Musculoskeletal: Negative.    Gastrointestinal: Negative.    Neurological: Negative.     Objective:     Vital Signs (Most Recent):  Temp: 98.6 °F (37 °C) (04/06/23 0725)  Pulse: 66 (04/06/23 0725)  Resp: 19 (04/06/23 0725)  BP: 129/61 (04/06/23 0725)  SpO2: (!) 92 % (04/06/23 0725)   Vital Signs (24h Range):  Temp:  [97.5 °F (36.4 °C)-98.6 °F (37 °C)] 98.6 °F (37 °C)  Pulse:  [] 66  Resp:  [17-20] 19  SpO2:  [91 %-95 %] 92 %  BP: (108-132)/(53-69) 129/61     Weight: 84.4 kg (186 lb 1.1 oz)  Body mass index is 26.7 kg/m².    SpO2: (!) 92 %         Intake/Output Summary (Last 24 hours) at 4/6/2023 0803  Last data filed at 4/6/2023 0559  Gross per 24 hour   Intake 135.04 ml   Output 151 ml   Net -15.96 ml       Lines/Drains/Airways        Central Venous Catheter Line  Duration             Percutaneous Central Line Insertion/Assessment - Triple Lumen  03/28/23 2216 8 days                    Physical Exam  Constitutional:       Appearance: Normal appearance.   Cardiovascular:      Rate and Rhythm: Bradycardia present.      Pulses: Normal pulses.   Pulmonary:      Effort: Pulmonary effort is normal.      Breath sounds: Rales present.   Musculoskeletal:      Cervical back: Normal range of motion and neck supple.      Right lower leg: Edema present.      Left lower leg: Edema present.   Neurological:      Mental Status: She is alert.       Significant Labs: BMP:   Recent Labs   Lab 04/05/23 1952 04/06/23  0711   GLU 94 80    136   K 3.6 3.5    110   CO2 22* 20*   BUN 17 15   CREATININE 0.5 0.4*   CALCIUM 7.6* 7.7*   MG 1.4* 1.9   , CMP   Recent Labs   Lab 04/05/23 1952 04/06/23  0711    136   K 3.6 3.5    110   CO2 22* 20*   GLU 94 80   BUN 17 15   CREATININE 0.5 0.4*   CALCIUM 7.6* 7.7*   ANIONGAP 4* 6*   , CBC   Recent Labs   Lab 04/05/23 0558 04/06/23  0709   WBC 5.55 6.19   HGB 8.3* 8.8*   HCT 25.0* 27.2*    182   , and All pertinent lab results from the last 24 hours have been reviewed.    Significant Imaging: Echocardiogram: Transthoracic echo (TTE) complete ([facility] Only):   Results for orders placed or performed during the hospital encounter of 03/28/23   Echo Saline Bubble? No   Result Value Ref Range    BSA 1.8 m2    TDI SEPTAL 0.08 m/s    Left Ventricular Outflow Tract Mean Velocity 0.91 cm/s    Left Ventricular Outflow Tract Mean Gradient 3.47 mmHg    Pulmonary Valve Mean Velocity 0.97 m/s    TDI LATERAL 0.08 m/s    PV PEAK VELOCITY 1.22 cm/s    LVIDd 3.19 (A) 3.5 - 6.0 cm    IVS 1.13 (A) 0.6 - 1.1 cm    Posterior Wall 0.92 0.6 - 1.1 cm    LVIDs 2.73 2.1 - 4.0 cm    FS 14 28 - 44 %    Sinus 2.60 cm    STJ 2.52 cm    Ascending aorta 2.66 cm    LV mass 93.30 g    LA size 2.86 cm    RVDD 2.05 cm    Left Ventricle  Relative Wall Thickness 0.58 cm    AV mean gradient 3 mmHg    AV valve area 2.96 cm2    AV Velocity Ratio 1.02     AV index (prosthetic) 0.94     Mean e' 0.08 m/s    IVRT 205.52 msec    LVOT diameter 2.00 cm    LVOT area 3.1 cm2    LVOT peak hector 1.09 m/s    LVOT peak VTI 22.70 cm    Ao peak hector 1.07 m/s    Ao VTI 24.1 cm    RVOT peak hector 0.97 m/s    RVOT peak VTI 26.8 cm    LVOT stroke volume 71.28 cm3    AV peak gradient 5 mmHg    PV mean gradient 2.56 mmHg    TR Max Hector 2.82 m/s    LV Systolic Volume 27.83 mL    LV Systolic Volume Index 14.7 mL/m2    LV Diastolic Volume 40.75 mL    LV Diastolic Volume Index 21.56 mL/m2    LV Mass Index 49 g/m2    RA Major Axis 4.20 cm    Left Atrium Minor Axis 4.75 cm    Left Atrium Major Axis 5.10 cm    Triscuspid Valve Regurgitation Peak Gradient 32 mmHg    LA Volume Index (Mod) 21.7 mL/m2    LA volume (mod) 41.08 cm3    RA Width 3.47 cm    EF 70 %    Narrative    · Concentric remodeling and hyperdynamic systolic function.  · The estimated LV ejection fraction is probably around 70%. Technically   very limited study. LV cavity appears to be small.  · Indeterminate left ventricular diastolic function with inconclusive left   atrial pressure.  · Normal right ventricular size with normal right ventricular systolic   function.  · This is a poor study with limited views of the endocardium . Consider   repeat study with contrast.       and X-Ray: CXR: X-Ray Chest 1 View (CXR):   Results for orders placed or performed during the hospital encounter of 03/28/23   X-Ray Chest 1 View    Narrative    EXAMINATION:  XR CHEST 1 VIEW    CLINICAL HISTORY:  Wheezing    COMPARISON:  03/28/2023    FINDINGS:  Cardiac silhouette is unchanged.  Right-sided central line remains.  There are low lung volumes.  Hazy opacities at the perihilar aspects of the lungs, left greater than right, may reflect edema.  Small left pleural effusion questioned.      Impression    As above.      Electronically signed  by: Miguel Price MD  Date:    04/03/2023  Time:    10:45     Assessment and Plan:       Active Diagnoses:    Diagnosis Date Noted POA    PRINCIPAL PROBLEM:  Severe sepsis [A41.9, R65.20] 03/29/2023 Yes    Sinus bradycardia [R00.1] 04/05/2023 Yes    Anemia [D64.9] 03/31/2023 Yes    Bacteremia [R78.81] 03/31/2023 Yes    Pneumonia [J18.9] 03/31/2023 Yes    Hypoalbuminemia [E88.09] 03/29/2023 Yes    Acute DVT (deep venous thrombosis) [I82.409] 03/29/2023 Clinically Undetermined    Aneurysm of heart [I25.3] 06/07/2019 Yes    Embolic stroke involving right middle cerebral artery [I63.411] 06/06/2019 Yes    S/P CABG x 4 [Z95.1] 12/01/2015 Not Applicable    Essential hypertension [I10] 12/01/2015 Yes    Type 2 diabetes mellitus with neurologic complication [E11.49] 12/01/2015 Yes      Problems Resolved During this Admission:       VTE Risk Mitigation (From admission, onward)           Ordered     apixaban tablet 5 mg  2 times daily         04/04/23 1009     IP VTE HIGH RISK PATIENT  Once         03/29/23 0243     Place sequential compression device  Until discontinued         03/29/23 0243     Place KANWAL hose  Until discontinued         03/29/23 0243                  Current Facility-Administered Medications   Medication    0.9%  NaCl infusion (for blood administration)    acetaminophen tablet 650 mg    apixaban tablet 5 mg    atorvastatin tablet 20 mg    dextrose 10% bolus 125 mL 125 mL    dextrose 10% bolus 250 mL 250 mL    dextrose 40 % gel 15,000 mg    doxycycline tablet 100 mg    ferrous sulfate tablet 1 each    glucagon (human recombinant) injection 1 mg    glucose chewable tablet 16 g    glucose chewable tablet 24 g    HYDROcodone-acetaminophen 5-325 mg per tablet 1 tablet    insulin aspart U-100 pen 0-5 Units    melatonin tablet 6 mg    ondansetron injection 4 mg    pantoprazole EC tablet 40 mg    sodium chloride 0.9% flush 10 mL       Admitted with sepsis now resolved. Noted with some asymptomatic bradycadia and  Jose Juan this morning  CAD status post four-vessel CABG 2007 Mild ischemic cardiomyopathy left ventricular ejection fraction 40% June 2019.  No stress-induced ischemia perfusion study September 2019.  Hypertensive heart disease - on carvediol 12.5 and lisiniopril 20mg at home  History of CVA June 2019.  Nonhemorrhagic CVA complicated by DVT PE July 2016.  IVC filter was removed June 2016.  DVT cleared on ultrasound 2019 - Eliquis 2.5mg at home  Extensive kyphoscoliosis.  Mild bilateral carotid disease on ultrasound September 2011.  Lipids well controlled November 2022 - simvastatin 40mg  Bradycardia  Sepsis     Plan: continue to hold home Carvedilol  Agree with Eliquis 5 mg bid for probable DVT  Pt is DNR, homebound from CVA. No indication for PM.  Increase K intake           Gaby Brown NP scribed for Dr. Michelle  Cardiology  [place] - Med Surg (3rd Fl)    I have personally interviewed and examined this patient face-to-face, and as the physician. Documented the above plan and rendered all medical decision making for this encounter. I have read and agree with the above documentation unless otherwise noted.

## 2023-04-06 NOTE — PT/OT/SLP PROGRESS
Physical Therapy      Patient Name:  Michelle Rowell   MRN:  [id number]    Patient not seen today secondary to Patient unwilling to participate. Patient requested to be discharge from PT services at this time. Medical Team( FLORIDALMA Martin) made aware.

## 2023-04-06 NOTE — PT/OT/SLP DISCHARGE
Physical Therapy Discharge Summary    Name: Michelle Rowell  MRN: [id number]   Principal Problem: Severe sepsis     Patient Discharged from acute Physical Therapy on 2023.  Please refer to prior PT noted date on 2023 for functional status.     Assessment:     Patient is no longer making progress.    Objective:     GOALS:   Multidisciplinary Problems       Physical Therapy Goals          Problem: Physical Therapy    Goal Priority Disciplines Outcome Goal Variances Interventions   Physical Therapy Goal     PT, PT/OT      Description: Goals to be met by: 23     Patient will increase functional independence with mobility by performin. Supine to sit with Maximum Assistance  2. Sit to supine with Maximum Assistance  3. Rolling to Left and Right with Moderate Assistance.                             Reasons for Discontinuation of Therapy Services  Patient declines to continue care and requested to be discharge from Physical  Therapy.    Plan:     Patient Discharged to:  Nursing Home .      2023

## 2023-04-06 NOTE — PLAN OF CARE
04/06/23 1220   Post-Acute Status   Post-Acute Authorization Placement   Post-Acute Placement Status Set-up Complete/Auth obtained   Coverage Medicare   Discharge Delays None known at this time         Patient orders accepted by Lisa. She states nurse can call report to Katalina at [place], [phone]. nurse Yarelis aware.

## 2023-04-06 NOTE — PLAN OF CARE
[place] - Med Surg (3rd Fl)  Discharge Final Note    Primary Care Provider: Reno Hilario MD    Expected Discharge Date: 4/6/2023    Final Discharge Note (most recent)       Final Note - 04/06/23 1531          Final Note    Assessment Type Final Discharge Note     Anticipated Discharge Disposition Home or Self Care        Post-Acute Status    Post-Acute Authorization Other     Other Status No Post-Acute Service Needs     Discharge Delays None known at this time                     Important Message from Medicare  Important Message from Medicare regarding Discharge Appeal Rights: Given to patient/caregiver, Explained to patient/caregiver, Other (comments) (Verbalizes understanding but unable to physically sign)     Date IMM was signed: 04/06/23  Time IMM was signed: 1000    Contact Info       Mahin Michelle MD   Specialty: Cardiology    [address] [place] [address]   Phone: [phone]       Next Steps: Follow up on 4/18/2023    Instructions: at 8:30 AM    Reno Hilario MD   Specialty: Family Medicine   Relationship: PCP - General    [address]  [facility] [place] [address]   Phone: [phone]       Next Steps: Follow up in 1 week(s)          Patient returning to [facility] where she resided prior to admission.

## 2023-04-06 NOTE — PLAN OF CARE
Recommendation/Intervention:     1. Rec'd continue cardiac diet with texture modifications per SLP/MD rec's.     2. Rec'd continue Boost Glucose Control with meals to provide additional kcals and protein.     3. Rec'd honroing pt's food preferences to encourage PO intake.     4. Rec'd frequent weight measurements to assess for any acute weight changes.     5. RD to follow and make rec's accordingly.    Goals: Pt will cont to met at least 75% ENN by RD follow-up  Nutrition Goal Status: new

## 2023-04-06 NOTE — ASSESSMENT & PLAN NOTE
Staph bacteremia seen on blood cultures   Likely contaminate due to two types of staph detected.    Currently on IV vanc/zosyn day   Repeat blood cultures.  NGTD   Doxycyline sensitive.  Will need additional 5 days.

## 2023-04-06 NOTE — PROGRESS NOTES
[place] - Med Surg (3rd Fl)  Adult Nutrition  Progress Note    SUMMARY       Recommendations    Recommendation/Intervention:     1. Rec'd continue cardiac diet with texture modifications per SLP/MD rec's.     2. Rec'd continue Boost Glucose Control with meals to provide additional kcals and protein.     3. Rec'd honroing pt's food preferences to encourage PO intake.     4. Rec'd frequent weight measurements to assess for any acute weight changes.     5. RD to follow and make rec's accordingly.    Goals: Pt will cont to met at least 75% ENN by RD follow-up  Nutrition Goal Status: new    Communication of RD Recs: other (comment) (POC)    Assessment and Plan    Nutrition Problem  Inadequate energy intake     Related to (etiology):   Chewing/swallowing difficulty     Signs and Symptoms (as evidenced by):   H/o oropharyngeal dysphagia; meeting 25-50% of EEN at this time      Interventions/Recommendations (treatment strategy):  Recommendation/Intervention:     1. Rec'd continue cardiac diet with texture modifications per SLP/MD rec's.     2. Rec'd continue Boost Glucose Control with meals to provide additional kcals and protein.     3. Rec'd honroing pt's food preferences to encourage PO intake.     4. Rec'd frequent weight measurements to assess for any acute weight changes.     5. RD to follow and make rec's accordingly.    Goals: Pt will cont to met at least 75% ENN by RD follow-up  Nutrition Goal Status: new     Nutrition Diagnosis Status:   Resolved       Malnutrition Assessment                                       Reason for Assessment    Reason For Assessment: RD follow-up  Diagnosis: infection/sepsis (severe sepsis)  Relevant Medical History: HTN, HLD, T2DM, CAD, s/p CABG, stroke, DVT, oropharyngeal dysphagia, anemia, pneumonia, bacteremia  Interdisciplinary Rounds: did not attend  General Information Comments:     3/30/23:  83 yo female on pureed diet. Consulted for hypoalbuminemia. Albumin is no longer  "considered an indicator of nutritrional status, but rather an indicator of inflammatory metabolism, mobidity, mortality, or severity of illness. Spoke with MAGED Cochran about patient's current health status. Trying to encourage PO intake from patient, who is only consuming small amounts of meals. Pt was taking Remron PTA. Pt meeting protein needs with currnent TPN rate; however, meeting approx 25-50% EEN. Rec'd 20% lipids to add to TPN to provide an additional 500 kcals. LBM 3/30/2023. Pt requesting milkshake and mashed potatoes for dinner. Will continue to accommodate needs to encourage PO intake. Pt will meet energy needs if able to consume at least 2 Boost Glucose Control ONSper day in addition to current TPN Rx. Will continue to monitor pt for any nutrition related changes.    4/3/2023:  Follow-up of 81 yo F pt on pureed diet. Pt now getting 250 mls of 20% fat emulsion with TPN. Noted PA progress note stating tapering TPN due to plans of d/c in next 48 hrs. Noted pt declining PEG and MBSS at this time.  Pt eating small bites of meals and Boost Glucose Control TID. Pt meeting 75 - 100% ENN at this time. No sig wt hx per chart review. Visited pt. Stated appetite is "good" and has not noticed any recent wt changes. Says UBW is typically in the 140s. RD to follow for any nutrition related changes.    4/6/2023:  Follow-up of 81 yo. Noted diet order changed from diabetic pureed to cardiac pureed. Noted pt weight gain of 28.22 lbs since 3/30/23 and moderate edema status. Pt eating 50% of meals and approx 100% of Boost Glucose Control TID which meets 75 - 100% ENN at this time. Visited pt. Pt stated appetite was better and has been enjoying the Boost shakes. No N/V/D/C at this time. RD to follow for any nutrition related changes.    Nutrition Discharge Planning: Rec'd d/c on cardiac/carbohydrate consistent diet with texture modification per MD/SLP rec's.    Nutrition Risk Screen    Nutrition Risk Screen: no indicators " "present    Nutrition/Diet History    Spiritual, Cultural Beliefs, [employer]al Practices, Values that Affect Care: no  Vitamin/Mineral/Herbal Supplements: D3, Mg ox  Food Allergies: NKFA  Factors Affecting Nutritional Intake: difficulty/impaired swallowing, chewing difficulties/inability to chew food    Anthropometrics    Temp: 98 °F (36.7 °C)  Height Method: Estimated  Height: 5' 10" (177.8 cm)  Height (inches): 70 in  Weight Method: Bed Scale  Weight: 84.4 kg (186 lb 1.1 oz)  Weight (lb): 186.07 lb  Ideal Body Weight (IBW), Female: 150 lb  % Ideal Body Weight, Female (lb): 96.67 %  BMI (Calculated): 26.7  BMI Grade: 25 - 29.9 - overweight       Lab/Procedures/Meds    Pertinent Labs Reviewed: reviewed  Pertinent Labs Comments: 4/6: RBC 2.89 (L), H/H 8.8/27.2 (L), CO2 20 (L), creatinine 0.4 (L), glucose 80 (WDL0, calcium 7.7 (L), POCT glucose 91 (WDL). 4/5: TSH 7.208 (H). 4/4: Alkaline phosphatase 35 (L).  Pertinent Medications Reviewed: reviewed  Pertinent Medications Comments: statin, ppi, apixaban, doxycycline, ferrous sulfate    Physical Findings/Assessment         Estimated/Assessed Needs    Weight Used For Calorie Calculations: 71.6 kg (157 lb 13.6 oz)  Energy Calorie Requirements (kcal): 1519.5  Energy Need Method: [place]-St Jeor (x AF 1.2)  Protein Requirements: 71.6 - 89.5 g (1 - 1.25 g/kg/day for older adults)  Weight Used For Protein Calculations: 71.6 kg (157 lb 13.6 oz)  Fluid Requirements (mL): 1519.5  Estimated Fluid Requirement Method: RDA Method (or per MD rec's)  RDA Method (mL): 1519.5  CHO Requirement: 171 - 209 g      Nutrition Prescription Ordered    Current Diet Order: Cardiac; Nectar thick  Nutrition Order Comments: Please puree foods  Oral Nutrition Supplement: Boost Glucose Control with meals    Evaluation of Received Nutrient/Fluid Intake    % Kcal Needs: 75 - 100% EEN  % Protein Needs: 75 - 100% EPN  I/O: +357 mLs (4/6)  Energy Calories Required: meeting needs  Protein Required: meeting " needs  Fluid Required: meeting needs  Comments: LBM 4/4/23  Tolerance: tolerating  % Intake of Estimated Energy Needs: 75 - 100 %  % Meal Intake: 50 - 75 %    Nutrition Risk    Level of Risk/Frequency of Follow-up: low - moderate     Monitor and Evaluation    Food and Nutrient Intake: energy intake, food and beverage intake, parenteral nutrition intake  Food and Nutrient Adminstration: diet order  Knowledge/Beliefs/Attitudes: food and nutrition knowledge/skill, beliefs and attitudes  Physical Activity and Function: nutrition-related ADLs and IADLs, factors affecting access to physical activity  Anthropometric Measurements: height/length, weight, weight change, body mass index  Biochemical Data, Medical Tests and Procedures: electrolyte and renal panel, gastrointestinal profile, glucose/endocrine profile, inflammatory profile, lipid profile  Nutrition-Focused Physical Findings: overall appearance     Nutrition Follow-Up    RD Follow-up?: Yes

## 2023-04-06 NOTE — PROGRESS NOTES
[facility] ([facility])  [facility]  Progress Note    Patient Name: Michelle Rowell  MRN: [id number]  Patient Class: IP- Inpatient   Admission Date: 3/28/2023  Length of Stay: 8 days  Attending Physician: Liv Paredes MD  Primary Care Provider: Reno Hilario MD        Subjective:     Principal Problem:Severe sepsis        HPI:  Patient is an 82 year old female with medical history of DVT, aneurysm of heart, HTN, HLD, DM type 2, CAD s/p CABG, R MCA stroke who was sent from nursing home for hypotension and confusion.  Patient has not been eating lately since her room mate was placed on hospice.  She denies fever, CP, SOB, nausea, vomiting and urinary complaints.      Admitted for concern of sepsis.  Currently on IV fluids, IV vanc/IV zosyn and levophed.          Overview/Hospital Course:  3/30 Admitted for sepsis.  Still on pressor support, clinimix and IV fluids.  Will wean levophed to maintain MAP>65.  Staph bacteremia on PCR.  Final blood cultures pending.  CTA chest negative for PE, but lower lobe pneumonia present.  Will continue IV vanc and IV zosyn.  Plan for echocardiogram today due to gram positive bacteremia and bp on lower side.       3/31 Patient slowly weaning off levophed.  Adding lipids since albumin 1.1.  Hg 7.5 with positive FOBT.  1 unit of pRBC ordered.  Will d/c Eliquis at this time for DVT.  Echo with EF 70%.  Continue IV vanc and IV zosyn for pneumonia and staph bacteremia.        4/2  Off of pressors since 8pm last night.   Refuses anything purred (ST put her on purred diet), but does drink boost TID   Hgb did drop 1 point but she is still getting IVF and bun/creatinine has dropped as well. No bloody stools. VSS  Day 5 Vanc and Zosyn. Afebrile. Repeat blood CX 3/30 NGTD    4/3  Off levophed and remains HD stable.  Will start to wean clinimix and fluids.  SLP with concern for aspiration but patient doesn't want to do MBSS.  She will continue pureed diet and not interested in  PEG.  Discussed resuming Eliquis at home dose 2.5mg BID and monitor h/h.  Staph epidermis/capitis bacteremia sensitive to doxycyline.  Will plan for 7-9 more days since she received IV vanc 6 days and IV zosyn5 days.  Plan for discharge in the next 48hours.      4/4 Transitioned from ICU to the floor overnight.  HD stable slowly discontinuing IV fluids.  Will increase Eliquis one more time in attempts for patient to have therapeutic level.  Repeat CBC tomorrow.  Likely d/c back to nursing home within the next 24 hours.        4/5 Sinus bradycardia.  Cardiology consulted and no further recommendations at this time.  Glucose 44 this morning.  D/C long acting insulin.  Switched diet from diabetic to cardiac.  Will monitor HR and glucose.  Possible d/c within the next 24 hours.         4/6 PT HD stable on room air.  Repeat CBC remains stable.  Patient has intermittent asymptomatic sinus bradycardia. Patient not interested in any further workup/procedures. Cardiology recommend holding home carvedilol.  Glucose wnl today.          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     Current Outpatient Medications on File Prior to Encounter   Medication Sig     apixaban (ELIQUIS) 2.5 mg Tab Take 2.5 mg by mouth 2 (two) times daily.    apixaban (ELIQUIS) 2.5 mg Tab Take 1 tablet (2.5 mg total) by mouth 2 (two) times a day.    carvedilol (COREG) 12.5 MG tablet Take 1 tablet (12.5 mg total) by mouth 2 (two) times daily.    carvediloL (COREG) 12.5 MG tablet Take 1 tablet (12.5 mg total) by mouth 2 (two) times a day. HOLD for pulse less than 60.    cholecalciferol, vitamin D3, (VITAMIN D3) 1,000 unit capsule Take 1,000 Units by mouth once daily.    clopidogreL (PLAVIX) 75 mg tablet Take 1 tablet (75 mg total) by mouth once daily.    insulin aspart U-100 (NOVOLOG) 100 unit/mL (3 mL) InPn pen Inject 0-5 Units into the skin every 6 (six) hours as needed (Hyperglycemia).    magnesium oxide (MAG-OX) 400 mg (241.3 mg magnesium) tablet Take 400 mg by mouth 2 (two) times daily.    metFORMIN (GLUCOPHAGE) 500 MG tablet Take 1 tablet (500 mg total) by mouth 2 (two) times a day.    mirtazapine (REMERON) 30 MG tablet TAKE 1 TABLET BY MOUTH ONCE DAILY IN THE EVENING    mirtazapine (REMERON) 30 MG tablet Take 1 tablet (30 mg total) by mouth nightly    pantoprazole (PROTONIX) 40 MG tablet Take 1 tablet (40 mg total) by mouth once daily.    ramipril (ALTACE) 2.5 MG capsule Take 2.5 mg by mouth once daily.    ramipriL (ALTACE) 5 MG capsule Take 1 capsule (5 mg total) by mouth once daily.    senna-docusate 8.6-50 mg (PERICOLACE) 8.6-50 mg per tablet Take 1 tablet by mouth once daily.    simvastatin (ZOCOR) 20 MG tablet Take 1 tablet (20 mg total) by mouth every evening.    simvastatin (ZOCOR) 40 MG tablet Take 40 mg by mouth every evening.     Family History       Problem Relation (Age of Onset)    Cancer Father    Heart disease Father    Thyroid disease Daughter          Tobacco Use    Smoking status: Former     Types: Cigarettes    Smokeless tobacco: Never    Tobacco comments:     quit in 1990s   Substance and Sexual Activity    Alcohol use: No    Drug use: No    Sexual  activity: Not Currently     Review of Systems   Constitutional:  Positive for appetite change. Negative for chills and fever.   Respiratory:  Negative for cough and shortness of breath.    Cardiovascular:  Negative for chest pain and leg swelling.   Gastrointestinal:  Negative for nausea and vomiting.   Genitourinary:  Negative for difficulty urinating and dysuria.   Musculoskeletal:  Negative for arthralgias and gait problem.   Psychiatric/Behavioral:  Negative for agitation and confusion.    Objective:     Vital Signs (Most Recent):  Temp: 98.6 °F (37 °C) (04/06/23 0725)  Pulse: (!) 59 (04/06/23 0957)  Resp: 19 (04/06/23 0725)  BP: 129/61 (04/06/23 0725)  SpO2: (!) 92 % (04/06/23 0725)   Vital Signs (24h Range):  Temp:  [97.7 °F (36.5 °C)-98.6 °F (37 °C)] 98.6 °F (37 °C)  Pulse:  [] 59  Resp:  [18-20] 19  SpO2:  [91 %-95 %] 92 %  BP: (111-132)/(56-69) 129/61     Weight: 84.4 kg (186 lb 1.1 oz)  Body mass index is 26.7 kg/m².    Physical Exam  Constitutional:       Appearance: Normal appearance.   Cardiovascular:      Rate and Rhythm: Regular rhythm. Bradycardia present.   Pulmonary:      Effort: Pulmonary effort is normal.      Breath sounds: Normal breath sounds.   Abdominal:      General: Abdomen is flat. Bowel sounds are normal.      Palpations: Abdomen is soft.   Musculoskeletal:      Right lower leg: Edema (+1) present.      Left lower leg: Edema (+1) present.   Skin:     General: Skin is warm and dry.   Neurological:      Mental Status: She is alert and oriented to person, place, and time. Mental status is at baseline.           Significant Labs: A1C:   Recent Labs   Lab 03/29/23  0430   HGBA1C 4.9       ABGs: No results for input(s): PH, PCO2, HCO3, POCSATURATED, BE, TOTALHB, COHB, METHB, O2HB, POCFIO2, PO2 in the last 48 hours.  Bilirubin:   Recent Labs   Lab 03/30/23  0316 03/31/23  0419 04/02/23  0527 04/03/23  0355 04/04/23  0553   BILITOT 0.3 0.2 0.2 0.2 0.2       Blood Culture:   No results for  input(s): LABBLOO in the last 48 hours.    BMP:   Recent Labs   Lab 04/06/23  0711   GLU 80      K 3.5      CO2 20*   BUN 15   CREATININE 0.4*   CALCIUM 7.7*   MG 1.9       CBC:   Recent Labs   Lab 04/05/23  0558 04/06/23  0709   WBC 5.55 6.19   HGB 8.3* 8.8*   HCT 25.0* 27.2*    182       CMP:   Recent Labs   Lab 04/05/23 1952 04/06/23  0711    136   K 3.6 3.5    110   CO2 22* 20*   GLU 94 80   BUN 17 15   CREATININE 0.5 0.4*   CALCIUM 7.6* 7.7*   ANIONGAP 4* 6*       Cardiac Markers: No results for input(s): CKMB, MYOGLOBIN, BNP, TROPISTAT in the last 48 hours.  Coagulation: No results for input(s): PT, INR, APTT in the last 48 hours.        Magnesium:   Recent Labs   Lab 04/05/23 1952 04/06/23  0711   MG 1.4* 1.9       POCT Glucose:   Recent Labs   Lab 04/05/23  1633 04/05/23 2002 04/06/23  0706   POCTGLUCOSE 94 94 91       Significant Imaging: CXR: mild patch bibasilar airspace disease or atelectasis     CTA chest:      1. No CT evidence of pulmonary thromboembolism.  2. Multifocal airspace disease, likely pneumonia.  3. Small bilateral pleural fluid collections.    ECHO: EF 70%     LE u/s   Impression:     Study technically difficult secondary to vascular calcifications and significant edema within the soft tissues.  There is lack of augmentation and compression of the mid left femoral vein concerning for possible thrombus.     Findings were discussed with Veronica Frank at 14:20 on3/31/2023.        Electronically signed by: Xochitl Chawla      Assessment/Plan:      * Severe sepsis  This patient does not have evidence of infective focus  My overall impression is septic shock due to MAP < 65.  Source: Unknown   Antibiotics given-   Antibiotics (72h ago, onward)    Start     Stop Route Frequency Ordered    04/03/23 0930  doxycycline tablet 100 mg         -- Oral Every 12 hours 04/03/23 0821        Latest lactate reviewed-  No results for input(s): LACTATE in the last 72  hours.  Organ dysfunction indicated by Encephalopathy     Fluid challenge Patient received 3.2 L bolus and now receiving 100cc hour of IV fluids.      Post- resuscitation assessment Yes Perfusion exam was performed within 6 hours of septic shock presentation after bolus shows Inadequate tissue perfusion assessed by invasive monitoring       Will Start Pressors- Levophed for MAP of 65  Source control achieved by: IV fluids, IV vanc and IV zosyn       No lactic acidosis or elevated procal at presentation.   + PCR blood staph.  Blood cultures gram positive cocci.    + PNA on CTA.    Echo EF 70%      4/3 Blood cultures positive for staph capitis/ staph epidermidis.  Will plan for 7-9 days of doxy po.      Resolved     Embolic stroke involving right middle cerebral artery  Continue statin   Continue Eliquis        Sinus bradycardia  Bradycardia in the 40's   Asymptomatic  [facility] consulted  Stable.    Discontinue home bb       Pneumonia  Seen on CTA chest  Received 6 days of zosyn.    Continue doxy        Bacteremia  Staph bacteremia seen on blood cultures ---sens pending   Currently on IV vanc/zosyn day   Repeat blood cultures.  NGTD   Doxycyline sensitive.  Will need additional 5 days.    Anemia  Positive FOBT  Transfuse 1 unit of PRBC   Anemia workup ordered---Fe studies c/w anemia of chronic disease. B12 ordered but not drawn. Will call lab  Repeat cbc with 2 point increase in hgb    4/3 Hg  Stable at 8.6   Start iron 325mg qd.      4/4 stable     Acute DVT (deep venous thrombosis)  Patient with history of DVT 2016, right LE. hypercoag work up negative at that time, but stayed on NOAC due to h/o PAF and CVA.   Not currently on appropriate dosage of Eliquis  CTA negative for PE    Eliquis held due to decrease in h/h   U/s now with likely DVT LLE, but difficult study technically.   Prophylactic lovenox dose started 4/1/23 and monitor h/h. Feel better with this option since has shorter half life than NOAC in case she bleeds  again and we have to stop it.    Would need to get her back on full dose NOAC as quickly as we safely can.     4/3 d/c lovenox and will try to start low dose eliquis which is patients home dose.  Will monitor h/h.      4/4 Attempt to give therapeutic dose of Eliquis today.  CBC tomorrow     4/6 H/H remains stable on Eliquis 5mg BID.            Hypoalbuminemia  Albumin 1.1  Likely contributing to hypotension  Dietician consulted (they recommend BOOST TID) and started on TPN   D/c lipids   PT does not want PEG tube or MBSS (concern for aspiration)  Continue on pureed diet       Aneurysm of heart  Echo with EF 70%      Type 2 diabetes mellitus with neurologic complication  Patient's FSGs are controlled on current medication regimen.  Last A1c reviewed-   Lab Results   Component Value Date    HGBA1C 4.9 03/29/2023     Most recent fingerstick glucose reviewed-   Recent Labs   Lab 04/05/23  1202 04/05/23  1633 04/05/23 2002 04/06/23  0706   POCTGLUCOSE 116* 94 94 91     Current correctional scale  Medium  Maintain anti-hyperglycemic dose as follows-   Antihyperglycemics (From admission, onward)    Start     Stop Route Frequency Ordered    04/05/23 0806  insulin aspart U-100 pen 0-5 Units         -- SubQ Before meals & nightly PRN 04/05/23 0806        Hold Oral hypoglycemics while patient is in the hospital.    4/4 Decreasing clinimix.  Glucose on lower side.  Will adjust determir.      4/5 Hypoglycemic this morning at 44.  D/c determir and change diabetic diet to [facility].  Monitor glucose       Essential hypertension  HD stable   Will continue to hold all home bp medications       S/P CABG x 4  Denies CP  Continue statin   Continue Eliquis         VTE Risk Mitigation (From admission, onward)         Ordered     apixaban tablet 5 mg  2 times daily         04/04/23 1009     IP VTE HIGH RISK PATIENT  Once         03/29/23 0243     Place sequential compression device  Until discontinued         03/29/23 0243     Place KANWAL  hose  Until discontinued         03/29/23 0243                Discharge Planning   CARLOS:      Code Status: DNR   Is the patient medically ready for discharge?:     Reason for patient still in hospital (select all that apply): Other (specify) discharge today  Discharge Plan A: Return to nursing home                  Veronica Frank PA-C  Department of Hospital Medicine   [facility] ([facility])

## 2023-04-06 NOTE — ASSESSMENT & PLAN NOTE
Patient's FSGs are controlled on current medication regimen.  Last A1c reviewed-   Lab Results   Component Value Date    HGBA1C 4.9 03/29/2023     Most recent fingerstick glucose reviewed-   Recent Labs   Lab 04/05/23  1202 04/05/23  1633 04/05/23 2002 04/06/23  0706   POCTGLUCOSE 116* 94 94 91     Current correctional scale  Medium  Maintain anti-hyperglycemic dose as follows-   Antihyperglycemics (From admission, onward)    Start     Stop Route Frequency Ordered    04/05/23 0806  insulin aspart U-100 pen 0-5 Units         -- SubQ Before meals & nightly PRN 04/05/23 0806        Hold Oral hypoglycemics while patient is in the hospital.    4/4 Decreasing clinimix.  Glucose on lower side.  Will adjust determir.      4/5 Hypoglycemic this morning at 44.  D/c determir and change diabetic diet to [facility].  Monitor glucose       4/6 Recommend holding all diabetic medications at discharge.  Can continue low dose s/s insulin.  Cardiac diet due to glucose on lower side and decreased po intake.

## 2023-04-06 NOTE — ASSESSMENT & PLAN NOTE
Patient's FSGs are controlled on current medication regimen.  Last A1c reviewed-   Lab Results   Component Value Date    HGBA1C 4.9 03/29/2023     Most recent fingerstick glucose reviewed-   Recent Labs   Lab 04/05/23  1202 04/05/23  1633 04/05/23 2002 04/06/23  0706   POCTGLUCOSE 116* 94 94 91     Current correctional scale  Medium  Maintain anti-hyperglycemic dose as follows-   Antihyperglycemics (From admission, onward)    Start     Stop Route Frequency Ordered    04/05/23 0806  insulin aspart U-100 pen 0-5 Units         -- SubQ Before meals & nightly PRN 04/05/23 0806        Hold Oral hypoglycemics while patient is in the hospital.    4/4 Decreasing clinimix.  Glucose on lower side.  Will adjust determir.      4/5 Hypoglycemic this morning at 44.  D/c determir and change diabetic diet to [facility].  Monitor glucose

## 2023-04-06 NOTE — ASSESSMENT & PLAN NOTE
Staph bacteremia seen on blood cultures ---sens pending   Currently on IV vanc/zosyn day   Repeat blood cultures.  NGTD   Doxycyline sensitive.  Will need additional 5 days.

## 2023-04-06 NOTE — PLAN OF CARE
04/06/23 1054   Medicare Message   Important Message from Medicare regarding Discharge Appeal Rights Given to patient/caregiver;Explained to patient/caregiver;Other (comments)  (Verbalizes understanding but unable to physically sign)   Date IMM was signed 04/06/23   Time IMM was signed 1000

## 2023-07-03 PROBLEM — J18.9 PNEUMONIA: Status: RESOLVED | Noted: 2023-01-01 | Resolved: 2023-07-03

## 2023-07-03 PROBLEM — A41.9 SEVERE SEPSIS: Status: RESOLVED | Noted: 2023-01-01 | Resolved: 2023-07-03

## 2023-07-03 PROBLEM — I63.411 EMBOLIC STROKE INVOLVING RIGHT MIDDLE CEREBRAL ARTERY: Status: RESOLVED | Noted: 2019-06-06 | Resolved: 2023-07-03

## 2023-07-03 PROBLEM — R65.20 SEVERE SEPSIS: Status: RESOLVED | Noted: 2023-01-01 | Resolved: 2023-07-03

## 2023-09-26 NOTE — MR AVS SNAPSHOT
[facility]  [address]  [facility] [address]  Phone: [phone]  Fax: [phone]                  Michelle Rowell   2017 4:15 PM   Office Visit    Description:  Female : 1940   Provider:  Reno Hilario MD   Department:  [facility]           Reason for Visit     Follow-up           Diagnoses this Visit        Comments    Essential hypertension    -  Primary     Dyslipidemia         Type 2 diabetes mellitus with diabetic neuropathy, without long-term current use of insulin                To Do List           Future Appointments        Provider Department Dept Phone    2017 8:15 AM LEAH RUBIO [facility] [phone]    2017 4:15 PM Reno Hilario MD [facility] [phone]    2017 11:30 AM Rodrick Faria MD [facility] - [facility] [phone]      Goals (5 Years of Data)     None       These Medications        Disp Refills Start End    lisinopril-hydrochlorothiazide (PRINZIDE,ZESTORETIC) 20-12.5 mg per tablet 30 tablet 5 2017    Take 1 tablet by mouth once daily. - Oral    Pharmacy: [facility] [address] [place] [address] #: [phone]         [facility] On Call     [facility] On Call Nurse Care Line -  Assistance  Registered nurses in the [facility] On Call Center provide clinical advisement, health education, appointment booking, and other advisory services.  Call for this free service at [phone].             Medications           Message regarding Medications     Verify the changes and/or additions to your medication regime listed below are the same as discussed with your clinician today.  If any of these changes or additions are incorrect, please notify your healthcare provider.        STOP taking these medications     lisinopril (PRINIVIL,ZESTRIL) 20 MG tablet Take 1 tablet by mouth once daily.           Verify that the below list of  "medications is an accurate representation of the medications you are currently taking.  If none reported, the list may be blank. If incorrect, please contact your healthcare provider. Carry this list with you in case of emergency.           Current Medications     carvedilol (COREG) 12.5 MG tablet Take 1 tablet (12.5 mg total) by mouth 2 (two) times daily.    latanoprost 0.005 % ophthalmic solution Place 1 drop into both eyes every evening.    lisinopril-hydrochlorothiazide (PRINZIDE,ZESTORETIC) 20-12.5 mg per tablet Take 1 tablet by mouth once daily.    metformin (GLUCOPHAGE) 1000 MG tablet Take 1 tablet (1,000 mg total) by mouth 2 (two) times daily with meals.    pantoprazole (PROTONIX) 40 MG tablet Take 1 tablet (40 mg total) by mouth once daily.    simvastatin (ZOCOR) 40 MG tablet TAKE ONE TABLET BY MOUTH ONCE DAILY IN THE EVENING           Clinical Reference Information           Vital Signs - Last Recorded  Most recent update: 1/25/2017  5:00 PM by Reno Hilario MD    BP Pulse Ht Wt BMI    126/76 88 5' 9" (1.753 m) 64.1 kg (141 lb 6.4 oz) 20.88 kg/m2      Blood Pressure          Most Recent Value    BP  126/76      Allergies as of 1/25/2017     No Known Allergies      Immunizations Administered on Date of Encounter - 1/25/2017     None      Orders Placed During Today's Visit     Future Labs/Procedures Expected by Expires    Comprehensive metabolic panel  4/25/2017 1/25/2018    Hemoglobin A1c  4/25/2017 1/25/2018    Lipid panel  4/25/2017 1/25/2018      " 76

## 2024-04-10 NOTE — ASSESSMENT & PLAN NOTE
Problem: Physical Therapy  Goal: Physical Therapy Goal  Description: Goals to be met by: 24     Patient will increase functional independence with mobility by performin. Supine to sit with [place]  2. Sit to supine with [place]  3. Bed to chair transfer with Modified [place] using No Assistive Device  4. Wheelchair propulsion x200 feet with Modified [place] using bilateral uppper extremities    Outcome: Ongoing, Progressing      Previous LE DVT, s/p IVC filter and on short term anticoagulation in 2016  - RLE edema noted on exam  - Repeat LE venous ultrasounds negative for DVTs  - OK to place SCDs  - IVC filter from previous admission removed, no longer present on KUB   - High risk for DVTs

## 2025-05-15 NOTE — PT/OT/SLP PROGRESS
Physical Therapy      Patient Name:  Michelle Rowell   MRN:  [id number]    Patient not seen today secondary to MD hold (Comment) Noted edema with pain upon movement on B LE jeff on left side. Discussed with FLORIDALMA Martin to hold PT tx at this  time due to possible thrombosis on left leg. Nursing made aware. Will follow-up when appropriate.     POST-OP DIAGNOSIS:  Right ovarian cyst 15-May-2025 11:05:21  Isaias Mace   POST-OP DIAGNOSIS:  Paratubal cyst 15-May-2025 12:36:14  Isaias Mace